# Patient Record
Sex: FEMALE | Race: WHITE | NOT HISPANIC OR LATINO | Employment: FULL TIME | URBAN - METROPOLITAN AREA
[De-identification: names, ages, dates, MRNs, and addresses within clinical notes are randomized per-mention and may not be internally consistent; named-entity substitution may affect disease eponyms.]

---

## 2021-03-31 DIAGNOSIS — Z23 ENCOUNTER FOR IMMUNIZATION: ICD-10-CM

## 2021-04-06 ENCOUNTER — TELEPHONE (OUTPATIENT)
Dept: ADMINISTRATIVE | Facility: OTHER | Age: 46
End: 2021-04-06

## 2021-04-06 NOTE — TELEPHONE ENCOUNTER
----- Message from Alanna Nelson sent at 4/5/2021  4:06 PM EDT -----  04/05/21 4:07 PM    Hello, our patient Ernestina Tucker has had mammogram completed/performed at the [address] [place] [address]   Please assist in updating the patient chart by   The date of service is 8/24/2020    Thank you,  APSTORA Nelson 63

## 2021-04-07 NOTE — TELEPHONE ENCOUNTER
Upon review of the In Basket request we were able to locate, review, and update the patient chart as requested for Mammogram     Any additional questions or concerns should be emailed to the Practice Liaisons via [email or web address] email, please do not reply via In Basket      Thank you  Leila Chavis MA

## 2021-04-08 ENCOUNTER — OFFICE VISIT (OUTPATIENT)
Dept: INTERNAL MEDICINE CLINIC | Facility: CLINIC | Age: 46
End: 2021-04-08
Payer: COMMERCIAL

## 2021-04-08 VITALS
TEMPERATURE: 97 F | BODY MASS INDEX: 25.1 KG/M2 | OXYGEN SATURATION: 98 % | SYSTOLIC BLOOD PRESSURE: 90 MMHG | HEART RATE: 80 BPM | DIASTOLIC BLOOD PRESSURE: 62 MMHG | HEIGHT: 64 IN | WEIGHT: 147 LBS

## 2021-04-08 DIAGNOSIS — R51.9 SINUS HEADACHE: ICD-10-CM

## 2021-04-08 DIAGNOSIS — R14.0 ABDOMINAL DISTENTION: ICD-10-CM

## 2021-04-08 DIAGNOSIS — M54.2 CHRONIC NECK PAIN: ICD-10-CM

## 2021-04-08 DIAGNOSIS — G89.29 CHRONIC BILATERAL LOW BACK PAIN, UNSPECIFIED WHETHER SCIATICA PRESENT: ICD-10-CM

## 2021-04-08 DIAGNOSIS — M54.50 CHRONIC BILATERAL LOW BACK PAIN, UNSPECIFIED WHETHER SCIATICA PRESENT: ICD-10-CM

## 2021-04-08 DIAGNOSIS — K21.9 GASTROESOPHAGEAL REFLUX DISEASE WITHOUT ESOPHAGITIS: ICD-10-CM

## 2021-04-08 DIAGNOSIS — G89.29 CHRONIC NECK PAIN: ICD-10-CM

## 2021-04-08 DIAGNOSIS — R10.10 PAIN OF UPPER ABDOMEN: ICD-10-CM

## 2021-04-08 DIAGNOSIS — Z11.4 SCREENING FOR HIV (HUMAN IMMUNODEFICIENCY VIRUS): ICD-10-CM

## 2021-04-08 DIAGNOSIS — Z00.00 ANNUAL PHYSICAL EXAM: Primary | ICD-10-CM

## 2021-04-08 PROCEDURE — 99204 OFFICE O/P NEW MOD 45 MIN: CPT | Performed by: FAMILY MEDICINE

## 2021-04-08 PROCEDURE — 3725F SCREEN DEPRESSION PERFORMED: CPT | Performed by: FAMILY MEDICINE

## 2021-04-08 RX ORDER — EPINEPHRINE 0.3 MG/.3ML
0.3 INJECTION SUBCUTANEOUS
COMMUNITY
Start: 2021-01-11 | End: 2022-05-12 | Stop reason: SDUPTHER

## 2021-04-08 RX ORDER — CETIRIZINE HYDROCHLORIDE 10 MG/1
10 TABLET ORAL DAILY
COMMUNITY
Start: 2021-01-11 | End: 2021-04-19 | Stop reason: SDUPTHER

## 2021-04-08 RX ORDER — EPINEPHRINE 0.3 MG/.3ML
INJECTION SUBCUTANEOUS
COMMUNITY
Start: 2021-01-14 | End: 2021-06-22 | Stop reason: ALTCHOICE

## 2021-04-08 RX ORDER — FLUOXETINE HYDROCHLORIDE 40 MG/1
CAPSULE ORAL
COMMUNITY
Start: 2020-11-23 | End: 2022-03-14 | Stop reason: SDUPTHER

## 2021-04-08 RX ORDER — HYDROXYZINE HYDROCHLORIDE 10 MG/1
TABLET, FILM COATED ORAL
COMMUNITY
Start: 2021-04-02 | End: 2021-04-09 | Stop reason: SDUPTHER

## 2021-04-08 RX ORDER — MECLIZINE HYDROCHLORIDE 25 MG/1
TABLET ORAL
COMMUNITY
Start: 2021-01-11 | End: 2022-03-14 | Stop reason: SDUPTHER

## 2021-04-08 RX ORDER — OMEPRAZOLE 40 MG/1
40 CAPSULE, DELAYED RELEASE ORAL DAILY
COMMUNITY
Start: 2021-03-23 | End: 2022-03-14 | Stop reason: SDUPTHER

## 2021-04-08 RX ORDER — ALBUTEROL SULFATE 90 UG/1
AEROSOL, METERED RESPIRATORY (INHALATION)
COMMUNITY
Start: 2021-03-26

## 2021-04-08 NOTE — PROGRESS NOTES
ADULT ANNUAL [address]     NAME: Tanya Draper  AGE: 54 y o  SEX: female  : 1975     DATE: 2021     Assessment and Plan:     Problem List Items Addressed This Visit     None      Visit Diagnoses     Annual physical exam    -  Primary    Relevant Orders    Vitamin B12    Vitamin D 25 hydroxy    Sinus headache        Relevant Orders    Ambulatory Referral to Otolaryngology    Screening for HIV (human immunodeficiency virus)        Gastroesophageal reflux disease without esophagitis        Relevant Medications    omeprazole (PriLOSEC) 40 MG capsule    Other Relevant Orders    Ambulatory referral to Gastroenterology    Pain of upper abdomen        Relevant Orders    Ambulatory referral to Gastroenterology    Abdominal distention        Relevant Orders    Ambulatory referral to Gastroenterology    Chronic bilateral low back pain, unspecified whether sciatica present        Chronic neck pain          Plan: referral placed  Pt to continue to follow with pain medicine concerning chronic pain  Fatigue persistent  prelmin labs in chart are relatively normal  Has elevated MCV without anemia and vit D has never been checked  Will add those studies  Immunizations and preventive care screenings were discussed with patient today  Appropriate education was printed on patient's after visit summary  Counseling:  · Exercise: the importance of regular exercise/physical activity was discussed  Recommend exercise 3-5 times per week for at least 30 minutes  BMI Counseling: Body mass index is 25 23 kg/m²  The BMI is above normal  Nutrition recommendations include encouraging healthy choices of fruits and vegetables, limiting drinks that contain sugar and reducing intake of cholesterol  Exercise recommendations include exercising 3-5 times per week             Return in about 1 year (around 2022) for Annual physical      Chief Complaint:     Chief Complaint   Patient presents with    [facility]      History of Present Illness:     Adult Annual Physical   Patient here for a comprehensive physical exam  The patient reports   Sinus migraines treid flonase, nasal spray, anithistamind but no improvement  Worse in the mornign  and abdominal pain and bloating  Worsening with over eatring  Deep knawing pain  Had an ultrasound done but it was unremarkable  Has gerd  On priolasc but doesn't help with discomfort and bloating  She already has an appointment with GI and ENT providers in the area  She needs referral for these providers  Has chronic back and neck pain  Follows with [facility] and pain management      Diet and Physical Activity  · Diet/Nutrition: consuming 3-5 servings of fruits/vegetables daily and drinks soda   · Exercise: walking  Depression Screening  PHQ-9 Depression Screening    PHQ-9:   Frequency of the following problems over the past two weeks:      Little interest or pleasure in doing things: 0 - not at all  Feeling down, depressed, or hopeless: 0 - not at all  PHQ-2 Score: 0       General Health  · Sleep: gets more than 8 hours of sleep on average  · Hearing: normal - bilateral   · Vision: goes for regular eye exams  · Dental: regular dental visits  /GYN Health  · Patient is: hystrectomy   · Last menstrual period: hysrectomy   · Last pap- gets it yearly  · Last mammo- august      Review of Systems:     Review of Systems   Constitutional: Negative for fever  HENT: Positive for sinus pressure  Respiratory: Negative for shortness of breath  Cardiovascular: Negative for leg swelling  Gastrointestinal: Positive for abdominal distention, abdominal pain and rectal pain (ent)  Negative for constipation, diarrhea and vomiting  Musculoskeletal: Positive for back pain and neck pain  Neurological: Positive for dizziness  Past Medical History:     History reviewed   No pertinent past medical history  Past Surgical History:     Past Surgical History:   Procedure Laterality Date    CYST REMOVAL  10/2020    lt breast     HYSTERECTOMY      HYSTERECTOMY  1998    LIPOSUCTION  09/2020      Social History:     E-Cigarette/Vaping    E-Cigarette Use Never User      E-Cigarette/Vaping Substances    Nicotine No     THC No     CBD No     Flavoring No     Other No     Unknown No      Social History     Socioeconomic History    Marital status: Unknown     Spouse name: None    Number of children: None    Years of education: None    Highest education level: None   Occupational History    None   Social Needs    Financial resource strain: None    Food insecurity     Worry: None     Inability: None    Transportation needs     Medical: None     Non-medical: None   Tobacco Use    Smoking status: Never Smoker    Smokeless tobacco: Never Used   Substance and Sexual Activity    Alcohol use: None    Drug use: None    Sexual activity: None   Lifestyle    Physical activity     Days per week: None     Minutes per session: None    Stress: None   Relationships    Social connections     Talks on phone: None     Gets together: None     Attends [employer] service: None     Active member of club or organization: None     Attends meetings of clubs or organizations: None     Relationship status: None    Intimate partner violence     Fear of current or ex partner: None     Emotionally abused: None     Physically abused: None     Forced sexual activity: None   Other Topics Concern    None   Social History Narrative    None      Family History:     History reviewed  No pertinent family history     Current Medications:     Current Outpatient Medications   Medication Sig Dispense Refill    albuterol (PROVENTIL HFA,VENTOLIN HFA) 90 mcg/act inhaler INHALE 2 PUFFS BY MOUTH AND INTO THE LUNGS 2 TIMES A DAY AS NEEDED FOR WHEEZING      BIOTIN EXTRA STRENGTH PO Take by mouth      cetirizine (ZyrTEC) 10 mg tablet Take 10 mg by mouth daily      Diclofenac Sodium (VOLTAREN) 1 % apply 1 APPLICATION topically four times a day      EPINEPHrine (EPIPEN) 0 3 mg/0 3 mL SOAJ inject 0 3 milliliters ( 0 3 milligrams ) intramuscularly in OUTE     (REFER TO PRESCRIPTION NOTES)   EPINEPHrine (EPIPEN) 0 3 mg/0 3 mL SOAJ Inject 0 3 mg into a muscle      FLUoxetine (PROzac) 40 MG capsule take 1 capsule by mouth daily      hydrOXYzine HCL (ATARAX) 10 mg tablet       meclizine (ANTIVERT) 25 mg tablet take 1 tablet by mouth twice a day if needed for dizziness      omeprazole (PriLOSEC) 40 MG capsule Take 40 mg by mouth daily       No current facility-administered medications for this visit  Allergies: Allergies   Allergen Reactions    Bee Venom Anaphylaxis    Iodides Other (See Comments)    [place] (Diagnostic) - Food Allergy Hives     Gi upset    [place] Oil - Food Allergy GI Intolerance    Aspirin Other (See Comments)     shaking  convulsions      Metronidazole GI Intolerance     Gi upset      Sulfamethoxazole-Trimethoprim GI Intolerance     Gi upst      Tape  [Medical Tape] Other (See Comments) and Hives     fever  Other reaction(s): Other  fever      Physical Exam:     BP 90/62 (BP Location: Left arm, Patient Position: Sitting) Comment: fsdf  Pulse 80   Temp (!) 97 °F (36 1 °C) (Tympanic) Comment: gdfgd  Ht 5' 4" (1 626 m)   Wt 66 7 kg (147 lb)   SpO2 98%   BMI 25 23 kg/m²     Physical Exam  HENT:      Head: Normocephalic and atraumatic  Right Ear: Tympanic membrane and external ear normal       Left Ear: Tympanic membrane and external ear normal       Nose: Congestion present  Eyes:      Extraocular Movements: Extraocular movements intact  Conjunctiva/sclera: Conjunctivae normal       Pupils: Pupils are equal, round, and reactive to light  Cardiovascular:      Rate and Rhythm: Normal rate and regular rhythm  Heart sounds: No murmur     Pulmonary:      Effort: Pulmonary effort is normal       Breath sounds: Normal breath sounds  Abdominal:      General: Bowel sounds are normal  There is distension (epigastric region)  Palpations: Abdomen is soft  Tenderness: There is no abdominal tenderness  There is no guarding or rebound  Musculoskeletal:      Right lower leg: No edema  Left lower leg: No edema  Skin:     General: Skin is warm  Neurological:      Mental Status: She is alert and oriented to person, place, and time        Gait: Gait normal       Deep Tendon Reflexes: Reflexes normal    Psychiatric:         Mood and Affect: Mood normal          Behavior: Behavior normal           Enma Timmons,   MEDICAL [id number]

## 2021-04-08 NOTE — PATIENT INSTRUCTIONS

## 2021-04-09 DIAGNOSIS — F41.9 ANXIETY: Primary | ICD-10-CM

## 2021-04-09 DIAGNOSIS — G47.00 INSOMNIA, UNSPECIFIED TYPE: ICD-10-CM

## 2021-04-09 RX ORDER — HYDROXYZINE HYDROCHLORIDE 10 MG/1
10 TABLET, FILM COATED ORAL
Qty: 30 TABLET | Refills: 0 | Status: SHIPPED | OUTPATIENT
Start: 2021-04-09 | End: 2021-05-25

## 2021-04-09 NOTE — TELEPHONE ENCOUNTER
----- Message from Gary Rangel sent at 4/8/2021  5:55 PM EDT -----  Regarding: Non-Urgent Medical Question  Contact: [phone]  I need my hydroxizene filled

## 2021-04-10 ENCOUNTER — APPOINTMENT (OUTPATIENT)
Dept: LAB | Facility: CLINIC | Age: 46
End: 2021-04-10
Payer: COMMERCIAL

## 2021-04-10 DIAGNOSIS — Z00.00 ANNUAL PHYSICAL EXAM: ICD-10-CM

## 2021-04-10 LAB
25(OH)D3 SERPL-MCNC: 25.4 NG/ML (ref 30–100)
VIT B12 SERPL-MCNC: 426 PG/ML (ref 100–900)

## 2021-04-10 PROCEDURE — 36415 COLL VENOUS BLD VENIPUNCTURE: CPT

## 2021-04-10 PROCEDURE — 82306 VITAMIN D 25 HYDROXY: CPT

## 2021-04-10 PROCEDURE — 82607 VITAMIN B-12: CPT

## 2021-04-12 ENCOUNTER — TELEPHONE (OUTPATIENT)
Dept: INTERNAL MEDICINE CLINIC | Facility: CLINIC | Age: 46
End: 2021-04-12

## 2021-04-12 NOTE — TELEPHONE ENCOUNTER
----- Message from Dominic Shine DO sent at 4/12/2021  9:25 AM EDT -----  Vit b12 normal  Vit d is low  Pt should start vit  D daily supplement

## 2021-04-15 ENCOUNTER — OFFICE VISIT (OUTPATIENT)
Dept: GASTROENTEROLOGY | Facility: CLINIC | Age: 46
End: 2021-04-15
Payer: COMMERCIAL

## 2021-04-15 VITALS
HEIGHT: 64 IN | SYSTOLIC BLOOD PRESSURE: 110 MMHG | HEART RATE: 78 BPM | BODY MASS INDEX: 25.61 KG/M2 | DIASTOLIC BLOOD PRESSURE: 80 MMHG | WEIGHT: 150 LBS | RESPIRATION RATE: 18 BRPM

## 2021-04-15 DIAGNOSIS — K21.9 GASTROESOPHAGEAL REFLUX DISEASE WITHOUT ESOPHAGITIS: ICD-10-CM

## 2021-04-15 DIAGNOSIS — R11.0 NAUSEA: ICD-10-CM

## 2021-04-15 DIAGNOSIS — R14.0 ABDOMINAL DISTENTION: ICD-10-CM

## 2021-04-15 DIAGNOSIS — R19.8 ALTERNATING CONSTIPATION AND DIARRHEA: Primary | ICD-10-CM

## 2021-04-15 DIAGNOSIS — R10.10 PAIN OF UPPER ABDOMEN: ICD-10-CM

## 2021-04-15 PROCEDURE — 99244 OFF/OP CNSLTJ NEW/EST MOD 40: CPT | Performed by: INTERNAL MEDICINE

## 2021-04-15 NOTE — LETTER
April 15, 2021     [facility] [address]    Patient: Gary Rangel   YOB: 1975   Date of Visit: 4/15/2021       Dear Dr Cesar Pruitt: Thank you for referring Gary Rangel to me for evaluation  Below are my notes for this consultation  If you have questions, please do not hesitate to call me  I look forward to following your patient along with you  Sincerely,        Aj Stokes MD        CC: No Recipients  Aj Stokes MD  4/15/2021  3:52 PM  Incomplete  [facility]    Dear   Dr Cesar Pruitt,     I had the pleasure of seeing your patient Gary Rangel in the office today and I thank you for this kind referral        Chief Complaint:  Abdominal lump and pain      HPI:  Gary Rangel is a 54 y o  female who presents with  Several GI issues  These appear to be associated with a lump that she feels in her mid abdomen  The patient had liposuction years ago  She has what she describes is a lump which comes and goes above the umbilicus  When it comes it is visible  It causes her pain most of the time, especially when she tries to sit up  When it is very bad she has significant GERD symptomatology  She has been taking Prilosec for years which usually works except during the episodes where she feels the lump  She has nausea but no vomiting  She has no dysphagia  She denies any weight loss or melena  No fever or chills  Her reflux symptomatology is otherwise not troubling unless the lump is present  She also notes constipation and diarrhea alternating  This is been going on for quite some time  She has a lot of low back issues  She does not take frequent nonsteroidals  She has no rectal bleeding melena or hematochezia  She has no change in the caliber of her stool  There is no family history of any GI disease  She had been gastroscoped many years ago  She has never been colonoscoped  She takes Prilosec on a daily basis 1/2 hour before her 1st meal of the day         Review of Systems:   Constitutional: No fever or chills, feels well, no tiredness, no recent weight gain or weight loss  HENT: No complaints of earache, no hearing loss, no nosebleeds, no nasal discharge, no sore throat, no hoarseness  Eyes: No complaints of eye pain, no red eyes, no discharge from eyes, no itchy eyes  Cardiovascular: No complaints of slow heart rate, no fast heart rate, no chest pain, no palpitations, no leg claudication, no lower extremity edema  Respiratory: No complaints of shortness of breath, no wheezing, no cough, no SOB on exertion, no orthopnea  Gastrointestinal: As noted in HPI  Genitourinary: No complaints of dysuria, no incontinence, no hesitancy, no nocturia  Musculoskeletal:   Chronic low back pain  Neurological: No complaints of headache, no confusion, no convulsions, no numbness or tingling, no dizziness or fainting, no limb weakness, no difficulty walking  Skin: No complaints of skin rash or skin lesions, no itching, no skin wound, no dry skin  Hematological/Lymphatic: No complaints of swollen glands, does not bleed easy  Allergic/Immunologic: No immunocompromised state  Endocrine:  No complaints of polyuria, no polydipsia  Psychiatric/Behavioral: is not suicidal, no sleep disturbances, no anxiety or depression, no change in personality, no emotional problems         Historical Information   Past Medical History:   Diagnosis Date    Degenerative joint disease     Depression     GERD (gastroesophageal reflux disease)     Hyperlipidemia     Lumbar radiculopathy     Thyroid nodule      Past Surgical History:   Procedure Laterality Date    CYST REMOVAL  10/2020    lt breast     HYSTERECTOMY      HYSTERECTOMY  1998    LIPOSUCTION  09/2020     Social History   Social History     Substance and Sexual Activity   Alcohol Use None     Social History     Substance and Sexual Activity   Drug Use Not on file     Social History     Tobacco Use   Smoking Status Never Smoker   Smokeless Tobacco Never Used     History reviewed  No pertinent family history  Current Medications: has a current medication list which includes the following prescription(s): albuterol, biotin, cetirizine, diclofenac sodium, epinephrine, fluoxetine, hydroxyzine hcl, meclizine, omeprazole, and epinephrine  Vital Signs: /80   Pulse 78   Resp 18   Ht 5' 4" (1 626 m)   Wt 68 kg (150 lb)   BMI 25 75 kg/m²     Physical Exam:   Constitutional  General Appearance: No acute distress, well appearing and well nourished  Head  Normocephalic  Eyes  Conjunctivae and lids: No swelling, erythema, or discharge  Pupils and irises: Equal, round and reactive to light  Ears, Nose, Mouth, and Throat  External inspection of ears and nose: Normal  Nasal mucosa, septum and turbinates: Normal without edema or erythema/   Oropharynx: Normal with no erythema, edema, exudate or lesions  Neck  Normal range of motion  Neck supple  Cardiovascular  Auscultation of the heart: Normal rate and rhythm, normal S1 and S2 without murmurs  Examination of the extremities for edema and/or varicosities: Normal  Pulmonary/Chest  Respiratory effort: No increased work of breathing or signs of respiratory distress  Auscultation of lungs: Clear to auscultation, equal breath sounds bilaterally, no wheezes, rales, no rhonchi  Abdomen  Abdomen:  Positive tender linear elevation above the umbilicus in the midline, positive Carnett sign  Liver and spleen: No hepatomegaly or splenomegaly  Musculoskeletal  Gait and station: normal   Digits and Nails: normal without clubbing or cyanosis  Inspection/palpation of joints, bones, and muscles: Normal  Neurological  No nystagmus or asterixis  Skin  Skin and subcutaneous tissue: Normal without rashes or lesions  Lymphatic  Palpation of the lymph nodes in neck: No lymphadenopathy  Psychiatric  Orientation to person, place and time: Normal   Mood and affect: Normal          Labs:   No results found for: ALT, AST, BUN, CALCIUM, CL, CHOL, CO2, CREATININE, GFRAA, GFRNONAA, HDL, HCT, HGB, HGBA1C, LDL, MG, PHOS, PLT, K, PSA, NA, TRIG, WBC      X-Rays & Procedures:   No orders to display         ______________________________________________________________________      Assessment & Plan:      Diagnoses and all orders for this visit:    Alternating constipation and diarrhea  -     Colonoscopy; Future    Gastroesophageal reflux disease without esophagitis  -     Ambulatory referral to Gastroenterology  -     EGD; Future    Pain of upper abdomen  -     Ambulatory referral to Gastroenterology  -     EGD; Future    Abdominal distention  -     Ambulatory referral to Gastroenterology  -     Colonoscopy; Future    Nausea  -     EGD; Future       the pain she is having in the area of her lump may indeed be a musculoskeletal  Issue or perhaps  An abdominal wall syndrome secondary to her Lipo suction, since she has a positive Carnett sign  I have advised that she see a surgeon and have given her a referral to Dr Kobe Arevalo  Concerning the chronic GERD with breakthrough as well as the nausea and alternating bowel habits I have taken liberty of scheduling her for both EGD and colonoscopy  I Will be happy to inform you of her results and further  Recommendations  I would like to thank you for allowing me to participate in her care              With warmest regards,    William Thompson MD, [facility]

## 2021-04-15 NOTE — PROGRESS NOTES
[facility]    Dear   Dr Arturo Pike,     I had the pleasure of seeing your patient Shay Domingo in the office today and I thank you for this kind referral        Chief Complaint:  Abdominal lump and pain      HPI:  Shay Domingo is a 54 y o  female who presents with  Several GI issues  These appear to be associated with a lump that she feels in her mid abdomen  The patient had liposuction years ago  She has what she describes is a lump which comes and goes above the umbilicus  When it comes it is visible  It causes her pain most of the time, especially when she tries to sit up  When it is very bad she has significant GERD symptomatology  She has been taking Prilosec for years which usually works except during the episodes where she feels the lump  She has nausea but no vomiting  She has no dysphagia  She denies any weight loss or melena  No fever or chills  Her reflux symptomatology is otherwise not troubling unless the lump is present  She also notes constipation and diarrhea alternating  This is been going on for quite some time  She has a lot of low back issues  She does not take frequent nonsteroidals  She has no rectal bleeding melena or hematochezia  She has no change in the caliber of her stool  There is no family history of any GI disease  She had been gastroscoped many years ago  She has never been colonoscoped  She takes Prilosec on a daily basis 1/2 hour before her 1st meal of the day         Review of Systems:   Constitutional: No fever or chills, feels well, no tiredness, no recent weight gain or weight loss  HENT: No complaints of earache, no hearing loss, no nosebleeds, no nasal discharge, no sore throat, no hoarseness  Eyes: No complaints of eye pain, no red eyes, no discharge from eyes, no itchy eyes    Cardiovascular: No complaints of slow heart rate, no fast heart rate, no chest pain, no palpitations, no leg claudication, no lower extremity edema  Respiratory: No complaints of shortness of breath, no wheezing, no cough, no SOB on exertion, no orthopnea  Gastrointestinal: As noted in HPI  Genitourinary: No complaints of dysuria, no incontinence, no hesitancy, no nocturia  Musculoskeletal:   Chronic low back pain  Neurological: No complaints of headache, no confusion, no convulsions, no numbness or tingling, no dizziness or fainting, no limb weakness, no difficulty walking  Skin: No complaints of skin rash or skin lesions, no itching, no skin wound, no dry skin  Hematological/Lymphatic: No complaints of swollen glands, does not bleed easy  Allergic/Immunologic: No immunocompromised state  Endocrine:  No complaints of polyuria, no polydipsia  Psychiatric/Behavioral: is not suicidal, no sleep disturbances, no anxiety or depression, no change in personality, no emotional problems  Historical Information   Past Medical History:   Diagnosis Date    Degenerative joint disease     Depression     GERD (gastroesophageal reflux disease)     Hyperlipidemia     Lumbar radiculopathy     Thyroid nodule      Past Surgical History:   Procedure Laterality Date    CYST REMOVAL  10/2020    lt breast     HYSTERECTOMY      HYSTERECTOMY  1998    LIPOSUCTION  09/2020     Social History   Social History     Substance and Sexual Activity   Alcohol Use None     Social History     Substance and Sexual Activity   Drug Use Not on file     Social History     Tobacco Use   Smoking Status Never Smoker   Smokeless Tobacco Never Used     History reviewed  No pertinent family history  Current Medications: has a current medication list which includes the following prescription(s): albuterol, biotin, cetirizine, diclofenac sodium, epinephrine, fluoxetine, hydroxyzine hcl, meclizine, omeprazole, and epinephrine         Vital Signs: /80   Pulse 78   Resp 18   Ht 5' 4" (1 626 m)   Wt 68 kg (150 lb)   BMI 25 75 kg/m²     Physical Exam: Constitutional  General Appearance: No acute distress, well appearing and well nourished  Head  Normocephalic  Eyes  Conjunctivae and lids: No swelling, erythema, or discharge  Pupils and irises: Equal, round and reactive to light  Ears, Nose, Mouth, and Throat  External inspection of ears and nose: Normal  Nasal mucosa, septum and turbinates: Normal without edema or erythema/   Oropharynx: Normal with no erythema, edema, exudate or lesions  Neck  Normal range of motion  Neck supple  Cardiovascular  Auscultation of the heart: Normal rate and rhythm, normal S1 and S2 without murmurs  Examination of the extremities for edema and/or varicosities: Normal  Pulmonary/Chest  Respiratory effort: No increased work of breathing or signs of respiratory distress  Auscultation of lungs: Clear to auscultation, equal breath sounds bilaterally, no wheezes, rales, no rhonchi  Abdomen  Abdomen:  Positive tender linear elevation above the umbilicus in the midline, positive Carnett sign  Liver and spleen: No hepatomegaly or splenomegaly  Musculoskeletal  Gait and station: normal   Digits and Nails: normal without clubbing or cyanosis  Inspection/palpation of joints, bones, and muscles: Normal  Neurological  No nystagmus or asterixis  Skin  Skin and subcutaneous tissue: Normal without rashes or lesions  Lymphatic  Palpation of the lymph nodes in neck: No lymphadenopathy  Psychiatric  Orientation to person, place and time: Normal   Mood and affect: Normal          Labs:   No results found for: ALT, AST, BUN, CALCIUM, CL, CHOL, CO2, CREATININE, GFRAA, GFRNONAA, HDL, HCT, HGB, HGBA1C, LDL, MG, PHOS, PLT, K, PSA, NA, TRIG, WBC      X-Rays & Procedures:   No orders to display         ______________________________________________________________________      Assessment & Plan:      Diagnoses and all orders for this visit:    Alternating constipation and diarrhea  -     Colonoscopy;  Future    Gastroesophageal reflux disease without esophagitis  -     Ambulatory referral to Gastroenterology  -     EGD; Future    Pain of upper abdomen  -     Ambulatory referral to Gastroenterology  -     EGD; Future    Abdominal distention  -     Ambulatory referral to Gastroenterology  -     Colonoscopy; Future    Nausea  -     EGD; Future       the pain she is having in the area of her lump may indeed be a musculoskeletal  Issue or perhaps  An abdominal wall syndrome secondary to her Lipo suction, since she has a positive Carnett sign  I have advised that she see a surgeon and have given her a referral to Dr Ronn Joshi  Concerning the chronic GERD with breakthrough as well as the nausea and alternating bowel habits I have taken liberty of scheduling her for both EGD and colonoscopy  I Will be happy to inform you of her results and further  Recommendations  I would like to thank you for allowing me to participate in her care              With warmest regards,    Kelly Barroso MD, [facility]

## 2021-04-16 ENCOUNTER — TELEPHONE (OUTPATIENT)
Dept: INTERNAL MEDICINE CLINIC | Facility: CLINIC | Age: 46
End: 2021-04-16

## 2021-04-16 DIAGNOSIS — R14.0 ABDOMINAL DISTENTION: ICD-10-CM

## 2021-04-16 DIAGNOSIS — R10.10 PAIN OF UPPER ABDOMEN: Primary | ICD-10-CM

## 2021-04-16 NOTE — TELEPHONE ENCOUNTER
Dr SIM \[facility]\"" referred pt to GI, who she saw yesterday for a lump in her stomach    he referred her to surgeon, Dr Sven Kessler Side she was then told that they can't accept the referral for surgery from GI, it has to come from her pcp  She is asking if Dr SIM \[facility]\"" would do the referral  She has an appt w/Dr Sven Mcdonnell for eval 4/23 @ 11:15    Pls advise pt of status of this request

## 2021-04-19 DIAGNOSIS — T78.40XD ALLERGY, SUBSEQUENT ENCOUNTER: Primary | ICD-10-CM

## 2021-04-19 RX ORDER — CETIRIZINE HYDROCHLORIDE 10 MG/1
10 TABLET ORAL DAILY
Qty: 90 TABLET | Refills: 1 | Status: SHIPPED | OUTPATIENT
Start: 2021-04-19 | End: 2021-10-23

## 2021-04-19 NOTE — TELEPHONE ENCOUNTER
----- Message from Nicole Patino sent at 4/19/2021  8:40 AM EDT -----  Regarding: Non-Urgent Medical Question  Contact: [phone]  I need a refill on my Zyrtec   Thanx No

## 2021-04-22 ENCOUNTER — TELEPHONE (OUTPATIENT)
Dept: INTERNAL MEDICINE CLINIC | Facility: CLINIC | Age: 46
End: 2021-04-22

## 2021-04-22 DIAGNOSIS — M54.50 CHRONIC BILATERAL LOW BACK PAIN, UNSPECIFIED WHETHER SCIATICA PRESENT: Primary | ICD-10-CM

## 2021-04-22 DIAGNOSIS — G89.29 CHRONIC NECK PAIN: ICD-10-CM

## 2021-04-22 DIAGNOSIS — M54.2 CHRONIC NECK PAIN: ICD-10-CM

## 2021-04-22 DIAGNOSIS — G89.29 CHRONIC BILATERAL LOW BACK PAIN, UNSPECIFIED WHETHER SCIATICA PRESENT: Primary | ICD-10-CM

## 2021-04-22 NOTE — TELEPHONE ENCOUNTER
Patient said she was referred to [facility]  who doesn't take her ins and she needs Dr Claude Torres to refer her to [address] [place] [address]'s spine & pain for her back & neck problem  Joshua Nuñez

## 2021-04-23 ENCOUNTER — TELEPHONE (OUTPATIENT)
Dept: PHYSICAL THERAPY | Facility: OTHER | Age: 46
End: 2021-04-23

## 2021-04-23 ENCOUNTER — CONSULT (OUTPATIENT)
Dept: SURGERY | Facility: CLINIC | Age: 46
End: 2021-04-23
Payer: COMMERCIAL

## 2021-04-23 VITALS
DIASTOLIC BLOOD PRESSURE: 68 MMHG | SYSTOLIC BLOOD PRESSURE: 118 MMHG | HEART RATE: 82 BPM | BODY MASS INDEX: 25.95 KG/M2 | HEIGHT: 64 IN | WEIGHT: 152 LBS | TEMPERATURE: 98.7 F

## 2021-04-23 DIAGNOSIS — R10.84 GENERALIZED ABDOMINAL PAIN: Primary | ICD-10-CM

## 2021-04-23 DIAGNOSIS — R19.06 EPIGASTRIC LUMP: Chronic | ICD-10-CM

## 2021-04-23 DIAGNOSIS — R10.10 PAIN OF UPPER ABDOMEN: ICD-10-CM

## 2021-04-23 PROCEDURE — 99244 OFF/OP CNSLTJ NEW/EST MOD 40: CPT | Performed by: SURGERY

## 2021-04-23 PROCEDURE — 1036F TOBACCO NON-USER: CPT | Performed by: SURGERY

## 2021-04-23 PROCEDURE — 3008F BODY MASS INDEX DOCD: CPT | Performed by: SURGERY

## 2021-04-23 NOTE — PROGRESS NOTES
Consult- General Surgery   Daniela Thomas 54 y o  female MRN: [id number]  Unit/Bed#:  Encounter: [id number]    Assessment/Plan     Assessment:   epigastric abdominal pain, painful lump   History of depression  GERD   Hyperlipidemia  Plan:  The patient is given a myriad of symptoms, nonspecific  I will get a CT scan of the abdomen and pelvis to investigate  The patient is allergic to IV contrast, she will get pre CT scan medication to avoid reaction  The patient will return to my office in a couple weeks for follow-up  History of Present Illness     HPI:  Daniela Thomas is a 54 y o  female who presents   To my office  For evaluation of epigastric lump  The patient noticed this lump September 2020, she also had liposuction September 2020  The lump has increased in size, causing discomfort and pain depending on the physical activity  Once the pain starts she started complaining of nausea without vomiting and radiated to the esophagus area  She is also complaining of loose bowel movement intermittent with constipation  She has been evaluated by GI and she is scheduled to have EGD and colonoscopy in the near future she never had imaging studies  Review of Systems   All other systems reviewed and are negative        Historical Information   Past Medical History:   Diagnosis Date    Degenerative joint disease     Depression     GERD (gastroesophageal reflux disease)     Hyperlipidemia     Lumbar radiculopathy     Thyroid nodule      Past Surgical History:   Procedure Laterality Date    CYST REMOVAL  10/2020    lt breast     HYSTERECTOMY      HYSTERECTOMY  1998    LIPOSUCTION  09/2020     Social History   Social History     Substance and Sexual Activity   Alcohol Use None     Social History     Substance and Sexual Activity   Drug Use Not on file     Social History     Tobacco Use   Smoking Status Never Smoker   Smokeless Tobacco Never Used     Family History: non-contributory    Meds/Allergies all medications and allergies reviewed     Current Outpatient Medications:     albuterol (PROVENTIL HFA,VENTOLIN HFA) 90 mcg/act inhaler, INHALE 2 PUFFS BY MOUTH AND INTO THE LUNGS 2 TIMES A DAY AS NEEDED FOR WHEEZING, Disp: , Rfl:     BIOTIN EXTRA STRENGTH PO, Take by mouth, Disp: , Rfl:     cetirizine (ZyrTEC) 10 mg tablet, Take 1 tablet (10 mg total) by mouth daily, Disp: 90 tablet, Rfl: 1    Diclofenac Sodium (VOLTAREN) 1 %, apply 1 APPLICATION topically four times a day, Disp: , Rfl:     EPINEPHrine (EPIPEN) 0 3 mg/0 3 mL SOAJ, Inject 0 3 mg into a muscle, Disp: , Rfl:     FLUoxetine (PROzac) 40 MG capsule, take 1 capsule by mouth daily, Disp: , Rfl:     hydrOXYzine HCL (ATARAX) 10 mg tablet, Take 1 tablet (10 mg total) by mouth daily at bedtime, Disp: 30 tablet, Rfl: 0    meclizine (ANTIVERT) 25 mg tablet, take 1 tablet by mouth twice a day if needed for dizziness, Disp: , Rfl:     omeprazole (PriLOSEC) 40 MG capsule, Take 40 mg by mouth daily, Disp: , Rfl:     EPINEPHrine (EPIPEN) 0 3 mg/0 3 mL SOAJ, inject 0 3 milliliters ( 0 3 milligrams ) intramuscularly in OUTE     (REFER TO PRESCRIPTION NOTES)  , Disp: , Rfl:   Allergies   Allergen Reactions    Bee Venom Anaphylaxis    Iodides Other (See Comments)    Layton (Diagnostic) - Food Allergy Hives     Gi upset    Layton Oil - Food Allergy GI Intolerance    Aspirin Other (See Comments)     shaking  convulsions      Metronidazole GI Intolerance     Gi upset      Sulfamethoxazole-Trimethoprim GI Intolerance     Gi upst      Tape  [Medical Tape] Other (See Comments) and Hives     fever  Other reaction(s):  Other  fever       Objective     Current Vitals:   Blood Pressure: 118/68 (04/23/21 1108)  Pulse: 82 (04/23/21 1108)  Temperature: 98 7 °F (37 1 °C) (04/23/21 1108)  Temp Source: Temporal (04/23/21 1108)  Height: 5' 4" (162 6 cm) (04/23/21 1108)  Weight - Scale: 68 9 kg (152 lb) (04/23/21 1108)      Physical Exam  Vitals signs and nursing note reviewed  Constitutional:       General: She is not in acute distress  Cardiovascular:      Rate and Rhythm: Normal rate and regular rhythm  Heart sounds: Normal heart sounds  No murmur  Pulmonary:      Effort: No respiratory distress  Breath sounds: Normal breath sounds  Abdominal:      Comments: Abdomen is soft, nondistended, mild to moderate tenderness in the supraumbilical region with fullness without obvious hernia noted  There is also generalized tenderness without guarding or rebound  There is no obvious mass palpable or visceromegaly noted  The patient has a Pfannenstiel surgical scar from hysterectomy  Skin:     General: Skin is warm  Coloration: Skin is not jaundiced  Findings: No erythema or rash  Neurological:      Mental Status: She is alert and oriented to person, place, and time  Cranial Nerves: No cranial nerve deficit     Psychiatric:         Mood and Affect: Mood normal          Behavior: Behavior normal

## 2021-04-27 ENCOUNTER — NURSE TRIAGE (OUTPATIENT)
Dept: PHYSICAL THERAPY | Facility: OTHER | Age: 46
End: 2021-04-27

## 2021-04-27 DIAGNOSIS — M54.41 CHRONIC BILATERAL LOW BACK PAIN WITH RIGHT-SIDED SCIATICA: Primary | ICD-10-CM

## 2021-04-27 DIAGNOSIS — G89.29 CHRONIC BILATERAL LOW BACK PAIN WITH RIGHT-SIDED SCIATICA: Primary | ICD-10-CM

## 2021-04-27 NOTE — TELEPHONE ENCOUNTER
Additional Information   Negative: Is this related to a work injury?  Negative: Is this related to an MVA?  Negative: Are you currently recieving homecare services?  Negative: Has the patient had unexplained weight loss?  Negative: Does the patient have a fever?  Negative: Is the patient experiencing blood in sputum?  Negative: Is the patient experiencing urine retention?  Negative: Is the patient experiencing acute drop foot or paralysis?  Negative: Has the patient experienced major trauma? (fall from height, high speed collision, direct blow to spine) and is also experiencing nausea, light-headedness, or loss of consciousness?  Affirmative: Is this a chronic condition? Background - Initial Assessment  Clinical complaint: bilateral lower back pain which radiates down the right leg to the foot at times  States she has a history of chronic back pain for over 20 years  States she has seen pain management for injections in the past  States she recently moved to this area and needs to establish with a new Pain Management group  Date of onset: 20 years  Frequency of pain: constant  Quality of pain: aching, sharp, shooting, stabbing and throbbing    Protocols used: SL [facility] COMPREHENSIVE SPINE PROGRAM PROTOCOL    This RN did review in detail the [facility] and what we can provide for their back pain  Patient is agreeable to being triaged by this RN and would like to have an evaluation with [address] [place] [address]  Patient is declining a PT eval at this time stating she has done PT in the past and was not helpful  Patient informed that I will be placing a referral to [facility] and that their office will be contacting her to discuss her appointment  No further questions and/or concerns were voiced by the patient at this time  Patient states understanding of the referral that was placed  Referral Closed

## 2021-04-28 ENCOUNTER — TELEPHONE (OUTPATIENT)
Dept: INTERNAL MEDICINE CLINIC | Facility: CLINIC | Age: 46
End: 2021-04-28

## 2021-05-07 ENCOUNTER — PATIENT MESSAGE (OUTPATIENT)
Dept: INTERNAL MEDICINE CLINIC | Facility: CLINIC | Age: 46
End: 2021-05-07

## 2021-05-12 ENCOUNTER — TELEPHONE (OUTPATIENT)
Dept: GASTROENTEROLOGY | Facility: CLINIC | Age: 46
End: 2021-05-12

## 2021-05-20 ENCOUNTER — TELEPHONE (OUTPATIENT)
Dept: INTERVENTIONAL RADIOLOGY/VASCULAR | Facility: HOSPITAL | Age: 46
End: 2021-05-20

## 2021-05-21 ENCOUNTER — TELEPHONE (OUTPATIENT)
Dept: INTERVENTIONAL RADIOLOGY/VASCULAR | Facility: HOSPITAL | Age: 46
End: 2021-05-21

## 2021-05-21 NOTE — NURSING NOTE
Pt was called to discuss prep medication instructions for upcoming CT abd/pelvis with PO and IV contrast and pt verbalized concerns regarding contrast stating "I can't take steroids  " pt describes "they make me sweat, a fast heart rate, I get anxious and hot" Pt also verbalized concern with drinking the po contrast stating "I don't think I'll be able to tolerate drinking the contrast"  Pt is unsure of what to do as she knows she needs the test to be done  I will reach out to the ordering provider for them to discuss other options with the patient   Pt verbalized no issues with MRI contrast

## 2021-05-21 NOTE — TELEPHONE ENCOUNTER
Nurse Malcom Fontana called to inform us of the message listed above  Please review and advise as to how to move forward with this pt   Thank you

## 2021-05-24 ENCOUNTER — TELEPHONE (OUTPATIENT)
Dept: INTERVENTIONAL RADIOLOGY/VASCULAR | Facility: HOSPITAL | Age: 46
End: 2021-05-24

## 2021-05-25 DIAGNOSIS — G47.00 INSOMNIA, UNSPECIFIED TYPE: ICD-10-CM

## 2021-05-25 RX ORDER — HYDROXYZINE HYDROCHLORIDE 10 MG/1
TABLET, FILM COATED ORAL
Qty: 30 TABLET | Refills: 0 | Status: SHIPPED | OUTPATIENT
Start: 2021-05-25 | End: 2021-06-28

## 2021-06-03 ENCOUNTER — HOSPITAL ENCOUNTER (OUTPATIENT)
Dept: CT IMAGING | Facility: HOSPITAL | Age: 46
Discharge: HOME | End: 2021-06-03
Attending: SURGERY
Payer: COMMERCIAL

## 2021-06-03 DIAGNOSIS — R10.84 GENERALIZED ABDOMINAL PAIN: ICD-10-CM

## 2021-06-03 DIAGNOSIS — R10.10 PAIN OF UPPER ABDOMEN: ICD-10-CM

## 2021-06-03 PROCEDURE — G1004 CDSM NDSC: HCPCS

## 2021-06-03 PROCEDURE — 74176 CT ABD & PELVIS W/O CONTRAST: CPT

## 2021-06-15 ENCOUNTER — HOSPITAL ENCOUNTER (EMERGENCY)
Facility: HOSPITAL | Age: 46
Discharge: HOME | End: 2021-06-15
Attending: EMERGENCY MEDICINE
Payer: COMMERCIAL

## 2021-06-15 VITALS
TEMPERATURE: 98.1 F | HEART RATE: 78 BPM | DIASTOLIC BLOOD PRESSURE: 70 MMHG | OXYGEN SATURATION: 100 % | BODY MASS INDEX: 26.07 KG/M2 | SYSTOLIC BLOOD PRESSURE: 146 MMHG | RESPIRATION RATE: 16 BRPM | WEIGHT: 151.9 LBS

## 2021-06-15 DIAGNOSIS — M54.31 SCIATICA, RIGHT SIDE: ICD-10-CM

## 2021-06-15 DIAGNOSIS — G89.29 ACUTE EXACERBATION OF CHRONIC LOW BACK PAIN: Primary | ICD-10-CM

## 2021-06-15 DIAGNOSIS — M54.50 ACUTE EXACERBATION OF CHRONIC LOW BACK PAIN: Primary | ICD-10-CM

## 2021-06-15 PROCEDURE — 99283 EMERGENCY DEPT VISIT LOW MDM: CPT

## 2021-06-15 PROCEDURE — 99285 EMERGENCY DEPT VISIT HI MDM: CPT | Performed by: EMERGENCY MEDICINE

## 2021-06-15 RX ORDER — PREDNISONE 10 MG/1
TABLET ORAL
Qty: 40 TABLET | Refills: 0 | Status: SHIPPED | OUTPATIENT
Start: 2021-06-15 | End: 2021-06-25

## 2021-06-15 RX ORDER — DIAZEPAM 5 MG/1
5 TABLET ORAL EVERY 8 HOURS PRN
Qty: 20 TABLET | Refills: 0 | Status: SHIPPED | OUTPATIENT
Start: 2021-06-15 | End: 2021-09-01

## 2021-06-15 RX ORDER — IBUPROFEN 600 MG/1
600 TABLET ORAL EVERY 6 HOURS PRN
Qty: 60 TABLET | Refills: 0 | Status: SHIPPED | OUTPATIENT
Start: 2021-06-15 | End: 2022-03-14

## 2021-06-15 RX ORDER — IBUPROFEN 600 MG/1
600 TABLET ORAL ONCE
Status: COMPLETED | OUTPATIENT
Start: 2021-06-15 | End: 2021-06-15

## 2021-06-15 RX ORDER — LIDOCAINE 50 MG/G
1 PATCH TOPICAL ONCE
Status: DISCONTINUED | OUTPATIENT
Start: 2021-06-15 | End: 2021-06-15 | Stop reason: HOSPADM

## 2021-06-15 RX ORDER — PREDNISONE 20 MG/1
60 TABLET ORAL ONCE
Status: DISCONTINUED | OUTPATIENT
Start: 2021-06-15 | End: 2021-06-15 | Stop reason: HOSPADM

## 2021-06-15 RX ADMIN — IBUPROFEN 600 MG: 600 TABLET ORAL at 10:00

## 2021-06-15 RX ADMIN — LIDOCAINE 5% 1 PATCH: 700 PATCH TOPICAL at 10:00

## 2021-06-15 NOTE — DISCHARGE INSTRUCTIONS
Do not drive while taking valium  The other medications you can drive on  Use Ibuprofen as prescribed WITH tylenol 650mg for further pain control       Call orthopedics and let them know you were in the ED because of pain, see if any cancelations have become available

## 2021-06-15 NOTE — ED PROVIDER NOTES
History  Chief Complaint   Patient presents with    Back Pain     pt with chronic back pain, has been having a flare up for a week      76-year-old female, chronic back pain, presents with acute exacerbation of her chronic back pain  She has been having exacerbation for approximately 1 week  She denies any movement or injury to her back that started the exacerbation  Patient is now complaining of right-sided sciatica as well  Patient denies numbness, tingling  She has no fevers or chills, no loss of control of bladder or bowel, no urinary symptoms  No saddle anesthesia  She states that it is very helpful for her to take ibuprofen 600 mg however she ran out of this medication  Back Pain  Associated symptoms: no abdominal pain, no dysuria, no fever, no numbness (No saddle anesthesia) and no weakness        Prior to Admission Medications   Prescriptions Last Dose Informant Patient Reported? Taking?    BIOTIN EXTRA STRENGTH PO  Self Yes No   Sig: Take by mouth   Diclofenac Sodium (VOLTAREN) 1 %  Self Yes No   Sig: apply 1 APPLICATION topically four times a day   EPINEPHrine (EPIPEN) 0 3 mg/0 3 mL SOAJ  Self Yes No   Sig: Inject 0 3 mg into a muscle   FLUoxetine (PROzac) 40 MG capsule  Self Yes No   Sig: take 1 capsule by mouth daily   albuterol (PROVENTIL HFA,VENTOLIN HFA) 90 mcg/act inhaler  Self Yes No   Sig: INHALE 2 PUFFS BY MOUTH AND INTO THE LUNGS 2 TIMES A DAY AS NEEDED FOR WHEEZING   cetirizine (ZyrTEC) 10 mg tablet  Self No No   Sig: Take 1 tablet (10 mg total) by mouth daily   meclizine (ANTIVERT) 25 mg tablet  Self Yes No   Sig: take 1 tablet by mouth twice a day if needed for dizziness   omeprazole (PriLOSEC) 40 MG capsule  Self Yes No   Sig: Take 40 mg by mouth daily      Facility-Administered Medications: None       Past Medical History:   Diagnosis Date    Degenerative joint disease     Depression     GERD (gastroesophageal reflux disease)     Hyperlipidemia     Lumbar radiculopathy  Thyroid nodule        Past Surgical History:   Procedure Laterality Date    CYST REMOVAL  10/2020    lt breast     HYSTERECTOMY      HYSTERECTOMY  1998    LIPOSUCTION  09/2020       History reviewed  No pertinent family history  I have reviewed and agree with the history as documented  E-Cigarette/Vaping    E-Cigarette Use Never User      E-Cigarette/Vaping Substances    Nicotine No     THC No     CBD No     Flavoring No     Other No     Unknown No      Social History     Tobacco Use    Smoking status: Never Smoker    Smokeless tobacco: Never Used   Vaping Use    Vaping Use: Never used   Substance Use Topics    Alcohol use: Not on file    Drug use: Not on file       Review of Systems   Constitutional: Negative for chills and fever  Gastrointestinal: Negative for abdominal pain, nausea and vomiting  Genitourinary: Negative for difficulty urinating, dysuria, flank pain and urgency  Musculoskeletal: Positive for back pain ( lumbar associated with right-sided sciatica)  Negative for arthralgias, gait problem and myalgias  Skin: Negative for rash and wound  Neurological: Negative for weakness and numbness (No saddle anesthesia)  All other systems reviewed and are negative  Physical Exam  Physical Exam  Vitals reviewed  Constitutional:       General: She is not in acute distress  Appearance: She is well-developed  She is not ill-appearing, toxic-appearing or diaphoretic  HENT:      Head: Normocephalic and atraumatic  Eyes:      Conjunctiva/sclera: Conjunctivae normal    Pulmonary:      Effort: Pulmonary effort is normal  No respiratory distress  Abdominal:      General: Abdomen is flat  There is no distension  Tenderness: There is no abdominal tenderness  There is no right CVA tenderness, left CVA tenderness or guarding  Musculoskeletal:         General: Tenderness (Lumbar, with right-sided sciatica  Positive straight leg raise at 10°  No numbness, no weakness  No focal neurologic deficit ) present  Cervical back: Normal range of motion  Skin:     General: Skin is warm and dry  Coloration: Skin is not pale  Findings: No bruising, erythema or rash  Neurological:      General: No focal deficit present  Mental Status: She is alert and oriented to person, place, and time  Cranial Nerves: No cranial nerve deficit  Sensory: No sensory deficit  Motor: No weakness  Psychiatric:         Behavior: Behavior normal          Vital Signs  ED Triage Vitals   Temperature Pulse Respirations Blood Pressure SpO2   06/15/21 0921 06/15/21 0921 06/15/21 0921 06/15/21 0921 06/15/21 0921   98 1 °F (36 7 °C) 78 16 146/70 100 %      Temp src Heart Rate Source Patient Position - Orthostatic VS BP Location FiO2 (%)   -- -- -- -- --             Pain Score       06/15/21 1000       Worst Possible Pain           Vitals:    06/15/21 0921   BP: 146/70   Pulse: 78         Visual Acuity      ED Medications  Medications   ibuprofen (MOTRIN) tablet 600 mg (600 mg Oral Given 6/15/21 1000)       Diagnostic Studies  Results Reviewed     None                 No orders to display              Procedures  Procedures         ED Course                             SBIRT 20yo+      Most Recent Value   SBIRT (24 yo +)   In order to provide better care to our patients, we are screening all of our patients for alcohol and drug use  Would it be okay to ask you these screening questions? No Filed at: 06/15/2021 0925                    MDM  Number of Diagnoses or Management Options  Acute exacerbation of chronic low back pain  Sciatica, right side  Diagnosis management comments: Acute exacerbation of chronic low back pain causing right-sided sciatica  No acute trauma that started the event, does not need imaging  No red flag symptoms, will treat symptomatically  Patient is given 600 mg ibuprofen as requested    Will discharge with anti-inflammatory medication, muscle relaxation, steroids  Advised follow-up with [facility]  Discharge in stable condition, advised return precaution in case of worsening pain or neuro deficit  feeling improved after ibuprofen given here  Disposition  Final diagnoses:   Acute exacerbation of chronic low back pain   Sciatica, right side     Time reflects when diagnosis was documented in both MDM as applicable and the Disposition within this note     Time User Action Codes Description Comment    6/15/2021  9:49 AM Antwan Pennington Add [M54 5,  G89 29] Acute exacerbation of chronic low back pain     6/15/2021  9:49 AM Antwan Pennington Add [M54 31] Sciatica, right side       ED Disposition     ED Disposition Condition Date/Time Comment    Discharge Stable Tue Troy 15, 2021  9:49 AM Domonique Ospina discharge to home/self care              Follow-up Information     Follow up With Specialties Details Why Contact Info Additional Information    [facility] Emergency Department Emergency Medicine  If symptoms worsen: loss of control of bladder/bowel, unbearable pain, numbness, weakness, etc [address] [facility] Emergency Department, [address] [place] [address] Comprehensive Spine Program Physical Therapy   [phone]          Discharge Medication List as of 6/15/2021  9:56 AM      START taking these medications    Details   diazepam (VALIUM) 5 mg tablet Take 1 tablet (5 mg total) by mouth every 8 (eight) hours as needed (Low back pain) for up to 10 days, Starting Tue 6/15/2021, Until Fri 6/25/2021 at 2359, Normal      ibuprofen (MOTRIN) 600 mg tablet Take 1 tablet (600 mg total) by mouth every 6 (six) hours as needed for moderate pain, Starting Tue 6/15/2021, Normal      predniSONE 10 mg tablet Multiple Dosages:Starting Tue 6/15/2021, Until Thu 6/17/2021 at 2359, THEN Starting Fri 6/18/2021, Until Sun 6/20/2021 at 2359, THEN Starting Mon 6/21/2021, Until Tue 6/22/2021 at 2359, THEN Starting Wed 6/23/2021, Until Thu 6/24/2021 at 2359Take 6  tablets (60 mg total) by mouth daily for 3 days, THEN 4 tablets (40 mg total) daily for 3 days, THEN 2 tablets (20 mg total) daily for 2 days, THEN 1 tablet (10 mg total) daily for 2 days  , Print         CONTINUE these medications which have NOT CHANGED    Details   albuterol (PROVENTIL HFA,VENTOLIN HFA) 90 mcg/act inhaler INHALE 2 PUFFS BY MOUTH AND INTO THE LUNGS 2 TIMES A DAY AS NEEDED FOR WHEEZING, Historical Med      BIOTIN EXTRA STRENGTH PO Take by mouth, Historical Med      cetirizine (ZyrTEC) 10 mg tablet Take 1 tablet (10 mg total) by mouth daily, Starting Mon 4/19/2021, Normal      Diclofenac Sodium (VOLTAREN) 1 % apply 1 APPLICATION topically four times a day, Historical Med      !! EPINEPHrine (EPIPEN) 0 3 mg/0 3 mL SOAJ Inject 0 3 mg into a muscle, Starting Mon 1/11/2021, Historical Med      FLUoxetine (PROzac) 40 MG capsule take 1 capsule by mouth daily, Historical Med      meclizine (ANTIVERT) 25 mg tablet take 1 tablet by mouth twice a day if needed for dizziness, Historical Med      omeprazole (PriLOSEC) 40 MG capsule Take 40 mg by mouth daily, Starting Tue 3/23/2021, Historical Med      !! EPINEPHrine (EPIPEN) 0 3 mg/0 3 mL SOAJ inject 0 3 milliliters ( 0 3 milligrams ) intramuscularly in OUTE     (REFER TO PRESCRIPTION NOTES)  , Historical Med      hydrOXYzine HCL (ATARAX) 10 mg tablet take 1 tablet by mouth at bedtime, Normal       !! - Potential duplicate medications found  Please discuss with provider              PDMP Review     None          ED Provider  Electronically Signed by           Kalani Kong DO  07/01/21 3173

## 2021-06-16 NOTE — TELEPHONE ENCOUNTER
Jazmyn Velásquez 5/7/2021 11:28 AM EDT      ----- Message -----  From: Ivette Juárez  Sent: 5/7/2021 11:03 AM EDT  To: [facility] Clinical  Subject: Non-Urgent Medical Question     I need [facility]eral to [facility] [place]  Had an eye dr appointment yesterday so they need it for insurance reasons  phone number [phone]   Thank libby

## 2021-06-22 ENCOUNTER — CONSULT (OUTPATIENT)
Dept: PAIN MEDICINE | Facility: CLINIC | Age: 46
End: 2021-06-22
Payer: COMMERCIAL

## 2021-06-22 ENCOUNTER — TELEPHONE (OUTPATIENT)
Dept: PHYSICAL THERAPY | Facility: OTHER | Age: 46
End: 2021-06-22

## 2021-06-22 ENCOUNTER — TELEPHONE (OUTPATIENT)
Dept: SURGERY | Facility: CLINIC | Age: 46
End: 2021-06-22

## 2021-06-22 VITALS
SYSTOLIC BLOOD PRESSURE: 118 MMHG | HEIGHT: 64 IN | BODY MASS INDEX: 25.95 KG/M2 | WEIGHT: 152 LBS | DIASTOLIC BLOOD PRESSURE: 76 MMHG | HEART RATE: 67 BPM

## 2021-06-22 DIAGNOSIS — G89.29 CHRONIC BILATERAL LOW BACK PAIN WITH RIGHT-SIDED SCIATICA: ICD-10-CM

## 2021-06-22 DIAGNOSIS — G89.4 CHRONIC PAIN SYNDROME: Primary | ICD-10-CM

## 2021-06-22 DIAGNOSIS — M51.36 LUMBAR DEGENERATIVE DISC DISEASE: ICD-10-CM

## 2021-06-22 DIAGNOSIS — M54.41 CHRONIC BILATERAL LOW BACK PAIN WITH RIGHT-SIDED SCIATICA: ICD-10-CM

## 2021-06-22 DIAGNOSIS — M54.16 LUMBAR RADICULOPATHY: ICD-10-CM

## 2021-06-22 PROBLEM — M51.369 LUMBAR DEGENERATIVE DISC DISEASE: Status: ACTIVE | Noted: 2021-06-22

## 2021-06-22 PROCEDURE — 99244 OFF/OP CNSLTJ NEW/EST MOD 40: CPT | Performed by: ANESTHESIOLOGY

## 2021-06-22 NOTE — TELEPHONE ENCOUNTER
Patient called to cancel and reschedule tomorrow's appointment due to work  She would like a call back to discuss the test results if possible  You can call anytime  [phone]  Patient did make an appointment for 7/14 but prefer a call sooner

## 2021-06-22 NOTE — PATIENT INSTRUCTIONS
Back Pain   WHAT YOU NEED TO KNOW:   What do I need to know about back pain? Back pain is common  You may feel sore or stiff on one or both sides of your back  The pain may spread to your buttocks or thighs  What causes or increases my risk for back pain? Conditions that affect the spine, joints, or muscles can cause back pain  These may include arthritis, spinal stenosis (narrowing of the spinal column), muscle tension, or breakdown of the spinal discs  The following increase your risk of back pain:  · Repeated bending, lifting, or twisting, or lifting heavy items    · Injury from a fall or accident    · Lack of regular physical activity     · Obesity or pregnancy     · Smoking    · Aging    · Driving, sitting, or standing for long periods    · Bad posture while sitting or standing    How is back pain diagnosed? Your healthcare provider will ask if you have any medical conditions  He or she may ask if you have a history of back pain and how it started  He or she may watch you stand and walk, and check your range of motion  Show him or her where you feel pain and what makes it better or worse  Describe the pain, how bad it is, and how long it lasts  Tell your provider if your pain worsens at night or when you lie on your back  How is back pain treated? · NSAIDs  help decrease swelling and pain  This medicine is available with or without a doctor's order  NSAIDs can cause stomach bleeding or kidney problems in certain people  If you take blood thinner medicine, always ask your healthcare provider if NSAIDs are safe for you  Always read the medicine label and follow directions  · Acetaminophen  decreases pain and fever  It is available without a doctor's order  Ask how much to take and how often to take it  Follow directions   Read the labels of all other medicines you are using to see if they also contain acetaminophen, or ask your doctor or pharmacist  Acetaminophen can cause liver damage if not taken correctly  Do not use more than 4 grams (4,000 milligrams) total of acetaminophen in one day  · Muscle relaxers  help decrease muscle spasms and back pain  · Prescription pain medicine  may be given  Ask your healthcare provider how to take this medicine safely  Some prescription pain medicines contain acetaminophen  Do not take other medicines that contain acetaminophen without talking to your healthcare provider  Too much acetaminophen may cause liver damage  Prescription pain medicine may cause constipation  Ask your healthcare provider how to prevent or treat constipation  How do I manage my back pain? · Apply ice  on your back for 15 to 20 minutes every hour or as directed  Use an ice pack, or put crushed ice in a plastic bag  Cover it with a towel before you apply it to your skin  Ice helps prevent tissue damage and decreases pain  · Apply heat  on your back for 20 to 30 minutes every 2 hours for as many days as directed  Heat helps decrease pain and muscle spasms  · Stay active  as much as you can without causing more pain  Bed rest could make your back pain worse  Avoid heavy lifting until your pain is gone  · Go to physical therapy as directed  A physical therapist can teach you exercises to help improve movement and strength, and to decrease pain  When should I seek immediate care? · You have pain, numbness, or weakness in one or both legs  · Your pain becomes so severe that you cannot walk  · You cannot control your urine or bowel movements  · You have severe back pain with chest pain  · You have severe back pain, nausea, and vomiting  · You have severe back pain that spreads to your side or genital area  When should I contact my healthcare provider? · You have back pain that does not get better with rest and pain medicine  · You have a fever      · You have pain that worsens when you are on your back or when you rest     · You have pain that worsens when you cough or sneeze  · You lose weight without trying  · You have questions or concerns about your condition or care  CARE AGREEMENT:   You have the right to help plan your care  Learn about your health condition and how it may be treated  Discuss treatment options with your healthcare providers to decide what care you want to receive  You always have the right to refuse treatment  The above information is an  only  It is not intended as medical advice for individual conditions or treatments  Talk to your doctor, nurse or pharmacist before following any medical regimen to see if it is safe and effective for you  © Copyright [address] [place] [address] Drive Information is for End User's use only and may not be sold, redistributed or otherwise used for commercial purposes   All illustrations and images included in CareNotes® are the copyrighted property of A D A M , Inc  or [address] [place] [address]

## 2021-06-22 NOTE — PROGRESS NOTES
Assessment:  1  Chronic pain syndrome    2  Chronic bilateral low back pain with right-sided sciatica    3  Lumbar radiculopathy    4  Lumbar degenerative disc disease        Plan:  Orders Placed This Encounter   Procedures    Ambulatory referral to Orthopedic Surgery     Standing Status:   Future     Standing Expiration Date:   6/22/2022     Referral Priority:   Routine     Referral Type:   Consult - AMB     Referral Reason:   Specialty Services Required     Referred to Provider:   Myah Dean MD     Requested Specialty:   Orthopedic Surgery     Number of Visits Requested:   1     Expiration Date:   6/22/2022     My impressions and treatment recommendations were discussed in detail with the patient, who verbalized understanding and had no further questions  The patient reports having L4-L5 lumbar epidural steroid injections in 2019 and 2020 when she was at [facility]  She reports that she is not interested in a further interventional pain procedures at this time  She would prefer to meet with a surgeon to have this fixed surgically  She states that the lumbar epidural steroid injections did not give her any relief, despite several office notes from the physicians at [facility] reporting that she had several weeks of relief  At this point, I did feel reasonable to have the patient see Dr Jose Juan Inman for an evaluation regarding surgical options  Follow-up is planned on an as-needed basis  Discharge instructions were provided  I personally saw and examined the patient and I agree with the above discussed plan of care  History of Present Illness:    Nj Wood is a 54 y o  female who presents to [facility] for initial evaluation of the above stated pain complaints  The patient has a past medical and chronic pain history as outlined in the assessment section  She was referred by Dr Charla Katz      The patient reports a 20 year history of low back pain   She states that her pain is moderate to severe and 10/10 on the verbal numerical pain rating scale  Her pain is nearly constant in nature without any typical pattern  She describes her pain as burning, cramping, shooting, numbness, sharp, pins and needles, throbbing, dull/aching  She reports weakness in her lower extremities  She does not ambulate with any assistive devices  Lying down and menstruation decreases pain  Standing, bending, sitting, walking, exercise, coughing, sneezing, and bowel movements increases pain  Nerve injections, home exercises, and heat/ice treatment provided no pain relief  The patient is a social alcohol drinker  Ibuprofen does give her some pain relief  She does reportedly have a history of undergoing L4-L5 lumbar epidural steroid injections from another pain physician, but reportedly does not wish to undergo any further injection therapy  She prefers to meet with a surgeon to discuss surgical options  Review of Systems:    Review of Systems   Constitutional: Negative for fever and unexpected weight change  HENT: Negative for trouble swallowing  Eyes: Negative for visual disturbance  Respiratory: Negative for shortness of breath and wheezing  Cardiovascular: Negative for chest pain and palpitations  Gastrointestinal: Negative for constipation, diarrhea, nausea and vomiting  Endocrine: Negative for cold intolerance, heat intolerance and polydipsia  Genitourinary: Negative for difficulty urinating and frequency  Musculoskeletal: Negative for arthralgias, gait problem, joint swelling and myalgias  Skin: Negative for rash  Neurological: Positive for dizziness, numbness and headaches  Negative for seizures, syncope and weakness  Hematological: Does not bruise/bleed easily  Psychiatric/Behavioral: Positive for dysphoric mood  All other systems reviewed and are negative          Patient Active Problem List   Diagnosis    Generalized abdominal pain  Epigastric lump    Chronic pain syndrome    Chronic bilateral low back pain with right-sided sciatica    Lumbar radiculopathy    Lumbar degenerative disc disease       Past Medical History:   Diagnosis Date    Degenerative joint disease     Depression     GERD (gastroesophageal reflux disease)     Hyperlipidemia     Lumbar radiculopathy     Thyroid nodule        Past Surgical History:   Procedure Laterality Date    CYST REMOVAL  10/2020    lt breast     HYSTERECTOMY      HYSTERECTOMY  1998    LIPOSUCTION  09/2020       History reviewed  No pertinent family history      Social History     Occupational History    Not on file   Tobacco Use    Smoking status: Never Smoker    Smokeless tobacco: Never Used   Vaping Use    Vaping Use: Never used   Substance and Sexual Activity    Alcohol use: Not on file    Drug use: Not on file    Sexual activity: Not on file         Current Outpatient Medications:     albuterol (PROVENTIL HFA,VENTOLIN HFA) 90 mcg/act inhaler, INHALE 2 PUFFS BY MOUTH AND INTO THE LUNGS 2 TIMES A DAY AS NEEDED FOR WHEEZING, Disp: , Rfl:     BIOTIN EXTRA STRENGTH PO, Take by mouth, Disp: , Rfl:     cetirizine (ZyrTEC) 10 mg tablet, Take 1 tablet (10 mg total) by mouth daily, Disp: 90 tablet, Rfl: 1    diazepam (VALIUM) 5 mg tablet, Take 1 tablet (5 mg total) by mouth every 8 (eight) hours as needed (Low back pain) for up to 10 days, Disp: 20 tablet, Rfl: 0    Diclofenac Sodium (VOLTAREN) 1 %, apply 1 APPLICATION topically four times a day, Disp: , Rfl:     EPINEPHrine (EPIPEN) 0 3 mg/0 3 mL SOAJ, Inject 0 3 mg into a muscle, Disp: , Rfl:     FLUoxetine (PROzac) 40 MG capsule, take 1 capsule by mouth daily, Disp: , Rfl:     hydrOXYzine HCL (ATARAX) 10 mg tablet, take 1 tablet by mouth at bedtime, Disp: 30 tablet, Rfl: 0    ibuprofen (MOTRIN) 600 mg tablet, Take 1 tablet (600 mg total) by mouth every 6 (six) hours as needed for moderate pain, Disp: 60 tablet, Rfl: 0   meclizine (ANTIVERT) 25 mg tablet, take 1 tablet by mouth twice a day if needed for dizziness, Disp: , Rfl:     omeprazole (PriLOSEC) 40 MG capsule, Take 40 mg by mouth daily, Disp: , Rfl:     predniSONE 10 mg tablet, Take 6 tablets (60 mg total) by mouth daily for 3 days, THEN 4 tablets (40 mg total) daily for 3 days, THEN 2 tablets (20 mg total) daily for 2 days, THEN 1 tablet (10 mg total) daily for 2 days  (Patient not taking: Reported on 6/22/2021), Disp: 40 tablet, Rfl: 0    Allergies   Allergen Reactions    Bee Venom Anaphylaxis    Iodides Other (See Comments)    [place] (Diagnostic) - Food Allergy Hives     Gi upset    [place] Oil - Food Allergy GI Intolerance    Aspirin Other (See Comments)     shaking  convulsions      Metronidazole GI Intolerance     Gi upset      Sulfamethoxazole-Trimethoprim GI Intolerance     Gi upst      Tape  [Medical Tape] Other (See Comments) and Hives     fever  Other reaction(s): Other  fever       Physical Exam:    /76   Pulse 67   Ht 5' 4" (1 626 m)   Wt 68 9 kg (152 lb)   BMI 26 09 kg/m²     Constitutional: normal, well developed, well nourished, alert, in no distress and non-toxic and no overt pain behavior  Eyes: anicteric  HEENT: grossly intact  Neck: supple, symmetric, trachea midline and no masses   Pulmonary:even and unlabored  Cardiovascular:No edema or pitting edema present  Skin:Normal without rashes or lesions and well hydrated  Psychiatric:Mood and affect appropriate  Neurologic:Cranial Nerves II-XII grossly intact  Musculoskeletal:normal     Lumbar Spine Exam    Appearance:  Normal lordosis  Palpation/Tenderness:  no tenderness or spasm  Sensory:  no sensory deficits noted  Range of Motion:  Flexion:   Moderately limited  without pain  Extension:  Moderately limited  with pain  Lateral Flexion - Left:  Moderately limited  with pain  Lateral Flexion - Right:  Moderately limited  with pain  Rotation - Left:  Moderately limited  with pain  Rotation - Right: Moderately limited  with pain  Motor Strength:  Left hip flexion:  5/5  Left hip extension:  5/5  Right hip flexion:  5/5  Right hip extension:  5/5  Left knee flexion:  5/5  Left knee extension:  5/5  Right knee flexion:  5/5  Right knee extension:  5/5  Left foot dorsiflexion:  5/5  Left foot plantar flexion:  5/5  Right foot dorsiflexion:  5/5  Right foot plantar flexion:  5/5  Reflexes:  Left Patellar:  2+   Right Patellar:  2+   Left Achilles:  2+   Right Achilles:  2+   Special Tests:  Left Straight Leg Test:  negative  Right Straight Leg Test:  negative  Left Santiago's Maneuver:  negative  Right Santiago's Maneuver:  negative    Imaging  MR LUMBAR SPINE WO CONTRAST    Impression    Progression of degenerative disc disease at L4-L5 and L5-S1, as   detailed above       Urgency: Routine  This is a routine medical imaging report  Recommendation: No specific imaging recommendation  Signed by Bret Sheth MD  on 3/17/2020 11:37 AM  Narrative    Procedure(s):MR LUMBAR SPINE WO CONTRAST   Date of service: 3/17/2020 7:06 AM     Provided clinical information: 38 years, Female, "Lumbar   radiculopathy, >6wks conservative tx, persistent-progressive sx,   surgical candidate"   Procedure and materials: Standard protocol  Contrast: None   Comparison studies: March 22, 2019   Observations:     Lumbar vertebral bodies demonstrate normal height, alignment and   signal intensity  Distal spinal cord unremarkable  T12-L1 disc through L3-L4 disc: Disc unremarkable  No central nor   foraminal stenosis and no compressive discopathy  No substantive facet   arthropathy  No interval change     L4-L5: The level of codominant finding  Disc bulge more than on the   prior examination extending into the right and left neuroforamina with   borderline bilateral foraminal stenosis which is new since the prior   study  Small posterior central annular tear less than on the prior   study       L5-S1: The level of codominant finding  Again seen is disc bulge with   interval development of posterior central disc protrusion 1 5 cm wide   at the base by 0 6 cm AP and 0 8 cm craniocaudal, more than on the   prior examination, strategically placed, abutting the right and left   S1 root descending in the canal  No foraminal stenosis  Other Result Text    This result has an attachment that is not available  Artie Schroeder MD - 03/17/2020   Formatting of this note might be different from the original    Procedure(s):MR [address] [place]LSEN   Date of service: 3/17/2020 7:06 AM     Provided clinical information: 38 years, Female, "Lumbar   radiculopathy, >6wks conservative tx, persistent-progressive sx,   surgical candidate"   Procedure and materials: Standard protocol  Contrast: None   Comparison studies: March 22, 2019   Observations:     Lumbar vertebral bodies demonstrate normal height, alignment and   signal intensity  Distal spinal cord unremarkable  T12-L1 disc through L3-L4 disc: Disc unremarkable  No central nor   foraminal stenosis and no compressive discopathy  No substantive facet   arthropathy  No interval change     L4-L5: The level of codominant finding  Disc bulge more than on the   prior examination extending into the right and left neuroforamina with   borderline bilateral foraminal stenosis which is new since the prior   study  Small posterior central annular tear less than on the prior   study  L5-S1: The level of codominant finding  Again seen is disc bulge with   interval development of posterior central disc protrusion 1 5 cm wide   at the base by 0 6 cm AP and 0 8 cm craniocaudal, more than on the   prior examination, strategically placed, abutting the right and left   S1 root descending in the canal  No foraminal stenosis       IMPRESSION   Progression of degenerative disc disease at L4-L5 and L5-S1, as   detailed above       Orders Placed This Encounter   Procedures    Ambulatory referral to Orthopedic Surgery

## 2021-06-22 NOTE — TELEPHONE ENCOUNTER
Call placed to the patient per [facility] referral     Voice message left for patient to call back  Phone number and hours of business provided  This is the 1st attempt to reach the patient  Will defer per protocol  Patient was triaged by me in 4/2021  Patient has seen Dr Janice Rowland and has a referral in for Dr Flor Castro with Orthopedic surgery

## 2021-06-28 ENCOUNTER — OFFICE VISIT (OUTPATIENT)
Dept: OBGYN CLINIC | Facility: CLINIC | Age: 46
End: 2021-06-28
Payer: COMMERCIAL

## 2021-06-28 VITALS
BODY MASS INDEX: 25.78 KG/M2 | DIASTOLIC BLOOD PRESSURE: 70 MMHG | HEART RATE: 81 BPM | SYSTOLIC BLOOD PRESSURE: 113 MMHG | HEIGHT: 64 IN | WEIGHT: 151 LBS

## 2021-06-28 DIAGNOSIS — M54.16 LUMBAR RADICULOPATHY: ICD-10-CM

## 2021-06-28 DIAGNOSIS — M51.36 LUMBAR DEGENERATIVE DISC DISEASE: ICD-10-CM

## 2021-06-28 DIAGNOSIS — G47.00 INSOMNIA, UNSPECIFIED TYPE: ICD-10-CM

## 2021-06-28 PROCEDURE — 99204 OFFICE O/P NEW MOD 45 MIN: CPT | Performed by: ORTHOPAEDIC SURGERY

## 2021-06-28 RX ORDER — HYDROXYZINE HYDROCHLORIDE 10 MG/1
TABLET, FILM COATED ORAL
Qty: 30 TABLET | Refills: 0 | Status: SHIPPED | OUTPATIENT
Start: 2021-06-28 | End: 2021-08-24
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
## 2021-07-06 ENCOUNTER — OFFICE VISIT (OUTPATIENT)
Dept: OBGYN CLINIC | Facility: CLINIC | Age: 46
End: 2021-07-06
Payer: COMMERCIAL

## 2021-07-06 VITALS
SYSTOLIC BLOOD PRESSURE: 99 MMHG | BODY MASS INDEX: 25.85 KG/M2 | DIASTOLIC BLOOD PRESSURE: 62 MMHG | WEIGHT: 151.4 LBS | HEIGHT: 64 IN | HEART RATE: 70 BPM

## 2021-07-06 DIAGNOSIS — G56.03 BILATERAL CARPAL TUNNEL SYNDROME: Primary | ICD-10-CM

## 2021-07-06 PROCEDURE — 99213 OFFICE O/P EST LOW 20 MIN: CPT | Performed by: ORTHOPAEDIC SURGERY

## 2021-07-06 RX ORDER — FLUOXETINE HYDROCHLORIDE 20 MG/1
CAPSULE ORAL
COMMUNITY
Start: 2021-07-01 | End: 2022-03-14

## 2021-07-06 NOTE — PROGRESS NOTES
CHIEF COMPLAINT:  Chief Complaint   Patient presents with    Left Hand - Pain, Numbness    Right Hand - Pain, Numbness       SUBJECTIVE:  Phillip Trinh is a 54y o  year old ***HD female who presents ***       PAST MEDICAL HISTORY:  Past Medical History:   Diagnosis Date    Degenerative joint disease     Depression     GERD (gastroesophageal reflux disease)     Hyperlipidemia     Lumbar radiculopathy     Thyroid nodule        PAST SURGICAL HISTORY:  Past Surgical History:   Procedure Laterality Date    CYST REMOVAL  10/2020    lt breast     HYSTERECTOMY      HYSTERECTOMY  1998    LIPOSUCTION  09/2020       FAMILY HISTORY:  History reviewed  No pertinent family history      SOCIAL HISTORY:  Social History     Tobacco Use    Smoking status: Never Smoker    Smokeless tobacco: Never Used   Vaping Use    Vaping Use: Never used   Substance Use Topics    Alcohol use: Not on file    Drug use: Not on file       MEDICATIONS:    Current Outpatient Medications:     albuterol (PROVENTIL HFA,VENTOLIN HFA) 90 mcg/act inhaler, INHALE 2 PUFFS BY MOUTH AND INTO THE LUNGS 2 TIMES A DAY AS NEEDED FOR WHEEZING, Disp: , Rfl:     BIOTIN EXTRA STRENGTH PO, Take by mouth, Disp: , Rfl:     cetirizine (ZyrTEC) 10 mg tablet, Take 1 tablet (10 mg total) by mouth daily, Disp: 90 tablet, Rfl: 1    Diclofenac Sodium (VOLTAREN) 1 %, apply 1 APPLICATION topically four times a day, Disp: , Rfl:     EPINEPHrine (EPIPEN) 0 3 mg/0 3 mL SOAJ, Inject 0 3 mg into a muscle, Disp: , Rfl:     FLUoxetine (PROzac) 20 mg capsule, , Disp: , Rfl:     FLUoxetine (PROzac) 40 MG capsule, take 1 capsule by mouth daily, Disp: , Rfl:     hydrOXYzine HCL (ATARAX) 10 mg tablet, take 1 tablet by mouth at bedtime, Disp: 30 tablet, Rfl: 0    ibuprofen (MOTRIN) 600 mg tablet, Take 1 tablet (600 mg total) by mouth every 6 (six) hours as needed for moderate pain, Disp: 60 tablet, Rfl: 0    meclizine (ANTIVERT) 25 mg tablet, take 1 tablet by mouth twice a day if needed for dizziness, Disp: , Rfl:     omeprazole (PriLOSEC) 40 MG capsule, Take 40 mg by mouth daily, Disp: , Rfl:     diazepam (VALIUM) 5 mg tablet, Take 1 tablet (5 mg total) by mouth every 8 (eight) hours as needed (Low back pain) for up to 10 days, Disp: 20 tablet, Rfl: 0    ALLERGIES:  Allergies   Allergen Reactions    Bee Venom Anaphylaxis    Iodides Other (See Comments)    [place] (Diagnostic) - Food Allergy Hives     Gi upset    [place] Oil - Food Allergy GI Intolerance    Aspirin Other (See Comments)     shaking  convulsions      Metronidazole GI Intolerance     Gi upset      Sulfamethoxazole-Trimethoprim GI Intolerance     Gi upst      Tape  [Medical Tape] Other (See Comments) and Hives     fever  Other reaction(s):  Other  fever       REVIEW OF SYSTEMS:  Review of Systems    VITALS:  Vitals:    07/06/21 0937   BP: 99/62   Pulse: 70       LABS:  HgA1c: No results found for: HGBA1C  BMP: No results found for: GLUCOSE, CALCIUM, NA, K, CO2, CL, BUN, CREATININE    _____________________________________________________  PHYSICAL EXAMINATION:  General: {1234:05189::"well developed and well nourished","alert","oriented times 3","appears comfortable"}  Psychiatric: {Psych:66516::"Normal"}  HEENT: Trachea Midline, No torticollis  Pulmonary: No audible wheezing or strider  Cardiovascular: No discernable arrhythmia   Skin: {Skin exam:55695}  Neurovascular: {Nerve Exam:18744::"Sensation Intact to the Median, Ulnar, Radial Nerve","Motor Intact to the Median, Ulnar, Radial Nerve","Pulses Intact"}    MUSCULOSKELETAL EXAMINATION:  ***    ___________________________________________________  STUDIES REVIEWED:  {[facility] Servicestudiesreviewed:54432::"Images obtained today of the *** *** views demonstrate ***"}      PROCEDURES PERFORMED:  Procedures  {Was Procdoc done:01370::"No Procedures performed today"}    _____________________________________________________  ASSESSMENT/PLAN:      There are no diagnoses linked to this encounter  Follow Up:  No follow-ups on file      Work/school status:  ***    To Do Next Visit:  {To do next visit:38400::"Re-evaluation of current issue"}    General Discussions:  {[facility] Non-Operative Discussions:72993}    Operative Discussions:  {[facility] Surgical Discussions:83804}    Scribe Attestation    I,:  Derik Howard am acting as a scribe while in the presence of the attending physician :       I,:  Latrell Stockton MD personally performed the services described in this documentation    as scribed in my presence :

## 2021-07-06 NOTE — PROGRESS NOTES
CHIEF COMPLAINT:  Chief Complaint   Patient presents with    Left Hand - Pain, Numbness    Right Hand - Pain, Numbness         SUBJECTIVE:  Rick Spencer is a 54y o  year old female who presents with numbness and tingling in the hand bilaterally  Patient states that this has been ongoing for a while  Patient states that they do wake her up at nighttime  She denies any falls, accidents, or injuries  She denies any previous treatment modalities  She denies any other acute complaints  PAST MEDICAL HISTORY:  Past Medical History:   Diagnosis Date    Degenerative joint disease     Depression     GERD (gastroesophageal reflux disease)     Hyperlipidemia     Lumbar radiculopathy     Thyroid nodule        PAST SURGICAL HISTORY:  Past Surgical History:   Procedure Laterality Date    CYST REMOVAL  10/2020    lt breast     HYSTERECTOMY      HYSTERECTOMY  1998    LIPOSUCTION  09/2020       FAMILY HISTORY:  History reviewed  No pertinent family history      SOCIAL HISTORY:  Social History     Tobacco Use    Smoking status: Never Smoker    Smokeless tobacco: Never Used   Vaping Use    Vaping Use: Never used   Substance Use Topics    Alcohol use: Not on file    Drug use: Not on file       MEDICATIONS:    Current Outpatient Medications:     albuterol (PROVENTIL HFA,VENTOLIN HFA) 90 mcg/act inhaler, INHALE 2 PUFFS BY MOUTH AND INTO THE LUNGS 2 TIMES A DAY AS NEEDED FOR WHEEZING, Disp: , Rfl:     BIOTIN EXTRA STRENGTH PO, Take by mouth, Disp: , Rfl:     cetirizine (ZyrTEC) 10 mg tablet, Take 1 tablet (10 mg total) by mouth daily, Disp: 90 tablet, Rfl: 1    Diclofenac Sodium (VOLTAREN) 1 %, apply 1 APPLICATION topically four times a day, Disp: , Rfl:     EPINEPHrine (EPIPEN) 0 3 mg/0 3 mL SOAJ, Inject 0 3 mg into a muscle, Disp: , Rfl:     FLUoxetine (PROzac) 20 mg capsule, , Disp: , Rfl:     FLUoxetine (PROzac) 40 MG capsule, take 1 capsule by mouth daily, Disp: , Rfl:     hydrOXYzine HCL (ATARAX) 10 mg tablet, take 1 tablet by mouth at bedtime, Disp: 30 tablet, Rfl: 0    ibuprofen (MOTRIN) 600 mg tablet, Take 1 tablet (600 mg total) by mouth every 6 (six) hours as needed for moderate pain, Disp: 60 tablet, Rfl: 0    meclizine (ANTIVERT) 25 mg tablet, take 1 tablet by mouth twice a day if needed for dizziness, Disp: , Rfl:     omeprazole (PriLOSEC) 40 MG capsule, Take 40 mg by mouth daily, Disp: , Rfl:     diazepam (VALIUM) 5 mg tablet, Take 1 tablet (5 mg total) by mouth every 8 (eight) hours as needed (Low back pain) for up to 10 days, Disp: 20 tablet, Rfl: 0    ALLERGIES:  Allergies   Allergen Reactions    Bee Venom Anaphylaxis    Iodides Other (See Comments)    [place] (Diagnostic) - Food Allergy Hives     Gi upset    [place] Oil - Food Allergy GI Intolerance    Aspirin Other (See Comments)     shaking  convulsions      Metronidazole GI Intolerance     Gi upset      Sulfamethoxazole-Trimethoprim GI Intolerance     Gi upst      Tape  [Medical Tape] Other (See Comments) and Hives     fever  Other reaction(s): Other  fever       REVIEW OF SYSTEMS:  Pertinent items are noted in HPI  A comprehensive review of systems was negative      LABS:  HgA1c: No results found for: HGBA1C  BMP: No results found for: GLUCOSE, CALCIUM, NA, K, CO2, CL, BUN, CREATININE      _____________________________________________________  PHYSICAL EXAMINATION:  General: well developed and well nourished, alert, oriented times 3 and appears comfortable  Psychiatric: Normal  HEENT: Trachea Midline, No torticollis  Cardiovascular: No discernable arrhythmia  Pulmonary: No wheezing or stridor  Skin: No masses, erythema, lacerations, fluctation, ulcerations  Neurovascular: Sensation Intact to the Median, Ulnar, Radial Nerve, Motor Intact to the Median, Ulnar, Radial Nerve and Pulses Intact    MUSCULOSKELETAL EXAMINATION:  LEFT SIDE:  Carpal tunnel:  No weakness APB, No atrophy thenar muscles, Negative tinel's test and Negative Derkin's Compression  RIGHT SIDE:  Carpal tunnel:  No weakness APB, No atrophy thenar muscles, Negative tinel's test and Negative Derkin's Compression     Tenderness over the right lateral epicondyle  Discomfort with resisted wrist extension      _____________________________________________________  STUDIES REVIEWED:  No Studies to review      PROCEDURES PERFORMED:  Procedures  No Procedures performed today     ASSESSMENT/PLAN:    Assessment:   Carpal Tunnel Syndrome  Bilateral  Lateral Epicondylitis - right     Plan:   Bracing  - night time splinting  EMG    Patient was offered an injection for lateral epicondylitis  Patient did not want to proceed with this is today  Follow Up: After Testing    To Do Next Visit:  Reassess current symptoms    General Discussions:  Carpal Tunnel Syndrome: The anatomy and physiology of carpal tunnel syndrome was discussed with the patient today  Increase pressure localized under the transverse carpal ligament can cause pain, numbness, tingling, or dysesthesias within the median nerve distribution as well as feelings of fatigue, clumsiness, or awkwardness  These symptoms typically occur at night and worse in the morning upon waking  Eventually, untreated carpal tunnel syndrome can result in weakness and permanent loss of muscle within the thenar compartment of the hand  Treatment options were discussed with the patient  Conservative treatment includes nocturnal resting splints to keep the nerve in a neutral position, ergonomic changes within the work or home environment, activity modification, and tendon gliding exercises  Steroid injections within the carpal canal can help a majority of patients, however this is often self-limited in a majority of patients  Surgical intervention to divide the transverse carpal ligament typically results in a long-lasting relief of the patient's complaints, with the recurrence rate of less than 1%     Lateral Epicondylitis: The anatomy and physiology of lateral epicondylitis was discussed with the patient today  Typically, a traumatic injury or repetitive use may cause a partial or complete tear of the extensor carpi radialis brevis muscle  This creates pain over the lateral epicondyle  This pain typically is made worse with palm down lifting activities as well as anything that involves strength and stability of the wrist   The pain may radiate from the wrist up to the elbow  At times, the shoulder may be weak as well which can predispose or cause continuation of the problem  Conservative treatment usually cures a majority of patients; however, this may take up to 6-9 months  Conservative treatment options typically include activity modification, therapy for strengthening of the shoulder and elbow, nocturnal wrist support splints, tennis elbow straps, and possible corticosteroid injections  Corticosteroid injections do not change the natural history of this process, may decrease the pain temporarily, and may increase the risk of recurrence  Surgery is required in fewer than 10% of patients

## 2021-07-07 ENCOUNTER — OFFICE VISIT (OUTPATIENT)
Dept: OBGYN CLINIC | Facility: CLINIC | Age: 46
End: 2021-07-07
Payer: COMMERCIAL

## 2021-07-07 VITALS
HEIGHT: 64 IN | WEIGHT: 151 LBS | DIASTOLIC BLOOD PRESSURE: 75 MMHG | SYSTOLIC BLOOD PRESSURE: 117 MMHG | BODY MASS INDEX: 25.78 KG/M2

## 2021-07-07 DIAGNOSIS — G89.4 CHRONIC PAIN SYNDROME: ICD-10-CM

## 2021-07-07 DIAGNOSIS — M54.41 CHRONIC BILATERAL LOW BACK PAIN WITH RIGHT-SIDED SCIATICA: ICD-10-CM

## 2021-07-07 DIAGNOSIS — M48.10 DISH (DIFFUSE IDIOPATHIC SKELETAL HYPEROSTOSIS): ICD-10-CM

## 2021-07-07 DIAGNOSIS — M54.16 LUMBAR RADICULOPATHY: Primary | ICD-10-CM

## 2021-07-07 DIAGNOSIS — G89.29 CHRONIC BILATERAL LOW BACK PAIN WITH RIGHT-SIDED SCIATICA: ICD-10-CM

## 2021-07-07 PROCEDURE — 1036F TOBACCO NON-USER: CPT | Performed by: ORTHOPAEDIC SURGERY

## 2021-07-07 PROCEDURE — 3008F BODY MASS INDEX DOCD: CPT | Performed by: ORTHOPAEDIC SURGERY

## 2021-07-07 PROCEDURE — 99214 OFFICE O/P EST MOD 30 MIN: CPT | Performed by: ORTHOPAEDIC SURGERY

## 2021-07-07 NOTE — PROGRESS NOTES
[facility]'S ORTHOPEDIC SPINE SURGERY  Yoel Tapia MD  [address] [address]  [phone]    HISTORY OF PRESENT ILLNESS:  13-year-old with chronic low back pain and right lower extremity symptoms evaluated on 6/28/2021  I did review the MRI report from 2020  Unfortunate films were not available  I also did not have access onto the list of injections  She reported 3 injections in 2021  I felt that the missing nformation was essential in order to fully evaluated the patient  I did give her an appointment for today  Function or symptoms were reported since last week  Films were brought in and were imported into our system  ALLERGIES:   Allergies   Allergen Reactions    Bee Venom Anaphylaxis    Iodides Other (See Comments)    [place] (Diagnostic) - Food Allergy Hives     Gi upset    [place] Oil - Food Allergy GI Intolerance    Aspirin Other (See Comments)     shaking  convulsions      Metronidazole GI Intolerance     Gi upset      Sulfamethoxazole-Trimethoprim GI Intolerance     Gi upst      Tape  [Medical Tape] Other (See Comments) and Hives     fever  Other reaction(s):  Other  fever       MEDICATIONS:    Current Outpatient Medications:     albuterol (PROVENTIL HFA,VENTOLIN HFA) 90 mcg/act inhaler, INHALE 2 PUFFS BY MOUTH AND INTO THE LUNGS 2 TIMES A DAY AS NEEDED FOR WHEEZING, Disp: , Rfl:     BIOTIN EXTRA STRENGTH PO, Take by mouth, Disp: , Rfl:     cetirizine (ZyrTEC) 10 mg tablet, Take 1 tablet (10 mg total) by mouth daily, Disp: 90 tablet, Rfl: 1    Diclofenac Sodium (VOLTAREN) 1 %, apply 1 APPLICATION topically four times a day, Disp: , Rfl:     EPINEPHrine (EPIPEN) 0 3 mg/0 3 mL SOAJ, Inject 0 3 mg into a muscle, Disp: , Rfl:     FLUoxetine (PROzac) 20 mg capsule, , Disp: , Rfl:     FLUoxetine (PROzac) 40 MG capsule, take 1 capsule by mouth daily, Disp: , Rfl:     hydrOXYzine HCL (ATARAX) 10 mg tablet, take 1 tablet by mouth at bedtime, Disp: 30 tablet, Rfl: 0    ibuprofen (MOTRIN) 600 mg tablet, Take 1 tablet (600 mg total) by mouth every 6 (six) hours as needed for moderate pain, Disp: 60 tablet, Rfl: 0    meclizine (ANTIVERT) 25 mg tablet, take 1 tablet by mouth twice a day if needed for dizziness, Disp: , Rfl:     omeprazole (PriLOSEC) 40 MG capsule, Take 40 mg by mouth daily, Disp: , Rfl:     diazepam (VALIUM) 5 mg tablet, Take 1 tablet (5 mg total) by mouth every 8 (eight) hours as needed (Low back pain) for up to 10 days, Disp: 20 tablet, Rfl: 0     PAST MEDICAL HISTORY:   Past Medical History:   Diagnosis Date    Degenerative joint disease     Depression     GERD (gastroesophageal reflux disease)     Hyperlipidemia     Lumbar radiculopathy     Thyroid nodule        PAST SURGICAL HISTORY:  Past Surgical History:   Procedure Laterality Date    CYST REMOVAL  10/2020    lt breast     HYSTERECTOMY      HYSTERECTOMY  1998    LIPOSUCTION  09/2020       SOCIAL HISTORY:  Social History     Tobacco Use   Smoking Status Never Smoker   Smokeless Tobacco Never Used        ROS:  Review of Systems   Musculoskeletal: Positive for back pain  Back greater than leg symptoms  Right leg predominantly  PHYSICAL EXAM:  Pleasant individual, no acute distress  Normal gait  No evidence of atrophy or deformity lower extremity  RADIOGRAPHIC STUDIES:  1  MRI, L-spine 3/17/2021, mild multilevel degenerative disc disease  Moderate degenerative changes L3-4  Moderate to severe degenerative changes at L5-S1  Posterior central disc protrusion with slight predominance on the left side with minimal lateral recess stenosis at L5-S1  Facet hypertrophy and mild lateral recess stenosis L4-5     2  CT, abdomen, 06/03/2021, multilevel thoracic and lumbar degenerative disc disease, diffuse idiopathic skeletal hyperostosis, moderate degenerative disc disease L3-4 L4-5 and L5-S1 with posterior osteophyte formation  No evidence of bony stenosis    No evidence of congenital stenosis  ASSESSMENT:  1  Lumbar radiculopathy    2  Chronic bilateral low back pain with right-sided sciatica    3  Chronic pain syndrome    4  DISH (diffuse idiopathic skeletal hyperostosis)      PLAN:  44-year-old with chronic back and right lower extremity symptoms  MRI study was reviewed CT scan was also reviewed  She does suffer from multilevel lumbar degenerative disc disease  There is also evidence of DISH  On MRI scan, there is a disc protrusion at L5-S1 without nerve root compression  There is evidence of mild lateral recess at L4-5 that can potentially be causing right lower extremity symptoms  I do not have access to the pain management record  If she has not had a L5 transforaminal injection, that should be attempted on the right side  If the injection helps then surgery may be considered  If the injection does not help, surgery is not reasonable  Review of the prior records will be helpful in this case    At this time I am not aware if she has had this injection this year or in the past      Scribe Attestation    I,:   am acting as a scribe while in the presence of the attending physician :       I,:   personally performed the services described in this documentation    as scribed in my presence :

## 2021-07-14 ENCOUNTER — TELEPHONE (OUTPATIENT)
Dept: PAIN MEDICINE | Facility: CLINIC | Age: 46
End: 2021-07-14

## 2021-07-14 NOTE — TELEPHONE ENCOUNTER
Patient wants a nurse to call her to find out the location of injection she had at [facility]   [address] in on 7/12     Please advise    Thank you     [phone]

## 2021-07-14 NOTE — TELEPHONE ENCOUNTER
S/W pt and advised that documentation from [facility] only included a procedure that was cancelled in Nov 2020, and that the other injections that she had in the past were not scanned into Epic at this time   Pt stated she did not want the same injection she had prior but she would discuss this with AS at next procedure

## 2021-07-15 ENCOUNTER — TELEPHONE (OUTPATIENT)
Dept: INTERNAL MEDICINE CLINIC | Facility: CLINIC | Age: 46
End: 2021-07-15

## 2021-07-15 DIAGNOSIS — Z11.1 SCREENING-PULMONARY TB: Primary | ICD-10-CM

## 2021-07-15 NOTE — TELEPHONE ENCOUNTER
Pt called back and is asking instead of the 2 step injection, can Dr Sherren Hymen order Quanatiferon Gold Plus blood test for the TB ???  Please call patient and let her know  Jameson Gallagher

## 2021-07-22 ENCOUNTER — TELEPHONE (OUTPATIENT)
Dept: NEUROLOGY | Facility: CLINIC | Age: 46
End: 2021-07-22

## 2021-08-02 ENCOUNTER — TELEPHONE (OUTPATIENT)
Dept: INTERNAL MEDICINE CLINIC | Facility: CLINIC | Age: 46
End: 2021-08-02

## 2021-08-02 DIAGNOSIS — Z12.31 BREAST CANCER SCREENING BY MAMMOGRAM: Primary | ICD-10-CM

## 2021-08-02 NOTE — TELEPHONE ENCOUNTER
PT has not been able to get here due to work schedule but will be in on Saturday to have the Quantiferon TB Gold Plus test drawn from the lab  Relayed to PT that order was still in      Also, Pt would like a referral for a Mammo so she can schedule one this August     Arnie 30:

## 2021-08-24 DIAGNOSIS — G47.00 INSOMNIA, UNSPECIFIED TYPE: ICD-10-CM

## 2021-08-24 RX ORDER — HYDROXYZINE HYDROCHLORIDE 10 MG/1
TABLET, FILM COATED ORAL
Qty: 30 TABLET | Refills: 0 | Status: SHIPPED | OUTPATIENT
Start: 2021-08-24 | End: 2021-09-22

## 2021-08-31 ENCOUNTER — OFFICE VISIT (OUTPATIENT)
Dept: INTERNAL MEDICINE CLINIC | Facility: CLINIC | Age: 46
End: 2021-08-31
Payer: COMMERCIAL

## 2021-08-31 VITALS
TEMPERATURE: 98.9 F | HEART RATE: 88 BPM | OXYGEN SATURATION: 98 % | WEIGHT: 154 LBS | DIASTOLIC BLOOD PRESSURE: 66 MMHG | SYSTOLIC BLOOD PRESSURE: 98 MMHG | HEIGHT: 64 IN | BODY MASS INDEX: 26.29 KG/M2

## 2021-08-31 DIAGNOSIS — G44.321 INTRACTABLE CHRONIC POST-TRAUMATIC HEADACHE: ICD-10-CM

## 2021-08-31 DIAGNOSIS — N64.4 BREAST PAIN, RIGHT: Primary | ICD-10-CM

## 2021-08-31 DIAGNOSIS — L90.5 SCAR TISSUE: ICD-10-CM

## 2021-08-31 PROCEDURE — 1036F TOBACCO NON-USER: CPT | Performed by: FAMILY MEDICINE

## 2021-08-31 PROCEDURE — 99214 OFFICE O/P EST MOD 30 MIN: CPT | Performed by: FAMILY MEDICINE

## 2021-08-31 RX ORDER — AZITHROMYCIN 250 MG/1
TABLET, FILM COATED ORAL
COMMUNITY
Start: 2021-08-30 | End: 2022-04-14 | Stop reason: ALTCHOICE

## 2021-08-31 RX ORDER — FLUCONAZOLE 150 MG/1
150 TABLET ORAL DAILY
COMMUNITY
Start: 2021-08-26 | End: 2022-04-14 | Stop reason: ALTCHOICE

## 2021-08-31 NOTE — PROGRESS NOTES
FOLLOW-UP OFFICE VISIT  [facility]    NAME: Peter Morelos  AGE: 54 y o  SEX: female  : 1975     DATE: 2021     Assessment and Plan:     Problem List Items Addressed This Visit     None      Visit Diagnoses     Breast pain, right    -  Primary    Relevant Orders    Mammo diagnostic bilateral w cad    Intractable chronic post-traumatic headache        Relevant Orders    Ambulatory referral to Neurology    MRI brain wo contrast    Scar tissue        Relevant Orders    Ambulatory referral to Dermatology      Plan; diagnostic mammo ordered  Will start work up for HA with mri of brain  Pt to f/u with neurology  Derm referral for possible intralesional injections  No follow-ups on file  Chief Complaint:     Chief Complaint   Patient presents with   Christina had issues with lt breast     cyst on lt breast - had it for a while     headaches     wants referral to neurologist , dropped hammer on head back in 2020        History of Present Illness:    patient complains chronic recurrent headaches consistent right breast   History of cyst removal breast on the left side  Says she started to notice some on the right side as well  Occasionally bothersome  Currently has a screening mammogram ordered and was told she needs a diagnostic mammo   recurrent headaches have been going since   Occurs in this daily  Onset is usually when she wakes up  Hx of trauma- hammer fell on her head a year ago  No post consussion work up  location is frontal    tried ibuprophen, alevie, advil , antihistamine, tylenol  with no improvement  asscoiated with dizziness and nasuea  No aura  No parasthesia  Review of Systems:     Review of Systems   Constitutional: Negative for fever  Respiratory: Negative for shortness of breath  Cardiovascular: Negative for chest pain  Gastrointestinal: Positive for nausea and vomiting     Genitourinary: Breast pain   Skin: Positive for wound  Neurological: Positive for headaches  Problem List:     Patient Active Problem List   Diagnosis    Generalized abdominal pain    Epigastric lump    Chronic pain syndrome    Chronic bilateral low back pain with right-sided sciatica    Lumbar radiculopathy    Lumbar degenerative disc disease    DISH (diffuse idiopathic skeletal hyperostosis)        Objective:     BP 98/66 (BP Location: Left arm, Patient Position: Sitting)   Pulse 88   Temp 98 9 °F (37 2 °C) (Tympanic)   Ht 5' 4" (1 626 m)   Wt 69 9 kg (154 lb)   SpO2 98%   BMI 26 43 kg/m²     Physical Exam  HENT:      Head: Normocephalic and atraumatic  Right Ear: External ear normal       Left Ear: External ear normal    Eyes:      Conjunctiva/sclera: Conjunctivae normal    Cardiovascular:      Rate and Rhythm: Normal rate  Heart sounds: No murmur heard  Pulmonary:      Breath sounds: Normal breath sounds  Musculoskeletal:      Right lower leg: No edema  Left lower leg: No edema  Skin:     Comments: Linear surgical scar, thick, on left breast     Neurological:      Mental Status: She is alert and oriented to person, place, and time  Gait: Gait normal       Deep Tendon Reflexes: Reflexes normal    Psychiatric:         Mood and Affect: Mood normal          Behavior: Behavior normal          Pertinent Laboratory/Diagnostic Studies:    Laboratory Results: I have personally reviewed the pertinent laboratory results/reports     Radiology/Other Diagnostic Testing Results: I have personally reviewed pertinent reports          Jimmy LÓPEZ HSPTLCasper Phlegm [facility]  9/1/2021 12:53 PM

## 2021-09-01 ENCOUNTER — TELEPHONE (OUTPATIENT)
Dept: NEUROLOGY | Facility: CLINIC | Age: 46
End: 2021-09-01

## 2021-09-01 ENCOUNTER — TELEPHONE (OUTPATIENT)
Dept: INTERNAL MEDICINE CLINIC | Facility: CLINIC | Age: 46
End: 2021-09-01

## 2021-09-01 NOTE — TELEPHONE ENCOUNTER
SAW  RAZIA  YESTERDAY   SHE  MADE  AN  APPT  WITH  DR Kashif Plata  BUT  NOT  TILL   12/20  FOR  HEADACHES    REALLY  DOESN'T   WANT  TO  WAIT  THAT  LONG   WANTS  TO  KNOW  IF  RAZIA  WOULD  ORDER  HER   AN  MRI  OF  HER  HEAD  WITH  DYE  BECAUSE  SHE  HAS  BEEN  HAVING  HEADACHES ISSUES  SINCE  SEPT 2020   Krista  PT   [phone]

## 2021-09-01 NOTE — TELEPHONE ENCOUNTER
Best contact number for patient:  [phone]  Emergency Contact name and number:    Referring provider and telephone number:  SHYAM SOLIS Provider Name and if affiliated with [facility]:     Reason for Appointment/Dx:migraines, dizziness, vertigo    Have you seen and followed up with a pediatric Neurologist for this disease in the past?      No (If yes ok to schedule with Dr Kimberlyn Steward)    Neurology Location patient would like to be seen:    Order received? Yes                                                 Records Received? Yes    Have you ever seen another Neurologist?       No    Insurance Information    Insurance Name:    ID/Policy #:    Secondary Insurance:    ID/Policy#: Workman's Comp/ Accident/ School  Information      Workman's Comp/Accident/School related? No    If yes name of Insurance company:    Claim #:    Date of Injury:    Type of Injury:     Name and Telephone Number:    Notes:                   Appointment date: 12/20/21 @ 2:30pm, w/Dr Apple Miles in [facility]  Verified address/phone-/insurance-all current  Sent appt details/directions

## 2021-09-01 NOTE — TELEPHONE ENCOUNTER
Called pt   And was notified and given number to call to schedule also needs you to sign correct mammo order as you put in for right breast and it is the left and tech corrected and sent it to you to sign

## 2021-09-02 ENCOUNTER — OFFICE VISIT (OUTPATIENT)
Dept: PAIN MEDICINE | Facility: CLINIC | Age: 46
End: 2021-09-02
Payer: COMMERCIAL

## 2021-09-02 VITALS
HEIGHT: 64 IN | HEART RATE: 69 BPM | WEIGHT: 156 LBS | DIASTOLIC BLOOD PRESSURE: 80 MMHG | BODY MASS INDEX: 26.63 KG/M2 | SYSTOLIC BLOOD PRESSURE: 118 MMHG

## 2021-09-02 DIAGNOSIS — G89.4 CHRONIC PAIN SYNDROME: Primary | ICD-10-CM

## 2021-09-02 DIAGNOSIS — M51.36 LUMBAR DEGENERATIVE DISC DISEASE: ICD-10-CM

## 2021-09-02 DIAGNOSIS — M54.16 LUMBAR RADICULOPATHY: ICD-10-CM

## 2021-09-02 PROCEDURE — 3008F BODY MASS INDEX DOCD: CPT | Performed by: FAMILY MEDICINE

## 2021-09-02 PROCEDURE — 1036F TOBACCO NON-USER: CPT | Performed by: NURSE PRACTITIONER

## 2021-09-02 PROCEDURE — 99214 OFFICE O/P EST MOD 30 MIN: CPT | Performed by: NURSE PRACTITIONER

## 2021-09-02 NOTE — PATIENT INSTRUCTIONS
Epidural Steroid Injection   AMBULATORY CARE:   What you need to know about an epidural steroid injection (JAZMINE):  An JAZMINE is a procedure to inject steroid medicine into the epidural space  The epidural space is between your spinal cord and vertebrae  Steroids reduce inflammation and fluid buildup in your spine that may be causing pain  You may be given pain medicine along with the steroids  How to prepare for an JAZMINE:  Your healthcare provider will talk to you about how to prepare for your procedure  He or she will tell you what medicines to take or not take on the day of your procedure  You may need to stop taking blood thinners or other medicines several days before your procedure  You may need to adjust any diabetes medicine you take on the day of your procedure  Steroid medicine can increase your blood sugar level  Arrange for someone to drive you home when you are discharged  What will happen during an JAZMINE:   · You will be given medicine to numb the procedure area  You will be awake for the procedure, but you will not feel pain  You may also be given medicine to help you relax  Contrast liquid will be used to help your healthcare provider see the area better  Tell the healthcare provider if you have ever had an allergic reaction to contrast liquid  · Your healthcare provider may place the needle into your neck area, middle of your back, or tailbone area  He may inject the medicine next to the nerves that are causing your pain  He may instead inject the medicine into a larger area of the epidural space  This helps the medicine spread to more nerves  Your healthcare provider will use a fluoroscope to help guide the needle to the right place  A fluoroscope is a type of x-ray  After the procedure, a bandage will be placed over the injection site to prevent infection  What will happen after an JAZMINE:  You will have a bandage over the injection site to prevent infection   Your healthcare provider will tell you when you can bathe and any activity guidelines  You will be able to go home  Risks of an JAZMINE:  You may have temporary or permanent nerve damage or paralysis  You may have bleeding or develop a serious infection, such as meningitis (swelling of the brain coverings)  An abscess may also develop  An abscess is a pus-filled area under the skin  You may need surgery to fix the abscess  You may have a seizure, anxiety, or trouble sleeping  If you are a man, you may have temporary erectile dysfunction (not able to have an erection)  Call your local emergency number (911 in the [address] [place] [address],[address]) if:   · You have a seizure  · You have trouble moving your legs  Seek care immediately if:   · Blood soaks through your bandage  · You have a fever or chills, severe back pain, and the procedure area is sensitive to the touch  · You cannot control when you urinate or have a bowel movement  Call your doctor if:   · You have weakness or numbness in your legs  · Your wound is red, swollen, or draining pus  · You have nausea or are vomiting  · Your face or neck is red and you feel warm  · You have more pain than you had before the procedure  · You have swelling in your hands or feet  · You have questions or concerns about your condition or care  Care for your wound as directed: You may remove the bandage before you go to bed the day of your procedure  You may take a shower, but do not take a bath for at least 24 hours  Self-care:   · Do not drive,  use machines, or do strenuous activity for 24 hours after your procedure or as directed  · Continue other treatments  as directed  Steroid injections alone will not control your pain  The injections are meant to be used with other treatments, such as physical therapy  Follow up with your doctor as directed:  Write down your questions so you remember to ask them during your visits     © Copyright [employer] 2021 Information is for End User's use only and may not be sold, redistributed or otherwise used for commercial purposes  All illustrations and images included in CareNotes® are the copyrighted property of A D A M , Inc  or [employer]  The above information is an  only  It is not intended as medical advice for individual conditions or treatments  Talk to your doctor, nurse or pharmacist before following any medical regimen to see if it is safe and effective for you

## 2021-09-09 ENCOUNTER — HOSPITAL ENCOUNTER (OUTPATIENT)
Dept: ULTRASOUND IMAGING | Facility: CLINIC | Age: 46
Discharge: HOME | End: 2021-09-09
Payer: COMMERCIAL

## 2021-09-09 ENCOUNTER — HOSPITAL ENCOUNTER (OUTPATIENT)
Dept: MAMMOGRAPHY | Facility: CLINIC | Age: 46
Discharge: HOME | End: 2021-09-09
Payer: COMMERCIAL

## 2021-09-09 VITALS — BODY MASS INDEX: 26.63 KG/M2 | WEIGHT: 156 LBS | HEIGHT: 64 IN

## 2021-09-09 DIAGNOSIS — N64.4 BREAST PAIN, LEFT: ICD-10-CM

## 2021-09-09 PROCEDURE — 76642 ULTRASOUND BREAST LIMITED: CPT

## 2021-09-09 PROCEDURE — G0279 TOMOSYNTHESIS, MAMMO: HCPCS

## 2021-09-09 PROCEDURE — 77066 DX MAMMO INCL CAD BI: CPT

## 2021-09-11 ENCOUNTER — TELEPHONE (OUTPATIENT)
Dept: INTERNAL MEDICINE CLINIC | Facility: CLINIC | Age: 46
End: 2021-09-11

## 2021-09-11 NOTE — TELEPHONE ENCOUNTER
----- Message from Kathleen Sanders DO sent at 9/10/2021  9:09 AM EDT -----  Please notify pt of negative study

## 2021-09-11 NOTE — TELEPHONE ENCOUNTER
Patient has been left a VM about her study and our number to call back if she has any questions or concerns

## 2021-09-17 ENCOUNTER — HOSPITAL ENCOUNTER (OUTPATIENT)
Dept: MRI IMAGING | Facility: HOSPITAL | Age: 46
Discharge: HOME | End: 2021-09-17
Payer: COMMERCIAL

## 2021-09-17 DIAGNOSIS — G44.321 INTRACTABLE CHRONIC POST-TRAUMATIC HEADACHE: ICD-10-CM

## 2021-09-17 PROCEDURE — G1004 CDSM NDSC: HCPCS

## 2021-09-17 PROCEDURE — 70551 MRI BRAIN STEM W/O DYE: CPT

## 2021-09-21 ENCOUNTER — TELEPHONE (OUTPATIENT)
Dept: INTERNAL MEDICINE CLINIC | Facility: CLINIC | Age: 46
End: 2021-09-21

## 2021-09-21 NOTE — TELEPHONE ENCOUNTER
----- Message from Kathleen Sanders DO sent at 9/21/2021  9:49 AM EDT -----  Please notify pt that her MRI of the brain was normal

## 2021-09-22 DIAGNOSIS — G47.00 INSOMNIA, UNSPECIFIED TYPE: ICD-10-CM

## 2021-09-22 RX ORDER — HYDROXYZINE HYDROCHLORIDE 10 MG/1
TABLET, FILM COATED ORAL
Qty: 30 TABLET | Refills: 0 | Status: SHIPPED | OUTPATIENT
Start: 2021-09-22 | End: 2021-11-18

## 2021-09-27 ENCOUNTER — TELEPHONE (OUTPATIENT)
Dept: RADIOLOGY | Facility: CLINIC | Age: 46
End: 2021-09-27

## 2021-10-23 DIAGNOSIS — T78.40XD ALLERGY, SUBSEQUENT ENCOUNTER: ICD-10-CM

## 2021-10-23 RX ORDER — CETIRIZINE HYDROCHLORIDE 10 MG/1
TABLET ORAL
Qty: 90 TABLET | Refills: 1 | Status: SHIPPED | OUTPATIENT
Start: 2021-10-23 | End: 2022-03-14 | Stop reason: SDUPTHER

## 2021-11-18 DIAGNOSIS — G47.00 INSOMNIA, UNSPECIFIED TYPE: ICD-10-CM

## 2021-11-18 RX ORDER — HYDROXYZINE HYDROCHLORIDE 10 MG/1
TABLET, FILM COATED ORAL
Qty: 30 TABLET | Refills: 0 | Status: SHIPPED | OUTPATIENT
Start: 2021-11-18 | End: 2021-12-16

## 2021-11-30 ENCOUNTER — TELEPHONE (OUTPATIENT)
Dept: GASTROENTEROLOGY | Facility: HOSPITAL | Age: 46
End: 2021-11-30

## 2021-12-01 ENCOUNTER — ANESTHESIA (OUTPATIENT)
Dept: GASTROENTEROLOGY | Facility: HOSPITAL | Age: 46
End: 2021-12-01

## 2021-12-01 ENCOUNTER — HOSPITAL ENCOUNTER (OUTPATIENT)
Dept: GASTROENTEROLOGY | Facility: HOSPITAL | Age: 46
Setting detail: OUTPATIENT SURGERY
Discharge: HOME | End: 2021-12-01
Attending: INTERNAL MEDICINE
Payer: COMMERCIAL

## 2021-12-01 ENCOUNTER — ANESTHESIA EVENT (OUTPATIENT)
Dept: GASTROENTEROLOGY | Facility: HOSPITAL | Age: 46
End: 2021-12-01

## 2021-12-01 VITALS
RESPIRATION RATE: 16 BRPM | WEIGHT: 152.56 LBS | SYSTOLIC BLOOD PRESSURE: 115 MMHG | HEART RATE: 66 BPM | TEMPERATURE: 98 F | BODY MASS INDEX: 26.05 KG/M2 | HEIGHT: 64 IN | OXYGEN SATURATION: 100 % | DIASTOLIC BLOOD PRESSURE: 57 MMHG

## 2021-12-01 DIAGNOSIS — R10.10 PAIN OF UPPER ABDOMEN: ICD-10-CM

## 2021-12-01 DIAGNOSIS — K21.9 GASTROESOPHAGEAL REFLUX DISEASE WITHOUT ESOPHAGITIS: ICD-10-CM

## 2021-12-01 DIAGNOSIS — R14.0 ABDOMINAL DISTENTION: ICD-10-CM

## 2021-12-01 DIAGNOSIS — R19.8 ALTERNATING CONSTIPATION AND DIARRHEA: ICD-10-CM

## 2021-12-01 DIAGNOSIS — R11.0 NAUSEA: ICD-10-CM

## 2021-12-01 PROCEDURE — 88342 IMHCHEM/IMCYTCHM 1ST ANTB: CPT | Performed by: PATHOLOGY

## 2021-12-01 PROCEDURE — 88313 SPECIAL STAINS GROUP 2: CPT | Performed by: PATHOLOGY

## 2021-12-01 PROCEDURE — 88305 TISSUE EXAM BY PATHOLOGIST: CPT | Performed by: PATHOLOGY

## 2021-12-01 PROCEDURE — 43239 EGD BIOPSY SINGLE/MULTIPLE: CPT | Performed by: INTERNAL MEDICINE

## 2021-12-01 PROCEDURE — 45380 COLONOSCOPY AND BIOPSY: CPT | Performed by: INTERNAL MEDICINE

## 2021-12-01 RX ORDER — SODIUM CHLORIDE, SODIUM LACTATE, POTASSIUM CHLORIDE, CALCIUM CHLORIDE 600; 310; 30; 20 MG/100ML; MG/100ML; MG/100ML; MG/100ML
125 INJECTION, SOLUTION INTRAVENOUS CONTINUOUS
Status: DISCONTINUED | OUTPATIENT
Start: 2021-12-01 | End: 2021-12-05 | Stop reason: HOSPADM

## 2021-12-01 RX ORDER — SODIUM CHLORIDE, SODIUM LACTATE, POTASSIUM CHLORIDE, CALCIUM CHLORIDE 600; 310; 30; 20 MG/100ML; MG/100ML; MG/100ML; MG/100ML
INJECTION, SOLUTION INTRAVENOUS CONTINUOUS PRN
Status: DISCONTINUED | OUTPATIENT
Start: 2021-12-01 | End: 2021-12-01

## 2021-12-01 RX ORDER — PROPOFOL 10 MG/ML
INJECTION, EMULSION INTRAVENOUS AS NEEDED
Status: DISCONTINUED | OUTPATIENT
Start: 2021-12-01 | End: 2021-12-01

## 2021-12-01 RX ADMIN — PROPOFOL 50 MG: 10 INJECTION, EMULSION INTRAVENOUS at 10:36

## 2021-12-01 RX ADMIN — PROPOFOL 50 MG: 10 INJECTION, EMULSION INTRAVENOUS at 10:34

## 2021-12-01 RX ADMIN — PROPOFOL 50 MG: 10 INJECTION, EMULSION INTRAVENOUS at 10:39

## 2021-12-01 RX ADMIN — PROPOFOL 100 MG: 10 INJECTION, EMULSION INTRAVENOUS at 10:33

## 2021-12-01 RX ADMIN — PROPOFOL 50 MG: 10 INJECTION, EMULSION INTRAVENOUS at 10:42

## 2021-12-01 RX ADMIN — SODIUM CHLORIDE, SODIUM LACTATE, POTASSIUM CHLORIDE, AND CALCIUM CHLORIDE 125 ML/HR: .6; .31; .03; .02 INJECTION, SOLUTION INTRAVENOUS at 09:41

## 2021-12-02 ENCOUNTER — TELEPHONE (OUTPATIENT)
Dept: GASTROENTEROLOGY | Facility: CLINIC | Age: 46
End: 2021-12-02

## 2021-12-03 ENCOUNTER — TELEPHONE (OUTPATIENT)
Dept: GASTROENTEROLOGY | Facility: CLINIC | Age: 46
End: 2021-12-03

## 2021-12-08 ENCOUNTER — TELEPHONE (OUTPATIENT)
Dept: GASTROENTEROLOGY | Facility: CLINIC | Age: 46
End: 2021-12-08

## 2021-12-08 DIAGNOSIS — R10.10 PAIN OF UPPER ABDOMEN: Primary | ICD-10-CM

## 2021-12-08 RX ORDER — DICYCLOMINE HCL 20 MG
20 TABLET ORAL 4 TIMES DAILY PRN
Qty: 30 TABLET | Refills: 3 | Status: SHIPPED | OUTPATIENT
Start: 2021-12-08 | End: 2022-04-14 | Stop reason: ALTCHOICE

## 2021-12-16 DIAGNOSIS — G47.00 INSOMNIA, UNSPECIFIED TYPE: ICD-10-CM

## 2021-12-16 RX ORDER — HYDROXYZINE HYDROCHLORIDE 10 MG/1
TABLET, FILM COATED ORAL
Qty: 30 TABLET | Refills: 0 | Status: SHIPPED | OUTPATIENT
Start: 2021-12-16 | End: 2022-03-14 | Stop reason: SDUPTHER

## 2022-01-03 ENCOUNTER — TELEPHONE (OUTPATIENT)
Dept: PAIN MEDICINE | Facility: CLINIC | Age: 47
End: 2022-01-03

## 2022-01-03 NOTE — TELEPHONE ENCOUNTER
Pt called in to reschedule her procedure  Please be advised thank you    Pt can be reached @ [phone]

## 2022-01-12 ENCOUNTER — TELEPHONE (OUTPATIENT)
Dept: INTERNAL MEDICINE CLINIC | Facility: CLINIC | Age: 47
End: 2022-01-12

## 2022-01-12 DIAGNOSIS — R10.11 RUQ PAIN: Primary | ICD-10-CM

## 2022-01-12 NOTE — TELEPHONE ENCOUNTER
Pt's gall bladders has to come out  She's in a lot of pain  The nitro test has to be done before they'll remove it   Can you order a Nitro test for her gall bladder?     Let her know; if one can be ordered

## 2022-01-12 NOTE — TELEPHONE ENCOUNTER
I need more detail  Who is requiring a "nitro test" and what exactly is that?  Why is her GI doc not ordering a  "nitro test"

## 2022-01-13 NOTE — TELEPHONE ENCOUNTER
Pt called back stated carmine here told her nothing was wrong with her then she moved from [place] to [facility] pa saw carmine gallardo  At [facility] and they ordered this test and before she could have it done she moved back to [place] and is asking to have it done this is for her gall bladder

## 2022-01-19 NOTE — TELEPHONE ENCOUNTER
Pt is ck'ing to see if the order for the Reyna Cancel has been received ???  It is a Hida Scan not a [employer]    she is really suffering needs to get this done ASAP    [employer] said they faxed it here    please call [facility] ph / 6  to see what can be done about getting this faxed        [phone] ()

## 2022-01-19 NOTE — TELEPHONE ENCOUNTER
Please notify pt that the hida has been ordered on the behalf of her GI provider  Please notify pt once again I will not review this study with her  She will have to follow up with her GI provider for the results once completed

## 2022-01-28 ENCOUNTER — TELEPHONE (OUTPATIENT)
Dept: INTERNAL MEDICINE CLINIC | Facility: CLINIC | Age: 47
End: 2022-01-28

## 2022-01-28 DIAGNOSIS — L90.5 SCAR TISSUE: Primary | ICD-10-CM

## 2022-02-08 ENCOUNTER — OFFICE VISIT (OUTPATIENT)
Dept: DERMATOLOGY | Facility: CLINIC | Age: 47
End: 2022-02-08
Payer: COMMERCIAL

## 2022-02-08 VITALS — WEIGHT: 150.8 LBS | TEMPERATURE: 98.2 F | BODY MASS INDEX: 25.88 KG/M2

## 2022-02-08 DIAGNOSIS — Z13.89 SCREENING FOR SKIN CONDITION: ICD-10-CM

## 2022-02-08 DIAGNOSIS — L91.0 HYPERTROPHIC SCAR: Primary | ICD-10-CM

## 2022-02-08 DIAGNOSIS — D22.9 NEVUS: ICD-10-CM

## 2022-02-08 PROCEDURE — 1036F TOBACCO NON-USER: CPT | Performed by: DERMATOLOGY

## 2022-02-08 PROCEDURE — 11900 INJECT SKIN LESIONS </W 7: CPT | Performed by: DERMATOLOGY

## 2022-02-08 PROCEDURE — 99203 OFFICE O/P NEW LOW 30 MIN: CPT | Performed by: DERMATOLOGY

## 2022-02-08 RX ORDER — TRIAMCINOLONE ACETONIDE 40 MG/ML
10 INJECTION, SUSPENSION INTRA-ARTICULAR; INTRAMUSCULAR ONCE
Status: COMPLETED | OUTPATIENT
Start: 2022-02-08 | End: 2022-02-08

## 2022-02-08 RX ADMIN — TRIAMCINOLONE ACETONIDE 10 MG: 40 INJECTION, SUSPENSION INTRA-ARTICULAR; INTRAMUSCULAR at 15:03

## 2022-02-08 NOTE — PROGRESS NOTES
[address]  [address] [facility] [address]  [phone]  [phone]     MRN: [id number] : 1975  Encounter: [id number]  Patient Information: Vivien Mcpherson  Chief complaint:  Scar left breast    History of present illness:  65-year-old female who tans presents for concerns regarding a scar on her left breast after an excision was performed for  A cyst about a year ago no other concerns noted  Past Medical History:   Diagnosis Date    Degenerative joint disease     Depression     GERD (gastroesophageal reflux disease)     Hyperlipidemia     Lumbar radiculopathy     Thyroid nodule      Past Surgical History:   Procedure Laterality Date    BREAST BIOPSY Left     neg    CYST REMOVAL  10/2020    lt breast     EGD      HYSTERECTOMY      HYSTERECTOMY  1998    LIPOSUCTION  2020     Social History   Social History     Substance and Sexual Activity   Alcohol Use Never     Social History     Substance and Sexual Activity   Drug Use Never     Social History     Tobacco Use   Smoking Status Never Smoker   Smokeless Tobacco Never Used     Family History   Problem Relation Age of Onset    No Known Problems Mother     No Known Problems Sister     No Known Problems Daughter     No Known Problems Maternal Grandmother     No Known Problems Paternal Grandmother     No Known Problems Maternal Aunt     No Known Problems Maternal Aunt     No Known Problems Maternal Aunt     No Known Problems Maternal Aunt     No Known Problems Maternal Aunt     No Known Problems Maternal Aunt     Breast cancer Neg Hx      Meds/Allergies   Allergies   Allergen Reactions    Bee Venom Anaphylaxis    Iodides Other (See Comments)    Caribou (Diagnostic) - Food Allergy Hives     Gi upset    [place] Oil - Food Allergy GI Intolerance    Aspirin Other (See Comments)     shaking  convulsions      Metronidazole GI Intolerance     Gi upset      Sulfamethoxazole-Trimethoprim GI Intolerance     Gi upst      Tape  [Medical Tape] Other (See Comments) and Hives     fever  Other reaction(s): Other  fever       Meds:  Prior to Admission medications    Medication Sig Start Date End Date Taking?  Authorizing Provider   albuterol (PROVENTIL HFA,VENTOLIN HFA) 90 mcg/act inhaler INHALE 2 PUFFS BY MOUTH AND INTO THE LUNGS 2 TIMES A DAY AS NEEDED FOR WHEEZING 3/26/21  Yes Historical Provider, MD   BIOTIN EXTRA STRENGTH PO Take by mouth   Yes Historical Provider, MD   cetirizine (ZyrTEC) 10 mg tablet take 1 tablet by mouth once daily 10/23/21  Yes Shelby Crum DO   Diclofenac Sodium (VOLTAREN) 1 % apply 1 APPLICATION topically four times a day 1/11/21  Yes Historical Provider, MD   fluconazole (DIFLUCAN) 150 mg tablet Take 150 mg by mouth daily 8/26/21  Yes Historical Provider, MD   FLUoxetine (PROzac) 40 MG capsule take 1 capsule by mouth daily 11/23/20  Yes Historical Provider, MD   ibuprofen (MOTRIN) 600 mg tablet Take 1 tablet (600 mg total) by mouth every 6 (six) hours as needed for moderate pain 6/15/21  Yes Dory Pennington,    meclizine (ANTIVERT) 25 mg tablet take 1 tablet by mouth twice a day if needed for dizziness 1/11/21  Yes Historical Provider, MD   omeprazole (PriLOSEC) 40 MG capsule Take 40 mg by mouth daily 3/23/21  Yes Historical Provider, MD   azithromycin (ZITHROMAX) 250 mg tablet take 2 tablets by mouth TODAY then take 1 tablet DAILY FOR 4 DAYS  Patient not taking: Reported on 2/8/2022 8/30/21   Historical Provider, MD   dicyclomine (BENTYL) 20 mg tablet Take 1 tablet (20 mg total) by mouth 4 (four) times a day as needed (abd pain)  Patient not taking: Reported on 2/8/2022 12/8/21   Michael Eaton MD   EPINEPHrine (EPIPEN) 0 3 mg/0 3 mL SOAJ Inject 0 3 mg into a muscle  Patient not taking: Reported on 8/31/2021 1/11/21   Historical Provider, MD   FLUoxetine (PROzac) 20 mg capsule  7/1/21   Historical Provider, MD   hydrOXYzine HCL (ATARAX) 10 mg tablet take 1 tablet by mouth at bedtime  Patient not taking: Reported on 2/8/2022 12/16/21   Katherin Salvage, DO       Subjective:     Review of Systems:    General: negative for - chills, fatigue, fever,  weight gain or weight loss  Psychological: negative for - anxiety, behavioral disorder, concentration difficulties, decreased libido, depression, irritability, memory difficulties, mood swings, sleep disturbances or suicidal ideation  ENT: negative for - hearing difficulties , nasal congestion, nasal discharge, oral lesions, sinus pain, sneezing, sore throat  Allergy and Immunology: negative for - hives, insect bite sensitivity,  Hematological and Lymphatic: negative for - bleeding problems, blood clots,bruising, swollen lymph nodes  Endocrine: negative for - hair pattern changes, hot flashes, malaise/lethargy, mood swings, palpitations, polydipsia/polyuria, skin changes, temperature intolerance or unexpected weight change  Respiratory: negative for - cough, hemoptysis, orthopnea, shortness of breath, or wheezing  Cardiovascular: negative for - chest pain, dyspnea on exertion, edema,  Gastrointestinal: negative for - abdominal pain, nausea/vomiting  Genito-Urinary: negative for - dysuria, incontinence, irregular/heavy menses or urinary frequency/urgency  Musculoskeletal: negative for - gait disturbance, joint pain, joint stiffness, joint swelling, muscle pain, muscular weakness  Dermatological:  As in HPI  Neurological: negative for confusion, dizziness, headaches, impaired coordination/balance, memory loss, numbness/tingling, seizures, speech problems, tremors or weakness       Objective:   Temp 98 2 °F (36 8 °C) (Temporal)   Wt 68 4 kg (150 lb 12 8 oz)   BMI 25 88 kg/m²     Physical Exam:    General Appearance:    Alert, cooperative, no distress   Head:    Normocephalic, without obvious abnormality, atraumatic           Skin:   A full skin exam was performed including scalp, head scalp, eyes, ears, nose, lips, neck, chest, axilla, abdomen, back, buttocks, bilateral upper extremities, bilateral lower extremities, hands, feet, fingers, toes, fingernails, and toenails patient well tanned hand normal pigmented lesions with regular shape and color hypertrophic scar noted on the left breast  Intralesional Injection Procedure Note  Diagnosis:  Hypertrophic scar Location:  Left breast informed consent:  Discussed risks (infection, pain, bleeding, bruising, thinning of the skin, loss of skin pigment, lack of resolution, and recurrence of lesion) and benefits of the procedure, as well as the alternatives   Informed consent was obtained  Preparation: The area was prepared a standard fashion  Anesthesia: not required  Procedure Details: An intralesional injection was performed with                             0 25 cc of Kenalog 40 in total were injected  Total number of lesions injected:  3       Assessment:     1  Hypertrophic scar     2  Nevus     3  Screening for skin condition           Plan:   Due to patient's concern the hypertrophic scar was treated with Kenalog 40 patient tolerated procedure well will recheck in 1 month  Nevi reviewed the concept of ABCDE and ugly duckling nothing markedly atypical patient reassured  Screening for Dermatologic Disorders: Nothing else of concern noted on complete exam follow up in 1 year       Jeni Vasques MD  2/8/2022,2:55 PM    Portions of the record may have been created with voice recognition software   Occasional wrong word or "sound a like" substitutions may have occurred due to the inherent limitations of voice recognition software   Read the chart carefully and recognize, using context, where substitutions have occurred

## 2022-02-08 NOTE — PATIENT INSTRUCTIONS
Due to patient's concern the hypertrophic scar was treated with Kenalog 40 patient tolerated procedure well will recheck in 1 month  Nevi reviewed the concept of ABCDE and ugly duckling nothing markedly atypical patient reassured  Screening for Dermatologic Disorders: Nothing else of concern noted on complete exam follow up in 1 year

## 2022-02-21 ENCOUNTER — TELEPHONE (OUTPATIENT)
Dept: PAIN MEDICINE | Facility: CLINIC | Age: 47
End: 2022-02-21

## 2022-02-21 NOTE — TELEPHONE ENCOUNTER
S/w pt and rescheduled [facility] for 4/27/22 230 pm  Advised her auth expires 3/9/22, pt unable to come in prior to that due to work schedule and other dr appts  Will try to obtain new auth for 4/27/22 but pt aware may need new office visit

## 2022-03-07 ENCOUNTER — PATIENT MESSAGE (OUTPATIENT)
Dept: GASTROENTEROLOGY | Facility: CLINIC | Age: 47
End: 2022-03-07

## 2022-03-07 ENCOUNTER — TELEPHONE (OUTPATIENT)
Dept: GASTROENTEROLOGY | Facility: CLINIC | Age: 47
End: 2022-03-07

## 2022-03-07 NOTE — TELEPHONE ENCOUNTER
Please tell her we do indeed check for gastric cancer and there is absolutely no evidence of that whatsoever, including on biopsies  Please tell her again that because of the positive findings on her HIDA scan I recommend a surgical evaluation for biliary dyskinesia

## 2022-03-07 NOTE — TELEPHONE ENCOUNTER
----- Message from Chen Bookbinder sent at 3/7/2022  8:56 AM EST -----  Regarding: My hida scan  Im still having severe pain in my upper abdomen  Had a hida scan on Friday was wondering what the next step was  I found out stomach cancer runs in my family  When u did those biopsys did u check for that and could it be possible to have that     Thanx

## 2022-03-08 ENCOUNTER — OFFICE VISIT (OUTPATIENT)
Dept: DERMATOLOGY | Facility: CLINIC | Age: 47
End: 2022-03-08
Payer: COMMERCIAL

## 2022-03-08 VITALS — HEIGHT: 64 IN | BODY MASS INDEX: 25.61 KG/M2 | WEIGHT: 150 LBS

## 2022-03-08 DIAGNOSIS — L98.9 UNKNOWN SKIN LESION: Primary | ICD-10-CM

## 2022-03-08 DIAGNOSIS — L91.0 HYPERTROPHIC SCAR: ICD-10-CM

## 2022-03-08 PROCEDURE — 88342 IMHCHEM/IMCYTCHM 1ST ANTB: CPT | Performed by: STUDENT IN AN ORGANIZED HEALTH CARE EDUCATION/TRAINING PROGRAM

## 2022-03-08 PROCEDURE — 88305 TISSUE EXAM BY PATHOLOGIST: CPT | Performed by: STUDENT IN AN ORGANIZED HEALTH CARE EDUCATION/TRAINING PROGRAM

## 2022-03-08 PROCEDURE — 88341 IMHCHEM/IMCYTCHM EA ADD ANTB: CPT | Performed by: STUDENT IN AN ORGANIZED HEALTH CARE EDUCATION/TRAINING PROGRAM

## 2022-03-08 PROCEDURE — 11102 TANGNTL BX SKIN SINGLE LES: CPT | Performed by: DERMATOLOGY

## 2022-03-08 PROCEDURE — 99214 OFFICE O/P EST MOD 30 MIN: CPT | Performed by: DERMATOLOGY

## 2022-03-08 PROCEDURE — 1036F TOBACCO NON-USER: CPT | Performed by: DERMATOLOGY

## 2022-03-08 PROCEDURE — 3008F BODY MASS INDEX DOCD: CPT | Performed by: DERMATOLOGY

## 2022-03-08 NOTE — PROGRESS NOTES
[address]  [address] [facility] [address]  [phone]  [phone]     MRN: [id number] : 1975  Encounter: [id number]  Patient Information: Rachel Davis  Chief complaint:  Recheck of keloid and skin lesion on chest    History of present illness:  69-year-old female we had treated for a hypertrophic scar/keloid left breast with intralesional steroid injection who comes in for recheck patient notes marked improved also concerned regarding lesion her mid chest that has been present for awhile  Past Medical History:   Diagnosis Date    Degenerative joint disease     Depression     GERD (gastroesophageal reflux disease)     Hyperlipidemia     Lumbar radiculopathy     Thyroid nodule      Past Surgical History:   Procedure Laterality Date    BREAST BIOPSY Left     neg    CYST REMOVAL  10/2020    lt breast     EGD      HYSTERECTOMY      HYSTERECTOMY  1998    LIPOSUCTION  2020     Social History   Social History     Substance and Sexual Activity   Alcohol Use Never     Social History     Substance and Sexual Activity   Drug Use Never     Social History     Tobacco Use   Smoking Status Never Smoker   Smokeless Tobacco Never Used     Family History   Problem Relation Age of Onset    No Known Problems Mother     No Known Problems Sister     No Known Problems Daughter     No Known Problems Maternal Grandmother     No Known Problems Paternal Grandmother     No Known Problems Maternal Aunt     No Known Problems Maternal Aunt     No Known Problems Maternal Aunt     No Known Problems Maternal Aunt     No Known Problems Maternal Aunt     No Known Problems Maternal Aunt     Breast cancer Neg Hx      Meds/Allergies   Allergies   Allergen Reactions    Bee Venom Anaphylaxis    Iodides Other (See Comments)    Browning (Diagnostic) - Food Allergy Hives     Gi upset    [place] Oil - Food Allergy GI Intolerance    Aspirin Other (See Comments)     shaking  convulsions      Metronidazole GI Intolerance     Gi upset      Sulfamethoxazole-Trimethoprim GI Intolerance     Gi upst      Tape  [Medical Tape] Other (See Comments) and Hives     fever  Other reaction(s): Other  fever       Meds:  Prior to Admission medications    Medication Sig Start Date End Date Taking?  Authorizing Provider   albuterol (PROVENTIL HFA,VENTOLIN HFA) 90 mcg/act inhaler INHALE 2 PUFFS BY MOUTH AND INTO THE LUNGS 2 TIMES A DAY AS NEEDED FOR WHEEZING 3/26/21  Yes Historical Provider, MD   BIOTIN EXTRA STRENGTH PO Take by mouth   Yes Historical Provider, MD   cetirizine (ZyrTEC) 10 mg tablet take 1 tablet by mouth once daily 10/23/21  Yes Shelby Crum DO   Diclofenac Sodium (VOLTAREN) 1 % apply 1 APPLICATION topically four times a day 1/11/21  Yes Historical Provider, MD   fluconazole (DIFLUCAN) 150 mg tablet Take 150 mg by mouth daily 8/26/21  Yes Historical Provider, MD   FLUoxetine (PROzac) 40 MG capsule take 1 capsule by mouth daily 11/23/20  Yes Historical Provider, MD   ibuprofen (MOTRIN) 600 mg tablet Take 1 tablet (600 mg total) by mouth every 6 (six) hours as needed for moderate pain 6/15/21  Yes Dory Pennington DO   meclizine (ANTIVERT) 25 mg tablet take 1 tablet by mouth twice a day if needed for dizziness 1/11/21  Yes Historical Provider, MD   omeprazole (PriLOSEC) 40 MG capsule Take 40 mg by mouth daily 3/23/21  Yes Historical Provider, MD   azithromycin (ZITHROMAX) 250 mg tablet take 2 tablets by mouth TODAY then take 1 tablet DAILY FOR 4 DAYS  Patient not taking: Reported on 2/8/2022 8/30/21   Historical Provider, MD   dicyclomine (BENTYL) 20 mg tablet Take 1 tablet (20 mg total) by mouth 4 (four) times a day as needed (abd pain)  Patient not taking: Reported on 2/8/2022 12/8/21   Vivian Christianson MD   EPINEPHrine (EPIPEN) 0 3 mg/0 3 mL SOAJ Inject 0 3 mg into a muscle  Patient not taking: Reported on 8/31/2021 1/11/21   Historical Provider, MD   FLUoxetine (PROzac) 20 mg capsule  7/1/21   Historical Provider, MD   hydrOXYzine HCL (ATARAX) 10 mg tablet take 1 tablet by mouth at bedtime  Patient not taking: Reported on 2/8/2022 12/16/21   Ericka Horvath DO       Subjective:     Review of Systems:    General: negative for - chills, fatigue, fever,  weight gain or weight loss  Psychological: negative for - anxiety, behavioral disorder, concentration difficulties, decreased libido, depression, irritability, memory difficulties, mood swings, sleep disturbances or suicidal ideation  ENT: negative for - hearing difficulties , nasal congestion, nasal discharge, oral lesions, sinus pain, sneezing, sore throat  Allergy and Immunology: negative for - hives, insect bite sensitivity,  Hematological and Lymphatic: negative for - bleeding problems, blood clots,bruising, swollen lymph nodes  Endocrine: negative for - hair pattern changes, hot flashes, malaise/lethargy, mood swings, palpitations, polydipsia/polyuria, skin changes, temperature intolerance or unexpected weight change  Respiratory: negative for - cough, hemoptysis, orthopnea, shortness of breath, or wheezing  Cardiovascular: negative for - chest pain, dyspnea on exertion, edema,  Gastrointestinal: negative for - abdominal pain, nausea/vomiting  Genito-Urinary: negative for - dysuria, incontinence, irregular/heavy menses or urinary frequency/urgency  Musculoskeletal: negative for - gait disturbance, joint pain, joint stiffness, joint swelling, muscle pain, muscular weakness  Dermatological:  As in HPI  Neurological: negative for confusion, dizziness, headaches, impaired coordination/balance, memory loss, numbness/tingling, seizures, speech problems, tremors or weakness       Objective:   Ht 5' 4" (1 626 m)   Wt 68 kg (150 lb)   BMI 25 75 kg/m²     Physical Exam:    General Appearance:    Alert, cooperative, no distress   Head:    Normocephalic, without obvious abnormality, atraumatic Skin:   A full skin exam was performed including scalp, head scalp, eyes, ears, nose, lips, neck, chest, axilla, abdomen, back, buttocks, bilateral upper extremities, bilateral lower extremities, hands, feet, fingers, toes, fingernails, and toenails previous noted keloid appear to be flat no elevation slightly indurated but minimally present 2 mm keratotic papule noted on the mid chest    Shave Biopsy Procedure Note    Pre-operative Diagnosis:  Rule out squamous cell carcinoma    Plan:  1  Instructed to keep the wound dry and covered for 24 and clean thereafter  2  Warning signs of infection were reviewed  3  Recommended that the patient use OTC acetaminophen as needed for pain  4  Return  Pending results of biopsy(ies)    Locations:  Mid chest    Indications:  Suspicious lesion    Anesthesia: Lidocaine 1% with epinephrine without added sodium bicarbonate    Procedure Details     Patient informed of the risks (including bleeding and infection) and benefits of the   procedure and Verbal informed consent obtained  The lesion and surrounding area were given a sterile prep using alcohol and draped in the usual sterile fashion  A Blue blade razor was used to obtain a specimen  Hemostasis achieved with aluminum chloride  Petrolatum and a sterile dressing applied  The specimen was sent for pathologic examination  The patient tolerated the procedure(s) well  Complications:  none  Assessment:     1  Unknown skin lesion     2   Hypertrophic scar           Plan:   Skin lesion question squamous cell carcinoma may require small excision  Hypertrophic scar and do not feel that further treatment at this time would be helpful may actually cause atrophy would hold off at this point    Tamie Romero MD  3/8/2022,3:30 PM    Portions of the record may have been created with voice recognition software   Occasional wrong word or "sound a like" substitutions may have occurred due to the inherent limitations of voice recognition software   Read the chart carefully and recognize, using context, where substitutions have occurred

## 2022-03-08 NOTE — PATIENT INSTRUCTIONS
Skin lesion question squamous cell carcinoma may require small excision  Hypertrophic scar and do not feel that further treatment at this time would be helpful may actually cause atrophy would hold off at this point  Wound care instructions given to patient

## 2022-03-14 ENCOUNTER — PATIENT MESSAGE (OUTPATIENT)
Dept: INTERNAL MEDICINE CLINIC | Facility: CLINIC | Age: 47
End: 2022-03-14

## 2022-03-14 DIAGNOSIS — K21.9 GASTROESOPHAGEAL REFLUX DISEASE WITHOUT ESOPHAGITIS: ICD-10-CM

## 2022-03-14 DIAGNOSIS — F32.9 REACTIVE DEPRESSION: Primary | ICD-10-CM

## 2022-03-14 DIAGNOSIS — T78.40XD ALLERGY, SUBSEQUENT ENCOUNTER: ICD-10-CM

## 2022-03-14 DIAGNOSIS — G47.00 INSOMNIA, UNSPECIFIED TYPE: ICD-10-CM

## 2022-03-14 DIAGNOSIS — R42 VERTIGO: ICD-10-CM

## 2022-03-14 DIAGNOSIS — F41.9 ANXIETY: ICD-10-CM

## 2022-03-14 RX ORDER — MECLIZINE HYDROCHLORIDE 25 MG/1
25 TABLET ORAL EVERY 8 HOURS PRN
Qty: 120 TABLET | Refills: 0 | Status: SHIPPED | OUTPATIENT
Start: 2022-03-14 | End: 2022-05-02

## 2022-03-14 RX ORDER — OMEPRAZOLE 40 MG/1
40 CAPSULE, DELAYED RELEASE ORAL DAILY
Qty: 30 CAPSULE | Refills: 2 | Status: SHIPPED | OUTPATIENT
Start: 2022-03-14 | End: 2022-06-02 | Stop reason: SDUPTHER

## 2022-03-14 RX ORDER — FLUOXETINE HYDROCHLORIDE 40 MG/1
40 CAPSULE ORAL DAILY
Qty: 30 CAPSULE | Refills: 2 | Status: SHIPPED | OUTPATIENT
Start: 2022-03-14 | End: 2022-06-02 | Stop reason: SDUPTHER

## 2022-03-14 RX ORDER — HYDROXYZINE HYDROCHLORIDE 10 MG/1
10 TABLET, FILM COATED ORAL
Qty: 30 TABLET | Refills: 0 | Status: SHIPPED | OUTPATIENT
Start: 2022-03-14 | End: 2022-06-02 | Stop reason: SDUPTHER

## 2022-03-14 RX ORDER — CETIRIZINE HYDROCHLORIDE 10 MG/1
10 TABLET ORAL DAILY
Qty: 90 TABLET | Refills: 0 | Status: SHIPPED | OUTPATIENT
Start: 2022-03-14 | End: 2022-04-25 | Stop reason: SDUPTHER

## 2022-03-16 ENCOUNTER — PATIENT MESSAGE (OUTPATIENT)
Dept: DERMATOLOGY | Facility: CLINIC | Age: 47
End: 2022-03-16

## 2022-03-16 NOTE — TELEPHONE ENCOUNTER
Patient called into the answering service after missing a call from the office  Patient was calling for her results  Patient informed her pathology was normal per Dr Singh Few note  Patient would still like to speak with the office  Patient was transferred to the office staff

## 2022-04-14 ENCOUNTER — OFFICE VISIT (OUTPATIENT)
Dept: INTERNAL MEDICINE CLINIC | Facility: CLINIC | Age: 47
End: 2022-04-14
Payer: COMMERCIAL

## 2022-04-14 VITALS
TEMPERATURE: 99.7 F | HEART RATE: 80 BPM | RESPIRATION RATE: 18 BRPM | SYSTOLIC BLOOD PRESSURE: 112 MMHG | DIASTOLIC BLOOD PRESSURE: 70 MMHG | BODY MASS INDEX: 25.1 KG/M2 | WEIGHT: 147 LBS | OXYGEN SATURATION: 99 % | HEIGHT: 64 IN

## 2022-04-14 DIAGNOSIS — Z11.59 NEED FOR HEPATITIS C SCREENING TEST: Primary | ICD-10-CM

## 2022-04-14 DIAGNOSIS — Z13.29 SCREENING FOR THYROID DISORDER: ICD-10-CM

## 2022-04-14 DIAGNOSIS — Z13.1 SCREENING FOR DIABETES MELLITUS: ICD-10-CM

## 2022-04-14 DIAGNOSIS — E55.9 VITAMIN D DEFICIENCY: ICD-10-CM

## 2022-04-14 DIAGNOSIS — Z13.6 ENCOUNTER FOR LIPID SCREENING FOR CARDIOVASCULAR DISEASE: ICD-10-CM

## 2022-04-14 DIAGNOSIS — B00.1 COLD SORE: ICD-10-CM

## 2022-04-14 DIAGNOSIS — Z13.220 ENCOUNTER FOR LIPID SCREENING FOR CARDIOVASCULAR DISEASE: ICD-10-CM

## 2022-04-14 DIAGNOSIS — R10.84 GENERALIZED ABDOMINAL PAIN: ICD-10-CM

## 2022-04-14 DIAGNOSIS — R10.9 RIGHT SIDED ABDOMINAL PAIN: ICD-10-CM

## 2022-04-14 PROCEDURE — 3725F SCREEN DEPRESSION PERFORMED: CPT

## 2022-04-14 PROCEDURE — 99396 PREV VISIT EST AGE 40-64: CPT

## 2022-04-14 RX ORDER — FLUOXETINE HYDROCHLORIDE 20 MG/1
20 CAPSULE ORAL DAILY
COMMUNITY
Start: 2022-04-03 | End: 2022-04-14 | Stop reason: SDUPTHER

## 2022-04-14 RX ORDER — CEPHALEXIN 500 MG/1
500 CAPSULE ORAL 3 TIMES DAILY
COMMUNITY
Start: 2022-04-04 | End: 2022-04-14 | Stop reason: ALTCHOICE

## 2022-04-14 RX ORDER — LORATADINE 10 MG/1
10 TABLET ORAL DAILY
COMMUNITY
Start: 2022-04-04 | End: 2022-04-14 | Stop reason: ALTCHOICE

## 2022-04-14 RX ORDER — FLUTICASONE PROPIONATE 50 MCG
1 SPRAY, SUSPENSION (ML) NASAL 2 TIMES DAILY
COMMUNITY
Start: 2022-04-04

## 2022-04-14 RX ORDER — ACYCLOVIR 50 MG/G
OINTMENT TOPICAL 4 TIMES DAILY
Qty: 15 G | Refills: 0 | Status: SHIPPED | OUTPATIENT
Start: 2022-04-14

## 2022-04-14 NOTE — PATIENT INSTRUCTIONS
Follow up with surgeon  Get labs completed    Allergic Rhinitis   AMBULATORY CARE:   Allergic rhinitis , or hay fever, is swelling of the inside of your nose  The swelling is a reaction to allergens in the air  An allergen can be anything that causes an allergic reaction  Allergies to weeds, grass, trees, or mold often cause seasonal allergic rhinitis  Indoor dust mites, cockroaches, pet dander, or mold can also cause allergic rhinitis  Common signs and symptoms include the following:   · Sneezing    · Nasal congestion    · Runny nose    · Itchy nose, eyes, or mouth    · Red, watery eyes    · Postnasal drip (nasal drainage down the back of your throat)    · Cough or frequent throat clearing    · Feeling tired or lethargic    · Dark circles under your eyes    Call 911 for the following:   · You have chest pain or shortness of breath  Seek care immediately if:   · You have severe pain  · You cough up blood  Contact your healthcare provider if:   · You have a fever  · You have ear or sinus pain, or a headache  · Your symptoms get worse, even after treatment  · You have yellow, green, brown, or bloody mucus coming from your nose  · Your nose is bleeding or you have pain inside your nose  · You have trouble sleeping because of your symptoms  · You have questions or concerns about your condition or care  Treatment:   · Antihistamines  help reduce itching, sneezing, and a runny nose  Some antihistamines can make you sleepy  · Nasal steroids  help decrease inflammation in your nose  · Decongestants  help clear your stuffy nose  · Immunotherapy  may be needed if your symptoms are severe or other treatments do not work  Immunotherapy is used to inject an allergen into your skin  At first, the therapy contains tiny amounts of the allergen  Your healthcare provider will slowly increase the amount of allergen   This may help your body be less sensitive to the allergen and stop reacting to it  You may need immunotherapy for weeks or longer  Manage allergic rhinitis:  The best way to manage allergic rhinitis is to avoid allergens that can trigger your symptoms  Any of the following may help decrease your symptoms:  · Rinse your nose and sinuses  with a salt water solution or use a salt water nasal spray  This will help thin the mucus in your nose and rinse away pollen and dirt  It will also help reduce swelling so you can breathe normally  Ask your healthcare provider how often to rinse your nose  · Reduce exposure to dust mites  Wash sheets and towels in hot water every week  Cover your pillows and mattresses with allergen-free covers  Limit the number of stuffed animals and soft toys your child has  Wash your child's toys in hot water regularly  Vacuum weekly and use a vacuum  with an air filter  If possible, get rid of carpets and curtains  These collect dust and dust mites  · Reduce exposure to pollen  Keep windows and doors closed in your house and car  Stay inside when air pollution or the pollen count is high  Run your air conditioner on recycle, and change air filters often  Shower and wash your hair before bed every night to rinse away pollen  · Reduce exposure to pet dander  If possible, do not keep cats, dogs, birds, or other pets  If you do keep pets in your home, keep them out of bedrooms and carpeted rooms  Bathe them often  · Reduce exposure to mold  Do not spend time in basements  Choose artificial plants instead of live plants  Keep your home's humidity at less than 45%  Do not have ponds or standing water in your home or yard  · Do not smoke  Avoid others who smoke  Ask your healthcare provider for information if you currently smoke and need help to quit  Follow up with your doctor as directed:  Write down your questions so you remember to ask them during your visits     © Copyright [employer] 2022 Information is for End User's use only and may not be sold, redistributed or otherwise used for commercial purposes  All illustrations and images included in CareNotes® are the copyrighted property of [employer]  or [employer]  The above information is an  only  It is not intended as medical advice for individual conditions or treatments  Talk to your doctor, nurse or pharmacist before following any medical regimen to see if it is safe and effective for you

## 2022-04-14 NOTE — PROGRESS NOTES
ADULT ANNUAL [address]     NAME: Marty Mcmillan  AGE: 51 y o  SEX: female  : 1975     DATE: 4/15/2022     Assessment and Plan:     Problem List Items Addressed This Visit        Other    Generalized abdominal pain      Other Visit Diagnoses     Need for hepatitis C screening test    -  Primary    Relevant Orders    Hepatitis C Antibody ([facility])    Right sided abdominal pain        Relevant Orders    CBC and differential    Comprehensive metabolic panel    UA w Reflex to Microscopic w Reflex to Culture -Lab Collect    Screening for diabetes mellitus        Relevant Orders    Hemoglobin A1C    Vitamin D deficiency        Relevant Orders    Vitamin D 25 hydroxy    Screening for thyroid disorder        Relevant Orders    TSH, 3rd generation with Free T4 reflex    Encounter for lipid screening for cardiovascular disease        Relevant Orders    Lipid Panel with Direct LDL reflex    Cold sore        Relevant Medications    acyclovir (ZOVIRAX) 5 % ointment          Immunizations and preventive care screenings were discussed with patient today  Appropriate education was printed on patient's after visit summary  Counseling:  Alcohol/drug use: discussed moderation in alcohol intake, the recommendations for healthy alcohol use, and avoidance of illicit drug use  Dental Health: discussed importance of regular tooth brushing, flossing, and dental visits  Injury prevention: discussed safety/seat belts, safety helmets, smoke detectors, carbon dioxide detectors, and smoking near bedding or upholstery  Sexual health: discussed sexually transmitted diseases, partner selection, use of condoms, avoidance of unintended pregnancy, and contraceptive alternatives  · Exercise: the importance of regular exercise/physical activity was discussed  Recommend exercise 3-5 times per week for at least 30 minutes            Return in about 3 months (around 7/14/2022) for Next scheduled follow up, Recheck  Chief Complaint:     Chief Complaint   Patient presents with    Mouth Lesions     cold sore popped up the last few days the OTC stuff isnt working      History of Present Illness:     Adult Annual Physical   Patient here for a comprehensive physical exam  The patient reports no problems  Diet and Physical Activity  · Diet/Nutrition: well balanced diet  · Exercise: no formal exercise  Depression Screening  PHQ-2/9 Depression Screening    Little interest or pleasure in doing things: 0 - not at all  Feeling down, depressed, or hopeless: 0 - not at all  PHQ-2 Score: 0  PHQ-2 Interpretation: Negative depression screen       General Health  · Sleep: gets more than 8 hours of sleep on average  · Hearing: normal - bilateral   · Vision: no vision problems  · Dental: regular dental visits and brushes teeth once daily  /GYN Health  · Patient is: postmenopausal  · Last menstrual period: n/a  · Contraceptive method: hysterectomy  Review of Systems:     Review of Systems   Constitutional: Negative  HENT: Negative  Nose burns   Eyes: Negative  Respiratory: Negative  Cardiovascular: Negative  Gastrointestinal: Negative  Endocrine: Negative  Genitourinary: Negative  Musculoskeletal: Positive for back pain  Skin: Negative  Neurological: Positive for headaches  Psychiatric/Behavioral: Negative  Negative for sleep disturbance and suicidal ideas        Past Medical History:     Past Medical History:   Diagnosis Date    Degenerative joint disease     Depression     GERD (gastroesophageal reflux disease)     Hyperlipidemia     Lumbar radiculopathy     Thyroid nodule       Past Surgical History:     Past Surgical History:   Procedure Laterality Date    BREAST BIOPSY Left 2020    neg    CYST REMOVAL  10/2020    lt breast     EGD      HYSTERECTOMY      HYSTERECTOMY  1998    LIPOSUCTION  09/2020      Social History:     Social History     Socioeconomic History    Marital status:      Spouse name: None    Number of children: None    Years of education: None    Highest education level: None   Occupational History    None   Tobacco Use    Smoking status: Never Smoker    Smokeless tobacco: Never Used   Vaping Use    Vaping Use: Never used   Substance and Sexual Activity    Alcohol use: Never    Drug use: Never    Sexual activity: Yes     Partners: Male   Other Topics Concern    None   Social History Narrative    None     Social Determinants of Health     Financial Resource Strain: Not on file   Food Insecurity: Not on file   Transportation Needs: Not on file   Physical Activity: Not on file   Stress: Not on file   Social Connections: Not on file   Intimate Partner Violence: Not on file   Housing Stability: Not on file      Family History:     Family History   Problem Relation Age of Onset    No Known Problems Mother     No Known Problems Sister     No Known Problems Daughter     No Known Problems Maternal Grandmother     No Known Problems Paternal Grandmother     No Known Problems Maternal Aunt     No Known Problems Maternal Aunt     No Known Problems Maternal Aunt     No Known Problems Maternal Aunt     No Known Problems Maternal Aunt     No Known Problems Maternal Aunt     Breast cancer Neg Hx       Current Medications:     Current Outpatient Medications   Medication Sig Dispense Refill    albuterol (PROVENTIL HFA,VENTOLIN HFA) 90 mcg/act inhaler INHALE 2 PUFFS BY MOUTH AND INTO THE LUNGS 2 TIMES A DAY AS NEEDED FOR WHEEZING      BIOTIN EXTRA STRENGTH PO Take by mouth      cetirizine (ZyrTEC) 10 mg tablet Take 1 tablet (10 mg total) by mouth daily 90 tablet 0    Diclofenac Sodium (VOLTAREN) 1 % apply 1 APPLICATION topically four times a day      FLUoxetine (PROzac) 40 MG capsule Take 1 capsule (40 mg total) by mouth daily 30 capsule 2    fluticasone (FLONASE) 50 mcg/act nasal spray 1 spray 2 (two) times a day      hydrOXYzine HCL (ATARAX) 10 mg tablet Take 1 tablet (10 mg total) by mouth daily at bedtime 30 tablet 0    meclizine (ANTIVERT) 25 mg tablet Take 1 tablet (25 mg total) by mouth every 8 (eight) hours as needed for dizziness 120 tablet 0    omeprazole (PriLOSEC) 40 MG capsule Take 1 capsule (40 mg total) by mouth daily 30 capsule 2    Probiotic Product (Misc Intestinal Lacy Regulat) CAPS Take 1 capsule by mouth daily      acyclovir (ZOVIRAX) 5 % ointment Apply topically 4 (four) times a day 15 g 0    EPINEPHrine (EPIPEN) 0 3 mg/0 3 mL SOAJ Inject 0 3 mg into a muscle (Patient not taking: Reported on 8/31/2021)       No current facility-administered medications for this visit  Allergies: Allergies   Allergen Reactions    Bee Venom Anaphylaxis    Iodides Other (See Comments)    [place] (Diagnostic) - Food Allergy Hives     Gi upset    [place] Oil - Food Allergy GI Intolerance     Other reaction(s): GI Intolerance, GI Reaction    Aspirin Other (See Comments)     shaking  convulsions    Other reaction(s): Other (See Comments)  shaking  convulsions  shaking  convulsions    Metronidazole GI Intolerance     Gi upset      Sulfamethoxazole-Trimethoprim GI Intolerance     Gi upst    Other reaction(s): GI Intolerance, GI Reaction  Gi upst  Gi upst    Tape  [Medical Tape] Other (See Comments) and Hives     fever  Other reaction(s): Other  fever      Physical Exam:     /70 (BP Location: Left arm, Patient Position: Sitting, Cuff Size: Standard)   Pulse 80   Temp 99 7 °F (37 6 °C) (Tympanic)   Resp 18   Ht 5' 4" (1 626 m)   Wt 66 7 kg (147 lb)   SpO2 99%   BMI 25 23 kg/m²     Physical Exam  Vitals and nursing note reviewed  Constitutional:       General: She is not in acute distress  Appearance: She is well-developed  HENT:      Head: Normocephalic and atraumatic        Right Ear: Tympanic membrane normal       Left Ear: Tympanic membrane normal       Nose: Nose normal       Mouth/Throat:      Mouth: Mucous membranes are moist       Pharynx: Oropharynx is clear  Eyes:      Conjunctiva/sclera: Conjunctivae normal       Pupils: Pupils are equal, round, and reactive to light  Cardiovascular:      Rate and Rhythm: Normal rate and regular rhythm  Pulses: Normal pulses  Heart sounds: Normal heart sounds  No murmur heard  Pulmonary:      Effort: Pulmonary effort is normal  No respiratory distress  Breath sounds: Normal breath sounds  Abdominal:      General: Bowel sounds are normal  There is no distension  Palpations: Abdomen is soft  Tenderness: There is no abdominal tenderness  Musculoskeletal:         General: Normal range of motion  Cervical back: Neck supple  Skin:     General: Skin is warm and dry  Capillary Refill: Capillary refill takes less than 2 seconds  Neurological:      Mental Status: She is alert and oriented to person, place, and time  Psychiatric:         Mood and Affect: Mood normal          Behavior: Behavior normal          Thought Content:  Thought content normal          Judgment: Judgment normal           Laurel Berry, [address] [facility]

## 2022-04-16 ENCOUNTER — APPOINTMENT (OUTPATIENT)
Dept: LAB | Facility: CLINIC | Age: 47
End: 2022-04-16
Payer: COMMERCIAL

## 2022-04-16 DIAGNOSIS — Z13.220 ENCOUNTER FOR LIPID SCREENING FOR CARDIOVASCULAR DISEASE: ICD-10-CM

## 2022-04-16 DIAGNOSIS — Z11.59 NEED FOR HEPATITIS C SCREENING TEST: ICD-10-CM

## 2022-04-16 DIAGNOSIS — Z13.6 ENCOUNTER FOR LIPID SCREENING FOR CARDIOVASCULAR DISEASE: ICD-10-CM

## 2022-04-16 DIAGNOSIS — Z13.29 SCREENING FOR THYROID DISORDER: ICD-10-CM

## 2022-04-16 DIAGNOSIS — R10.9 RIGHT SIDED ABDOMINAL PAIN: ICD-10-CM

## 2022-04-16 DIAGNOSIS — E55.9 VITAMIN D DEFICIENCY: ICD-10-CM

## 2022-04-16 DIAGNOSIS — Z13.1 SCREENING FOR DIABETES MELLITUS: ICD-10-CM

## 2022-04-16 LAB
25(OH)D3 SERPL-MCNC: 42.6 NG/ML (ref 30–100)
ALBUMIN SERPL BCP-MCNC: 3.6 G/DL (ref 3.5–5)
ALP SERPL-CCNC: 98 U/L (ref 46–116)
ALT SERPL W P-5'-P-CCNC: 24 U/L (ref 12–78)
ANION GAP SERPL CALCULATED.3IONS-SCNC: 4 MMOL/L (ref 4–13)
AST SERPL W P-5'-P-CCNC: 21 U/L (ref 5–45)
BACTERIA UR QL AUTO: ABNORMAL /HPF
BASOPHILS # BLD AUTO: 0.05 THOUSANDS/ΜL (ref 0–0.1)
BASOPHILS NFR BLD AUTO: 1 % (ref 0–1)
BILIRUB SERPL-MCNC: 0.51 MG/DL (ref 0.2–1)
BILIRUB UR QL STRIP: NEGATIVE
BUN SERPL-MCNC: 12 MG/DL (ref 5–25)
CALCIUM SERPL-MCNC: 8.9 MG/DL (ref 8.3–10.1)
CHLORIDE SERPL-SCNC: 107 MMOL/L (ref 100–108)
CHOLEST SERPL-MCNC: 173 MG/DL
CLARITY UR: CLEAR
CO2 SERPL-SCNC: 27 MMOL/L (ref 21–32)
COLOR UR: ABNORMAL
CREAT SERPL-MCNC: 0.69 MG/DL (ref 0.6–1.3)
EOSINOPHIL # BLD AUTO: 0.2 THOUSAND/ΜL (ref 0–0.61)
EOSINOPHIL NFR BLD AUTO: 3 % (ref 0–6)
ERYTHROCYTE [DISTWIDTH] IN BLOOD BY AUTOMATED COUNT: 12 % (ref 11.6–15.1)
EST. AVERAGE GLUCOSE BLD GHB EST-MCNC: 111 MG/DL
GFR SERPL CREATININE-BSD FRML MDRD: 103 ML/MIN/1.73SQ M
GLUCOSE P FAST SERPL-MCNC: 95 MG/DL (ref 65–99)
GLUCOSE UR STRIP-MCNC: NEGATIVE MG/DL
HBA1C MFR BLD: 5.5 %
HCT VFR BLD AUTO: 40.6 % (ref 34.8–46.1)
HCV AB SER QL: NORMAL
HDLC SERPL-MCNC: 49 MG/DL
HGB BLD-MCNC: 13.2 G/DL (ref 11.5–15.4)
HGB UR QL STRIP.AUTO: NEGATIVE
IMM GRANULOCYTES # BLD AUTO: 0.02 THOUSAND/UL (ref 0–0.2)
IMM GRANULOCYTES NFR BLD AUTO: 0 % (ref 0–2)
KETONES UR STRIP-MCNC: NEGATIVE MG/DL
LDLC SERPL CALC-MCNC: 115 MG/DL (ref 0–100)
LEUKOCYTE ESTERASE UR QL STRIP: NEGATIVE
LYMPHOCYTES # BLD AUTO: 2.15 THOUSANDS/ΜL (ref 0.6–4.47)
LYMPHOCYTES NFR BLD AUTO: 28 % (ref 14–44)
MCH RBC QN AUTO: 30.1 PG (ref 26.8–34.3)
MCHC RBC AUTO-ENTMCNC: 32.5 G/DL (ref 31.4–37.4)
MCV RBC AUTO: 93 FL (ref 82–98)
MONOCYTES # BLD AUTO: 0.67 THOUSAND/ΜL (ref 0.17–1.22)
MONOCYTES NFR BLD AUTO: 9 % (ref 4–12)
MUCOUS THREADS UR QL AUTO: ABNORMAL
NEUTROPHILS # BLD AUTO: 4.59 THOUSANDS/ΜL (ref 1.85–7.62)
NEUTS SEG NFR BLD AUTO: 59 % (ref 43–75)
NITRITE UR QL STRIP: NEGATIVE
NON-SQ EPI CELLS URNS QL MICRO: ABNORMAL /HPF
NRBC BLD AUTO-RTO: 0 /100 WBCS
PH UR STRIP.AUTO: 6.5 [PH]
PLATELET # BLD AUTO: 354 THOUSANDS/UL (ref 149–390)
PMV BLD AUTO: 10 FL (ref 8.9–12.7)
POTASSIUM SERPL-SCNC: 4.5 MMOL/L (ref 3.5–5.3)
PROT SERPL-MCNC: 7.2 G/DL (ref 6.4–8.2)
PROT UR STRIP-MCNC: ABNORMAL MG/DL
RBC # BLD AUTO: 4.38 MILLION/UL (ref 3.81–5.12)
RBC #/AREA URNS AUTO: ABNORMAL /HPF
SODIUM SERPL-SCNC: 138 MMOL/L (ref 136–145)
SP GR UR STRIP.AUTO: 1.02 (ref 1–1.03)
TRIGL SERPL-MCNC: 47 MG/DL
TSH SERPL DL<=0.05 MIU/L-ACNC: 1.63 UIU/ML (ref 0.45–4.5)
UROBILINOGEN UR STRIP-ACNC: <2 MG/DL
WBC # BLD AUTO: 7.68 THOUSAND/UL (ref 4.31–10.16)
WBC #/AREA URNS AUTO: ABNORMAL /HPF

## 2022-04-16 PROCEDURE — 83036 HEMOGLOBIN GLYCOSYLATED A1C: CPT

## 2022-04-16 PROCEDURE — 86803 HEPATITIS C AB TEST: CPT

## 2022-04-16 PROCEDURE — 36415 COLL VENOUS BLD VENIPUNCTURE: CPT

## 2022-04-16 PROCEDURE — 84443 ASSAY THYROID STIM HORMONE: CPT

## 2022-04-16 PROCEDURE — 82306 VITAMIN D 25 HYDROXY: CPT

## 2022-04-16 PROCEDURE — 85025 COMPLETE CBC W/AUTO DIFF WBC: CPT

## 2022-04-16 PROCEDURE — 81001 URINALYSIS AUTO W/SCOPE: CPT

## 2022-04-16 PROCEDURE — 80061 LIPID PANEL: CPT

## 2022-04-16 PROCEDURE — 80053 COMPREHEN METABOLIC PANEL: CPT

## 2022-04-25 DIAGNOSIS — T78.40XD ALLERGY, SUBSEQUENT ENCOUNTER: ICD-10-CM

## 2022-04-25 RX ORDER — CETIRIZINE HYDROCHLORIDE 10 MG/1
10 TABLET ORAL DAILY
Qty: 90 TABLET | Refills: 0 | Status: SHIPPED | OUTPATIENT
Start: 2022-04-25 | End: 2022-07-26

## 2022-04-26 ENCOUNTER — OFFICE VISIT (OUTPATIENT)
Dept: SURGERY | Facility: CLINIC | Age: 47
End: 2022-04-26
Payer: COMMERCIAL

## 2022-04-26 VITALS
WEIGHT: 150 LBS | TEMPERATURE: 98 F | BODY MASS INDEX: 25.61 KG/M2 | DIASTOLIC BLOOD PRESSURE: 68 MMHG | HEART RATE: 72 BPM | RESPIRATION RATE: 16 BRPM | SYSTOLIC BLOOD PRESSURE: 116 MMHG | HEIGHT: 64 IN | OXYGEN SATURATION: 98 %

## 2022-04-26 DIAGNOSIS — K82.8 DYSKINESIA OF GALLBLADDER: Primary | ICD-10-CM

## 2022-04-26 PROCEDURE — 99214 OFFICE O/P EST MOD 30 MIN: CPT | Performed by: SURGERY

## 2022-04-26 PROCEDURE — 3008F BODY MASS INDEX DOCD: CPT | Performed by: SURGERY

## 2022-04-26 RX ORDER — HEPARIN SODIUM 5000 [USP'U]/ML
5000 INJECTION, SOLUTION INTRAVENOUS; SUBCUTANEOUS
Status: CANCELLED | OUTPATIENT
Start: 2022-04-26 | End: 2022-04-27

## 2022-04-26 RX ORDER — SODIUM CHLORIDE, SODIUM LACTATE, POTASSIUM CHLORIDE, CALCIUM CHLORIDE 600; 310; 30; 20 MG/100ML; MG/100ML; MG/100ML; MG/100ML
125 INJECTION, SOLUTION INTRAVENOUS
Status: CANCELLED | OUTPATIENT
Start: 2022-04-26 | End: 2022-04-27

## 2022-04-26 NOTE — PROGRESS NOTES
Follow Up - General Surgery   Ryder Christianson 51 y o  female MRN: [id number]  Unit/Bed#:  Encounter: [id number]    Assessment/Plan     Assessment:  Gallbladder dyskinesia  History of GERD  History of hyperlipidemia  History of degenerative joint disease  History of fatty liver  Plan:  In light of the symptomatology and findings on HIDA scan, the patient became symptomatic during detection of CCK, I advised the patient to undergo laparoscopic cholecystectomy  I discussed the operative procedure, risks, benefits, alternatives and possible complications, she understood and agreed to proceed  I also advised the patient that not all her symptoms will go away after surgery  History of Present Illness     HPI:  Ryder Christianson is a 51 y o  female who presents to my office for follow-up  The patient continue having symptoms of abdominal pain in the epigastric area, feeling full after eating, nauseous all the time, no diarrhea, no constipation, no change in the color urine or stool  Patient had HIDA scan ordered by her PCP revealing ejection fraction of 42%, patient became symptomatic with injection of CCK complaining of nausea, abdominal pain not feeling well overall  The patient was referred to us for further evaluation  Patient has had extensive workup in the past with EGD, colonoscopy, CT scan of the abdomen and pelvis and ultrasound  Review of Systems: The rest of the review of system total of 10 were negative except for the HPI      Historical Information   Past Medical History:   Diagnosis Date    Degenerative joint disease     Depression     GERD (gastroesophageal reflux disease)     Hyperlipidemia     Lumbar radiculopathy     Thyroid nodule      Past Surgical History:   Procedure Laterality Date    BREAST BIOPSY Left 2020    neg    CYST REMOVAL  10/2020    lt breast     EGD      HYSTERECTOMY      HYSTERECTOMY  1998    LIPOSUCTION  09/2020     Social History   Social History     Substance and Sexual Activity   Alcohol Use Never     Social History     Substance and Sexual Activity   Drug Use Never     Social History     Tobacco Use   Smoking Status Never Smoker   Smokeless Tobacco Never Used     Family History: non-contributory    Meds/Allergies   all medications and allergies reviewed     Current Outpatient Medications:     acyclovir (ZOVIRAX) 5 % ointment, Apply topically 4 (four) times a day, Disp: 15 g, Rfl: 0    albuterol (PROVENTIL HFA,VENTOLIN HFA) 90 mcg/act inhaler, INHALE 2 PUFFS BY MOUTH AND INTO THE LUNGS 2 TIMES A DAY AS NEEDED FOR WHEEZING, Disp: , Rfl:     BIOTIN EXTRA STRENGTH PO, Take by mouth, Disp: , Rfl:     cetirizine (ZyrTEC) 10 mg tablet, Take 1 tablet (10 mg total) by mouth daily, Disp: 90 tablet, Rfl: 0    Diclofenac Sodium (VOLTAREN) 1 %, apply 1 APPLICATION topically four times a day, Disp: , Rfl:     EPINEPHrine (EPIPEN) 0 3 mg/0 3 mL SOAJ, Inject 0 3 mg into a muscle  , Disp: , Rfl:     FLUoxetine (PROzac) 40 MG capsule, Take 1 capsule (40 mg total) by mouth daily, Disp: 30 capsule, Rfl: 2    fluticasone (FLONASE) 50 mcg/act nasal spray, 1 spray 2 (two) times a day, Disp: , Rfl:     hydrOXYzine HCL (ATARAX) 10 mg tablet, Take 1 tablet (10 mg total) by mouth daily at bedtime, Disp: 30 tablet, Rfl: 0    meclizine (ANTIVERT) 25 mg tablet, Take 1 tablet (25 mg total) by mouth every 8 (eight) hours as needed for dizziness, Disp: 120 tablet, Rfl: 0    omeprazole (PriLOSEC) 40 MG capsule, Take 1 capsule (40 mg total) by mouth daily, Disp: 30 capsule, Rfl: 2    Probiotic Product (Misc Intestinal Lacy Regulat) CAPS, Take 1 capsule by mouth daily, Disp: , Rfl:   Allergies   Allergen Reactions    Bee Venom Anaphylaxis    Iodides Other (See Comments)    [place] (Diagnostic) - Food Allergy Hives     Gi upset    [place] Oil - Food Allergy GI Intolerance     Other reaction(s): GI Intolerance, GI Reaction    Aspirin Other (See Comments)     shaking  convulsions    Other reaction(s): Other (See Comments)  shaking  convulsions  shaking  convulsions    Metronidazole GI Intolerance     Gi upset      Sulfamethoxazole-Trimethoprim GI Intolerance     Gi upst    Other reaction(s): GI Intolerance, GI Reaction  Gi upst  Gi upst    Tape  [Medical Tape] Other (See Comments) and Hives     fever  Other reaction(s): Other  fever       Objective     Current Vitals:   Blood Pressure: 116/68 (04/26/22 0937)  Pulse: 72 (04/26/22 0937)  Temperature: 98 °F (36 7 °C) (04/26/22 0937)  Temp Source: Temporal (04/26/22 0937)  Respirations: 16 (04/26/22 0937)  Height: 5' 4" (162 6 cm) (04/26/22 0153)  Weight - Scale: 68 kg (150 lb) (04/26/22 0937)  SpO2: 98 % (04/26/22 1892)    Physical Exam  Vitals and nursing note reviewed  Constitutional:       General: She is not in acute distress  Cardiovascular:      Rate and Rhythm: Normal rate and regular rhythm  Heart sounds: No murmur heard  Pulmonary:      Effort: No respiratory distress  Breath sounds: Normal breath sounds  Abdominal:      Palpations: Abdomen is soft  There is no mass  Tenderness: There is no abdominal tenderness  Skin:     General: Skin is warm  Coloration: Skin is not jaundiced  Findings: No erythema or rash  Neurological:      Mental Status: She is alert and oriented to person, place, and time  Cranial Nerves: No cranial nerve deficit     Psychiatric:         Mood and Affect: Mood normal          Behavior: Behavior normal

## 2022-04-26 NOTE — H&P (VIEW-ONLY)
Follow Up - General Surgery   Eugenie Krishnan 51 y o  female MRN: [id number]  Unit/Bed#:  Encounter: [id number]    Assessment/Plan     Assessment:  Gallbladder dyskinesia  History of GERD  History of hyperlipidemia  History of degenerative joint disease  History of fatty liver  Plan:  In light of the symptomatology and findings on HIDA scan, the patient became symptomatic during detection of CCK, I advised the patient to undergo laparoscopic cholecystectomy  I discussed the operative procedure, risks, benefits, alternatives and possible complications, she understood and agreed to proceed  I also advised the patient that not all her symptoms will go away after surgery  History of Present Illness     HPI:  Eugenie Krishnan is a 51 y o  female who presents to my office for follow-up  The patient continue having symptoms of abdominal pain in the epigastric area, feeling full after eating, nauseous all the time, no diarrhea, no constipation, no change in the color urine or stool  Patient had HIDA scan ordered by her PCP revealing ejection fraction of 42%, patient became symptomatic with injection of CCK complaining of nausea, abdominal pain not feeling well overall  The patient was referred to us for further evaluation  Patient has had extensive workup in the past with EGD, colonoscopy, CT scan of the abdomen and pelvis and ultrasound  Review of Systems: The rest of the review of system total of 10 were negative except for the HPI      Historical Information   Past Medical History:   Diagnosis Date    Degenerative joint disease     Depression     GERD (gastroesophageal reflux disease)     Hyperlipidemia     Lumbar radiculopathy     Thyroid nodule      Past Surgical History:   Procedure Laterality Date    BREAST BIOPSY Left 2020    neg    CYST REMOVAL  10/2020    lt breast     EGD      HYSTERECTOMY      HYSTERECTOMY  1998    LIPOSUCTION  09/2020     Social History   Social History     Substance and Sexual Activity   Alcohol Use Never     Social History     Substance and Sexual Activity   Drug Use Never     Social History     Tobacco Use   Smoking Status Never Smoker   Smokeless Tobacco Never Used     Family History: non-contributory    Meds/Allergies   all medications and allergies reviewed     Current Outpatient Medications:     acyclovir (ZOVIRAX) 5 % ointment, Apply topically 4 (four) times a day, Disp: 15 g, Rfl: 0    albuterol (PROVENTIL HFA,VENTOLIN HFA) 90 mcg/act inhaler, INHALE 2 PUFFS BY MOUTH AND INTO THE LUNGS 2 TIMES A DAY AS NEEDED FOR WHEEZING, Disp: , Rfl:     BIOTIN EXTRA STRENGTH PO, Take by mouth, Disp: , Rfl:     cetirizine (ZyrTEC) 10 mg tablet, Take 1 tablet (10 mg total) by mouth daily, Disp: 90 tablet, Rfl: 0    Diclofenac Sodium (VOLTAREN) 1 %, apply 1 APPLICATION topically four times a day, Disp: , Rfl:     EPINEPHrine (EPIPEN) 0 3 mg/0 3 mL SOAJ, Inject 0 3 mg into a muscle  , Disp: , Rfl:     FLUoxetine (PROzac) 40 MG capsule, Take 1 capsule (40 mg total) by mouth daily, Disp: 30 capsule, Rfl: 2    fluticasone (FLONASE) 50 mcg/act nasal spray, 1 spray 2 (two) times a day, Disp: , Rfl:     hydrOXYzine HCL (ATARAX) 10 mg tablet, Take 1 tablet (10 mg total) by mouth daily at bedtime, Disp: 30 tablet, Rfl: 0    meclizine (ANTIVERT) 25 mg tablet, Take 1 tablet (25 mg total) by mouth every 8 (eight) hours as needed for dizziness, Disp: 120 tablet, Rfl: 0    omeprazole (PriLOSEC) 40 MG capsule, Take 1 capsule (40 mg total) by mouth daily, Disp: 30 capsule, Rfl: 2    Probiotic Product (Misc Intestinal Lacy Regulat) CAPS, Take 1 capsule by mouth daily, Disp: , Rfl:   Allergies   Allergen Reactions    Bee Venom Anaphylaxis    Iodides Other (See Comments)    [place] (Diagnostic) - Food Allergy Hives     Gi upset    [place] Oil - Food Allergy GI Intolerance     Other reaction(s): GI Intolerance, GI Reaction    Aspirin Other (See Comments)     shaking  convulsions    Other reaction(s): Other (See Comments)  shaking  convulsions  shaking  convulsions    Metronidazole GI Intolerance     Gi upset      Sulfamethoxazole-Trimethoprim GI Intolerance     Gi upst    Other reaction(s): GI Intolerance, GI Reaction  Gi upst  Gi upst    Tape  [Medical Tape] Other (See Comments) and Hives     fever  Other reaction(s): Other  fever       Objective     Current Vitals:   Blood Pressure: 116/68 (04/26/22 0937)  Pulse: 72 (04/26/22 0937)  Temperature: 98 °F (36 7 °C) (04/26/22 0937)  Temp Source: Temporal (04/26/22 0937)  Respirations: 16 (04/26/22 0937)  Height: 5' 4" (162 6 cm) (04/26/22 2607)  Weight - Scale: 68 kg (150 lb) (04/26/22 0937)  SpO2: 98 % (04/26/22 9594)    Physical Exam  Vitals and nursing note reviewed  Constitutional:       General: She is not in acute distress  Cardiovascular:      Rate and Rhythm: Normal rate and regular rhythm  Heart sounds: No murmur heard  Pulmonary:      Effort: No respiratory distress  Breath sounds: Normal breath sounds  Abdominal:      Palpations: Abdomen is soft  There is no mass  Tenderness: There is no abdominal tenderness  Skin:     General: Skin is warm  Coloration: Skin is not jaundiced  Findings: No erythema or rash  Neurological:      Mental Status: She is alert and oriented to person, place, and time  Cranial Nerves: No cranial nerve deficit     Psychiatric:         Mood and Affect: Mood normal          Behavior: Behavior normal

## 2022-05-02 DIAGNOSIS — R42 VERTIGO: ICD-10-CM

## 2022-05-02 RX ORDER — MECLIZINE HYDROCHLORIDE 25 MG/1
TABLET ORAL
Qty: 120 TABLET | Refills: 0 | Status: SHIPPED | OUTPATIENT
Start: 2022-05-02 | End: 2022-06-11

## 2022-05-02 NOTE — PRE-PROCEDURE INSTRUCTIONS
Pre-Surgery Instructions:   Medication Instructions    albuterol (PROVENTIL HFA,VENTOLIN HFA) 90 mcg/act inhaler Uses PRN- OK to take day of surgery    BIOTIN EXTRA STRENGTH PO Stop taking prior to surgery    cetirizine (ZyrTEC) 10 mg tablet Take day of surgery   Diclofenac Sodium (VOLTAREN) 1 % Stop taking 1 day prior to surgery   FLUoxetine (PROzac) 40 MG capsule Take night before surgery    fluticasone (FLONASE) 50 mcg/act nasal spray Uses PRN- OK to take day of surgery    hydrOXYzine HCL (ATARAX) 10 mg tablet Take night before surgery    meclizine (ANTIVERT) 25 mg tablet Uses PRN- OK to take day of surgery    omeprazole (PriLOSEC) 40 MG capsule Take day of surgery   Probiotic Product (Misc Intestinal Lacy Regulat) CAPS Stop taking prior to surgery  Covid screening negative as per patient  Reviewed with patient via phone all medication instructions  Advised not to take any NSAID's, Vitamins or Herbal products prior to the DOS  Acetaminophen products are ok to take  Reviewed showering instructions as given by surgical office  Instructed to call office with any questions or concerns  Instructed about NPO after midnight the night before DOS, except sips of water with allowed medications in AM on DOS  Informed about call from Michael Reed with the time to arrive for the scheduled surgery  Patient verbalized understanding

## 2022-05-03 ENCOUNTER — TELEPHONE (OUTPATIENT)
Dept: PAIN MEDICINE | Facility: CLINIC | Age: 47
End: 2022-05-03

## 2022-05-03 NOTE — TELEPHONE ENCOUNTER
Patient called to cnl procedure on 5/11- pls call to reschedule for a later date -pt is having surgery on 5/6 - thank you     [phone]

## 2022-05-03 NOTE — TELEPHONE ENCOUNTER
S/w pt and cxl 5/11/22 [facility]  Advised ins Teresa Valladares is still pending, will call back to reschedule once Teresa Valladares is received

## 2022-05-06 ENCOUNTER — HOSPITAL ENCOUNTER (OUTPATIENT)
Facility: HOSPITAL | Age: 47
Setting detail: OUTPATIENT SURGERY
Discharge: HOME | End: 2022-05-06
Attending: SURGERY | Admitting: SURGERY
Payer: COMMERCIAL

## 2022-05-06 ENCOUNTER — ANESTHESIA (OUTPATIENT)
Dept: PERIOP | Facility: HOSPITAL | Age: 47
End: 2022-05-06
Payer: COMMERCIAL

## 2022-05-06 ENCOUNTER — ANESTHESIA EVENT (OUTPATIENT)
Dept: PERIOP | Facility: HOSPITAL | Age: 47
End: 2022-05-06
Payer: COMMERCIAL

## 2022-05-06 VITALS
HEART RATE: 85 BPM | RESPIRATION RATE: 11 BRPM | TEMPERATURE: 98.4 F | HEIGHT: 64 IN | DIASTOLIC BLOOD PRESSURE: 55 MMHG | BODY MASS INDEX: 25.56 KG/M2 | OXYGEN SATURATION: 99 % | WEIGHT: 149.69 LBS | SYSTOLIC BLOOD PRESSURE: 119 MMHG

## 2022-05-06 DIAGNOSIS — K82.8 DYSKINESIA OF GALLBLADDER: ICD-10-CM

## 2022-05-06 PROBLEM — R11.2 PONV (POSTOPERATIVE NAUSEA AND VOMITING): Status: ACTIVE | Noted: 2022-05-06

## 2022-05-06 PROBLEM — Z98.890 PONV (POSTOPERATIVE NAUSEA AND VOMITING): Status: ACTIVE | Noted: 2022-05-06

## 2022-05-06 PROCEDURE — 47562 LAPAROSCOPIC CHOLECYSTECTOMY: CPT | Performed by: CLINICAL NURSE SPECIALIST

## 2022-05-06 PROCEDURE — C1729 CATH, DRAINAGE: HCPCS | Performed by: SURGERY

## 2022-05-06 PROCEDURE — 47562 LAPAROSCOPIC CHOLECYSTECTOMY: CPT | Performed by: SURGERY

## 2022-05-06 PROCEDURE — 88304 TISSUE EXAM BY PATHOLOGIST: CPT | Performed by: PATHOLOGY

## 2022-05-06 RX ORDER — SODIUM CHLORIDE, SODIUM LACTATE, POTASSIUM CHLORIDE, CALCIUM CHLORIDE 600; 310; 30; 20 MG/100ML; MG/100ML; MG/100ML; MG/100ML
125 INJECTION, SOLUTION INTRAVENOUS
Status: COMPLETED | OUTPATIENT
Start: 2022-05-06 | End: 2022-05-06

## 2022-05-06 RX ORDER — FENTANYL CITRATE/PF 50 MCG/ML
50 SYRINGE (ML) INJECTION
Status: DISCONTINUED | OUTPATIENT
Start: 2022-05-06 | End: 2022-05-06 | Stop reason: HOSPADM

## 2022-05-06 RX ORDER — PROPOFOL 10 MG/ML
INJECTION, EMULSION INTRAVENOUS CONTINUOUS PRN
Status: DISCONTINUED | OUTPATIENT
Start: 2022-05-06 | End: 2022-05-06

## 2022-05-06 RX ORDER — ROCURONIUM BROMIDE 10 MG/ML
INJECTION, SOLUTION INTRAVENOUS AS NEEDED
Status: DISCONTINUED | OUTPATIENT
Start: 2022-05-06 | End: 2022-05-06

## 2022-05-06 RX ORDER — KETAMINE HCL IN NACL, ISO-OSM 100MG/10ML
SYRINGE (ML) INJECTION AS NEEDED
Status: DISCONTINUED | OUTPATIENT
Start: 2022-05-06 | End: 2022-05-06

## 2022-05-06 RX ORDER — PROPOFOL 10 MG/ML
INJECTION, EMULSION INTRAVENOUS AS NEEDED
Status: DISCONTINUED | OUTPATIENT
Start: 2022-05-06 | End: 2022-05-06

## 2022-05-06 RX ORDER — TRAMADOL HYDROCHLORIDE 50 MG/1
50 TABLET ORAL EVERY 6 HOURS PRN
Status: DISCONTINUED | OUTPATIENT
Start: 2022-05-06 | End: 2022-05-06 | Stop reason: HOSPADM

## 2022-05-06 RX ORDER — ONDANSETRON 2 MG/ML
4 INJECTION INTRAMUSCULAR; INTRAVENOUS EVERY 8 HOURS PRN
Status: DISCONTINUED | OUTPATIENT
Start: 2022-05-06 | End: 2022-05-06 | Stop reason: HOSPADM

## 2022-05-06 RX ORDER — PROMETHAZINE HYDROCHLORIDE 25 MG/ML
6.25 INJECTION, SOLUTION INTRAMUSCULAR; INTRAVENOUS ONCE AS NEEDED
Status: COMPLETED | OUTPATIENT
Start: 2022-05-06 | End: 2022-05-06

## 2022-05-06 RX ORDER — HEPARIN SODIUM 5000 [USP'U]/ML
5000 INJECTION, SOLUTION INTRAVENOUS; SUBCUTANEOUS
Status: COMPLETED | OUTPATIENT
Start: 2022-05-06 | End: 2022-05-06

## 2022-05-06 RX ORDER — BUPIVACAINE HYDROCHLORIDE 2.5 MG/ML
INJECTION, SOLUTION EPIDURAL; INFILTRATION; INTRACAUDAL AS NEEDED
Status: DISCONTINUED | OUTPATIENT
Start: 2022-05-06 | End: 2022-05-06 | Stop reason: HOSPADM

## 2022-05-06 RX ORDER — ACETAMINOPHEN 325 MG/1
TABLET ORAL
Status: DISCONTINUED
Start: 2022-05-06 | End: 2022-05-06 | Stop reason: HOSPADM

## 2022-05-06 RX ORDER — CEFAZOLIN SODIUM 1 G/50ML
1000 SOLUTION INTRAVENOUS ONCE
Status: COMPLETED | OUTPATIENT
Start: 2022-05-06 | End: 2022-05-06

## 2022-05-06 RX ORDER — DEXAMETHASONE SODIUM PHOSPHATE 10 MG/ML
INJECTION, SOLUTION INTRAMUSCULAR; INTRAVENOUS AS NEEDED
Status: DISCONTINUED | OUTPATIENT
Start: 2022-05-06 | End: 2022-05-06

## 2022-05-06 RX ORDER — CEFAZOLIN SODIUM 1 G/3ML
INJECTION, POWDER, FOR SOLUTION INTRAMUSCULAR; INTRAVENOUS AS NEEDED
Status: DISCONTINUED | OUTPATIENT
Start: 2022-05-06 | End: 2022-05-06

## 2022-05-06 RX ORDER — ONDANSETRON 2 MG/ML
INJECTION INTRAMUSCULAR; INTRAVENOUS AS NEEDED
Status: DISCONTINUED | OUTPATIENT
Start: 2022-05-06 | End: 2022-05-06

## 2022-05-06 RX ORDER — ACETAMINOPHEN AND CODEINE PHOSPHATE 300; 30 MG/1; MG/1
1 TABLET ORAL EVERY 6 HOURS PRN
Qty: 20 TABLET | Refills: 0 | Status: SHIPPED | OUTPATIENT
Start: 2022-05-06 | End: 2022-05-16

## 2022-05-06 RX ORDER — SCOLOPAMINE TRANSDERMAL SYSTEM 1 MG/1
1 PATCH, EXTENDED RELEASE TRANSDERMAL
Status: DISCONTINUED | OUTPATIENT
Start: 2022-05-06 | End: 2022-05-06 | Stop reason: HOSPADM

## 2022-05-06 RX ORDER — METOCLOPRAMIDE HYDROCHLORIDE 5 MG/ML
10 INJECTION INTRAMUSCULAR; INTRAVENOUS ONCE AS NEEDED
Status: COMPLETED | OUTPATIENT
Start: 2022-05-06 | End: 2022-05-06

## 2022-05-06 RX ORDER — FENTANYL CITRATE 50 UG/ML
INJECTION, SOLUTION INTRAMUSCULAR; INTRAVENOUS AS NEEDED
Status: DISCONTINUED | OUTPATIENT
Start: 2022-05-06 | End: 2022-05-06

## 2022-05-06 RX ORDER — MAGNESIUM HYDROXIDE 1200 MG/15ML
LIQUID ORAL AS NEEDED
Status: DISCONTINUED | OUTPATIENT
Start: 2022-05-06 | End: 2022-05-06 | Stop reason: HOSPADM

## 2022-05-06 RX ORDER — LIDOCAINE HYDROCHLORIDE 10 MG/ML
INJECTION, SOLUTION EPIDURAL; INFILTRATION; INTRACAUDAL; PERINEURAL AS NEEDED
Status: DISCONTINUED | OUTPATIENT
Start: 2022-05-06 | End: 2022-05-06

## 2022-05-06 RX ORDER — ONDANSETRON 2 MG/ML
4 INJECTION INTRAMUSCULAR; INTRAVENOUS ONCE AS NEEDED
Status: DISCONTINUED | OUTPATIENT
Start: 2022-05-06 | End: 2022-05-06 | Stop reason: HOSPADM

## 2022-05-06 RX ORDER — ACETAMINOPHEN 325 MG/1
975 TABLET ORAL EVERY 8 HOURS PRN
Status: DISCONTINUED | OUTPATIENT
Start: 2022-05-06 | End: 2022-05-06 | Stop reason: HOSPADM

## 2022-05-06 RX ORDER — HYDROMORPHONE HCL/PF 1 MG/ML
0.5 SYRINGE (ML) INJECTION
Status: DISCONTINUED | OUTPATIENT
Start: 2022-05-06 | End: 2022-05-06 | Stop reason: HOSPADM

## 2022-05-06 RX ORDER — MIDAZOLAM HYDROCHLORIDE 2 MG/2ML
INJECTION, SOLUTION INTRAMUSCULAR; INTRAVENOUS AS NEEDED
Status: DISCONTINUED | OUTPATIENT
Start: 2022-05-06 | End: 2022-05-06

## 2022-05-06 RX ADMIN — PROPOFOL 50 MG: 10 INJECTION, EMULSION INTRAVENOUS at 13:05

## 2022-05-06 RX ADMIN — SCOPALAMINE 1 PATCH: 1 PATCH, EXTENDED RELEASE TRANSDERMAL at 11:16

## 2022-05-06 RX ADMIN — ONDANSETRON 4 MG: 2 INJECTION INTRAMUSCULAR; INTRAVENOUS at 12:29

## 2022-05-06 RX ADMIN — ACETAMINOPHEN 975 MG: 325 TABLET ORAL at 14:12

## 2022-05-06 RX ADMIN — DEXAMETHASONE SODIUM PHOSPHATE 10 MG: 10 INJECTION, SOLUTION INTRAMUSCULAR; INTRAVENOUS at 12:34

## 2022-05-06 RX ADMIN — Medication 20 MG: at 12:44

## 2022-05-06 RX ADMIN — SUGAMMADEX 200 MG: 100 INJECTION, SOLUTION INTRAVENOUS at 13:16

## 2022-05-06 RX ADMIN — SODIUM CHLORIDE, SODIUM LACTATE, POTASSIUM CHLORIDE, AND CALCIUM CHLORIDE: .6; .31; .03; .02 INJECTION, SOLUTION INTRAVENOUS at 13:11

## 2022-05-06 RX ADMIN — PROPOFOL 30 MG: 10 INJECTION, EMULSION INTRAVENOUS at 12:44

## 2022-05-06 RX ADMIN — METOCLOPRAMIDE HYDROCHLORIDE 10 MG: 5 INJECTION INTRAMUSCULAR; INTRAVENOUS at 13:32

## 2022-05-06 RX ADMIN — CEFAZOLIN SODIUM 1000 MG: 1 INJECTION, POWDER, FOR SOLUTION INTRAMUSCULAR; INTRAVENOUS at 12:44

## 2022-05-06 RX ADMIN — SODIUM CHLORIDE, SODIUM LACTATE, POTASSIUM CHLORIDE, AND CALCIUM CHLORIDE 125 ML/HR: .6; .31; .03; .02 INJECTION, SOLUTION INTRAVENOUS at 11:09

## 2022-05-06 RX ADMIN — PROPOFOL 120 MCG/KG/MIN: 10 INJECTION, EMULSION INTRAVENOUS at 12:35

## 2022-05-06 RX ADMIN — HEPARIN SODIUM 5000 UNITS: 5000 INJECTION INTRAVENOUS; SUBCUTANEOUS at 11:09

## 2022-05-06 RX ADMIN — PROMETHAZINE HYDROCHLORIDE 6.25 MG: 25 INJECTION INTRAMUSCULAR; INTRAVENOUS at 13:40

## 2022-05-06 RX ADMIN — SUGAMMADEX 200 MG: 100 INJECTION, SOLUTION INTRAVENOUS at 12:59

## 2022-05-06 RX ADMIN — PROPOFOL 170 MG: 10 INJECTION, EMULSION INTRAVENOUS at 12:33

## 2022-05-06 RX ADMIN — MIDAZOLAM HYDROCHLORIDE 2 MG: 1 INJECTION, SOLUTION INTRAMUSCULAR; INTRAVENOUS at 12:29

## 2022-05-06 RX ADMIN — ROCURONIUM BROMIDE 30 MG: 10 INJECTION, SOLUTION INTRAVENOUS at 12:33

## 2022-05-06 RX ADMIN — FENTANYL CITRATE 100 MCG: 50 INJECTION, SOLUTION INTRAMUSCULAR; INTRAVENOUS at 12:33

## 2022-05-06 RX ADMIN — PHENYLEPHRINE HYDROCHLORIDE 20 MCG/MIN: 10 INJECTION INTRAVENOUS at 12:35

## 2022-05-06 RX ADMIN — LIDOCAINE HYDROCHLORIDE 50 MG: 10 INJECTION, SOLUTION EPIDURAL; INFILTRATION; INTRACAUDAL; PERINEURAL at 12:33

## 2022-05-06 RX ADMIN — ONDANSETRON 4 MG: 2 INJECTION INTRAMUSCULAR; INTRAVENOUS at 13:15

## 2022-05-06 RX ADMIN — CEFAZOLIN SODIUM 1000 MG: 1 SOLUTION INTRAVENOUS at 11:34

## 2022-05-06 NOTE — ANESTHESIA POSTPROCEDURE EVALUATION
Post-Op Assessment Note    CV Status:  Stable  Pain Score: 0    Pain management: adequate     Mental Status:  Awake and sleepy   Hydration Status:  Euvolemic   PONV Controlled:  Controlled   Airway Patency:  Patent and adequate      Post Op Vitals Reviewed: Yes      Staff: CRNA         No complications documented      BP   117/64   Temp   97 9   Pulse  83   Resp   20   SpO2   100

## 2022-05-06 NOTE — OP NOTE
OPERATIVE REPORT  PATIENT NAME: Brett Powell    :  1975  MRN: [id number]  Pt Location: [facility]OM 02    SURGERY DATE: 2022    Surgeon(s) and Role:     * Reyna Puentes MD - Primary     * Sherie Miller PA-C - Assisting    Preop Diagnosis:  Dyskinesia of gallbladder [K82 8]    Post-Op Diagnosis Codes: * Dyskinesia of gallbladder [K82 8]    Procedure(s) (LRB):  CHOLECYSTECTOMY LAPAROSCOPIC (N/A)    Specimen(s):  ID Type Source Tests Collected by Time Destination   1 : Gallbladder and contents  Tissue Gallbladder TISSUE EXAM Reyna Puentes MD 2022 1250        Estimated Blood Loss:   2 mL    Drains:  None    Anesthesia Type:   General    Operative Indications:  Dyskinesia of gallbladder [K82 8]    Operative Findings:  Gallbladder was markedly distended with some adhesions to the surrounding fatty tissue  The rest of the abdominal cavity showed no evidence of inflammatory or neoplastic process  Liver surface appeared normal     Complications:   None    Procedure and Technique:    The patient was identified and the patient was placed in the operating table in a supine position  After adequate anesthesia induction and satisfactory endotracheal intubation the abdomen was prepped and draped under sterile usual fashion with ChloraPrep  Time-out was called the patient was identified as well as the surgical site  The abdominal wall was elevated with towel clips, an incision was made through the umbilicus and 5 mm trocar was introduced  After verifying the position and the abdomen was insufflated with CO2  After obtaining adequate pneumoperitoneum the scope was advanced and exploration was performed with above findings  11 mm trocar was placed in the epigastric area and 5 mm trocar in the midclavicular and anterior axillary line under direct vision  The fundus of the gallbladder was grasped and pulled towards the right shoulder       All the adhesions were carefully taken down between the surrounding fatty tissue and gallbladder using the dissector and cautery  Anthony's pouch was grasped and pulled towards the right side  The cystic duct was identified, dissected and stapled proximally distally x2 and then divided with scissors  The cystic artery was also identified, dissected, stapled proximally and distally and then divided with scissors  The gallbladder was removed from the gallbladder fossa using electrocautery and the hook  The gallbladder was retrieved through the epigastric port site with the help of the Endo Catch  The abdominal cavity was copiously irrigated with saline solution  The gallbladder fossa was dry  The cystic duct and cystic artery were inspected with no evidence of bile leak or bleeding respectively  The ports were removed under direct vision without evidence of bleeding from the abdominal wall  The subcutaneous tissue was infiltrated with 0 25% Marcaine and the skin was closed with a 4-0 Vicryl in an interrupted subcuticular fashion  Sterile dressings were applied  At the end of the case instrument, needles, sponges counts were correct  The patient tolerated the procedure well and then the patient was transferred to recovery room in a stable conditions       I was present for the entire procedure, A qualified resident physician was not available and A physician assistant was required during the procedure for retraction tissue handling,dissection and suturing    Patient Disposition:  PACU , hemodynamically stable and extubated and stable      SIGNATURE: Ag House MD  DATE: May 6, 2022  TIME: 1:05 PM

## 2022-05-06 NOTE — ANESTHESIA PREPROCEDURE EVALUATION
Procedure:  CHOLECYSTECTOMY LAPAROSCOPIC (N/A Abdomen)    Relevant Problems   ANESTHESIA   (+) PONV (postoperative nausea and vomiting)      MUSCULOSKELETAL   (+) Chronic bilateral low back pain with right-sided sciatica   (+) Lumbar degenerative disc disease      NEURO/PSYCH   (+) Chronic bilateral low back pain with right-sided sciatica   (+) Chronic pain syndrome      Other   (+) DISH (diffuse idiopathic skeletal hyperostosis)   (+) Generalized abdominal pain   (+) Lumbar radiculopathy        Physical Exam    Airway    Mallampati score: I  TM Distance: >3 FB  Neck ROM: full     Dental       Cardiovascular  Cardiovascular exam normal    Pulmonary  Pulmonary exam normal     Other Findings        Anesthesia Plan  ASA Score- 2     Anesthesia Type- general with ASA Monitors  Additional Monitors:   Airway Plan: ETT  Plan Factors-Exercise tolerance (METS): >4 METS  Chart reviewed  EKG reviewed  Existing labs reviewed  Patient summary reviewed  Induction- intravenous  Postoperative Plan- Plan for postoperative opioid use  Planned trial extubation    Informed Consent- Anesthetic plan and risks discussed with patient  I personally reviewed this patient with the CRNA  Discussed and agreed on the Anesthesia Plan with the CRNA  Lizzie Montalvo

## 2022-05-06 NOTE — DISCHARGE INSTRUCTIONS
SURGERY INSTRUCTIONS    No diet restriction for this surgery  May shower every day starting tomorrow  Remove plastic dressings in 3 days  Remove strips in 7 days  Nothing should be covering incisions 7 days after surgery  Call office to make an appointment in 2 weeks [phone]  Call office with any issues regarding the surgery  No driving, heavy lifting or strenuous exercise for one week  Resume home medications starting today  Resume blood thinners starting tomorrow  (Aspirin, Coumadin, Eliquis, Xarelto)  Apply ice to the incisions  10 minutes every hour for 2 days  May take Tylenol 3, regular Tylenol or ibuprofen for pain  Alternating narcotics with ibuprofen or Advil leave will have better pain control    May take Colace for constipation  If you experience urinary retention, please contact our office to be treated  After LAPAROSCOPIC surgery you may experience shoulder pain that usually resolves in 2-3 days or taking plain Tylenol  Please let us know how we did, fill out survey that you may receive via email or regular mail, thank you!!

## 2022-05-12 ENCOUNTER — OFFICE VISIT (OUTPATIENT)
Dept: INTERNAL MEDICINE CLINIC | Facility: CLINIC | Age: 47
End: 2022-05-12
Payer: COMMERCIAL

## 2022-05-12 VITALS
SYSTOLIC BLOOD PRESSURE: 110 MMHG | WEIGHT: 147.6 LBS | BODY MASS INDEX: 25.2 KG/M2 | HEART RATE: 80 BPM | OXYGEN SATURATION: 97 % | DIASTOLIC BLOOD PRESSURE: 70 MMHG | HEIGHT: 64 IN | TEMPERATURE: 98.5 F

## 2022-05-12 DIAGNOSIS — Z90.49 S/P LAPAROSCOPIC CHOLECYSTECTOMY: ICD-10-CM

## 2022-05-12 DIAGNOSIS — Z91.030 H/O BEE STING ALLERGY: Primary | ICD-10-CM

## 2022-05-12 PROCEDURE — 1036F TOBACCO NON-USER: CPT

## 2022-05-12 PROCEDURE — 99213 OFFICE O/P EST LOW 20 MIN: CPT

## 2022-05-12 RX ORDER — EPINEPHRINE 0.3 MG/.3ML
0.3 INJECTION SUBCUTANEOUS ONCE
Qty: 1 EACH | Refills: 1 | Status: SHIPPED | OUTPATIENT
Start: 2022-05-12 | End: 2022-05-23

## 2022-05-12 NOTE — PROGRESS NOTES
[facility] [address] [place] [address]    NAME: Jody Schaumann  AGE: 51 y o  SEX: female  : 1975     DATE: 2022     Assessment and Plan:     1  H/O bee sting allergy    - EPINEPHrine (EPIPEN) 0 3 mg/0 3 mL SOAJ; Inject 0 3 mL (0 3 mg total) into a muscle once for 1 dose  Dispense: 1 each; Refill: 1    2  S/P laparoscopic cholecystectomy  Patient has surgery on May 6  She is home and doing well  She is tolerating diet  Incisional wounds are healing well without signs of infection  She offers no complaints  She has a follow-up with her surgeon next week  No follow-ups on file  Chief Complaint:     Chief Complaint   Patient presents with    Hair/Scalp Problem     Falling out    Skin Problem     itchy        History of Present Illness:     Bee presents for follow-up evaluation  She had a cholecystectomy on May 6  She reports no problems or concerns since the surgery  She denies fever or chills, chest pain or shortness of breath, nausea, vomiting, or diarrhea  Her primary concern today is loss of hair  She states she is seeing some hair loss when showering  There is no large patches of hair loss on her scalp  There is no rash  She states she had had some itchiness on her scalp however since her surgery that has resolved  She thinks the hair loss may be related to the hair coloring she has use  Review of Systems:     Review of Systems   Constitutional: Negative  HENT: Negative  Respiratory: Negative  Cardiovascular: Negative  Gastrointestinal: Negative  Skin:        Increased hair loss noticed   Neurological: Negative  Psychiatric/Behavioral: Negative           Problem List:     Patient Active Problem List   Diagnosis    Generalized abdominal pain    Epigastric lump    Chronic pain syndrome    Chronic bilateral low back pain with right-sided sciatica    Lumbar radiculopathy    Lumbar degenerative disc disease    DISH (diffuse idiopathic skeletal hyperostosis)    PONV (postoperative nausea and vomiting)    Dyskinesia of gallbladder        Objective:     /70   Pulse 80   Temp 98 5 °F (36 9 °C)   Ht 5' 4" (1 626 m)   Wt 67 kg (147 lb 9 6 oz)   SpO2 97%   BMI 25 34 kg/m²     Physical Exam  Constitutional:       General: She is not in acute distress  Appearance: She is normal weight  HENT:      Head: Normocephalic and atraumatic  Right Ear: External ear normal       Left Ear: External ear normal       Nose: Nose normal       Mouth/Throat:      Mouth: Mucous membranes are moist       Pharynx: Oropharynx is clear  Eyes:      Conjunctiva/sclera: Conjunctivae normal    Cardiovascular:      Rate and Rhythm: Normal rate and regular rhythm  Pulses: Normal pulses  Heart sounds: Normal heart sounds  No murmur heard  Pulmonary:      Effort: Pulmonary effort is normal       Breath sounds: Normal breath sounds  No wheezing  Abdominal:      General: Bowel sounds are normal  There is no distension  Palpations: Abdomen is soft  Tenderness: There is no abdominal tenderness  Comments: Laparoscopic incisional wounds from cholecystectomy are healing well  Incisions are without redness, drainage, or swelling  Musculoskeletal:         General: Normal range of motion  Cervical back: Neck supple  Skin:     General: Skin is warm and dry  Capillary Refill: Capillary refill takes less than 2 seconds  Neurological:      Mental Status: She is alert and oriented to person, place, and time  Psychiatric:         Mood and Affect: Mood normal          Behavior: Behavior normal          Thought Content: Thought content normal          Judgment: Judgment normal          I spent 10 minutes with this patient      [address] [place] [address]  [facility]

## 2022-05-13 ENCOUNTER — TELEPHONE (OUTPATIENT)
Dept: INTERNAL MEDICINE CLINIC | Facility: CLINIC | Age: 47
End: 2022-05-13

## 2022-05-13 NOTE — TELEPHONE ENCOUNTER
PA for the patients EPINEPHrine 0 3MG/0 3ML auto-injectors has been started, patients (Key: [id number]) - [id number]   Prescription has been discontinued by physician, so Pa was deleted

## 2022-05-13 NOTE — TELEPHONE ENCOUNTER
TFESI was denied  Pt's pain needs to be 6 or greater  Last time I s/w pt her pain was only 4 and pt has not been seen in the office since 9/2/21  S/w pt and advised of denial  Told pt when pain increases to schedule office follow up and can resubmit request again

## 2022-05-23 ENCOUNTER — OFFICE VISIT (OUTPATIENT)
Dept: SURGERY | Facility: CLINIC | Age: 47
End: 2022-05-23

## 2022-05-23 VITALS
SYSTOLIC BLOOD PRESSURE: 112 MMHG | HEART RATE: 95 BPM | RESPIRATION RATE: 16 BRPM | DIASTOLIC BLOOD PRESSURE: 70 MMHG | BODY MASS INDEX: 24.52 KG/M2 | OXYGEN SATURATION: 98 % | HEIGHT: 64 IN | WEIGHT: 143.6 LBS | TEMPERATURE: 98.6 F

## 2022-05-23 DIAGNOSIS — K82.8 DYSKINESIA OF GALLBLADDER: Primary | ICD-10-CM

## 2022-05-23 DIAGNOSIS — Z48.89 POSTOPERATIVE VISIT: ICD-10-CM

## 2022-05-23 PROCEDURE — 99024 POSTOP FOLLOW-UP VISIT: CPT | Performed by: SURGERY

## 2022-05-23 RX ORDER — VITAMIN E 268 MG
400 CAPSULE ORAL DAILY
COMMUNITY

## 2022-05-23 NOTE — PROGRESS NOTES
Post-Op Follow Up- General Surgery   Elizabeth Cooper 51 y o  female MRN: [id number]  Unit/Bed#:  Encounter: [id number]    Assessment/Plan     Assessment:  Status post laparoscopic cholecystectomy, improved  Plan:  Patient is doing fantastic from the surgical standpoint  She is discharged from my care and I will be glad to see her if any problem arises in the future  History of Present Illness     HPI:  Elizabeth Cooper is a 51 y o  female who presents to my office for 1st postop follow-up after laparoscopic cholecystectomy from May 6 for gallbladder dyskinesia  The patient stated doing well, tolerating diet and having regular bowel movement  The patient denies having any fever, chills, nausea, vomiting, diarrhea, constipation or abdominal pain  Pathology discussed with patient  Patient stated the symptoms prior to surgery are completely resolved        Historical Information   Past Medical History:   Diagnosis Date    Asthma     Degenerative joint disease     Depression     GERD (gastroesophageal reflux disease)     Lumbar radiculopathy     PONV (postoperative nausea and vomiting)     Thyroid nodule      Past Surgical History:   Procedure Laterality Date    BREAST BIOPSY Left 2020    neg    CHOLECYSTECTOMY LAPAROSCOPIC N/A 5/6/2022    Procedure: CHOLECYSTECTOMY LAPAROSCOPIC;  Surgeon: Scott Arango MD;  [facility];  Service: General    CYST REMOVAL  10/2020    lt breast     EGD      HYSTERECTOMY      HYSTERECTOMY  1998    LIPOSUCTION  09/2020    TUBAL LIGATION       Social History   Social History     Substance and Sexual Activity   Alcohol Use Not Currently     Social History     Substance and Sexual Activity   Drug Use Never     Social History     Tobacco Use   Smoking Status Never Smoker   Smokeless Tobacco Never Used     Family History: non-contributory    Meds/Allergies   all medications and allergies reviewed     Current Outpatient Medications:     albuterol (PROVENTIL HFA,VENTOLIN HFA) 90 mcg/act inhaler, INHALE 2 PUFFS BY MOUTH AND INTO THE LUNGS 2 TIMES A DAY AS NEEDED FOR WHEEZING, Disp: , Rfl:     BIOTIN EXTRA STRENGTH PO, Take by mouth, Disp: , Rfl:     cetirizine (ZyrTEC) 10 mg tablet, Take 1 tablet (10 mg total) by mouth daily, Disp: 90 tablet, Rfl: 0    Diclofenac Sodium (VOLTAREN) 1 %, apply 1 APPLICATION topically four times a day, Disp: , Rfl:     EPINEPHrine (EPIPEN) 0 3 mg/0 3 mL SOAJ, Inject 0 3 mL (0 3 mg total) into a muscle once for 1 dose, Disp: 1 each, Rfl: 1    FLUoxetine (PROzac) 40 MG capsule, Take 1 capsule (40 mg total) by mouth daily, Disp: 30 capsule, Rfl: 2    fluticasone (FLONASE) 50 mcg/act nasal spray, 1 spray 2 (two) times a day, Disp: , Rfl:     hydrOXYzine HCL (ATARAX) 10 mg tablet, Take 1 tablet (10 mg total) by mouth daily at bedtime, Disp: 30 tablet, Rfl: 0    meclizine (ANTIVERT) 25 mg tablet, take 1 tablet by mouth every 8 hours if needed for dizziness, Disp: 120 tablet, Rfl: 0    milk thistle 175 MG tablet, Take 175 mg by mouth in the morning and 175 mg in the evening and 175 mg before bedtime  , Disp: , Rfl:     omeprazole (PriLOSEC) 40 MG capsule, Take 1 capsule (40 mg total) by mouth daily, Disp: 30 capsule, Rfl: 2    Probiotic Product (Misc Intestinal Lacy Regulat) CAPS, Take 1 capsule by mouth daily, Disp: , Rfl:     vitamin E, tocopherol, 400 units capsule, Take 400 Units by mouth in the morning , Disp: , Rfl:     acyclovir (ZOVIRAX) 5 % ointment, Apply topically 4 (four) times a day (Patient not taking: No sig reported), Disp: 15 g, Rfl: 0  Allergies   Allergen Reactions    Bee Venom Anaphylaxis    Iodides Other (See Comments)    [place] (Diagnostic) - Food Allergy Hives     Gi upset    [place] Oil - Food Allergy GI Intolerance     Other reaction(s): GI Intolerance, GI Reaction    Aspirin Other (See Comments)     shaking  convulsions    Other reaction(s):  Other (See Comments)  shaking  convulsions  shaking  convulsions  Metronidazole GI Intolerance     Gi upset      Sulfamethoxazole-Trimethoprim GI Intolerance     Gi upst    Other reaction(s): GI Intolerance, GI Reaction  Gi upst  Gi upst    Tape  [Medical Tape] Other (See Comments) and Hives     fever  Other reaction(s): Other  fever       Objective     Current Vitals:   Blood Pressure: 112/70 (05/23/22 0847)  Pulse: 95 (05/23/22 0847)  Temperature: 98 6 °F (37 °C) (05/23/22 0847)  Temp Source: Temporal (05/23/22 0847)  Respirations: 16 (05/23/22 0847)  Height: 5' 4" (162 6 cm) (05/23/22 0847)  Weight - Scale: 65 1 kg (143 lb 9 6 oz) (05/23/22 0847)  SpO2: 98 % (05/23/22 0847)    Physical Exam  Vitals and nursing note reviewed  Abdominal:      Comments: Abdomen is soft, nondistended and nontender  Incisions are well-healed without evidence of infection or incisional hernia

## 2022-05-24 ENCOUNTER — OFFICE VISIT (OUTPATIENT)
Dept: DERMATOLOGY | Facility: CLINIC | Age: 47
End: 2022-05-24
Payer: COMMERCIAL

## 2022-05-24 VITALS — HEIGHT: 64 IN | WEIGHT: 143 LBS | BODY MASS INDEX: 24.41 KG/M2

## 2022-05-24 DIAGNOSIS — R21 RASH: Primary | ICD-10-CM

## 2022-05-24 PROCEDURE — 88313 SPECIAL STAINS GROUP 2: CPT | Performed by: PATHOLOGY

## 2022-05-24 PROCEDURE — 88305 TISSUE EXAM BY PATHOLOGIST: CPT | Performed by: PATHOLOGY

## 2022-05-24 PROCEDURE — 3008F BODY MASS INDEX DOCD: CPT

## 2022-05-24 PROCEDURE — 11102 TANGNTL BX SKIN SINGLE LES: CPT | Performed by: DERMATOLOGY

## 2022-05-24 PROCEDURE — 99214 OFFICE O/P EST MOD 30 MIN: CPT | Performed by: DERMATOLOGY

## 2022-05-24 NOTE — PROGRESS NOTES
[facility]  [address] [place] [id number]  [phone]  [phone]     MRN: [id number] : 1975  Encounter: [id number]  Patient Information: Michael Beatty  Chief complaint:  Itching elbows    History of present illness:  59-year-old female presents for concerns regarding irritation on elbows that has been intermittent over last couple months also complains of some spots on the scalp no other areas of involvement noted    Past Medical History:   Diagnosis Date    Asthma     Degenerative joint disease     Depression     GERD (gastroesophageal reflux disease)     Lumbar radiculopathy     PONV (postoperative nausea and vomiting)     Thyroid nodule      Past Surgical History:   Procedure Laterality Date    BREAST BIOPSY Left     neg    CHOLECYSTECTOMY LAPAROSCOPIC N/A 2022    Procedure: CHOLECYSTECTOMY LAPAROSCOPIC;  Surgeon: Damien Hamlin MD;  Location: [facility];  Service: General    CYST REMOVAL  10/2020    lt breast     EGD      HYSTERECTOMY      HYSTERECTOMY  1998    LIPOSUCTION  2020    TUBAL LIGATION       Social History   Social History     Substance and Sexual Activity   Alcohol Use Not Currently     Social History     Substance and Sexual Activity   Drug Use Never     Social History     Tobacco Use   Smoking Status Never Smoker   Smokeless Tobacco Never Used     Family History   Problem Relation Age of Onset    No Known Problems Mother     No Known Problems Sister     No Known Problems Daughter     No Known Problems Maternal Grandmother     No Known Problems Paternal Grandmother     No Known Problems Maternal Aunt     No Known Problems Maternal Aunt     No Known Problems Maternal Aunt     No Known Problems Maternal Aunt     No Known Problems Maternal Aunt     No Known Problems Maternal Aunt     Breast cancer Neg Hx      Meds/Allergies   Allergies   Allergen Reactions    Bee Venom Anaphylaxis    Iodides Other (See Comments)    [place] (Diagnostic) - Food Allergy Hives     Gi upset    [place] Oil - Food Allergy GI Intolerance     Other reaction(s): GI Intolerance, GI Reaction    Aspirin Other (See Comments)     shaking  convulsions    Other reaction(s): Other (See Comments)  shaking  convulsions  shaking  convulsions    Metronidazole GI Intolerance     Gi upset      Sulfamethoxazole-Trimethoprim GI Intolerance     Gi upst    Other reaction(s): GI Intolerance, GI Reaction  Gi upst  Gi upst    Tape  [Medical Tape] Other (See Comments) and Hives     fever  Other reaction(s): Other  fever       Meds:  Prior to Admission medications    Medication Sig Start Date End Date Taking?  Authorizing Provider   acyclovir (ZOVIRAX) 5 % ointment Apply topically 4 (four) times a day  Patient not taking: No sig reported 4/14/22   KIRSTIN Medina   albuterol (PROVENTIL HFA,VENTOLIN HFA) 90 mcg/act inhaler INHALE 2 PUFFS BY MOUTH AND INTO THE LUNGS 2 TIMES A DAY AS NEEDED FOR WHEEZING 3/26/21   Historical Provider, MD   BIOTIN EXTRA STRENGTH PO Take by mouth    Historical Provider, MD   cetirizine (ZyrTEC) 10 mg tablet Take 1 tablet (10 mg total) by mouth daily 4/25/22   Donelda Arm, DO   Diclofenac Sodium (VOLTAREN) 1 % apply 1 APPLICATION topically four times a day 1/11/21   Historical Provider, MD   EPINEPHrine (EPIPEN) 0 3 mg/0 3 mL SOAJ Inject 0 3 mL (0 3 mg total) into a muscle once for 1 dose 5/12/22 5/23/22  KIRSTIN Medina   FLUoxetine (PROzac) 40 MG capsule Take 1 capsule (40 mg total) by mouth daily 3/14/22 6/12/22  Donelda Arm, DO   fluticasone (FLONASE) 50 mcg/act nasal spray 1 spray 2 (two) times a day 4/4/22   Historical Provider, MD   hydrOXYzine HCL (ATARAX) 10 mg tablet Take 1 tablet (10 mg total) by mouth daily at bedtime 3/14/22   Donelda Arm, DO   meclizine (ANTIVERT) 25 mg tablet take 1 tablet by mouth every 8 hours if needed for dizziness 5/2/22   Donelda Arm, DO   milk thistle 175 MG tablet Take 175 mg by mouth in the morning and 175 mg in the evening and 175 mg before bedtime  Historical Provider, MD   omeprazole (PriLOSEC) 40 MG capsule Take 1 capsule (40 mg total) by mouth daily 3/14/22 6/12/22  Osman Gladis, DO   Probiotic Product (Misc Intestinal Lacy Regulat) CAPS Take 1 capsule by mouth daily    Historical Provider, MD   vitamin E, tocopherol, 400 units capsule Take 400 Units by mouth in the morning      Historical Provider, MD       Subjective:     Review of Systems:    General: negative for - chills, fatigue, fever,  weight gain or weight loss  Psychological: negative for - anxiety, behavioral disorder, concentration difficulties, decreased libido, depression, irritability, memory difficulties, mood swings, sleep disturbances or suicidal ideation  ENT: negative for - hearing difficulties , nasal congestion, nasal discharge, oral lesions, sinus pain, sneezing, sore throat  Allergy and Immunology: negative for - hives, insect bite sensitivity,  Hematological and Lymphatic: negative for - bleeding problems, blood clots,bruising, swollen lymph nodes  Endocrine: negative for - hair pattern changes, hot flashes, malaise/lethargy, mood swings, palpitations, polydipsia/polyuria, skin changes, temperature intolerance or unexpected weight change  Respiratory: negative for - cough, hemoptysis, orthopnea, shortness of breath, or wheezing  Cardiovascular: negative for - chest pain, dyspnea on exertion, edema,  Gastrointestinal: negative for - abdominal pain, nausea/vomiting  Genito-Urinary: negative for - dysuria, incontinence, irregular/heavy menses or urinary frequency/urgency  Musculoskeletal: negative for - gait disturbance, joint pain, joint stiffness, joint swelling, muscle pain, muscular weakness  Dermatological:  As in HPI  Neurological: negative for confusion, dizziness, headaches, impaired coordination/balance, memory loss, numbness/tingling, seizures, speech problems, tremors or weakness       Objective:   Ht 5' 4" (1 626 m)   Wt 64 9 kg (143 lb)   BMI 24 55 kg/m²     Physical Exam:    General Appearance:    Alert, cooperative, no distress   Head:    Normocephalic, without obvious abnormality, atraumatic           Skin:   A full skin exam was performed including scalp, head scalp, eyes, ears, nose, lips, neck, chest, axilla, abdomen, back, buttocks, bilateral upper extremities, bilateral lower extremities, hands, feet, fingers, toes, fingernails, and toenails papules noted on the elbow also some on scalp no definitive vesicles noted      Shave Biopsy Procedure Note    Pre-operative Diagnosis:  Question dermatitis herpetiformis    Plan:  1  Instructed to keep the wound dry and covered for 24 and clean thereafter  2  Warning signs of infection were reviewed  3  Recommended that the patient use OTC acetaminophen as needed for pain  4  Return  Pending results of biopsy(ies)    Locations:  Left elbow    Indications:  Pruritic rash    Anesthesia: Lidocaine 1% with epinephrine without added sodium bicarbonate    Procedure Details     Patient informed of the risks (including bleeding and infection) and benefits of the   procedure and Verbal informed consent obtained  The lesion and surrounding area were given a sterile prep using alcohol and draped in the usual sterile fashion  A Blue blade razor was used to obtain a specimen  Hemostasis achieved with aluminum chloride  Petrolatum and a sterile dressing applied  The specimen was sent for pathologic examination  The patient tolerated the procedure(s) well  Complications:  none  Assessment:     1  Rash           Plan:   Rash in the elbow area with itching reminiscent of dermatitis herpetiformis  biopsy performed at present await for the results of this and if suggestive will go ahead and obtain IgA tissue methyltransferase      Alvina Mirza MD  3/74/4726,4:79 PM    Portions of the record may have been created with voice recognition software   Occasional wrong word or "sound a like" substitutions may have occurred due to the inherent limitations of voice recognition software   Read the chart carefully and recognize, using context, where substitutions have occurred

## 2022-05-24 NOTE — PATIENT INSTRUCTIONS
Rash in the elbow area with itching reminiscent of dermatitis herpetiformis  biopsy performed at present await for the results of this and if suggestive will go ahead and obtain IgA tissue methyltransferase

## 2022-06-01 ENCOUNTER — TELEPHONE (OUTPATIENT)
Dept: DERMATOLOGY | Facility: CLINIC | Age: 47
End: 2022-06-01

## 2022-06-01 DIAGNOSIS — L30.9 ECZEMA, UNSPECIFIED TYPE: Primary | ICD-10-CM

## 2022-06-01 RX ORDER — FLUOCINONIDE TOPICAL SOLUTION USP, 0.05% 0.5 MG/ML
SOLUTION TOPICAL 2 TIMES DAILY
Qty: 60 ML | Refills: 1 | Status: SHIPPED | OUTPATIENT
Start: 2022-06-01

## 2022-06-01 NOTE — RESULT ENCOUNTER NOTE
Discussed with patient  Biopsy showed nonspecific changes more consistent with a eczematous type process at present will go ahead and treat the scalp with fluocinonide solution and the elbows with fluocinonide cream if no improvement noted will pursue the diagnosis of dermatitis herpetiformis further

## 2022-06-01 NOTE — PROGRESS NOTES
Biopsy showed nonspecific changes more consistent with a eczematous type process at present will go ahead and treat the scalp with fluocinonide solution and the elbows with fluocinonide cream if no improvement noted will pursue the diagnosis of dermatitis herpetiformis further

## 2022-06-02 ENCOUNTER — TELEPHONE (OUTPATIENT)
Dept: INTERNAL MEDICINE CLINIC | Facility: CLINIC | Age: 47
End: 2022-06-02

## 2022-06-02 DIAGNOSIS — G47.00 INSOMNIA, UNSPECIFIED TYPE: ICD-10-CM

## 2022-06-02 DIAGNOSIS — K21.9 GASTROESOPHAGEAL REFLUX DISEASE WITHOUT ESOPHAGITIS: ICD-10-CM

## 2022-06-02 DIAGNOSIS — F32.9 REACTIVE DEPRESSION: ICD-10-CM

## 2022-06-02 DIAGNOSIS — F41.9 ANXIETY: ICD-10-CM

## 2022-06-02 RX ORDER — HYDROXYZINE HYDROCHLORIDE 10 MG/1
10 TABLET, FILM COATED ORAL
Qty: 30 TABLET | Refills: 0 | Status: CANCELLED | OUTPATIENT
Start: 2022-06-02

## 2022-06-02 RX ORDER — FLUOXETINE HYDROCHLORIDE 40 MG/1
40 CAPSULE ORAL DAILY
Qty: 30 CAPSULE | Refills: 2 | Status: SHIPPED | OUTPATIENT
Start: 2022-06-02 | End: 2022-08-31

## 2022-06-02 RX ORDER — HYDROXYZINE HYDROCHLORIDE 10 MG/1
10 TABLET, FILM COATED ORAL
Qty: 30 TABLET | Refills: 0 | Status: SHIPPED | OUTPATIENT
Start: 2022-06-02 | End: 2022-07-26 | Stop reason: SDUPTHER

## 2022-06-02 RX ORDER — OMEPRAZOLE 40 MG/1
40 CAPSULE, DELAYED RELEASE ORAL DAILY
Qty: 30 CAPSULE | Refills: 2 | Status: SHIPPED | OUTPATIENT
Start: 2022-06-02 | End: 2022-08-31

## 2022-06-02 NOTE — TELEPHONE ENCOUNTER
----- Message from Rachael Nelson sent at 6/2/2022  2:55 PM EDT -----  Regarding: Refill   I need a refill on my medication called hydroxizine and I tried to refill it and its locked in my medicine all of them r locked except for one  Can u unlock them?

## 2022-06-11 DIAGNOSIS — R42 VERTIGO: ICD-10-CM

## 2022-06-11 RX ORDER — MECLIZINE HYDROCHLORIDE 25 MG/1
TABLET ORAL
Qty: 120 TABLET | Refills: 0 | Status: SHIPPED | OUTPATIENT
Start: 2022-06-11

## 2022-07-26 DIAGNOSIS — G47.00 INSOMNIA, UNSPECIFIED TYPE: ICD-10-CM

## 2022-07-26 DIAGNOSIS — T78.40XD ALLERGY, SUBSEQUENT ENCOUNTER: ICD-10-CM

## 2022-07-26 RX ORDER — CETIRIZINE HYDROCHLORIDE 10 MG/1
10 TABLET ORAL DAILY
Qty: 90 TABLET | Refills: 0 | Status: CANCELLED | OUTPATIENT
Start: 2022-07-26

## 2022-07-26 RX ORDER — CETIRIZINE HYDROCHLORIDE 10 MG/1
TABLET ORAL
Qty: 90 TABLET | Refills: 0 | Status: SHIPPED | OUTPATIENT
Start: 2022-07-26 | End: 2022-10-27

## 2022-07-26 RX ORDER — HYDROXYZINE HYDROCHLORIDE 10 MG/1
10 TABLET, FILM COATED ORAL
Qty: 30 TABLET | Refills: 0 | Status: SHIPPED | OUTPATIENT
Start: 2022-07-26 | End: 2022-10-12

## 2022-08-11 RX ORDER — FLUOXETINE HYDROCHLORIDE 20 MG/1
20 CAPSULE ORAL DAILY
COMMUNITY
Start: 2022-06-26 | End: 2022-10-28

## 2022-08-12 ENCOUNTER — OFFICE VISIT (OUTPATIENT)
Dept: INTERNAL MEDICINE CLINIC | Facility: CLINIC | Age: 47
End: 2022-08-12
Payer: COMMERCIAL

## 2022-08-12 VITALS
HEIGHT: 64 IN | TEMPERATURE: 98.8 F | RESPIRATION RATE: 16 BRPM | HEART RATE: 70 BPM | WEIGHT: 154.2 LBS | BODY MASS INDEX: 26.32 KG/M2 | SYSTOLIC BLOOD PRESSURE: 110 MMHG | OXYGEN SATURATION: 98 % | DIASTOLIC BLOOD PRESSURE: 60 MMHG

## 2022-08-12 DIAGNOSIS — Z13.1 SCREENING FOR DIABETES MELLITUS: ICD-10-CM

## 2022-08-12 DIAGNOSIS — Z11.4 SCREENING FOR HIV (HUMAN IMMUNODEFICIENCY VIRUS): ICD-10-CM

## 2022-08-12 DIAGNOSIS — Z23 ENCOUNTER FOR IMMUNIZATION: ICD-10-CM

## 2022-08-12 DIAGNOSIS — Z12.4 SCREENING FOR CERVICAL CANCER: ICD-10-CM

## 2022-08-12 DIAGNOSIS — Z12.31 ENCOUNTER FOR SCREENING MAMMOGRAM FOR BREAST CANCER: ICD-10-CM

## 2022-08-12 DIAGNOSIS — R11.0 NAUSEA: ICD-10-CM

## 2022-08-12 DIAGNOSIS — Z13.220 ENCOUNTER FOR LIPID SCREENING FOR CARDIOVASCULAR DISEASE: ICD-10-CM

## 2022-08-12 DIAGNOSIS — Z13.6 ENCOUNTER FOR LIPID SCREENING FOR CARDIOVASCULAR DISEASE: ICD-10-CM

## 2022-08-12 DIAGNOSIS — R10.13 EPIGASTRIC PAIN: Primary | ICD-10-CM

## 2022-08-12 DIAGNOSIS — Z13.29 SCREENING FOR THYROID DISORDER: ICD-10-CM

## 2022-08-12 PROCEDURE — 99214 OFFICE O/P EST MOD 30 MIN: CPT

## 2022-08-12 RX ORDER — ONDANSETRON 8 MG/1
8 TABLET, ORALLY DISINTEGRATING ORAL EVERY 8 HOURS PRN
Qty: 20 TABLET | Refills: 0 | Status: SHIPPED | OUTPATIENT
Start: 2022-08-12 | End: 2022-10-27

## 2022-08-12 NOTE — PROGRESS NOTES
INTERNAL MEDICINE FOLLOW-UP VISIT  [facility] - [facility] [address] [place] [address]    NAME: Amarjit Garcia  AGE: 51 y o  SEX: female  : 1975     DATE: 2022     Assessment and Plan:     Epigastric pain/nausea  Recently had cholecystectomy in May  Continues with nausea, epigastric pain as well as "yellow" stool  ? residual stone or stricture in duct    - Hepatic function panel; Future  - UA w Reflex to Microscopic w Reflex to Culture -Lab Collect; Future  - CT abdomen pelvis wo contrast; Future  - ondansetron (ZOFRAN-ODT) 8 mg disintegrating tablet; Take 1 tablet (8 mg total) by mouth every 8 (eight) hours as needed for nausea  Dispense: 20 tablet; Refill: 0  - CBC and differential; Future  - Basic metabolic panel; Future     Chief Complaint:     Chief Complaint   Patient presents with    Follow-up    Nausea      History of Present Illness:     Patient presents today in the office with nausea and epigastric pain, and "yellow" colored stool  She has been experiencing this for 6-8 months  She eventually had her gallbladder out in may however still continues with these symptoms  She had an EGD and colonoscopy as well  S    The following portions of the patient's history were reviewed and updated as appropriate: allergies, current medications, past family history, past medical history, past social history, past surgical history and problem list      Review of Systems:     Review of Systems   Constitutional: Negative for appetite change, chills, diaphoresis, fatigue, fever and unexpected weight change  HENT: Negative for postnasal drip and sneezing  Eyes: Negative for visual disturbance  Respiratory: Negative for chest tightness and shortness of breath  Cardiovascular: Negative for chest pain, palpitations and leg swelling  Gastrointestinal: Positive for abdominal pain and nausea  Negative for blood in stool     Endocrine: Negative for cold intolerance, heat intolerance, polydipsia, polyphagia and polyuria  Genitourinary: Negative for difficulty urinating, dysuria, frequency and urgency  Musculoskeletal: Negative for arthralgias and myalgias  Skin: Negative for rash and wound  Neurological: Negative for dizziness, weakness, light-headedness and headaches  Hematological: Negative for adenopathy  Psychiatric/Behavioral: Negative for confusion, dysphoric mood and sleep disturbance  The patient is not nervous/anxious           Past Medical History:     Past Medical History:   Diagnosis Date    Asthma     Degenerative joint disease     Depression     GERD (gastroesophageal reflux disease)     Lumbar radiculopathy     PONV (postoperative nausea and vomiting)     Thyroid nodule         Current Medications:     Current Outpatient Medications:     albuterol (PROVENTIL HFA,VENTOLIN HFA) 90 mcg/act inhaler, INHALE 2 PUFFS BY MOUTH AND INTO THE LUNGS 2 TIMES A DAY AS NEEDED FOR WHEEZING, Disp: , Rfl:     BIOTIN EXTRA STRENGTH PO, Take by mouth, Disp: , Rfl:     cetirizine (ZyrTEC) 10 mg tablet, take 1 tablet by mouth once daily, Disp: 90 tablet, Rfl: 0    Diclofenac Sodium (VOLTAREN) 1 %, apply 1 APPLICATION topically four times a day, Disp: , Rfl:     fluocinonide (LIDEX) 0 05 % cream, Apply topically 2 (two) times a day To rash on elbows to resolved (Patient not taking: Reported on 8/12/2022), Disp: 30 g, Rfl: 1    fluocinonide (LIDEX) 0 05 % external solution, Apply topically 2 (two) times a day To scalp until resolved (Patient not taking: Reported on 8/12/2022), Disp: 60 mL, Rfl: 1    FLUoxetine (PROzac) 40 MG capsule, Take 1 capsule (40 mg total) by mouth daily, Disp: 30 capsule, Rfl: 2    fluticasone (FLONASE) 50 mcg/act nasal spray, 1 spray 2 (two) times a day, Disp: , Rfl:     hydrOXYzine HCL (ATARAX) 10 mg tablet, Take 1 tablet (10 mg total) by mouth daily at bedtime, Disp: 30 tablet, Rfl: 0    meclizine (ANTIVERT) 25 mg tablet, take 1 tablet by mouth every 8 hours if needed for dizziness, Disp: 120 tablet, Rfl: 0    milk thistle 175 MG tablet, Take 175 mg by mouth in the morning and 175 mg in the evening and 175 mg before bedtime  , Disp: , Rfl:     omeprazole (PriLOSEC) 40 MG capsule, Take 1 capsule (40 mg total) by mouth daily, Disp: 30 capsule, Rfl: 2    ondansetron (ZOFRAN-ODT) 8 mg disintegrating tablet, Take 1 tablet (8 mg total) by mouth every 8 (eight) hours as needed for nausea, Disp: 20 tablet, Rfl: 0    Probiotic Product (Misc Intestinal Lacy Regulat) CAPS, Take 1 capsule by mouth daily, Disp: , Rfl:     acyclovir (ZOVIRAX) 5 % ointment, Apply topically 4 (four) times a day (Patient not taking: No sig reported), Disp: 15 g, Rfl: 0    EPINEPHrine (EPIPEN) 0 3 mg/0 3 mL SOAJ, Inject 0 3 mL (0 3 mg total) into a muscle once for 1 dose, Disp: 1 each, Rfl: 1    FLUoxetine (PROzac) 20 mg capsule, 20 mg daily, Disp: , Rfl:     vitamin E, tocopherol, 400 units capsule, Take 400 Units by mouth in the morning  (Patient not taking: Reported on 8/12/2022), Disp: , Rfl:      Allergies: Allergies   Allergen Reactions    Bee Venom Anaphylaxis    Iodides Other (See Comments)    [place] (Diagnostic) - Food Allergy Hives     Gi upset    [place] Oil - Food Allergy GI Intolerance     Other reaction(s): GI Intolerance, GI Reaction    Aspirin Other (See Comments)     shaking  convulsions    Other reaction(s): Other (See Comments)  shaking  convulsions  shaking  convulsions    Metronidazole GI Intolerance     Gi upset      Sulfamethoxazole-Trimethoprim GI Intolerance     Gi upst    Other reaction(s): GI Intolerance, GI Reaction  Gi upst  Gi upst    Tape  [Medical Tape] Other (See Comments) and Hives     fever  Other reaction(s):  Other  fever        Physical Exam:     /60 (BP Location: Left arm, Patient Position: Sitting, Cuff Size: Standard)   Pulse 70   Temp 98 8 °F (37 1 °C) (Tympanic)   Resp 16   Ht 5' 4" (1 626 m)   Wt 69 9 kg (154 lb 3 2 oz) SpO2 98%   BMI 26 47 kg/m²     Physical Exam  Constitutional:       Appearance: She is well-developed  HENT:      Head: Normocephalic and atraumatic  Eyes:      Pupils: Pupils are equal, round, and reactive to light  Neck:      Thyroid: No thyromegaly  Cardiovascular:      Rate and Rhythm: Normal rate and regular rhythm  Heart sounds: No murmur heard  Pulmonary:      Effort: Pulmonary effort is normal       Breath sounds: Normal breath sounds  Abdominal:      General: Bowel sounds are normal       Palpations: Abdomen is soft  Musculoskeletal:         General: Normal range of motion  Cervical back: Normal range of motion and neck supple  Lymphadenopathy:      Cervical: No cervical adenopathy  Skin:     General: Skin is warm and dry  Neurological:      Mental Status: She is alert and oriented to person, place, and time  Data:     Laboratory Results: I have personally reviewed the pertinent laboratory results/reports   Radiology/Other Diagnostic Testing Results: I have personally reviewed pertinent reports        KIRSTIN Sebastian  [facility] OF [address] [place] [address]

## 2022-08-13 ENCOUNTER — APPOINTMENT (OUTPATIENT)
Dept: LAB | Facility: CLINIC | Age: 47
End: 2022-08-13
Payer: COMMERCIAL

## 2022-08-13 DIAGNOSIS — R10.13 EPIGASTRIC PAIN: ICD-10-CM

## 2022-08-13 DIAGNOSIS — Z11.4 SCREENING FOR HIV (HUMAN IMMUNODEFICIENCY VIRUS): ICD-10-CM

## 2022-08-13 DIAGNOSIS — Z13.1 SCREENING FOR DIABETES MELLITUS: ICD-10-CM

## 2022-08-13 DIAGNOSIS — Z13.6 ENCOUNTER FOR LIPID SCREENING FOR CARDIOVASCULAR DISEASE: ICD-10-CM

## 2022-08-13 DIAGNOSIS — R11.0 NAUSEA: ICD-10-CM

## 2022-08-13 DIAGNOSIS — Z13.220 ENCOUNTER FOR LIPID SCREENING FOR CARDIOVASCULAR DISEASE: ICD-10-CM

## 2022-08-13 DIAGNOSIS — Z13.29 SCREENING FOR THYROID DISORDER: ICD-10-CM

## 2022-08-13 LAB
ALBUMIN SERPL BCP-MCNC: 3.4 G/DL (ref 3.5–5)
ALP SERPL-CCNC: 109 U/L (ref 46–116)
ALT SERPL W P-5'-P-CCNC: 26 U/L (ref 12–78)
ANION GAP SERPL CALCULATED.3IONS-SCNC: 2 MMOL/L (ref 4–13)
AST SERPL W P-5'-P-CCNC: 22 U/L (ref 5–45)
BASOPHILS # BLD AUTO: 0.08 THOUSANDS/ΜL (ref 0–0.1)
BASOPHILS NFR BLD AUTO: 1 % (ref 0–1)
BILIRUB DIRECT SERPL-MCNC: 0.11 MG/DL (ref 0–0.2)
BILIRUB SERPL-MCNC: 0.47 MG/DL (ref 0.2–1)
BILIRUB UR QL STRIP: NEGATIVE
BUN SERPL-MCNC: 8 MG/DL (ref 5–25)
CALCIUM SERPL-MCNC: 8.5 MG/DL (ref 8.3–10.1)
CHLORIDE SERPL-SCNC: 109 MMOL/L (ref 96–108)
CHOLEST SERPL-MCNC: 174 MG/DL
CLARITY UR: CLEAR
CO2 SERPL-SCNC: 27 MMOL/L (ref 21–32)
COLOR UR: NORMAL
CREAT SERPL-MCNC: 0.69 MG/DL (ref 0.6–1.3)
EOSINOPHIL # BLD AUTO: 0.34 THOUSAND/ΜL (ref 0–0.61)
EOSINOPHIL NFR BLD AUTO: 4 % (ref 0–6)
ERYTHROCYTE [DISTWIDTH] IN BLOOD BY AUTOMATED COUNT: 11.9 % (ref 11.6–15.1)
EST. AVERAGE GLUCOSE BLD GHB EST-MCNC: 111 MG/DL
GFR SERPL CREATININE-BSD FRML MDRD: 103 ML/MIN/1.73SQ M
GLUCOSE P FAST SERPL-MCNC: 92 MG/DL (ref 65–99)
GLUCOSE UR STRIP-MCNC: NEGATIVE MG/DL
HBA1C MFR BLD: 5.5 %
HCT VFR BLD AUTO: 40.4 % (ref 34.8–46.1)
HDLC SERPL-MCNC: 48 MG/DL
HGB BLD-MCNC: 13.1 G/DL (ref 11.5–15.4)
HGB UR QL STRIP.AUTO: NEGATIVE
IMM GRANULOCYTES # BLD AUTO: 0.02 THOUSAND/UL (ref 0–0.2)
IMM GRANULOCYTES NFR BLD AUTO: 0 % (ref 0–2)
KETONES UR STRIP-MCNC: NEGATIVE MG/DL
LDLC SERPL CALC-MCNC: 114 MG/DL (ref 0–100)
LEUKOCYTE ESTERASE UR QL STRIP: NEGATIVE
LYMPHOCYTES # BLD AUTO: 2.08 THOUSANDS/ΜL (ref 0.6–4.47)
LYMPHOCYTES NFR BLD AUTO: 26 % (ref 14–44)
MCH RBC QN AUTO: 30 PG (ref 26.8–34.3)
MCHC RBC AUTO-ENTMCNC: 32.4 G/DL (ref 31.4–37.4)
MCV RBC AUTO: 93 FL (ref 82–98)
MONOCYTES # BLD AUTO: 0.63 THOUSAND/ΜL (ref 0.17–1.22)
MONOCYTES NFR BLD AUTO: 8 % (ref 4–12)
NEUTROPHILS # BLD AUTO: 4.8 THOUSANDS/ΜL (ref 1.85–7.62)
NEUTS SEG NFR BLD AUTO: 61 % (ref 43–75)
NITRITE UR QL STRIP: NEGATIVE
NRBC BLD AUTO-RTO: 0 /100 WBCS
PH UR STRIP.AUTO: 7 [PH]
PLATELET # BLD AUTO: 349 THOUSANDS/UL (ref 149–390)
PMV BLD AUTO: 9.6 FL (ref 8.9–12.7)
POTASSIUM SERPL-SCNC: 4.4 MMOL/L (ref 3.5–5.3)
PROT SERPL-MCNC: 7.3 G/DL (ref 6.4–8.4)
PROT UR STRIP-MCNC: NEGATIVE MG/DL
RBC # BLD AUTO: 4.36 MILLION/UL (ref 3.81–5.12)
SODIUM SERPL-SCNC: 138 MMOL/L (ref 135–147)
SP GR UR STRIP.AUTO: 1.02 (ref 1–1.03)
TRIGL SERPL-MCNC: 60 MG/DL
TSH SERPL DL<=0.05 MIU/L-ACNC: 2.44 UIU/ML (ref 0.45–4.5)
UROBILINOGEN UR STRIP-ACNC: <2 MG/DL
WBC # BLD AUTO: 7.95 THOUSAND/UL (ref 4.31–10.16)

## 2022-08-13 PROCEDURE — 84443 ASSAY THYROID STIM HORMONE: CPT

## 2022-08-13 PROCEDURE — 80076 HEPATIC FUNCTION PANEL: CPT

## 2022-08-13 PROCEDURE — 83036 HEMOGLOBIN GLYCOSYLATED A1C: CPT

## 2022-08-13 PROCEDURE — 36415 COLL VENOUS BLD VENIPUNCTURE: CPT

## 2022-08-13 PROCEDURE — 80048 BASIC METABOLIC PNL TOTAL CA: CPT

## 2022-08-13 PROCEDURE — 87389 HIV-1 AG W/HIV-1&-2 AB AG IA: CPT

## 2022-08-13 PROCEDURE — 85025 COMPLETE CBC W/AUTO DIFF WBC: CPT

## 2022-08-13 PROCEDURE — 81003 URINALYSIS AUTO W/O SCOPE: CPT

## 2022-08-13 PROCEDURE — 80061 LIPID PANEL: CPT

## 2022-08-14 LAB — HIV 1+2 AB+HIV1 P24 AG SERPL QL IA: NORMAL

## 2022-08-15 ENCOUNTER — TELEPHONE (OUTPATIENT)
Dept: INTERNAL MEDICINE CLINIC | Facility: CLINIC | Age: 47
End: 2022-08-15

## 2022-08-15 NOTE — TELEPHONE ENCOUNTER
----- Message from [id number] sent at 8/15/2022  8:35 AM EDT -----  Bad cholesterol is mildly elevated   Monitor carbs and sweets

## 2022-09-13 ENCOUNTER — HOSPITAL ENCOUNTER (OUTPATIENT)
Dept: MAMMOGRAPHY | Facility: CLINIC | Age: 47
Discharge: HOME | End: 2022-09-13
Payer: COMMERCIAL

## 2022-09-13 VITALS — BODY MASS INDEX: 25.61 KG/M2 | WEIGHT: 150 LBS | HEIGHT: 64 IN

## 2022-09-13 DIAGNOSIS — Z12.31 ENCOUNTER FOR SCREENING MAMMOGRAM FOR BREAST CANCER: ICD-10-CM

## 2022-09-13 PROCEDURE — 77063 BREAST TOMOSYNTHESIS BI: CPT

## 2022-09-13 PROCEDURE — 77067 SCR MAMMO BI INCL CAD: CPT

## 2022-09-14 ENCOUNTER — TELEPHONE (OUTPATIENT)
Dept: INTERNAL MEDICINE CLINIC | Facility: CLINIC | Age: 47
End: 2022-09-14

## 2022-09-14 NOTE — TELEPHONE ENCOUNTER
She wants you to know that she had her tubes tied   And when they did liposuction the broke the tube     is this causing her all her pain

## 2022-09-14 NOTE — TELEPHONE ENCOUNTER
Patient saw  Olga Damon on 8/12 due to Abdominal pain she ordered a CT of the Abdominal patient would like Olga Damon to call her at [phone] asap she has some questions for her

## 2022-09-14 NOTE — TELEPHONE ENCOUNTER
The CT scan was never done   I cant say for if this is causing pain she would need to follow up with gynecology

## 2022-09-17 ENCOUNTER — HOSPITAL ENCOUNTER (OUTPATIENT)
Dept: CT IMAGING | Facility: HOSPITAL | Age: 47
Discharge: HOME | End: 2022-09-17
Payer: COMMERCIAL

## 2022-09-17 DIAGNOSIS — R10.13 EPIGASTRIC PAIN: ICD-10-CM

## 2022-09-17 PROCEDURE — 74176 CT ABD & PELVIS W/O CONTRAST: CPT

## 2022-09-17 PROCEDURE — G1004 CDSM NDSC: HCPCS

## 2022-09-21 ENCOUNTER — TELEPHONE (OUTPATIENT)
Dept: INTERNAL MEDICINE CLINIC | Facility: CLINIC | Age: 47
End: 2022-09-21

## 2022-09-21 NOTE — TELEPHONE ENCOUNTER
Patient read the results where it states about colitis, she is asking if its possible she has an infection in her intestines?

## 2022-09-21 NOTE — TELEPHONE ENCOUNTER
----- Message from Preethi Yo, [address] sent at 9/21/2022 10:07 AM EDT -----  No specific issues noted on CT scan   I agree with Laurel's last assessment to follow up with the gastroenterology doctor for further direction

## 2022-10-12 DIAGNOSIS — G47.00 INSOMNIA, UNSPECIFIED TYPE: ICD-10-CM

## 2022-10-12 RX ORDER — HYDROXYZINE HYDROCHLORIDE 10 MG/1
TABLET, FILM COATED ORAL
Qty: 30 TABLET | Refills: 0 | Status: SHIPPED | OUTPATIENT
Start: 2022-10-12

## 2022-10-27 ENCOUNTER — TELEPHONE (OUTPATIENT)
Dept: INTERNAL MEDICINE CLINIC | Facility: CLINIC | Age: 47
End: 2022-10-27

## 2022-10-27 RX ORDER — DOXYCYCLINE HYCLATE 100 MG
100 TABLET ORAL 2 TIMES DAILY
COMMUNITY
Start: 2022-10-25 | End: 2022-11-01

## 2022-10-27 NOTE — TELEPHONE ENCOUNTER
----- Message from Romero Lott sent at 10/27/2022  2:41 PM EDT -----  Regarding: Change pharmacies   Can u please change my pharmacy from [facility] to [facility] in [address] [place] [address],[address]

## 2022-10-28 ENCOUNTER — OFFICE VISIT (OUTPATIENT)
Dept: INTERNAL MEDICINE CLINIC | Facility: CLINIC | Age: 47
End: 2022-10-28

## 2022-10-28 VITALS
WEIGHT: 148.5 LBS | SYSTOLIC BLOOD PRESSURE: 118 MMHG | BODY MASS INDEX: 25.35 KG/M2 | HEIGHT: 64 IN | HEART RATE: 72 BPM | DIASTOLIC BLOOD PRESSURE: 62 MMHG | TEMPERATURE: 97.6 F | OXYGEN SATURATION: 100 % | RESPIRATION RATE: 98 BRPM

## 2022-10-28 DIAGNOSIS — Z12.4 SCREENING FOR CERVICAL CANCER: ICD-10-CM

## 2022-10-28 DIAGNOSIS — Z23 ENCOUNTER FOR IMMUNIZATION: ICD-10-CM

## 2022-10-28 DIAGNOSIS — J01.10 ACUTE NON-RECURRENT FRONTAL SINUSITIS: Primary | ICD-10-CM

## 2022-10-28 RX ORDER — FLUTICASONE PROPIONATE 50 MCG
1 SPRAY, SUSPENSION (ML) NASAL 2 TIMES DAILY
Qty: 11.1 ML | Refills: 2 | Status: SHIPPED | OUTPATIENT
Start: 2022-10-28

## 2022-10-28 NOTE — PROGRESS NOTES
INTERNAL MEDICINE FOLLOW-UP VISIT  [facility] - [facility] OF [address] [place] [address]    NAME: Sarah Rodriguez  AGE: 49 y o  SEX: female  : 1975     DATE: 10/28/2022     Assessment and Plan:   1  Acute non-recurrent frontal sinusitis  Patient was seen in urgent care on 10/25 and started on doxycycline still continues with nasal congestion  Complete antibiotics until finished  May use Sudafed as a decongestant  - fluticasone (FLONASE) 50 mcg/act nasal spray; 1 spray into each nostril 2 (two) times a day  Dispense: 11 1 mL; Refill: 2  Stay hydrated as water will thin out secretions        No follow-ups on file  Chief Complaint:     Chief Complaint   Patient presents with   • Sinusitis     Was in urgent care and was provided meds but still feels unwell       History of Present Illness:     Patient is here today for a follow-up  She was seen in urgent care on  for congestion and sore throat and was started on doxycycline  She comes in today with continued congestion and headaches  The following portions of the patient's history were reviewed and updated as appropriate: allergies, current medications, past family history, past medical history, past social history, past surgical history and problem list      Review of Systems:     Review of Systems   Constitutional: Negative for appetite change, chills, diaphoresis, fatigue, fever and unexpected weight change  HENT: Positive for congestion  Negative for postnasal drip and sneezing  Eyes: Negative for visual disturbance  Respiratory: Negative for chest tightness and shortness of breath  Cardiovascular: Negative for chest pain, palpitations and leg swelling  Gastrointestinal: Negative for abdominal pain and blood in stool  Endocrine: Negative for cold intolerance, heat intolerance, polydipsia, polyphagia and polyuria  Genitourinary: Negative for difficulty urinating, dysuria, frequency and urgency  Musculoskeletal: Negative for arthralgias and myalgias  Skin: Negative for rash and wound  Neurological: Positive for headaches  Negative for dizziness, weakness and light-headedness  Hematological: Negative for adenopathy  Psychiatric/Behavioral: Negative for confusion, dysphoric mood and sleep disturbance  The patient is not nervous/anxious           Past Medical History:     Past Medical History:   Diagnosis Date   • Asthma    • Degenerative joint disease    • Depression    • GERD (gastroesophageal reflux disease)    • Lumbar radiculopathy    • PONV (postoperative nausea and vomiting)    • Thyroid nodule         Current Medications:     Current Outpatient Medications:   •  albuterol (PROVENTIL HFA,VENTOLIN HFA) 90 mcg/act inhaler, INHALE 2 PUFFS BY MOUTH AND INTO THE LUNGS 2 TIMES A DAY AS NEEDED FOR WHEEZING, Disp: , Rfl:   •  Diclofenac Sodium (VOLTAREN) 1 %, apply 1 APPLICATION topically four times a day, Disp: , Rfl:   •  doxycycline hyclate (VIBRA-TABS) 100 mg tablet, Take 100 mg by mouth 2 (two) times a day, Disp: , Rfl:   •  fluocinonide (LIDEX) 0 05 % cream, Apply topically 2 (two) times a day To rash on elbows to resolved, Disp: 30 g, Rfl: 1  •  fluocinonide (LIDEX) 0 05 % external solution, Apply topically 2 (two) times a day To scalp until resolved, Disp: 60 mL, Rfl: 1  •  fluticasone (FLONASE) 50 mcg/act nasal spray, 1 spray into each nostril 2 (two) times a day, Disp: 11 1 mL, Rfl: 2  •  hydrOXYzine HCL (ATARAX) 10 mg tablet, take 1 tablet by mouth at bedtime, Disp: 30 tablet, Rfl: 0  •  meclizine (ANTIVERT) 25 mg tablet, take 1 tablet by mouth every 8 hours if needed for dizziness, Disp: 120 tablet, Rfl: 0  •  EPINEPHrine (EPIPEN) 0 3 mg/0 3 mL SOAJ, Inject 0 3 mL (0 3 mg total) into a muscle once for 1 dose, Disp: 1 each, Rfl: 1  •  FLUoxetine (PROzac) 40 MG capsule, Take 1 capsule (40 mg total) by mouth daily, Disp: 30 capsule, Rfl: 2  •  omeprazole (PriLOSEC) 40 MG capsule, Take 1 capsule (40 mg total) by mouth daily, Disp: 30 capsule, Rfl: 2     Allergies: Allergies   Allergen Reactions   • Bee Venom Anaphylaxis   • Iodides Other (See Comments)   • [place] (Diagnostic) - Food Allergy Hives     Gi upset   • [place] Oil - Food Allergy GI Intolerance     Other reaction(s): GI Intolerance, GI Reaction   • Aspirin Other (See Comments)     shaking  convulsions    Other reaction(s): Other (See Comments)  shaking  convulsions  shaking  convulsions   • Metronidazole GI Intolerance     Gi upset     • Sulfamethoxazole-Trimethoprim GI Intolerance     Gi upst    Other reaction(s): GI Intolerance, GI Reaction  Gi upst  Gi upst   • Tape  [Medical Tape] Other (See Comments) and Hives     fever  Other reaction(s): Other  fever        Physical Exam:     /62 (BP Location: Left arm, Patient Position: Sitting, Cuff Size: Standard) Comment: bp  Pulse 72   Temp 97 6 °F (36 4 °C) (Temporal) Comment: no  Resp (!) 98   Ht 5' 4" (1 626 m)   Wt 67 4 kg (148 lb 8 oz)   SpO2 100%   BMI 25 49 kg/m²     Physical Exam  Constitutional:       Appearance: She is well-developed  HENT:      Head: Normocephalic and atraumatic  Eyes:      Pupils: Pupils are equal, round, and reactive to light  Neck:      Thyroid: No thyromegaly  Cardiovascular:      Rate and Rhythm: Normal rate and regular rhythm  Heart sounds: No murmur heard  Pulmonary:      Effort: Pulmonary effort is normal       Breath sounds: Normal breath sounds  Abdominal:      General: Bowel sounds are normal       Palpations: Abdomen is soft  Musculoskeletal:         General: Normal range of motion  Cervical back: Normal range of motion and neck supple  Lymphadenopathy:      Cervical: No cervical adenopathy  Skin:     General: Skin is warm and dry  Neurological:      Mental Status: She is alert and oriented to person, place, and time          [facility] [address] [place] [address]

## 2022-10-30 DIAGNOSIS — F32.9 REACTIVE DEPRESSION: ICD-10-CM

## 2022-10-30 DIAGNOSIS — F41.9 ANXIETY: ICD-10-CM

## 2022-10-30 DIAGNOSIS — Z91.030 H/O BEE STING ALLERGY: ICD-10-CM

## 2022-10-30 DIAGNOSIS — R42 VERTIGO: ICD-10-CM

## 2022-10-31 RX ORDER — FLUOXETINE HYDROCHLORIDE 40 MG/1
40 CAPSULE ORAL DAILY
Qty: 90 CAPSULE | Refills: 1 | Status: SHIPPED | OUTPATIENT
Start: 2022-10-31 | End: 2023-04-29

## 2022-10-31 RX ORDER — MECLIZINE HYDROCHLORIDE 25 MG/1
25 TABLET ORAL EVERY 8 HOURS PRN
Qty: 120 TABLET | Refills: 1 | Status: SHIPPED | OUTPATIENT
Start: 2022-10-31

## 2022-10-31 RX ORDER — EPINEPHRINE 0.3 MG/.3ML
0.3 INJECTION SUBCUTANEOUS ONCE
Qty: 1 EACH | Refills: 0 | Status: SHIPPED | OUTPATIENT
Start: 2022-10-31 | End: 2022-11-03 | Stop reason: SDUPTHER

## 2022-11-02 ENCOUNTER — TELEPHONE (OUTPATIENT)
Dept: INTERNAL MEDICINE CLINIC | Facility: CLINIC | Age: 47
End: 2022-11-02

## 2022-11-02 NOTE — TELEPHONE ENCOUNTER
PA has been initiated for the patients EPINEPHrine 0 3MG/0 3ML auto-injectors, patients key is: [id number]  Paperwork has been printed and put in  Physician office to be signed and then faxed

## 2022-11-02 NOTE — TELEPHONE ENCOUNTER
[facility] rec prior auth for Epipen  They do not need a prior auth for the preferred brand Mylan  Please redo rx for the Mylan brand and resend to pharmacy

## 2022-11-03 ENCOUNTER — TELEPHONE (OUTPATIENT)
Dept: INTERNAL MEDICINE CLINIC | Facility: CLINIC | Age: 47
End: 2022-11-03

## 2022-11-03 DIAGNOSIS — Z91.030 H/O BEE STING ALLERGY: Primary | ICD-10-CM

## 2022-11-03 RX ORDER — EPINEPHRINE 0.3 MG/.3ML
0.3 INJECTION SUBCUTANEOUS ONCE
Qty: 1 EACH | Refills: 0 | Status: SHIPPED | OUTPATIENT
Start: 2022-11-03 | End: 2022-11-03

## 2022-11-03 RX ORDER — CEFDINIR 300 MG/1
CAPSULE ORAL
COMMUNITY
Start: 2022-10-30 | End: 2022-11-28

## 2022-11-03 RX ORDER — EPINEPHRINE 0.3 MG/.3ML
0.3 INJECTION SUBCUTANEOUS ONCE
Status: CANCELLED | OUTPATIENT
Start: 2022-11-03 | End: 2022-11-03

## 2022-11-08 ENCOUNTER — VBI (OUTPATIENT)
Dept: ADMINISTRATIVE | Facility: OTHER | Age: 47
End: 2022-11-08

## 2022-11-25 ENCOUNTER — TELEPHONE (OUTPATIENT)
Dept: INTERNAL MEDICINE CLINIC | Facility: CLINIC | Age: 47
End: 2022-11-25

## 2022-11-25 ENCOUNTER — APPOINTMENT (OUTPATIENT)
Dept: LAB | Facility: CLINIC | Age: 47
End: 2022-11-25

## 2022-11-25 ENCOUNTER — OFFICE VISIT (OUTPATIENT)
Dept: INTERNAL MEDICINE CLINIC | Facility: CLINIC | Age: 47
End: 2022-11-25

## 2022-11-25 VITALS
WEIGHT: 148 LBS | RESPIRATION RATE: 20 BRPM | OXYGEN SATURATION: 98 % | DIASTOLIC BLOOD PRESSURE: 68 MMHG | TEMPERATURE: 98.7 F | SYSTOLIC BLOOD PRESSURE: 114 MMHG | HEART RATE: 78 BPM | HEIGHT: 64 IN | BODY MASS INDEX: 25.27 KG/M2

## 2022-11-25 DIAGNOSIS — Z91.030 H/O BEE STING ALLERGY: ICD-10-CM

## 2022-11-25 DIAGNOSIS — R21 RASH AND NONSPECIFIC SKIN ERUPTION: ICD-10-CM

## 2022-11-25 DIAGNOSIS — R10.13 EPIGASTRIC PAIN: ICD-10-CM

## 2022-11-25 DIAGNOSIS — R11.0 NAUSEA: ICD-10-CM

## 2022-11-25 DIAGNOSIS — R10.13 EPIGASTRIC PAIN: Primary | ICD-10-CM

## 2022-11-25 LAB
ALBUMIN SERPL BCP-MCNC: 3.4 G/DL (ref 3.5–5)
ALP SERPL-CCNC: 100 U/L (ref 46–116)
ALT SERPL W P-5'-P-CCNC: 30 U/L (ref 12–78)
ANION GAP SERPL CALCULATED.3IONS-SCNC: 5 MMOL/L (ref 4–13)
AST SERPL W P-5'-P-CCNC: 22 U/L (ref 5–45)
BASOPHILS # BLD AUTO: 0.07 THOUSANDS/ÂΜL (ref 0–0.1)
BASOPHILS NFR BLD AUTO: 1 % (ref 0–1)
BILIRUB SERPL-MCNC: 0.16 MG/DL (ref 0.2–1)
BUN SERPL-MCNC: 13 MG/DL (ref 5–25)
CALCIUM ALBUM COR SERPL-MCNC: 9.4 MG/DL (ref 8.3–10.1)
CALCIUM SERPL-MCNC: 8.9 MG/DL (ref 8.3–10.1)
CHLORIDE SERPL-SCNC: 109 MMOL/L (ref 96–108)
CO2 SERPL-SCNC: 27 MMOL/L (ref 21–32)
CREAT SERPL-MCNC: 0.65 MG/DL (ref 0.6–1.3)
EOSINOPHIL # BLD AUTO: 0.41 THOUSAND/ÂΜL (ref 0–0.61)
EOSINOPHIL NFR BLD AUTO: 4 % (ref 0–6)
ERYTHROCYTE [DISTWIDTH] IN BLOOD BY AUTOMATED COUNT: 12.4 % (ref 11.6–15.1)
GFR SERPL CREATININE-BSD FRML MDRD: 105 ML/MIN/1.73SQ M
GLUCOSE P FAST SERPL-MCNC: 94 MG/DL (ref 65–99)
HCT VFR BLD AUTO: 39.8 % (ref 34.8–46.1)
HGB BLD-MCNC: 12.9 G/DL (ref 11.5–15.4)
IMM GRANULOCYTES # BLD AUTO: 0.03 THOUSAND/UL (ref 0–0.2)
IMM GRANULOCYTES NFR BLD AUTO: 0 % (ref 0–2)
LIPASE SERPL-CCNC: 183 U/L (ref 73–393)
LYMPHOCYTES # BLD AUTO: 3.43 THOUSANDS/ÂΜL (ref 0.6–4.47)
LYMPHOCYTES NFR BLD AUTO: 32 % (ref 14–44)
MCH RBC QN AUTO: 30.2 PG (ref 26.8–34.3)
MCHC RBC AUTO-ENTMCNC: 32.4 G/DL (ref 31.4–37.4)
MCV RBC AUTO: 93 FL (ref 82–98)
MONOCYTES # BLD AUTO: 0.71 THOUSAND/ÂΜL (ref 0.17–1.22)
MONOCYTES NFR BLD AUTO: 7 % (ref 4–12)
NEUTROPHILS # BLD AUTO: 6.01 THOUSANDS/ÂΜL (ref 1.85–7.62)
NEUTS SEG NFR BLD AUTO: 56 % (ref 43–75)
NRBC BLD AUTO-RTO: 0 /100 WBCS
PLATELET # BLD AUTO: 356 THOUSANDS/UL (ref 149–390)
PMV BLD AUTO: 9.7 FL (ref 8.9–12.7)
POTASSIUM SERPL-SCNC: 4.3 MMOL/L (ref 3.5–5.3)
PROT SERPL-MCNC: 7.6 G/DL (ref 6.4–8.4)
RBC # BLD AUTO: 4.27 MILLION/UL (ref 3.81–5.12)
SODIUM SERPL-SCNC: 141 MMOL/L (ref 135–147)
WBC # BLD AUTO: 10.66 THOUSAND/UL (ref 4.31–10.16)

## 2022-11-25 RX ORDER — MOMETASONE FUROATE 1 MG/ML
SOLUTION TOPICAL DAILY
Qty: 60 ML | Refills: 0 | Status: SHIPPED | OUTPATIENT
Start: 2022-11-25

## 2022-11-25 RX ORDER — FAMOTIDINE 20 MG/1
20 TABLET, FILM COATED ORAL 2 TIMES DAILY
Qty: 60 TABLET | Refills: 3 | Status: SHIPPED | OUTPATIENT
Start: 2022-11-25

## 2022-11-25 RX ORDER — EPINEPHRINE 0.3 MG/.3ML
0.3 INJECTION SUBCUTANEOUS ONCE
Qty: 0.3 ML | Refills: 3 | Status: SHIPPED | OUTPATIENT
Start: 2022-11-25 | End: 2022-11-25

## 2022-11-25 NOTE — TELEPHONE ENCOUNTER
Rogers [employer] left message regarding  epinephrine auto injectors  Prior authorization is not needed   If you have any questions, our number is [phone]

## 2022-11-25 NOTE — PATIENT INSTRUCTIONS
Tea tree oil - scalp treatment        GERD (Gastroesophageal Reflux Disease)   AMBULATORY CARE:   Gastroesophageal reflux disease (GERD)  is reflux that happens more than 2 times a week for a few weeks  Reflux means acid and food in your stomach back up into your esophagus  GERD can cause other health problems over time if it is not treated  Common causes of GERD:  GERD often happens because the lower muscle (sphincter) of the esophagus does not close properly  The sphincter normally opens to let food into the stomach  It then closes to keep food and stomach acid in the stomach  If the sphincter does not close properly, stomach acid and food back up (reflux) into the esophagus  The following may increase your risk for GERD:  Certain foods such as spicy foods, chocolate, foods that contain caffeine, peppermint, and fried foods    Hiatal hernia    Certain medicines such as calcium channel blockers (used to treat high blood pressure), allergy medicines, sedatives, or antidepressants    Pregnancy, obesity, or scleroderma    Lying down after a meal    Drinking alcohol or smoking cigarettes    Signs and symptoms:   Heartburn (burning pain in your chest)    Pain after meals that spreads to your neck, jaw, or shoulder    Pain that gets better when you change positions    Bitter or acid taste in your mouth    A dry cough    Trouble swallowing or pain with swallowing    Hoarseness or a sore throat    Burping or hiccups    Feeling full soon after you start eating    Call your local emergency number (911 in the [address] [place] [address],[address]) if:   You have severe chest pain and sudden trouble breathing  Seek care immediately if:   You have trouble breathing after you vomit  You have trouble swallowing, or pain with swallowing  Your bowel movements are black, bloody, or tarry-looking  Your vomit looks like coffee grounds or has blood in it  Call your doctor or gastroenterologist if:   You feel full and cannot burp or vomit      You vomit large amounts, or you vomit often  You are losing weight without trying  Your symptoms get worse or do not improve with treatment  You have questions or concerns about your condition or care  Treatment for GERD:   Medicines  are used to decrease stomach acid  Medicine may also be used to help your lower esophageal sphincter and stomach contract (tighten) more  Surgery  is done to wrap the upper part of the stomach around the esophageal sphincter  This will strengthen the sphincter and prevent reflux  Manage GERD:       Do not have foods or drinks that may increase heartburn  These include chocolate, peppermint, fried or fatty foods, drinks that contain caffeine, or carbonated drinks (soda)  Other foods include spicy foods, onions, tomatoes, and tomato-based foods  Do not have foods or drinks that can irritate your esophagus, such as citrus fruits, juices, and alcohol  Do not eat large meals  When you eat a lot of food at one time, your stomach needs more acid to digest it  Eat 6 small meals each day instead of 3 large meals, and eat slowly  Do not eat meals 2 to 3 hours before bedtime  Elevate the head of your bed  Place 6-inch blocks under the head of your bed frame  You may also use more than one pillow under your head and shoulders while you sleep  Maintain a healthy weight  If you are overweight, weight loss may help relieve symptoms of GERD  Do not smoke  Smoking weakens the lower esophageal sphincter and increases the risk of GERD  Ask your healthcare provider for information if you currently smoke and need help to quit  E-cigarettes or smokeless tobacco still contain nicotine  Talk to your healthcare provider before you use these products  Do not put pressure on your abdomen  Pressure pushes acid up into your esophagus  Do not wear clothing that is tight around your waist  Do not bend over  Bend at the knees if you need to pick something up      Follow up with your doctor or gastroenterologist as directed:  Write down your questions so you remember to ask them during your visits  © Copyright [employer] 2022 Information is for End User's use only and may not be sold, redistributed or otherwise used for commercial purposes  All illustrations and images included in CareNotes® are the copyrighted property of A [employer]  or [employer]   The above information is an  only  It is not intended as medical advice for individual conditions or treatments  Talk to your doctor, nurse or pharmacist before following any medical regimen to see if it is safe and effective for you

## 2022-11-25 NOTE — PROGRESS NOTES
[facility] - [facility] OF [address] [place] [address]    NAME: Ryder Christianson  AGE: 49 y o  SEX: female  : 1975     DATE: 2022     Assessment and Plan:     Problem List Items Addressed This Visit        Other    Epigastric pain - Primary     Patient reports ongoing epigastric pain with GERD symptoms  Positive for nausea and early satiety  CT of abdomen and pelvis in September without any remarkable findings  No alcohol or THC use  Reports her father passed away from stomach cancer recently  For epigastric tenderness  Gastric emptying study ordered as patient reports early satiety  Will also get ultrasound of abdomen  Check lipase  Patient currently takes Prilosec 40 mg daily  Will add Pepcid 20 mg b i d   If symptoms continue will refer to Gastroenterology  Relevant Medications    famotidine (PEPCID) 20 mg tablet    Other Relevant Orders    NM gastric emptying    US abdomen complete    Comprehensive metabolic panel (Completed)    CBC and differential (Completed)    Lipase (Completed)    Celiac Disease Antibody Profile   Other Visit Diagnoses     Rash and nonspecific skin eruption        Relevant Medications    mometasone (ELOCON) 0 1 % lotion    Nausea        Relevant Orders    US abdomen complete    Comprehensive metabolic panel (Completed)    CBC and differential (Completed)    Lipase (Completed)    Celiac Disease Antibody Profile              No follow-ups on file  Chief Complaint:     Chief Complaint   Patient presents with   • Heartburn     Having GI issues   • itchy scalp     Currently at a 5 on pain scale and worst has been a 10        History of Present Illness:     Bela presents to the office for evaluation of epigastric pain and reflux  Symptoms are chronic  A CT scan of the abdomen pelvis was completed in September which showed no significant findings    She had EGD and colonoscopy in the last year both which were normal   She reports that her father recently passed from stomach cancer which is creating some anxiety for her  She reports early satiety and reflux symptoms  Denies fever chills, nausea, vomiting, diarrhea or constipation  Review of Systems:     Review of Systems   HENT: Negative  Respiratory: Negative  Cardiovascular: Negative  Gastrointestinal: Positive for abdominal pain (epigastric )  Early satiety   Genitourinary: Negative  Musculoskeletal: Negative  Skin: Positive for rash (base of scalp)  Neurological: Negative  Psychiatric/Behavioral: Negative  Problem List:     Patient Active Problem List   Diagnosis   • Generalized abdominal pain   • Epigastric lump   • Chronic pain syndrome   • Chronic bilateral low back pain with right-sided sciatica   • Lumbar radiculopathy   • Lumbar degenerative disc disease   • DISH (diffuse idiopathic skeletal hyperostosis)   • PONV (postoperative nausea and vomiting)   • Dyskinesia of gallbladder   • Postoperative visit   • Epigastric pain        Objective:     /68 (BP Location: Left arm, Patient Position: Sitting, Cuff Size: Large)   Pulse 78   Temp 98 7 °F (37 1 °C) (Tympanic)   Resp 20   Ht 5' 4" (1 626 m)   Wt 67 1 kg (148 lb)   SpO2 98%   BMI 25 40 kg/m²     Physical Exam  Constitutional:       General: She is not in acute distress  Appearance: Normal appearance  She is normal weight  HENT:      Head: Normocephalic and atraumatic  Right Ear: External ear normal       Left Ear: External ear normal       Nose: Nose normal       Mouth/Throat:      Mouth: Mucous membranes are moist       Pharynx: Oropharynx is clear  Eyes:      Conjunctiva/sclera: Conjunctivae normal    Cardiovascular:      Rate and Rhythm: Normal rate and regular rhythm  Pulses: Normal pulses  Heart sounds: Normal heart sounds  No murmur heard  Pulmonary:      Effort: Pulmonary effort is normal       Breath sounds: Normal breath sounds  No wheezing     Abdominal: General: Bowel sounds are normal  There is no distension  Palpations: Abdomen is soft  Tenderness: There is generalized abdominal tenderness  Musculoskeletal:         General: Normal range of motion  Cervical back: Normal range of motion and neck supple  Lymphadenopathy:      Cervical: No cervical adenopathy  Skin:     General: Skin is warm and dry  Capillary Refill: Capillary refill takes less than 2 seconds  Findings: Rash (Three reddened macular lesions on scalp  No crusting or drainage ) present  Neurological:      Mental Status: She is alert and oriented to person, place, and time  Psychiatric:         Mood and Affect: Mood normal          Behavior: Behavior normal          Thought Content: Thought content normal          Judgment: Judgment normal          I spent 20 minutes with this patient      [address] [place] [address]  [facility]

## 2022-11-28 LAB
ENDOMYSIUM IGA SER QL: NEGATIVE
GLIADIN PEPTIDE IGA SER-ACNC: 5 UNITS (ref 0–19)
GLIADIN PEPTIDE IGG SER-ACNC: 1 UNITS (ref 0–19)
IGA SERPL-MCNC: 417 MG/DL (ref 87–352)
TTG IGA SER-ACNC: <2 U/ML (ref 0–3)
TTG IGG SER-ACNC: <2 U/ML (ref 0–5)

## 2022-11-28 NOTE — ASSESSMENT & PLAN NOTE
Patient reports ongoing epigastric pain with GERD symptoms  Positive for nausea and early satiety  CT of abdomen and pelvis in September without any remarkable findings  No alcohol or THC use  Reports her father passed away from stomach cancer recently  For epigastric tenderness  Gastric emptying study ordered as patient reports early satiety  Will also get ultrasound of abdomen  Check lipase  Patient currently takes Prilosec 40 mg daily  Will add Pepcid 20 mg b i d   If symptoms continue will refer to Gastroenterology

## 2022-11-29 ENCOUNTER — TELEPHONE (OUTPATIENT)
Dept: INTERNAL MEDICINE CLINIC | Facility: CLINIC | Age: 47
End: 2022-11-29

## 2022-11-29 DIAGNOSIS — R10.13 EPIGASTRIC PAIN: Primary | ICD-10-CM

## 2022-11-29 NOTE — TELEPHONE ENCOUNTER
I spoke with Bela regarding her test results  She has her ultrasound and gastric emptying study scheduled  She also has a an appointment with Gastroenterology in December which she had scheduled  Have reached out to Gastroenterology regarding her recent testing her upcoming appointment  I will follow-up with her if they have any further recommendations prior to her appointment    Also will follow-up with her with test results of her upcoming test

## 2022-11-30 ENCOUNTER — APPOINTMENT (OUTPATIENT)
Dept: LAB | Facility: CLINIC | Age: 47
End: 2022-11-30

## 2022-12-06 ENCOUNTER — HOSPITAL ENCOUNTER (OUTPATIENT)
Dept: NUCLEAR MEDICINE | Facility: HOSPITAL | Age: 47
Discharge: HOME | End: 2022-12-06

## 2022-12-06 DIAGNOSIS — R10.13 EPIGASTRIC PAIN: ICD-10-CM

## 2022-12-10 ENCOUNTER — HOSPITAL ENCOUNTER (OUTPATIENT)
Dept: ULTRASOUND IMAGING | Facility: HOSPITAL | Age: 47
Discharge: HOME | End: 2022-12-10

## 2022-12-10 DIAGNOSIS — R11.0 NAUSEA: ICD-10-CM

## 2022-12-10 DIAGNOSIS — R10.13 EPIGASTRIC PAIN: ICD-10-CM

## 2022-12-12 DIAGNOSIS — L30.9 ECZEMA, UNSPECIFIED TYPE: Primary | ICD-10-CM

## 2022-12-12 DIAGNOSIS — R21 RASH AND NONSPECIFIC SKIN ERUPTION: ICD-10-CM

## 2022-12-13 RX ORDER — MOMETASONE FUROATE 1 MG/ML
SOLUTION TOPICAL
Qty: 60 ML | Refills: 0 | Status: SHIPPED | OUTPATIENT
Start: 2022-12-13

## 2022-12-13 RX ORDER — FLUOCINONIDE TOPICAL SOLUTION USP, 0.05% 0.5 MG/ML
SOLUTION TOPICAL
Qty: 60 ML | Refills: 1 | Status: SHIPPED | OUTPATIENT
Start: 2022-12-13

## 2022-12-14 ENCOUNTER — TELEPHONE (OUTPATIENT)
Dept: GASTROENTEROLOGY | Facility: CLINIC | Age: 47
End: 2022-12-14

## 2022-12-14 NOTE — TELEPHONE ENCOUNTER
Patients GI provider:  Sushil Parker    Number to return call: [phone]    Reason for call: Pt called to r/s apt for Friday due to weather  She r/s her apt for 1/19  She is requesting a call with results in the mean time      Scheduled procedure/appointment date if applicable: apt 0/00

## 2022-12-16 ENCOUNTER — TELEMEDICINE (OUTPATIENT)
Dept: INTERNAL MEDICINE CLINIC | Facility: CLINIC | Age: 47
End: 2022-12-16

## 2022-12-16 DIAGNOSIS — K31.84 GASTROPARESIS: Primary | ICD-10-CM

## 2022-12-16 NOTE — PATIENT INSTRUCTIONS
Gastroparesis Diet Guidelines  [facility]     Gastroparesis   AMBULATORY CARE:   Gastroparesis  is a condition that causes food to move more slowly than normal from the stomach to the intestines  Gastroparesis is not caused by blockage  Often, the cause may not be known  It may be caused by damage to a nerve that controls muscles used to move food to your small intestines  Common signs and symptoms include:   Nausea and vomiting    Feeling full sooner than normal or after eating less than usual    Abdominal bloating and pain    Weight loss or poor nutrition     Frequent changes in blood sugar    You or someone else should call 911 if:   Your heart is beating faster and you are breathing faster than usual     You cannot be woken up  Seek care immediately if:   You are confused or have trouble thinking clearly  You are dizzy or very drowsy  Contact your healthcare provider if:   You are urinating less than usual     Your symptoms return or become worse  The color of your urine is dark yellow  You have questions or concerns about your condition or care  Treatment for gastroparesis  may include medicine to control your nausea and vomiting or to help food move through your stomach  You may need nutritional support  Gastric electrical stimulation (GES) may be done when symptoms cannot be controlled by other treatments  Surgery may be needed to place a feeding tube into your small intestines if other treatments do not work  Manage gastroparesis:  Your healthcare provider may suggest any of the following:  Change your eating habits  Take small bites of food to make it easier for your body to digest  You may need to eat several small meals low in fiber and fat throughout the day  Ask your dietitian for help with planning your meals  Do not eat raw fruits, vegetables, or whole grains  These can cause you to have undigested food in your stomach   The undigested food can form a blockage that can become life-threatening  Drink liquids as directed  Liquids will prevent dehydration caused by vomiting  Slowly drink small amounts of liquids at a time  Ask your healthcare provider how much liquid to drink each day, and which liquids are best for you  You may also need to drink an oral rehydration solution (ORS)  An ORS has the right amounts of sugar, salt, and minerals in water to replace body fluids  Do not lie down for 2 hours after your meals  Walking and sitting after meals help with digestion  Control your blood sugar levels if you have diabetes  High blood sugar levels may make your symptoms worse  Ask your healthcare provider how to control your blood sugar levels  Follow up with your healthcare provider as directed: You may need tests to check if treatment is working  You may need to see a dietitian for help with a nutrition plan  Write down your questions so you remember to ask them during your visits  © Copyright [employer] 2022 Information is for End User's use only and may not be sold, redistributed or otherwise used for commercial purposes  All illustrations and images included in CareNotes® are the copyrighted property of A D A M , Inc  or [facility] Adali Jarquin   The above information is an  only  It is not intended as medical advice for individual conditions or treatments  Talk to your doctor, nurse or pharmacist before following any medical regimen to see if it is safe and effective for you

## 2022-12-16 NOTE — PROGRESS NOTES
Virtual Regular Visit    Verification of patient location:    Patient is located in the following state in which I hold an active license [place]      Assessment/Plan:    Problem List Items Addressed This Visit        Digestive    Gastroparesis - Primary     Gastric emptying study completed shows severe delay in gastric emptying  We will send information regarding gastroparesis diet  Patient instructed to follow-up with gastroenterology  Reason for visit is No chief complaint on file  Encounter provider [address] [place], [place] [address]K[place]IN    Provider located at [address] [place] [address]      Recent Visits  No visits were found meeting these conditions  Showing recent visits within past 7 days and meeting all other requirements  Today's Visits  Date Type Provider Dept   12/16/22 Telemedicine Laurel Veloz, [address] [facility] today's visits and meeting all other requirements  Future Appointments  No visits were found meeting these conditions  Showing future appointments within next 150 days and meeting all other requirements       The patient was identified by name and date of birth  Darnell Padilla was informed that this is a telemedicine visit and that the visit is being conducted through the [employer]  She agrees to proceed     My office door was closed  No one else was in the room  She acknowledged consent and understanding of privacy and security of the video platform  The patient has agreed to participate and understands they can discontinue the visit at any time  Patient is aware this is a billable service  Subjective  Darnell Padilla is a 49 y o  female to review recent radiology results   Bee presents via virtual visit to discuss results of her recent abdominal ultrasound and gastric emptying study    She has been having ongoing issues related to abdominal discomfort and bloating, epigastric burning and abnormal color stools  Status postcholecystectomy earlier this year  She states that her symptoms really started after she had liposuction surgery a couple years ago  Gastric emptying study showed severe delays in gastric emptying  Ultrasound showed mild hepatic steatosis  CT scan completed in September was unremarkable  Patient was supposed to see gastroenterology today however had to cancel secondary to weather  She is rescheduled to see them in January  Patient was reassured regarding her ultrasound  Counseled on avoiding excessive alcohol, hepatotoxic medications, and eating carbohydrates in moderation with exercise  I will send her information regarding diet for gastroparesis         Past Medical History:   Diagnosis Date   • Asthma    • Degenerative joint disease    • Depression    • GERD (gastroesophageal reflux disease)    • Lumbar radiculopathy    • PONV (postoperative nausea and vomiting)    • Thyroid nodule        Past Surgical History:   Procedure Laterality Date   • BREAST BIOPSY Left 2020    neg   • CHOLECYSTECTOMY LAPAROSCOPIC N/A 5/6/2022    Procedure: CHOLECYSTECTOMY LAPAROSCOPIC;  Surgeon: Juan Manuel Brown MD;  Location: [facility] OR;  Service: General   • CYST REMOVAL  10/2020    lt breast    • EGD     • HYSTERECTOMY     • HYSTERECTOMY  1998   • LIPOSUCTION  09/2020   • TUBAL LIGATION         Current Outpatient Medications   Medication Sig Dispense Refill   • albuterol (PROVENTIL HFA,VENTOLIN HFA) 90 mcg/act inhaler INHALE 2 PUFFS BY MOUTH AND INTO THE LUNGS 2 TIMES A DAY AS NEEDED FOR WHEEZING (Patient not taking: Reported on 11/25/2022)     • Diclofenac Sodium (VOLTAREN) 1 % As needed     • EPINEPHrine (EPIPEN) 0 3 mg/0 3 mL SOAJ Inject 0 3 mL (0 3 mg total) into a muscle once for 1 dose As needed 0 3 mL 3   • famotidine (PEPCID) 20 mg tablet Take 1 tablet (20 mg total) by mouth 2 (two) times a day 60 tablet 3   • fluocinonide (LIDEX) 0 05 % external solution APPLY TWICE A DAY TO SCALP UNTIL RESOLVED 60 mL 1   • FLUoxetine (PROzac) 40 MG capsule Take 1 capsule (40 mg total) by mouth daily 90 capsule 1   • fluticasone (FLONASE) 50 mcg/act nasal spray 1 spray into each nostril 2 (two) times a day 11 1 mL 2   • hydrOXYzine HCL (ATARAX) 10 mg tablet take 1 tablet by mouth at bedtime 30 tablet 0   • meclizine (ANTIVERT) 25 mg tablet Take 1 tablet (25 mg total) by mouth every 8 (eight) hours as needed for dizziness 120 tablet 1   • mometasone (ELOCON) 0 1 % lotion APPLY TO AFFECTED AREA TOPICALLY EVERY DAY 60 mL 0   • omeprazole (PriLOSEC) 40 MG capsule Take 1 capsule (40 mg total) by mouth daily 30 capsule 2     No current facility-administered medications for this visit  Allergies   Allergen Reactions   • Bee Venom Anaphylaxis   • Iodides Other (See Comments)   • [place] Oil - Food Allergy GI Intolerance     Other reaction(s): GI Intolerance, GI Reaction   • Aspirin Other (See Comments)     shaking  convulsions    Other reaction(s): Other (See Comments)  shaking  convulsions  shaking  convulsions   • Metronidazole GI Intolerance     Gi upset     • Sulfamethoxazole-Trimethoprim GI Intolerance     Gi upst    Other reaction(s): GI Intolerance, GI Reaction  Gi upst  Gi upst   • Tape  [Medical Tape] Other (See Comments) and Hives     fever  Other reaction(s): Other  fever       Review of Systems   Constitutional: Negative  Gastrointestinal: Positive for abdominal distention and abdominal pain  Yellow colored stools       Video Exam    There were no vitals filed for this visit  Physical Exam  Constitutional:       General: She is not in acute distress  HENT:      Head: Normocephalic and atraumatic  Right Ear: External ear normal       Left Ear: External ear normal       Nose: No congestion  Mouth/Throat:      Pharynx: Oropharynx is clear     Eyes:      Conjunctiva/sclera: Conjunctivae normal    Pulmonary:      Effort: Pulmonary effort is normal  No respiratory distress  Skin:     General: Skin is dry  Neurological:      General: No focal deficit present  Mental Status: She is alert and oriented to person, place, and time  Psychiatric:         Mood and Affect: Mood normal          Behavior: Behavior normal          Thought Content:  Thought content normal          Judgment: Judgment normal           I spent 15 minutes directly with the patient during this visit

## 2022-12-16 NOTE — ASSESSMENT & PLAN NOTE
Gastric emptying study completed shows severe delay in gastric emptying  We will send information regarding gastroparesis diet  Patient instructed to follow-up with gastroenterology

## 2022-12-25 ENCOUNTER — PATIENT MESSAGE (OUTPATIENT)
Dept: INTERNAL MEDICINE CLINIC | Facility: CLINIC | Age: 47
End: 2022-12-25

## 2022-12-25 DIAGNOSIS — G89.4 CHRONIC PAIN SYNDROME: Primary | ICD-10-CM

## 2022-12-25 DIAGNOSIS — Z91.030 H/O BEE STING ALLERGY: ICD-10-CM

## 2022-12-25 DIAGNOSIS — R06.02 SHORTNESS OF BREATH: ICD-10-CM

## 2022-12-27 RX ORDER — EPINEPHRINE 0.3 MG/.3ML
0.3 INJECTION SUBCUTANEOUS ONCE
Qty: 0.3 ML | Refills: 0 | Status: SHIPPED | OUTPATIENT
Start: 2022-12-27 | End: 2022-12-27

## 2022-12-27 RX ORDER — ALBUTEROL SULFATE 90 UG/1
1-2 AEROSOL, METERED RESPIRATORY (INHALATION) EVERY 6 HOURS PRN
Qty: 18 G | Refills: 3 | Status: SHIPPED | OUTPATIENT
Start: 2022-12-27

## 2023-01-09 ENCOUNTER — OFFICE VISIT (OUTPATIENT)
Dept: INTERNAL MEDICINE CLINIC | Facility: CLINIC | Age: 48
End: 2023-01-09

## 2023-01-09 ENCOUNTER — TELEPHONE (OUTPATIENT)
Dept: GASTROENTEROLOGY | Facility: CLINIC | Age: 48
End: 2023-01-09

## 2023-01-09 ENCOUNTER — PATIENT MESSAGE (OUTPATIENT)
Dept: GASTROENTEROLOGY | Facility: CLINIC | Age: 48
End: 2023-01-09

## 2023-01-09 VITALS
TEMPERATURE: 98.7 F | DIASTOLIC BLOOD PRESSURE: 76 MMHG | OXYGEN SATURATION: 98 % | SYSTOLIC BLOOD PRESSURE: 112 MMHG | HEIGHT: 64 IN | BODY MASS INDEX: 24.96 KG/M2 | WEIGHT: 146.2 LBS | HEART RATE: 82 BPM

## 2023-01-09 DIAGNOSIS — R10.84 GENERALIZED ABDOMINAL PAIN: Primary | ICD-10-CM

## 2023-01-09 DIAGNOSIS — K58.9 IRRITABLE BOWEL SYNDROME, UNSPECIFIED TYPE: Primary | ICD-10-CM

## 2023-01-09 RX ORDER — DICYCLOMINE HCL 20 MG
20 TABLET ORAL 4 TIMES DAILY PRN
Qty: 30 TABLET | Refills: 0 | Status: SHIPPED | OUTPATIENT
Start: 2023-01-09

## 2023-01-09 NOTE — PATIENT INSTRUCTIONS
Irritable Bowel Syndrome   AMBULATORY CARE:   Irritable bowel syndrome (IBS)  is a condition that prevents food from moving through your intestines normally  The food may move through too slowly or too quickly  This causes abdominal pain, bloating, increased gas, constipation, or diarrhea  Common symptoms include the following:   Abdominal pain that disappears after you have a bowel movement    Abdominal cramps that are worse after you eat    Gas    Bloated abdomen    Diarrhea, constipation, or both     Feeling like you need to have a bowel movement after you just had one    Mucus in your bowel movement    Feeling that you have not completely emptied your bowels after a bowel movement    Seek care immediately if:   You have severe abdominal pain  Your bowel movements are dark or have blood in them  Call your doctor if:   You have a fever  You have pain in your rectum  You are losing weight without trying  Your abdominal pain does not go away, even after treatment  You have questions or concerns about your condition or care  Medicines:   Medicines  help you have a bowel movement, soften your bowel movement, or treat diarrhea  You may also need medicine to treat abnormal growth of bacteria or to relax your muscles  Ask you healthcare provider about a taking a daily probiotic  Take your medicine as directed  Contact your healthcare provider if you think your medicine is not helping or if you have side effects  Tell him of her if you are allergic to any medicine  Keep a list of the medicines, vitamins, and herbs you take  Include the amounts, and when and why you take them  Bring the list or the pill bottles to follow-up visits  Carry your medicine list with you in case of an emergency  Manage your symptoms:   Eat a variety of healthy foods  Healthy foods include fruits, vegetables, whole-grain breads, low-fat dairy products, beans, lean meats, and fish   You may need to avoid certain foods to decrease your symptoms  Drink liquids as directed  Ask how much liquid to drink each day and which liquids are best for you  For most people, good liquids to drink are water, juice, and milk  Exercise regularly  Ask about the best exercise plan for you  Exercise can decrease your blood pressure and improve your health  Keep a record  for 3 weeks  Include everything you eat and drink and your symptoms  Bring this record with you to your follow-up visits  Follow up with your doctor as directed:  Write down your questions so you remember to ask them during your visits  © Copyright [employer] 2022 Information is for End User's use only and may not be sold, redistributed or otherwise used for commercial purposes  All illustrations and images included in CareNotes® are the copyrighted property of [employer]  or [facility] Adali Jarquin   The above information is an  only  It is not intended as medical advice for individual conditions or treatments  Talk to your doctor, nurse or pharmacist before following any medical regimen to see if it is safe and effective for you

## 2023-01-09 NOTE — PROGRESS NOTES
[facility] - [facility] OF [address] [place] [address]    NAME: Monster Troy  AGE: 49 y o  SEX: female  : 1975     DATE: 2023     Assessment and Plan:     1  Generalized abdominal pain  Abdominal cramping and loose stools x2 days  Reports she is having sensation that she needs to have a bowel movement  Denies fever or chills, nausea or vomiting  Denies watery or bloody stools  Abdomen is soft nondistended, bowel sounds normoactive  Mildly diffusely tender  Gastroenterology sent in a prescription for dicyclomine  Patient was counseled to start medication as prescribed encouraged hydration and a brat diet patient has follow-up with gastroenterology in 10 days  Counseled that if she develops fever or chills, nausea or vomiting increasing pain or any other concerns to return for further evaluation          No follow-ups on file  Chief Complaint:     Chief Complaint   Patient presents with   • Abdominal Pain     Stomach cramping and loose stools for the last 2 days         History of Present Illness:     Bee presents for evaluation of stomach cramping and loose stools x2 days  Denies fever or chills  Denies nausea vomiting, diarrhea or bloody stools  States she feels like she has to have a bowel movement frequently  She reached out to gastroenterology through messaging they sent her in a prescription for dicyclomine  Review of Systems:     Review of Systems   Constitutional: Negative  Negative for chills and fever  Respiratory: Negative  Cardiovascular: Negative  Gastrointestinal: Positive for abdominal pain (cramping) and diarrhea ("loose stools")  Negative for abdominal distention, blood in stool, nausea and vomiting  Genitourinary: Negative  Musculoskeletal: Negative  Neurological: Negative  Psychiatric/Behavioral: The patient is nervous/anxious           Problem List:     Patient Active Problem List   Diagnosis   • Generalized abdominal pain   • Epigastric lump   • Chronic pain syndrome   • Chronic bilateral low back pain with right-sided sciatica   • Lumbar radiculopathy   • Lumbar degenerative disc disease   • DISH (diffuse idiopathic skeletal hyperostosis)   • PONV (postoperative nausea and vomiting)   • Dyskinesia of gallbladder   • Postoperative visit   • Epigastric pain   • Gastroparesis        Objective:     /76 (BP Location: Left arm, Patient Position: Sitting, Cuff Size: Standard)   Pulse 82   Temp 98 7 °F (37 1 °C) (Temporal)   Ht 5' 4" (1 626 m)   Wt 66 3 kg (146 lb 3 2 oz)   SpO2 98%   BMI 25 10 kg/m²     Physical Exam  Vitals and nursing note reviewed  Constitutional:       General: She is not in acute distress  Appearance: She is normal weight  HENT:      Head: Normocephalic and atraumatic  Right Ear: External ear normal       Left Ear: External ear normal       Nose: Nose normal       Mouth/Throat:      Mouth: Mucous membranes are moist       Pharynx: Oropharynx is clear  Eyes:      Conjunctiva/sclera: Conjunctivae normal    Cardiovascular:      Rate and Rhythm: Normal rate and regular rhythm  Pulses: Normal pulses  Heart sounds: Normal heart sounds  No murmur heard  Pulmonary:      Effort: Pulmonary effort is normal       Breath sounds: Normal breath sounds  No wheezing  Abdominal:      General: Bowel sounds are normal       Palpations: Abdomen is soft  Tenderness: There is generalized abdominal tenderness  Musculoskeletal:         General: Normal range of motion  Cervical back: Neck supple  Skin:     General: Skin is warm and dry  Capillary Refill: Capillary refill takes less than 2 seconds  Neurological:      Mental Status: She is alert and oriented to person, place, and time  Psychiatric:         Mood and Affect: Mood normal          Behavior: Behavior normal          Thought Content:  Thought content normal          Judgment: Judgment normal          I spent 12 minutes with this patient      [facility]  [facility]

## 2023-01-09 NOTE — TELEPHONE ENCOUNTER
Pt has an upcoming appointment w/ you on the 19th  She is experiencing trouble w/ her IBS and wanted to know if she could be prescribed medication, she was previously taking dicyclomine do you want her to take the same medication   She was unsure of the rx name

## 2023-01-19 ENCOUNTER — OFFICE VISIT (OUTPATIENT)
Dept: GASTROENTEROLOGY | Facility: CLINIC | Age: 48
End: 2023-01-19

## 2023-01-19 VITALS
RESPIRATION RATE: 16 BRPM | HEART RATE: 87 BPM | HEIGHT: 64 IN | OXYGEN SATURATION: 98 % | WEIGHT: 141 LBS | BODY MASS INDEX: 24.07 KG/M2 | SYSTOLIC BLOOD PRESSURE: 110 MMHG | DIASTOLIC BLOOD PRESSURE: 72 MMHG

## 2023-01-19 DIAGNOSIS — R14.0 ABDOMINAL DISTENTION: Primary | ICD-10-CM

## 2023-01-19 DIAGNOSIS — R19.7 DIARRHEA, UNSPECIFIED TYPE: ICD-10-CM

## 2023-01-19 DIAGNOSIS — K31.84 GASTROPARESIS: ICD-10-CM

## 2023-01-19 DIAGNOSIS — R11.2 NAUSEA AND VOMITING, UNSPECIFIED VOMITING TYPE: ICD-10-CM

## 2023-01-19 NOTE — PROGRESS NOTES
[facility] - Outpatient Follow-up Note  Sangeetha Harding 49 y o  female MRN: [id number]  Encounter: [id number]          ASSESSMENT AND PLAN:      1  Gastroparesis  2  Nausea and vomiting  3  Diarrhea  4  Abdominal pain    Patient reports a long history of recurrent nausea, vomiting, and abdominal pain  She reports symptoms began after her abdominal liposuction surgery 4 years ago  She also has a long history of intermittent bouts of diarrhea and cramping x years  EGD 12/1/21 was normal and biopsies were negative for h pylori  Colonoscopy with visualization of the terminal ileum 12/1/21 was normal and biopsies were negative for microscopic colitis  US 12/10/22 s/p mild hepatic steatosis and s/p cholecystectomy  Celiac serology was negative  Gastric emptying study 12/6/22 showed a severely reduced rate of gastric emptying  We reviewed the diagnosis of gastroparesis  Small, frequent low fat meals recommended  We discussed a Reglan trial (and reviewed risk of tardive dyskinesia)  She would like to think about this possibility and will call the office for a script if needed  She is on a PPI  Will check a MR enterography to evaluate for SB crohn's disease and exclude obstruction given her significant gastroparesis (she is allergic to iodine/CT contrast)  Will check a 24 hr urine 5-HIAA  Will check a fecal calprotectin and fecal pancreatic elastase  Will check 3 O&P stool tests  Follow up after above testing  If MR enterography and stool testing negative, would consider a Xifaxan 550mg po TID x 14 days course for her diarrhea   ______________________________________________________________________    SUBJECTIVE:  Patient is a pleasant 49year old female who presents to the office for follow up of her GI symptoms  Patient reports a long history of recurrent nausea, vomiting, and abdominal pain   She reports symptoms began after her abdominal liposuction surgery 4 years ago   She also has a long history of intermittent bout of diarrhea and cramping x years  No blood in the stool  EGD 12/1/21 was normal and biopsies were negative for h pylori  Colonoscopy with visualization of the terminal ileum 12/1/21 was normal and biopsies were negative for microscopic colitis  US 12/10/22 s/p mild hepatic steatosis and s/p cholecystectomy  Celiac serology was negative  Gastric emptying study 12/6/22 showed a severely reduced rate of gastric emptying  REVIEW OF SYSTEMS IS OTHERWISE NEGATIVE        Historical Information   Past Medical History:   Diagnosis Date   • Asthma    • Degenerative joint disease    • Depression    • GERD (gastroesophageal reflux disease)    • Lumbar radiculopathy    • PONV (postoperative nausea and vomiting)    • Thyroid nodule      Past Surgical History:   Procedure Laterality Date   • BREAST BIOPSY Left 2020    neg   • CHOLECYSTECTOMY LAPAROSCOPIC N/A 5/6/2022    Procedure: CHOLECYSTECTOMY LAPAROSCOPIC;  Surgeon: Li Colorado MD;  Location: [facility];  Service: General   • CYST REMOVAL  10/2020    lt breast    • EGD     • HYSTERECTOMY     • HYSTERECTOMY  1998   • LIPOSUCTION  09/2020   • TUBAL LIGATION       Social History   Social History     Substance and Sexual Activity   Alcohol Use Not Currently     Social History     Substance and Sexual Activity   Drug Use Never     Social History     Tobacco Use   Smoking Status Never   Smokeless Tobacco Never     Family History   Problem Relation Age of Onset   • No Known Problems Mother    • Cancer Father    • No Known Problems Sister    • No Known Problems Daughter    • No Known Problems Maternal Grandmother    • No Known Problems Paternal Grandmother    • No Known Problems Maternal Aunt    • No Known Problems Maternal Aunt    • No Known Problems Maternal Aunt    • No Known Problems Maternal Aunt    • No Known Problems Maternal Aunt    • No Known Problems Maternal Aunt    • Breast cancer Neg Hx Meds/Allergies       Current Outpatient Medications:   •  albuterol (PROVENTIL HFA,VENTOLIN HFA) 90 mcg/act inhaler  •  Diclofenac Sodium (VOLTAREN) 1 %  •  dicyclomine (BENTYL) 20 mg tablet  •  EPINEPHrine (EPIPEN) 0 3 mg/0 3 mL SOAJ  •  famotidine (PEPCID) 20 mg tablet  •  fluocinonide (LIDEX) 0 05 % external solution  •  FLUoxetine (PROzac) 40 MG capsule  •  fluticasone (FLONASE) 50 mcg/act nasal spray  •  hydrOXYzine HCL (ATARAX) 10 mg tablet  •  meclizine (ANTIVERT) 25 mg tablet  •  mometasone (ELOCON) 0 1 % lotion  •  omeprazole (PriLOSEC) 40 MG capsule    Allergies   Allergen Reactions   • Bee Venom Anaphylaxis   • Iodides Other (See Comments)   • [place] Oil - Food Allergy GI Intolerance     Other reaction(s): GI Intolerance, GI Reaction   • Aspirin Other (See Comments)     shaking  convulsions    Other reaction(s): Other (See Comments)  shaking  convulsions  shaking  convulsions   • Metronidazole GI Intolerance     Gi upset     • Sulfamethoxazole-Trimethoprim GI Intolerance     Gi upst    Other reaction(s): GI Intolerance, GI Reaction  Gi upst  Gi upst   • Tape  [Medical Tape] Other (See Comments) and Hives     fever  Other reaction(s): Other  fever           Objective     Blood pressure 110/72, pulse 87, resp  rate 16, height 5' 4" (1 626 m), weight 64 kg (141 lb), SpO2 98 %, not currently breastfeeding  Body mass index is 24 2 kg/m²  PHYSICAL EXAM:      General Appearance:   Alert, cooperative, no distress   HEENT:   Normocephalic, atraumatic, anicteric    Neck:  Supple, symmetrical, trachea midline   Lungs:   Clear to auscultation bilaterally; no rales, rhonchi or wheezing; respirations unlabored    Heart[de-identified]   Regular rate and rhythm; no murmur, rub, or gallop     Abdomen:   Soft, non-tender, non-distended; normal bowel sounds; no masses, no organomegaly    Genitalia:   Deferred    Rectal:   Deferred    Extremities:  No cyanosis, clubbing or edema    Pulses:  2+ and symmetric    Skin:  No jaundice, rashes, or lesions    Lymph nodes:  No palpable cervical lymphadenopathy        Lab Results:   No visits with results within 1 Day(s) from this visit  Latest known visit with results is:   Appointment on 11/29/2022   Component Date Value   • H pylori Ag, Stl 11/30/2022 Negative          Radiology Results:   No results found

## 2023-01-20 ENCOUNTER — TELEPHONE (OUTPATIENT)
Dept: OTHER | Facility: OTHER | Age: 48
End: 2023-01-20

## 2023-01-20 ENCOUNTER — PATIENT MESSAGE (OUTPATIENT)
Dept: GASTROENTEROLOGY | Facility: CLINIC | Age: 48
End: 2023-01-20

## 2023-01-20 ENCOUNTER — TELEPHONE (OUTPATIENT)
Dept: GASTROENTEROLOGY | Facility: CLINIC | Age: 48
End: 2023-01-20

## 2023-01-20 NOTE — TELEPHONE ENCOUNTER
Please provide her for the number to the lab as they would be able to best answer questions regarding collection for those tests

## 2023-01-20 NOTE — TELEPHONE ENCOUNTER
Patient called today regarding she has Questions for the 24 Hour Urine test and the Stool Sample Test  Please call patient back

## 2023-01-20 NOTE — TELEPHONE ENCOUNTER
----- Message from Adin Mauricio sent at 1/20/2023  6:42 AM EST -----  Regarding: Mri test  Contact: [phone]  Can I have medicine threw an iv in my arm instead of drinking it? There’s no way I’m gonna be able to stomach that drink  This is for the mri for my intestines   Thank u

## 2023-01-30 ENCOUNTER — APPOINTMENT (OUTPATIENT)
Dept: LAB | Facility: CLINIC | Age: 48
End: 2023-01-30

## 2023-01-30 DIAGNOSIS — R14.0 ABDOMINAL DISTENTION: ICD-10-CM

## 2023-01-31 PROBLEM — N60.02 BENIGN CYST OF LEFT BREAST IN FEMALE: Status: ACTIVE | Noted: 2021-01-11

## 2023-01-31 PROBLEM — E04.1 THYROID NODULE: Status: ACTIVE | Noted: 2023-01-31

## 2023-01-31 PROBLEM — F32.5 MAJOR DEPRESSIVE DISORDER WITH SINGLE EPISODE, IN FULL REMISSION (HCC): Status: ACTIVE | Noted: 2021-01-14

## 2023-01-31 PROBLEM — E88.1 LIPODYSTROPHY: Status: ACTIVE | Noted: 2020-08-31

## 2023-01-31 PROBLEM — K21.9 GASTROESOPHAGEAL REFLUX DISEASE WITHOUT ESOPHAGITIS: Status: ACTIVE | Noted: 2021-01-14

## 2023-02-01 ENCOUNTER — OFFICE VISIT (OUTPATIENT)
Dept: OBGYN CLINIC | Age: 48
End: 2023-02-01

## 2023-02-01 VITALS
DIASTOLIC BLOOD PRESSURE: 70 MMHG | WEIGHT: 145 LBS | SYSTOLIC BLOOD PRESSURE: 114 MMHG | BODY MASS INDEX: 25.69 KG/M2 | HEIGHT: 63 IN

## 2023-02-01 DIAGNOSIS — Z12.31 ENCOUNTER FOR SCREENING MAMMOGRAM FOR MALIGNANT NEOPLASM OF BREAST: ICD-10-CM

## 2023-02-01 DIAGNOSIS — N60.01 BREAST CYST, RIGHT: ICD-10-CM

## 2023-02-01 DIAGNOSIS — Z01.419 ENCOUNTER FOR ANNUAL ROUTINE GYNECOLOGICAL EXAMINATION: Primary | ICD-10-CM

## 2023-02-01 NOTE — PATIENT INSTRUCTIONS
Kegel Exercises for Women   AMBULATORY CARE:   Kegel exercises  help strengthen your pelvic muscles  Pelvic muscles hold your pelvic organs, such as your bladder and uterus, in place  Kegel exercises help prevent or control problems with urine incontinence (leakage)  Incontinence may be caused by pregnancy, childbirth, or menopause  Contact your healthcare provider if:   You cannot feel your muscles tighten or relax  You continue to leak urine  You have questions or concerns about your condition or care  Use the correct muscles:  Pelvic muscles are the muscles you use to control urine flow  To target these muscles, stop and start the flow of urine several times  This will help you become familiar with how it feels to tighten and relax these muscles  How to do Kegel exercises:   Empty your bladder  You may lie down, stand up, or sit down to do these exercises  When you first try to do these exercises, it may be easier if you lie down  Tighten or squeeze your pelvic muscles slowly  It may feel like you are trying to hold back urine or gas  Hold this position for 3 seconds  Relax for 3 seconds  Repeat this cycle 10 times  Do 10 sets of Kegel exercises, at least 3 times a day  Do not hold your breath when you do Kegel exercises  Keep your stomach, back, and leg muscles relaxed  As your muscles get stronger, you will be able to hold the squeeze longer  Your healthcare provider may ask that you increase your pelvic muscle squeeze to 10 seconds  After you squeeze for 10 seconds, relax for 10 seconds  What else you should know:   Once you know how to do Kegel exercises, use different positions  You can do these exercises while you lie on the floor, sit at your desk or watch TV, and while you stand  You may notice improved bladder control within about 6 weeks  Tighten your pelvic muscles before you sneeze, cough, or lift to prevent urine leakage      Follow up with your doctor as directed: Write down your questions so you remember to ask them during your visits  © Copyright [employer] 2022 Information is for End User's use only and may not be sold, redistributed or otherwise used for commercial purposes  All illustrations and images included in CareNotes® are the copyrighted property of A D A M , Inc  or [employer]  The above information is an  only  It is not intended as medical advice for individual conditions or treatments  Talk to your doctor, nurse or pharmacist before following any medical regimen to see if it is safe and effective for you  Breast Self Exam for Women   AMBULATORY CARE:   A breast self-exam (BSE)  is a way to check your breasts for lumps and other changes  Regular BSEs can help you know how your breasts normally look and feel  Most breast lumps or changes are not cancer, but you should always have them checked by a healthcare provider  Why you should do a BSE:  Breast cancer is the most common type of cancer in women  Even if you have mammograms, you may still want to do a BSE regularly  If you know how your breasts normally feel and look, it may help you know when to contact your healthcare provider  Mammograms can miss some cancers  You may find a lump during a BSE that did not show up on a mammogram   When you should do a BSE:  If you have periods, you may want to do your BSE 1 week after your period ends  This is the time when your breasts may be the least swollen, lumpy, or tender  You can do regular BSEs even if you are breastfeeding or have breast implants  Call your doctor if:   You find any lumps or changes in your breasts  You have breast pain or fluid coming from your nipples  You have questions or concerns about your condition or care  How to do a BSE:       Look at your breasts in a mirror  Look at the size and shape of each breast and nipple  Check for swelling, lumps, dimpling, scaly skin, or other skin changes   Look for nipple changes, such as a nipple that is painful or beginning to pull inward  Gently squeeze both nipples and check to see if fluid (that is not breast milk) comes out of them  If you find any of these or other breast changes, contact your healthcare provider  Check your breasts while you sit or  the following 3 positions:    [place] your arms down at your sides  Raise your hands and join them behind your head  Put firm pressure with your hands on your hips  Bend slightly forward while you look at your breasts in the mirror  Lie down and feel your breasts  When you lie down, your breast tissue spreads out evenly over your chest  This makes it easier for you to feel for lumps and anything that may not be normal for your breasts  Do a BSE on one breast at a time  Place a small pillow or towel under your left shoulder  Put your left arm behind your head  Use the 3 middle fingers of your right hand  Use your fingertip pads, on the top of your fingers  Your fingertip pad is the most sensitive part of your finger  Use small circles to feel your breast tissue  Use your fingertip pads to make dime-sized, overlapping circles on your breast and armpits  Use light, medium, and firm pressure  First, press lightly  Second, press with medium pressure to feel a little deeper into the breast  Last, use firm pressure to feel deep within your breast     Examine your entire breast area  Examine the breast area from above the breast to below the breast where you feel only ribs  Make small circles with your fingertips, starting in the middle of your armpit  Make circles going up and down the breast area  Continue toward your breast and all the way across it  Examine the area from your armpit all the way over to the middle of your chest (breastbone)  Stop at the middle of your chest     Move the pillow or towel to your right shoulder, and put your right arm behind your head    Use the 3 fingertip pads of your left hand, and repeat the above steps to do a BSE on your right breast     What else you can do to check for breast problems or cancer:  Talk to your healthcare provider about mammograms  A mammogram is an x-ray of your breasts to screen for breast cancer or other problems  Your provider can tell you the benefits and risks of mammograms  The first mammogram is usually at age 39 or 48  Your provider may recommend you start at 36 or younger if your risk for breast cancer is high  Mammograms usually continue every 1 to 2 years until age 76  Follow up with your doctor as directed:  Write down your questions so you remember to ask them during your visits  © [employer] 2022 Information is for End User's use only and may not be sold, redistributed or otherwise used for commercial purposes  All illustrations and images included in CareNotes® are the copyrighted property of A D A M , Inc  or [employer]   The above information is an  only  It is not intended as medical advice for individual conditions or treatments  Talk to your doctor, nurse or pharmacist before following any medical regimen to see if it is safe and effective for you  Mammogram   AMBULATORY CARE:   What you need to know about a mammogram:  A mammogram is an x-ray of your breasts to screen for breast cancer  Your healthcare provider will talk with you about the benefits and risks of mammograms  Together you will decide when you will get your first mammogram  This is usually at age 39 or 48  Your provider may recommend you start at 36 or younger if your risk for breast cancer is high  Mammograms usually continue every 1 to 2 years until age 76  How to prepare for a mammogram:   Do not use deodorant, powder, lotion, or perfume  These products may cause particles to appear on your mammogram     Wear a 2-piece outfit  If your breasts are tender before your monthly period, do not have a mammogram during this time  Schedule your mammogram for 1 week after your period ends  If you are breastfeeding, express as much milk as possible before the mammogram     Bring a list of the dates and places of your past mammograms and other breast tests or treatments  What will happen during a mammogram:  A top view and a side view x-ray are usually done for each breast  Tell healthcare providers if you have breast implants or breast problems before you have your mammogram  You may need extra x-rays of each breast   You will be given a hospital gown  Take off your clothes from the waist up  Wear the hospital gown so that it opens in the front  You will sit or stand next to a small x-ray machine  The healthcare provider will help you place one of your breasts on the x-ray plate  Your arm and breast will be moved until your breast is in the correct position  Your breast will be gently pressed between 2 plates for a few seconds while the x-ray is taken  This may be uncomfortable  You will be asked to hold your breath while the x-ray is taken  Another x-ray will be taken of the same breast after the position of the x-ray machine has been changed  Your other breast will be x-rayed the same way  What will happen after your mammogram:  Your breasts may feel tender for a short time after the mammogram  You may go back to your regular activities  Ask your healthcare provider when you should receive the results of your mammogram   Risks of a mammogram:  You will be exposed to a small amount of radiation  Some breast cancers may not show up on mammograms  Call your doctor if:   You do not receive your results when expected  You have questions or concerns about the mammogram     Follow up with your doctor as directed:  Write down your questions so you remember to ask them during your visits    © Copyright [employer] 2022 Information is for End User's use only and may not be sold, redistributed or otherwise used for commercial purposes  All illustrations and images included in CareNotes® are the copyrighted property of A [employer] M , Inc  or [employer]  The above information is an  only  It is not intended as medical advice for individual conditions or treatments  Talk to your doctor, nurse or pharmacist before following any medical regimen to see if it is safe and effective for you

## 2023-02-01 NOTE — PROGRESS NOTES
Here today for her NP annual exam  Current complaints none to offer     [id number]  Menarche 12  Hysterectomy 1998  Last PAP  Unsure thinks 1998   Last Mammo 9/13/2022 BiR-1 TC-3 8%L  Last Colonoscopy 11/15/21

## 2023-02-01 NOTE — PROGRESS NOTES
Tamia Cain  1975    Assessment/Plan: Yearly exam     Perineal hygiene reviewed  Kegel exercises recommended  SBE, daily exercise and healthy diet with adequate calcium and vitamin D encouraged  Weight bearing exercises a minimum of 150 minutes/week advised  Yearly mammograms advised  Advised to call with any issues, all concerns & questions addressed  See provided information in your after visit summary     Viet Boucher was seen today for gynecologic exam     Diagnoses and all orders for this visit:    Encounter for annual routine gynecological examination    Encounter for screening mammogram for malignant neoplasm of breast  -     Mammo screening bilateral w 3d & cad; Future    F/U annually or Biannual if Medicare     Results will be released to [employer], if abnormal will call or message to review and discuss treatment plan  Health Maintenance:    Last Mammogram: 09/13/2022  Results were: Negative  Next Mammogram: 09/13/2023    Last Colonoscopy: 12/01/2021      Subjective    CC: Yearly Exam     Tamia Cain is a 49 y o  postmenopausal female here for annual exam  No concerns today  S/p total hysterectomy 20 years ago for benign disease  Unsure if she has ovaries  [id number]  Sexual activity: She is sexually active without pain, bleeding or dryness  STD testing:  She does not want STD testing today  Non- smoker, occasional drinker  Exercise- not currently     She denies breast concerns, vaginal issues, pelvic pain, dyspareunia, urinary symptoms, symptoms of pelvic organ prolapse, urinary, or fecal incontinence today  Reports occasional urinary urgency and leakage with coughing or sneezing       Family hx of breast cancer: No  Family hx of ovarian cancer: No  Family hx of colon cancer: Yes      Past Medical History:   Diagnosis Date   • Asthma    • Degenerative joint disease    • Depression    • GERD (gastroesophageal reflux disease)    • Lumbar radiculopathy    • PONV (postoperative nausea and vomiting)    • Thyroid nodule      Past Surgical History:   Procedure Laterality Date   • BREAST BIOPSY Left 2020    neg   • CHOLECYSTECTOMY LAPAROSCOPIC N/A 5/6/2022    Procedure: CHOLECYSTECTOMY LAPAROSCOPIC;  Surgeon: Ameya Hawkins MD;  Location: [facility];  Service: General   • CYST REMOVAL  10/2020    lt breast    • EGD     • HYSTERECTOMY     • HYSTERECTOMY  1998   • LIPOSUCTION  09/2020   • TUBAL LIGATION        Family History   Problem Relation Age of Onset   • Alzheimer's disease Mother    • Cancer Father         stomach/GI   • No Known Problems Sister    • No Known Problems Daughter    • No Known Problems Maternal Grandmother    • No Known Problems Paternal Grandmother    • Colon cancer Maternal Aunt    • Cancer Maternal Aunt         stomach   • No Known Problems Maternal Aunt    • No Known Problems Maternal Aunt    • No Known Problems Maternal Aunt    • No Known Problems Maternal Aunt    • No Known Problems Maternal Aunt    • Colon cancer Maternal Uncle    • Breast cancer Neg Hx      Social History     Tobacco Use   • Smoking status: Never   • Smokeless tobacco: Never   Vaping Use   • Vaping Use: Never used   Substance Use Topics   • Alcohol use: Not Currently     Comment: ocaasional   • Drug use: Never       Current Outpatient Medications:   •  Diclofenac Sodium (VOLTAREN) 1 %, Apply 2 g topically 4 (four) times a day, Disp: 100 g, Rfl: 3  •  famotidine (PEPCID) 20 mg tablet, Take 1 tablet (20 mg total) by mouth 2 (two) times a day, Disp: 60 tablet, Rfl: 3  •  fluocinonide (LIDEX) 0 05 % external solution, APPLY TWICE A DAY TO SCALP UNTIL RESOLVED, Disp: 60 mL, Rfl: 1  •  FLUoxetine (PROzac) 40 MG capsule, Take 1 capsule (40 mg total) by mouth daily, Disp: 90 capsule, Rfl: 1  •  fluticasone (FLONASE) 50 mcg/act nasal spray, 1 spray into each nostril 2 (two) times a day, Disp: 11 1 mL, Rfl: 2  •  hydrOXYzine HCL (ATARAX) 10 mg tablet, take 1 tablet by mouth at bedtime, Disp: 30 tablet, Rfl: 0  •  meclizine (ANTIVERT) 25 mg tablet, Take 1 tablet (25 mg total) by mouth every 8 (eight) hours as needed for dizziness, Disp: 120 tablet, Rfl: 1  •  mometasone (ELOCON) 0 1 % lotion, APPLY TO AFFECTED AREA TOPICALLY EVERY DAY, Disp: 60 mL, Rfl: 0  •  omeprazole (PriLOSEC) 40 MG capsule, Take 1 capsule (40 mg total) by mouth daily, Disp: 30 capsule, Rfl: 2  •  albuterol (PROVENTIL HFA,VENTOLIN HFA) 90 mcg/act inhaler, Inhale 1-2 puffs every 6 (six) hours as needed for wheezing (Patient not taking: Reported on 2/1/2023), Disp: 18 g, Rfl: 3  •  dicyclomine (BENTYL) 20 mg tablet, Take 1 tablet (20 mg total) by mouth 4 (four) times a day as needed (IBS) (Patient not taking: Reported on 2/1/2023), Disp: 30 tablet, Rfl: 0  •  EPINEPHrine (EPIPEN) 0 3 mg/0 3 mL SOAJ, Inject 0 3 mL (0 3 mg total) into a muscle once for 1 dose As needed, Disp: 0 3 mL, Rfl: 0  Patient Active Problem List    Diagnosis Date Noted   • Thyroid nodule 01/31/2023   • Gastroparesis 12/16/2022   • Epigastric pain 08/12/2022   • Postoperative visit 05/23/2022   • PONV (postoperative nausea and vomiting) 05/06/2022   • Dyskinesia of gallbladder 05/06/2022   • DISH (diffuse idiopathic skeletal hyperostosis) 07/07/2021   • Chronic pain syndrome 06/22/2021   • Chronic bilateral low back pain with right-sided sciatica 06/22/2021   • Lumbar radiculopathy 06/22/2021   • Lumbar degenerative disc disease 06/22/2021   • Generalized abdominal pain 04/23/2021   • Epigastric lump 04/23/2021   • Major depressive disorder with single episode, in full remission ([facility] 75 ) 01/14/2021   • Gastroesophageal reflux disease without esophagitis 01/14/2021   • Benign cyst of left breast in female 01/11/2021   • 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       Allergies   Allergen Reactions   • Bee Venom Anaphylaxis   • Iodides Other (See Comments)   • Barnhart Oil - Food Allergy GI Intolerance     Other reaction(s): GI Intolerance, GI Reaction   • Aspirin Other (See Comments) shaking  convulsions    Other reaction(s): Other (See Comments)  shaking  convulsions  shaking  convulsions   • Metronidazole GI Intolerance     Gi upset     • Sulfamethoxazole-Trimethoprim GI Intolerance     Gi upst    Other reaction(s): GI Intolerance, GI Reaction  Gi upst  Gi upst   • Tape  [Medical Tape] Other (See Comments) and Hives     fever  Other reaction(s): Other  fever       OB History    Para Term  AB Living   3 3 3 0 0 3   SAB IAB Ectopic Multiple Live Births   0 0 0 0 3      # Outcome Date GA Lbr Jayme/2nd Weight Sex Delivery Anes PTL Lv   3 Term 56    F Vag-Spont   FRANSICO   2 Term 17    M Vag-Spont   FRANSICO   1 Term 11    M Vag-Spont   FRANSICO       Vitals:    23 1552   BP: 114/70   Weight: 65 8 kg (145 lb)   Height: 5' 3" (1 6 m)     Body mass index is 25 69 kg/m²  Review of Systems   Constitutional: Negative for chills, fatigue, fever and unexpected weight change  Eyes: Negative for visual disturbance  Respiratory: Negative for shortness of breath  Cardiovascular: Negative for chest pain  Gastrointestinal: Negative for abdominal pain, constipation, diarrhea, nausea and vomiting  Endocrine: Negative  Genitourinary: Negative for difficulty urinating, dyspareunia, dysuria, frequency, genital sores, hematuria, pelvic pain, urgency, vaginal bleeding, vaginal discharge and vaginal pain  Musculoskeletal: Negative for back pain and myalgias  Skin: Negative for pallor and rash  Neurological: Negative for dizziness, light-headedness and headaches  Hematological: Negative for adenopathy  Psychiatric/Behavioral: Negative for dysphoric mood  All other systems reviewed and are negative  Physical Exam  Constitutional:       General: She is not in acute distress  Appearance: Normal appearance  She is not ill-appearing  Genitourinary:      Bladder and urethral meatus normal       No lesions in the vagina        Right Labia: No rash, tenderness, lesions, skin changes or Bartholin's cyst      Left Labia: No tenderness, lesions, skin changes, Bartholin's cyst or rash  No inguinal adenopathy present in the right or left side  Vaginal cuff intact  No vaginal discharge, erythema, tenderness, bleeding, ulceration or cuff induration  No vaginal prolapse present  No vaginal atrophy present  Right Adnexa: not palpable  Left Adnexa: not palpable  Cervix is absent  Uterus is absent  No urethral prolapse, tenderness or discharge present  Bladder urgency on palpation not present  Pelvic exam was performed with patient in the lithotomy position  Breasts:     Right: No swelling, inverted nipple, mass, nipple discharge, skin change or tenderness  Left: No swelling, inverted nipple, mass, nipple discharge, skin change or tenderness  HENT:      Head: Normocephalic and atraumatic  Eyes:      Conjunctiva/sclera: Conjunctivae normal    Pulmonary:      Effort: Pulmonary effort is normal    Abdominal:      General: There is no distension  Palpations: Abdomen is soft  Tenderness: There is no abdominal tenderness  Musculoskeletal:         General: Normal range of motion  Cervical back: Neck supple  Lymphadenopathy:      Upper Body:      Right upper body: No supraclavicular or axillary adenopathy  Left upper body: No supraclavicular or axillary adenopathy  Lower Body: No right inguinal adenopathy  No left inguinal adenopathy  Neurological:      Mental Status: She is alert and oriented to person, place, and time  Skin:     General: Skin is warm and dry  Psychiatric:         Mood and Affect: Mood normal          Behavior: Behavior normal    Vitals and nursing note reviewed

## 2023-02-02 ENCOUNTER — LAB (OUTPATIENT)
Dept: LAB | Facility: CLINIC | Age: 48
End: 2023-02-02

## 2023-02-02 DIAGNOSIS — R19.7 DIARRHEA, UNSPECIFIED TYPE: ICD-10-CM

## 2023-02-04 LAB — CALPROTECTIN STL-MCNT: 71 UG/G (ref 0–120)

## 2023-02-07 LAB
5OH-INDOLEACETATE 24H UR-MCNC: 1.7 MG/L
5OH-INDOLEACETATE 24H UR-MRATE: 3.4 MG/24 HR (ref 0–14.9)
ELASTASE PANC STL-MCNT: >500 UG ELAST./G

## 2023-02-09 ENCOUNTER — HOSPITAL ENCOUNTER (OUTPATIENT)
Dept: MRI IMAGING | Facility: HOSPITAL | Age: 48
Discharge: HOME | End: 2023-02-09

## 2023-02-09 DIAGNOSIS — R19.7 DIARRHEA, UNSPECIFIED TYPE: ICD-10-CM

## 2023-02-09 DIAGNOSIS — R14.0 ABDOMINAL DISTENTION: ICD-10-CM

## 2023-02-09 RX ADMIN — GADOBUTROL 6 ML: 604.72 INJECTION INTRAVENOUS at 08:21

## 2023-02-20 DIAGNOSIS — K58.0 IRRITABLE BOWEL SYNDROME WITH DIARRHEA: Primary | ICD-10-CM

## 2023-02-23 DIAGNOSIS — R21 RASH AND NONSPECIFIC SKIN ERUPTION: ICD-10-CM

## 2023-02-23 RX ORDER — MOMETASONE FUROATE 1 MG/ML
SOLUTION TOPICAL
Qty: 60 ML | Refills: 3 | Status: SHIPPED | OUTPATIENT
Start: 2023-02-23

## 2023-03-03 ENCOUNTER — TELEPHONE (OUTPATIENT)
Dept: GASTROENTEROLOGY | Facility: CLINIC | Age: 48
End: 2023-03-03

## 2023-03-03 ENCOUNTER — PATIENT MESSAGE (OUTPATIENT)
Dept: GASTROENTEROLOGY | Facility: CLINIC | Age: 48
End: 2023-03-03

## 2023-03-03 NOTE — TELEPHONE ENCOUNTER
----- Message from Otis Ricardo sent at 3/3/2023 12:16 PM EST -----  Regarding: Medication   Contact: [phone]  Hey I’m ready to try that medication for my gastropreses that we talked about at my last appointment  That medication xifaxan seems to be working yah   Thanx

## 2023-03-04 ENCOUNTER — APPOINTMENT (OUTPATIENT)
Dept: LAB | Facility: CLINIC | Age: 48
End: 2023-03-04

## 2023-03-04 DIAGNOSIS — E78.5 BORDERLINE HYPERLIPIDEMIA: ICD-10-CM

## 2023-03-04 LAB
ANION GAP SERPL CALCULATED.3IONS-SCNC: 3 MMOL/L (ref 4–13)
BUN SERPL-MCNC: 10 MG/DL (ref 5–25)
CALCIUM SERPL-MCNC: 9.2 MG/DL (ref 8.3–10.1)
CHLORIDE SERPL-SCNC: 109 MMOL/L (ref 96–108)
CHOLEST SERPL-MCNC: 175 MG/DL
CO2 SERPL-SCNC: 26 MMOL/L (ref 21–32)
CREAT SERPL-MCNC: 0.61 MG/DL (ref 0.6–1.3)
ERYTHROCYTE [DISTWIDTH] IN BLOOD BY AUTOMATED COUNT: 12 % (ref 11.6–15.1)
GFR SERPL CREATININE-BSD FRML MDRD: 107 ML/MIN/1.73SQ M
GLUCOSE P FAST SERPL-MCNC: 93 MG/DL (ref 65–99)
HCT VFR BLD AUTO: 38.9 % (ref 34.8–46.1)
HDLC SERPL-MCNC: 54 MG/DL
HGB BLD-MCNC: 13 G/DL (ref 11.5–15.4)
LDLC SERPL CALC-MCNC: 110 MG/DL (ref 0–100)
MCH RBC QN AUTO: 31 PG (ref 26.8–34.3)
MCHC RBC AUTO-ENTMCNC: 33.4 G/DL (ref 31.4–37.4)
MCV RBC AUTO: 93 FL (ref 82–98)
PLATELET # BLD AUTO: 305 THOUSANDS/UL (ref 149–390)
PMV BLD AUTO: 10 FL (ref 8.9–12.7)
POTASSIUM SERPL-SCNC: 4 MMOL/L (ref 3.5–5.3)
RBC # BLD AUTO: 4.2 MILLION/UL (ref 3.81–5.12)
SODIUM SERPL-SCNC: 138 MMOL/L (ref 135–147)
TRIGL SERPL-MCNC: 53 MG/DL
WBC # BLD AUTO: 8.67 THOUSAND/UL (ref 4.31–10.16)

## 2023-03-09 DIAGNOSIS — G47.00 INSOMNIA, UNSPECIFIED TYPE: ICD-10-CM

## 2023-03-09 RX ORDER — HYDROXYZINE HYDROCHLORIDE 10 MG/1
10 TABLET, FILM COATED ORAL
Qty: 30 TABLET | Refills: 0 | Status: SHIPPED | OUTPATIENT
Start: 2023-03-09

## 2023-03-11 ENCOUNTER — APPOINTMENT (OUTPATIENT)
Dept: LAB | Facility: CLINIC | Age: 48
End: 2023-03-11

## 2023-03-11 DIAGNOSIS — R10.13 EPIGASTRIC PAIN: ICD-10-CM

## 2023-03-11 DIAGNOSIS — E78.5 BORDERLINE HYPERLIPIDEMIA: ICD-10-CM

## 2023-03-11 LAB
ALBUMIN SERPL BCP-MCNC: 3.6 G/DL (ref 3.5–5)
ALP SERPL-CCNC: 107 U/L (ref 46–116)
ALT SERPL W P-5'-P-CCNC: 39 U/L (ref 12–78)
AST SERPL W P-5'-P-CCNC: 27 U/L (ref 5–45)
BILIRUB DIRECT SERPL-MCNC: 0.11 MG/DL (ref 0–0.2)
BILIRUB SERPL-MCNC: 0.49 MG/DL (ref 0.2–1)
PROT SERPL-MCNC: 7.3 G/DL (ref 6.4–8.4)

## 2023-03-11 RX ORDER — FAMOTIDINE 20 MG/1
TABLET, FILM COATED ORAL
Qty: 60 TABLET | Refills: 3 | Status: SHIPPED | OUTPATIENT
Start: 2023-03-11

## 2023-03-28 DIAGNOSIS — R06.02 SHORTNESS OF BREATH: ICD-10-CM

## 2023-03-28 RX ORDER — ALBUTEROL SULFATE 90 UG/1
AEROSOL, METERED RESPIRATORY (INHALATION)
Qty: 18 G | Refills: 3 | Status: SHIPPED | OUTPATIENT
Start: 2023-03-28

## 2023-04-30 DIAGNOSIS — F41.9 ANXIETY: ICD-10-CM

## 2023-04-30 DIAGNOSIS — K31.84 GASTROPARESIS: ICD-10-CM

## 2023-04-30 DIAGNOSIS — F32.9 REACTIVE DEPRESSION: ICD-10-CM

## 2023-04-30 RX ORDER — METOCLOPRAMIDE 5 MG/1
TABLET ORAL
Qty: 90 TABLET | Refills: 1 | Status: SHIPPED | OUTPATIENT
Start: 2023-04-30

## 2023-04-30 RX ORDER — FLUOXETINE HYDROCHLORIDE 40 MG/1
40 CAPSULE ORAL DAILY
Qty: 90 CAPSULE | Refills: 1 | Status: SHIPPED | OUTPATIENT
Start: 2023-04-30 | End: 2023-10-27

## 2023-05-16 ENCOUNTER — TELEPHONE (OUTPATIENT)
Dept: ADMINISTRATIVE | Facility: OTHER | Age: 48
End: 2023-05-16

## 2023-05-16 ENCOUNTER — OFFICE VISIT (OUTPATIENT)
Age: 48
End: 2023-05-16

## 2023-05-16 VITALS
TEMPERATURE: 99.7 F | DIASTOLIC BLOOD PRESSURE: 76 MMHG | WEIGHT: 144.2 LBS | BODY MASS INDEX: 25.55 KG/M2 | SYSTOLIC BLOOD PRESSURE: 122 MMHG | HEART RATE: 96 BPM | OXYGEN SATURATION: 98 % | HEIGHT: 63 IN | RESPIRATION RATE: 16 BRPM

## 2023-05-16 DIAGNOSIS — Z91.030 H/O BEE STING ALLERGY: ICD-10-CM

## 2023-05-16 DIAGNOSIS — K52.9 GASTROENTERITIS: Primary | ICD-10-CM

## 2023-05-16 RX ORDER — ONDANSETRON 4 MG/1
4 TABLET, ORALLY DISINTEGRATING ORAL EVERY 6 HOURS PRN
Qty: 20 TABLET | Refills: 0 | Status: SHIPPED | OUTPATIENT
Start: 2023-05-16

## 2023-05-16 RX ORDER — EPINEPHRINE 0.3 MG/.3ML
0.3 INJECTION SUBCUTANEOUS ONCE
Qty: 0.3 ML | Refills: 0 | Status: SHIPPED | OUTPATIENT
Start: 2023-05-16 | End: 2023-05-16

## 2023-05-16 NOTE — TELEPHONE ENCOUNTER
----- Message from Jess Guerra LPN sent at 7/99/4700 11:33 AM EDT -----  Regarding: pap  05/16/23 11:33 AM    Hello, our patient oLrena Luevano has had pap smear completed/performed   Please assist in updating the patient chart by [facility] The date of service is 02/2023    Thank you,  PRATEEK Blackwood U  8

## 2023-05-16 NOTE — LETTER
May 16, 2023     Patient: Edwin Cadena  YOB: 1975  Date of Visit: 5/16/2023      To Whom it May Concern:    Edwin Cadena is under my professional care  Carmen Cortes was seen in my office on 5/16/2023  Carmen Cortes may return to work on 05/18/23  If you have any questions or concerns, please don't hesitate to call           Sincerely,          KIRSTIN Albrecht        CC: No Recipients

## 2023-05-16 NOTE — PROGRESS NOTES
"[facility] - [facility] [place]    NAME: Kate White  AGE: 49 y o  SEX: female  : 1975     DATE: 2023     Assessment and Plan:     1  Gastroenteritis  Patient reports nausea, vomiting, diarrhea with suspected fever and chills since   Reports that she has been having multiple liquid stools throughout the day and night  States that today she was able to tolerate water  Skin is warm, dry and normal color  Cap refill less than 2 seconds  Mucous membranes are pink and moist   Patient has mild diffuse tenderness on abdominal exam   Counseled on starting Zofran and then trying to take liquids  Counseled on a bland diet and to advance diet as tolerated  Tylenol for fever  If symptoms worsen such as increased pain continued vomiting despite medication or bloody stools to be evaluated emergency department  If symptoms persist into Thursday she was instructed to follow-up in office   - ondansetron (ZOFRAN-ODT) 4 mg disintegrating tablet; Take 1 tablet (4 mg total) by mouth every 6 (six) hours as needed for nausea  Dispense: 20 tablet; Refill: 0    2  H/O bee sting allergy  Requesting refill  - EPINEPHrine (EPIPEN) 0 3 mg/0 3 mL SOAJ; Inject 0 3 mL (0 3 mg total) into a muscle once for 1 dose As needed  Dispense: 0 3 mL; Refill: 0        Return for Next scheduled follow up  Chief Complaint:     Chief Complaint   Patient presents with   • Nausea   • Vomiting     Onset         History of Present Illness:     Bee presents to the office today for evaluation of nausea, vomiting and diarrhea since   States she is \"unable to keep anything down\" however states that this morning she has been able to tolerate water  Patient believes she has had a fever she has felt chilled  Denies chest pain or shortness of breath  Denies blood in stool  Review of Systems:     Review of Systems   Constitutional: Positive for chills, fatigue and fever     HENT: " "Negative  Respiratory: Negative  Cardiovascular: Negative  Gastrointestinal: Positive for abdominal pain, diarrhea, nausea and vomiting  Genitourinary: Negative  Musculoskeletal: Negative  Skin: Negative  Neurological: Negative  Problem List:     Patient Active Problem List   Diagnosis   • Generalized abdominal pain   • Epigastric lump   • Chronic pain syndrome   • Chronic bilateral low back pain with right-sided sciatica   • Lumbar radiculopathy   • Lumbar degenerative disc disease   • DISH (diffuse idiopathic skeletal hyperostosis)   • PONV (postoperative nausea and vomiting)   • Dyskinesia of gallbladder   • Postoperative visit   • Epigastric pain   • Gastroparesis   • Benign cyst of left breast in female   • Thyroid nodule   • Major depressive disorder with single episode, in full remission (HCC)   • Lipodystrophy   • Gastroesophageal reflux disease without esophagitis        Objective:     /76 (BP Location: Right arm, Patient Position: Sitting, Cuff Size: Standard)   Pulse 96   Temp 99 7 °F (37 6 °C) (Tympanic)   Resp 16   Ht 5' 3\" (1 6 m)   Wt 65 4 kg (144 lb 3 2 oz)   SpO2 98%   BMI 25 54 kg/m²     Physical Exam  Vitals and nursing note reviewed  Constitutional:       General: She is not in acute distress  Appearance: She is normal weight  HENT:      Head: Normocephalic and atraumatic  Right Ear: External ear normal       Left Ear: External ear normal       Nose: Nose normal       Mouth/Throat:      Lips: Pink  Mouth: Mucous membranes are moist       Pharynx: Oropharynx is clear  Eyes:      Conjunctiva/sclera: Conjunctivae normal    Cardiovascular:      Rate and Rhythm: Normal rate and regular rhythm  Pulses: Normal pulses  Heart sounds: Normal heart sounds  No murmur heard  Pulmonary:      Effort: Pulmonary effort is normal       Breath sounds: Normal breath sounds  No wheezing     Abdominal:      General: Bowel sounds are normal  There " is no distension  Palpations: Abdomen is soft  Tenderness: There is abdominal tenderness (mild, diffuse)  There is no guarding  Musculoskeletal:         General: Normal range of motion  Cervical back: Neck supple  Skin:     General: Skin is warm and dry  Capillary Refill: Capillary refill takes less than 2 seconds  Neurological:      Mental Status: She is alert and oriented to person, place, and time  Psychiatric:         Mood and Affect: Mood normal          Behavior: Behavior normal          Thought Content: Thought content normal          Judgment: Judgment normal          I spent 15 minutes with this patient      [address] [place] [address], [address] [place], [place] [address] PRIMARY CARE [place]

## 2023-05-23 ENCOUNTER — TELEPHONE (OUTPATIENT)
Age: 48
End: 2023-05-23

## 2023-05-23 NOTE — TELEPHONE ENCOUNTER
[facility] called epi pain approved but pharmacy has to order it will be in tomorrow

## 2023-05-25 NOTE — TELEPHONE ENCOUNTER
Upon review of the In Basket request we no pap done per feb 1 2023 patient had previous hysterectomy and both cervix and uterus are absent  update HM to not a candidate  Any additional questions or concerns should be emailed to the Practice Liaisons via the appropriate education email address, please do not reply via In Basket      Thank you  Chalo Tam MA

## 2023-05-31 NOTE — TELEPHONE ENCOUNTER
Derm referral in [facility]  Referral #[id number] [FreeTextEntry1] : \par warm soaks\par mupirocin\par cx sent\par call/rto prn

## 2023-06-02 ENCOUNTER — APPOINTMENT (OUTPATIENT)
Age: 48
End: 2023-06-02
Payer: COMMERCIAL

## 2023-06-02 ENCOUNTER — OFFICE VISIT (OUTPATIENT)
Age: 48
End: 2023-06-02
Payer: COMMERCIAL

## 2023-06-02 VITALS
DIASTOLIC BLOOD PRESSURE: 74 MMHG | RESPIRATION RATE: 18 BRPM | OXYGEN SATURATION: 97 % | WEIGHT: 144 LBS | TEMPERATURE: 97.8 F | HEIGHT: 63 IN | SYSTOLIC BLOOD PRESSURE: 116 MMHG | HEART RATE: 88 BPM | BODY MASS INDEX: 25.52 KG/M2

## 2023-06-02 DIAGNOSIS — R00.2 PALPITATIONS: ICD-10-CM

## 2023-06-02 DIAGNOSIS — F41.9 ANXIETY: ICD-10-CM

## 2023-06-02 DIAGNOSIS — R00.2 PALPITATIONS: Primary | ICD-10-CM

## 2023-06-02 DIAGNOSIS — E04.1 THYROID NODULE: ICD-10-CM

## 2023-06-02 DIAGNOSIS — Z13.29 SCREENING FOR THYROID DISORDER: ICD-10-CM

## 2023-06-02 PROCEDURE — 84443 ASSAY THYROID STIM HORMONE: CPT

## 2023-06-02 PROCEDURE — 86376 MICROSOMAL ANTIBODY EACH: CPT

## 2023-06-02 PROCEDURE — 36415 COLL VENOUS BLD VENIPUNCTURE: CPT

## 2023-06-02 PROCEDURE — 84439 ASSAY OF FREE THYROXINE: CPT

## 2023-06-02 PROCEDURE — 99213 OFFICE O/P EST LOW 20 MIN: CPT

## 2023-06-02 PROCEDURE — 84481 FREE ASSAY (FT-3): CPT

## 2023-06-02 NOTE — PATIENT INSTRUCTIONS
[employer] [facility]    Heart Palpitations   AMBULATORY CARE:   Heart palpitations  are feelings that your heart races, jumps, throbs, or flutters  You may feel extra beats, no beats for a short time, or skipped beats  You may have these feelings in your chest, throat, or neck  They may happen when you are sitting, standing, or lying  Heart palpitations may be frightening, but are usually not caused by a serious problem  Call 911 or have someone else call for any of the following: You have any of the following signs of a heart attack:      Squeezing, pressure, or pain in your chest    You may  also have any of the following:     Discomfort or pain in your back, neck, jaw, stomach, or arm    Shortness of breath    Nausea or vomiting    Lightheadedness or a sudden cold sweat    You have any of the following signs of a stroke:      Numbness or drooping on one side of your face     Weakness in an arm or leg    Confusion or difficulty speaking    Dizziness, a severe headache, or vision loss    You faint or lose consciousness  Seek care immediately if:   Your palpitations happen more often or last longer than usual      You have palpitations and shortness of breath, nausea, sweating, or dizziness  Contact your healthcare provider if:   You have questions or concerns about your condition or care  Follow up with your healthcare provider as directed: You may need to follow up with a cardiologist  Artdarinel Morrow may need tests to check for heart problems that cause palpitations  Write down your questions so you remember to ask them during your visits  Treatment for heart palpitations  is usually not needed  Your healthcare provider may stop or change your medicines if they are causing your palpitations  Conditions that cause palpitations, such as an abnormal heartbeat, will be treated  Keep a record:  Write down when your palpitations start and stop, what you were doing when they started, and your symptoms   Keep track of what you ate or drank within a few hours of your palpitations  Include anything that seemed to help your symptoms, such as lying down or holding your breath  This record will help you and your healthcare provider learn what triggers your palpitations  Bring this record with you to your follow up visits  Help prevent heart palpitations:   Manage stress and anxiety  Find ways to relax such as listening to music, meditating, or doing yoga  Exercise can also help decrease stress and anxiety  Talk to someone you trust about your stress or anxiety  You can also talk to a therapist      Get plenty of sleep every night  Ask your healthcare provider how much sleep you need each night  Do not drink caffeine or alcohol  Caffeine and alcohol can make your palpitations worse  Caffeine is found in soda, coffee, tea, chocolate, and drinks that increase your energy  Do not smoke  Nicotine and other chemicals in cigarettes and cigars may damage your heart and blood vessels  Ask your healthcare provider for information if you currently smoke and need help to quit  E-cigarettes or smokeless tobacco still contain nicotine  Talk to your healthcare provider before you use these products  Do not use illegal drugs  Talk to your healthcare provider if you use illegal drugs and want help to quit  © Copyright [employer] 2022 Information is for End User's use only and may not be sold, redistributed or otherwise used for commercial purposes  The above information is an  only  It is not intended as medical advice for individual conditions or treatments  Talk to your doctor, nurse or pharmacist before following any medical regimen to see if it is safe and effective for you  Relaxation and Meditation   WHAT YOU NEED TO KNOW:   Relaxation and meditation can help decrease pain, stress, and anxiety  Relaxation and meditation also help regulate your breathing and decrease your blood pressure and heartbeat  DISCHARGE INSTRUCTIONS:   Types of relaxation:   Slow, deep breathing  can help relax your body and mind  Deep breathing can be done at any time  Progressive muscle relaxation  decreases tense muscles  With this therapy, you relax certain muscle groups until your entire body is relaxed  Start at one end of your body and move to the other end, such as from your feet to your head  Autogenic training, or self-control relaxation , helps increase the blood flow to your limbs and helps you sleep  With this therapy, you try to replace painful or uncomfortable thoughts and feelings with pleasant ones  Guided imagery  uses your imagination to help you feel peaceful and calm  You try to see, hear, smell, and taste things that you picture in your mind  For example, you might imagine lying on a beach, feeling the sand, and hearing the waves  Distraction  uses activities you enjoy to help you take your mind off of pain, stress, or anxiety  Distraction may include, listening to music, painting, reading a book, or exercise  Types of meditation:  Meditation is a mind exercise that helps to relax your body and free your mind of worry  You sit quietly in a comfortable position, close your eyes, and relax your muscles  Your thoughts are relaxed while your body stays alert  Meditation can be done alone or with other people  Mantra meditation  is when you think or speak a certain word or phrase over and over  The word or phrase often has a smooth sound  The mantra is used as a way to help you focus  The sound is believed to make vibrations that have different effects on people  Mindfulness meditation  is when you focus on what is happening in your life at that point in time  You become aware of your thoughts and feelings in the present without making any judgment  You learn not to worry about your past and future      © Copyright [employer] 2022 Information is for End User's use only and may not be sold, redistributed or otherwise used for commercial purposes  The above information is an  only  It is not intended as medical advice for individual conditions or treatments  Talk to your doctor, nurse or pharmacist before following any medical regimen to see if it is safe and effective for you

## 2023-06-03 LAB
T3FREE SERPL-MCNC: 3.2 PG/ML (ref 2.5–3.9)
T4 FREE SERPL-MCNC: 0.96 NG/DL (ref 0.61–1.12)
TSH SERPL DL<=0.05 MIU/L-ACNC: 2.77 UIU/ML (ref 0.45–4.5)

## 2023-06-04 LAB — THYROPEROXIDASE AB SERPL-ACNC: <9 IU/ML (ref 0–34)

## 2023-06-05 NOTE — PROGRESS NOTES
[facility] - [facility] PRIMARY CARE [place]    NAME: Sarika Check  AGE: 49 y o  SEX: female  : 1975     DATE: 2023     Assessment and Plan:     1  Palpitations  Reports increasing episodes of  feeling her heart is racing with associated dizziness and sweatiness  Patient has history of depression with anxiety  States that there is no new stressors that she is aware of  Patient does have anxiety related to medical concerns and has been noted to ruminate on test results  Patient has been reassured  As she reports increased frequency we will do a Holter monitor test   Also check TSH    - AMB extended holter monitor; Future  - TSH, 3rd generation with Free T4 reflex; Future  - T3, free; Future  - T4, free; Future  - Anti-microsomal antibody; Future    2  Anxiety  Patient is on Prozac 40 mg daily  Does have anxiety related to medical problems  Patient tends to obsess on test results  And ruminating on concerns related to what tests mean or worsening of her mild hepatosteatosis  Patient reports concerns are secondary to her father's passing from stomach cancer  We will check TSH she has a history of a thyroid nodule  Patient may benefit from behavioral health therapy for chronic stress management   - TSH, 3rd generation with Free T4 reflex; Future  - T3, free; Future  - T4, free; Future  - Anti-microsomal antibody; Future    3  Screening for thyroid disorder    - TSH, 3rd generation with Free T4 reflex; Future  - T3, free; Future  - T4, free; Future  - Anti-microsomal antibody; Future    4  Thyroid nodule    - TSH, 3rd generation with Free T4 reflex; Future  - T3, free; Future  - T4, free; Future  - Anti-microsomal antibody; Future        No follow-ups on file       Chief Complaint:     Chief Complaint   Patient presents with   • Follow-up     Dizzy, nausea, shivers, sweats, itchiness on scalp and elbow        History of Present Illness:     Bee presents to the office today for evaluation of multiple symptoms  Many of these have been chronic in nature  States she is concerned because they are increasing in frequency and she is recently had what she believes could possibly be panic attacks  Patient has had ongoing rash on her scalp and elbows she has seen dermatology  Has an upcoming appointment with dermatology in July  Recommend that she discuss the rash with dermatologist, as he has been treating her for it  Patient also has ongoing GI issues related to nausea and abdominal pain  Has a history of gastroparesis  I have done extensive lab work related to the GI complaints that she has also had multiple imaging studies  Has an appointment with gastroenterology next week  Patient is also reporting palpitations feeling like her heart is racing  States she will get dizzy and sweaty with these episodes  Review of Systems:     Review of Systems   Constitutional: Negative  HENT: Negative  Respiratory: Negative  Cardiovascular: Positive for palpitations  Gastrointestinal: Positive for abdominal pain and nausea  Genitourinary: Negative  Skin: Positive for rash  Neurological: Positive for dizziness (With palpitations)  Psychiatric/Behavioral: The patient is nervous/anxious           Problem List:     Patient Active Problem List   Diagnosis   • Generalized abdominal pain   • Epigastric lump   • Chronic pain syndrome   • Chronic bilateral low back pain with right-sided sciatica   • Lumbar radiculopathy   • Lumbar degenerative disc disease   • DISH (diffuse idiopathic skeletal hyperostosis)   • PONV (postoperative nausea and vomiting)   • Dyskinesia of gallbladder   • Postoperative visit   • Epigastric pain   • Gastroparesis   • Benign cyst of left breast in female   • Thyroid nodule   • Major depressive disorder with single episode, in full remission ([facility] [address] )   • Lipodystrophy   • Gastroesophageal reflux disease without esophagitis        Objective:     /74 (BP "Location: Right arm, Patient Position: Sitting, Cuff Size: Standard)   Pulse 88   Temp 97 8 °F (36 6 °C) (Temporal)   Resp 18   Ht 5' 3\" (1 6 m)   Wt 65 3 kg (144 lb)   SpO2 97%   BMI 25 51 kg/m²     Physical Exam  Vitals and nursing note reviewed  Constitutional:       General: She is not in acute distress  Appearance: Normal appearance  She is normal weight  She is not ill-appearing  HENT:      Head: Normocephalic and atraumatic  Nose: Nose normal       Mouth/Throat:      Mouth: Mucous membranes are moist       Pharynx: Oropharynx is clear  Eyes:      Conjunctiva/sclera: Conjunctivae normal       Pupils: Pupils are equal, round, and reactive to light  Cardiovascular:      Rate and Rhythm: Normal rate and regular rhythm  Pulses: Normal pulses  Heart sounds: Normal heart sounds  No murmur heard  Pulmonary:      Effort: Pulmonary effort is normal       Breath sounds: Normal breath sounds  No wheezing  Abdominal:      General: Bowel sounds are normal       Palpations: Abdomen is soft  Musculoskeletal:         General: Normal range of motion  Cervical back: Normal range of motion and neck supple  Lymphadenopathy:      Cervical: No cervical adenopathy  Skin:     General: Skin is warm and dry  Capillary Refill: Capillary refill takes less than 2 seconds  Neurological:      General: No focal deficit present  Mental Status: She is alert and oriented to person, place, and time  Psychiatric:         Attention and Perception: Attention normal          Mood and Affect: Mood normal          Behavior: Behavior normal          Thought Content: Thought content normal          Judgment: Judgment normal          I spent 20 minutes with this patient      KIRSTIN Topete  [facility] PRIMARY CARE [place]  "

## 2023-06-08 ENCOUNTER — OFFICE VISIT (OUTPATIENT)
Dept: GASTROENTEROLOGY | Facility: CLINIC | Age: 48
End: 2023-06-08
Payer: COMMERCIAL

## 2023-06-08 VITALS
SYSTOLIC BLOOD PRESSURE: 108 MMHG | BODY MASS INDEX: 25.83 KG/M2 | DIASTOLIC BLOOD PRESSURE: 68 MMHG | HEIGHT: 63 IN | HEART RATE: 77 BPM | OXYGEN SATURATION: 98 % | WEIGHT: 145.8 LBS

## 2023-06-08 DIAGNOSIS — K58.0 IRRITABLE BOWEL SYNDROME WITH DIARRHEA: ICD-10-CM

## 2023-06-08 DIAGNOSIS — K31.84 GASTROPARESIS: Primary | ICD-10-CM

## 2023-06-08 PROCEDURE — 99214 OFFICE O/P EST MOD 30 MIN: CPT | Performed by: PHYSICIAN ASSISTANT

## 2023-06-08 NOTE — PROGRESS NOTES
[facility] - Outpatient Follow-up Note  Antony Avery 49 y o  female MRN: [id number]  Encounter: [id number]          ASSESSMENT AND PLAN:      1  Gastroparesis  2  Irritable bowel syndrome with diarrhea    Patient presents for follow up: she has gastroparesis and IBS-D  Her gastroparesis symptoms began after her abdominal liposuction surgery 4 years ago  EGD 12/1/21 was normal and biopsies were negative for h pylori  Colonoscopy with visualization of the terminal ileum 12/1/21 was normal and biopsies were negative for microscopic colitis  US 12/10/22 s/p mild hepatic steatosis and s/p cholecystectomy  Celiac serology was negative  Gastric emptying study 12/6/22 showed a severely reduced rate of gastric emptying  MR enterography 2/23 was negative for SB crohn's  Pancreatic fecal elastase was normal   24 hours 5-HIAA was negative  Her IBS-D/possible SIBO was treated with a Xifaxan 550mg po TID x 14 days course with significant improvement! She is still having some nausea and abdominal distention but admits to not following the gastroparesis diet  We reviewed the importance of small, frequent low fat meals  She has Reglan to utilize just on an as needed basis with benefit (we reviewed the risk of tardive dyskinesia)  Follow up in 6 months or sooner if needed  ______________________________________________________________________    SUBJECTIVE:  Patient is a pleasant 49year old female who presents to the office for follow up  She has gastroparesis and SIBO  She reports her diarrhea significantly improved after the Xifaxan course  Her bowel movements are dramatically improved  She reports she will still have bouts with the nausea and abdominal distention after eating but has not been following the gastroparesis diet  She uses Reglan just as needed with benefit  Overall she is doing better  She reports having palpitations and is going to see cardiology    She also reports a scalp rash/pruritis and is following with dermatology  She did have a high IgA level and is going to see a Rheumatologist  She has arthritis as well  REVIEW OF SYSTEMS IS OTHERWISE NEGATIVE        Historical Information   Past Medical History:   Diagnosis Date   • Asthma    • Degenerative joint disease    • Depression    • GERD (gastroesophageal reflux disease)    • Lumbar radiculopathy    • PONV (postoperative nausea and vomiting)    • Thyroid nodule      Past Surgical History:   Procedure Laterality Date   • BREAST BIOPSY Left 2020    neg   • CHOLECYSTECTOMY LAPAROSCOPIC N/A 5/6/2022    Procedure: CHOLECYSTECTOMY LAPAROSCOPIC;  Surgeon: Simran Mckeon MD;  Location: [facility];  Service: General   • CYST REMOVAL  10/2020    lt breast    • EGD     • HYSTERECTOMY     • HYSTERECTOMY  1998   • LIPOSUCTION  09/2020   • TUBAL LIGATION       Social History   Social History     Substance and Sexual Activity   Alcohol Use Not Currently    Comment: ocaasional     Social History     Substance and Sexual Activity   Drug Use Never     Social History     Tobacco Use   Smoking Status Never   Smokeless Tobacco Never     Family History   Problem Relation Age of Onset   • Alzheimer's disease Mother    • Cancer Father         stomach/GI   • No Known Problems Sister    • No Known Problems Daughter    • No Known Problems Maternal Grandmother    • No Known Problems Paternal Grandmother    • Colon cancer Maternal Aunt    • Cancer Maternal Aunt         stomach   • No Known Problems Maternal Aunt    • No Known Problems Maternal Aunt    • No Known Problems Maternal Aunt    • No Known Problems Maternal Aunt    • No Known Problems Maternal Aunt    • Colon cancer Maternal Uncle    • Breast cancer Neg Hx        Meds/Allergies       Current Outpatient Medications:   •  albuterol (PROVENTIL HFA,VENTOLIN HFA) 90 mcg/act inhaler  •  Diclofenac Sodium (VOLTAREN) 1 %  •  dicyclomine (BENTYL) 20 mg tablet  •  famotidine (PEPCID) 20 mg "tablet  •  fluocinonide (LIDEX) 0 05 % external solution  •  FLUoxetine (PROzac) 40 MG capsule  •  fluticasone (FLONASE) 50 mcg/act nasal spray  •  hydrOXYzine HCL (ATARAX) 10 mg tablet  •  meclizine (ANTIVERT) 25 mg tablet  •  metoclopramide (REGLAN) 5 mg tablet  •  mometasone (ELOCON) 0 1 % lotion  •  EPINEPHrine (EPIPEN) 0 3 mg/0 3 mL SOAJ  •  omeprazole (PriLOSEC) 40 MG capsule  •  ondansetron (ZOFRAN-ODT) 4 mg disintegrating tablet    Allergies   Allergen Reactions   • Bee Venom Anaphylaxis   • Iodides Other (See Comments)   • [place] Oil - Food Allergy GI Intolerance     Other reaction(s): GI Intolerance, GI Reaction   • Aspirin Other (See Comments)     shaking  convulsions    Other reaction(s): Other (See Comments)  shaking  convulsions  shaking  convulsions   • Metronidazole GI Intolerance     Gi upset     • Sulfamethoxazole-Trimethoprim GI Intolerance     Gi upst    Other reaction(s): GI Intolerance, GI Reaction  Gi upst  Gi upst   • Tape  [Medical Tape] Other (See Comments) and Hives     fever  Other reaction(s): Other  fever           Objective     Blood pressure 108/68, pulse 77, height 5' 3\" (1 6 m), weight 66 1 kg (145 lb 12 8 oz), SpO2 98 %, not currently breastfeeding  Body mass index is 25 83 kg/m²  PHYSICAL EXAM:      General Appearance:   Alert, cooperative, no distress   HEENT:   Normocephalic, atraumatic, anicteric    Neck:  Supple, symmetrical, trachea midline   Lungs:   Clear to auscultation bilaterally; no rales, rhonchi or wheezing; respirations unlabored    Heart[de-identified]   Regular rate and rhythm; no murmur, rub, or gallop  Abdomen:   Soft, non-tender, non-distended; normal bowel sounds; no masses, no organomegaly    Genitalia:   Deferred    Rectal:   Deferred    Extremities:  No cyanosis, clubbing or edema    Pulses:  2+ and symmetric    Skin:  No jaundice   Lymph nodes:  No palpable cervical lymphadenopathy        Lab Results:   No visits with results within 1 Day(s) from this visit   " Latest known visit with results is:   Appointment on 06/02/2023   Component Date Value   • TSH 3RD GENERATON 06/02/2023 2 767    • T3, Free 06/02/2023 3 20    • Free T4 06/02/2023 0 96    • THYROID MICROSOMAL ANTIB* 06/02/2023 <9          Radiology Results:   No results found

## 2023-06-21 DIAGNOSIS — F32.9 REACTIVE DEPRESSION: ICD-10-CM

## 2023-06-21 DIAGNOSIS — F41.9 ANXIETY: ICD-10-CM

## 2023-06-21 RX ORDER — FLUOXETINE HYDROCHLORIDE 40 MG/1
40 CAPSULE ORAL DAILY
Qty: 90 CAPSULE | Refills: 0 | Status: SHIPPED | OUTPATIENT
Start: 2023-06-21 | End: 2023-12-18

## 2023-07-03 ENCOUNTER — OFFICE VISIT (OUTPATIENT)
Age: 48
End: 2023-07-03
Payer: COMMERCIAL

## 2023-07-03 VITALS
SYSTOLIC BLOOD PRESSURE: 116 MMHG | RESPIRATION RATE: 18 BRPM | WEIGHT: 147 LBS | HEART RATE: 77 BPM | HEIGHT: 63 IN | OXYGEN SATURATION: 99 % | TEMPERATURE: 98.6 F | BODY MASS INDEX: 26.05 KG/M2 | DIASTOLIC BLOOD PRESSURE: 74 MMHG

## 2023-07-03 DIAGNOSIS — J32.8 OTHER CHRONIC SINUSITIS: Primary | ICD-10-CM

## 2023-07-03 PROCEDURE — 3725F SCREEN DEPRESSION PERFORMED: CPT

## 2023-07-03 PROCEDURE — 99214 OFFICE O/P EST MOD 30 MIN: CPT

## 2023-07-03 RX ORDER — LORATADINE 10 MG/1
10 TABLET ORAL DAILY
COMMUNITY
Start: 2023-06-28

## 2023-07-03 RX ORDER — AMOXICILLIN AND CLAVULANATE POTASSIUM 875; 125 MG/1; MG/1
1 TABLET, FILM COATED ORAL 2 TIMES DAILY
COMMUNITY
Start: 2023-06-28 | End: 2023-07-03 | Stop reason: ALTCHOICE

## 2023-07-03 RX ORDER — CEPHALEXIN 500 MG/1
500 CAPSULE ORAL 2 TIMES DAILY
Qty: 14 CAPSULE | Refills: 0 | Status: SHIPPED | OUTPATIENT
Start: 2023-07-03 | End: 2023-07-10

## 2023-07-03 NOTE — PROGRESS NOTES
Name: Ana Pabon      : 1975      MRN: [id number]  Encounter Provider: KIRSTIN Tubbs  Encounter Date: 7/3/2023   Encounter department: [facility] [address] [address]. Other chronic sinusitis  Patient presents this visit with complaints of sinus pain, headache, states sinuses on fire. Patient states went to ENT last week, was given Augmentin however that is never worked in the past patient was taking the Augmentin and she stopped as the symptoms are just worsening. Discussed taking doxycycline, however patient is going to the beach and will be out a lot, does not want it; also states cefdinir has not worked in the past.  Patient states that she has taken Keflex and that seems to work better for her- even called her pharmacy during visit and that was what she took when it cleared. Will order the Keflex at this time, given the broad-spectrum; pt educated that Augmentin is the best choice however she said will no longer take the Augmentin, so will order the keflx. Instructed patient to go back to ENT for follow-up if symptoms worsen or fail to improve, states she is getting MRI of sinuses in 3 months. -     cephalexin (KEFLEX) 500 mg capsule; Take 1 capsule (500 mg total) by mouth 2 (two) times a day for 7 days     Subjective      Patient states she has been having sinus pain about little over a week. Patient went to ENT. Patient states was taking antibiotics but they are not working, has a pounding headache still and feels as if sinuses are on fire. Patient states has taken Keflex in the past and that seems to be the only thing that helps her. Sinus Problem  This is a recurrent problem. The current episode started in the past 7 days. The problem has been gradually worsening since onset. There has been no fever. The pain is moderate. Associated symptoms include congestion, headaches and sinus pressure.  Pertinent negatives include no chills, coughing, ear pain, neck pain, shortness of breath, sneezing or sore throat. Past treatments include antibiotics. The treatment provided no relief. Review of Systems   Constitutional: Negative for chills and fever. HENT: Positive for congestion and sinus pressure. Negative for ear pain, sneezing and sore throat. Eyes: Negative for pain and visual disturbance. Respiratory: Negative for cough and shortness of breath. Cardiovascular: Negative for chest pain and palpitations. Gastrointestinal: Negative for abdominal pain and vomiting. Genitourinary: Negative for dysuria and hematuria. Musculoskeletal: Negative for arthralgias, back pain and neck pain. Skin: Negative for color change and rash. Neurological: Positive for headaches. Negative for seizures and syncope. All other systems reviewed and are negative. Current Outpatient Medications on File Prior to Visit   Medication Sig   • albuterol (PROVENTIL HFA,VENTOLIN HFA) 90 mcg/act inhaler INHALE 1-2 PUFFS EVERY 6 HOURS AS NEEDED FOR WHEEZING. • Diclofenac Sodium (VOLTAREN) 1 % Apply 2 g topically 4 (four) times a day   • dicyclomine (BENTYL) 20 mg tablet Take 1 tablet (20 mg total) by mouth 4 (four) times a day as needed (IBS)   • famotidine (PEPCID) 20 mg tablet TAKE 1 TABLET BY MOUTH TWICE A DAY   • fluocinonide (LIDEX) 0.05 % external solution APPLY TWICE A DAY TO SCALP UNTIL RESOLVED   • FLUoxetine (PROzac) 40 MG capsule Take 1 capsule (40 mg total) by mouth daily   • fluticasone (FLONASE) 50 mcg/act nasal spray 1 spray into each nostril 2 (two) times a day   • hydrOXYzine HCL (ATARAX) 10 mg tablet TAKE 1 TABLET BY MOUTH EVERYDAY AT BEDTIME   • loratadine (CLARITIN) 10 mg tablet Take 10 mg by mouth daily   • meclizine (ANTIVERT) 25 mg tablet Take 1 tablet (25 mg total) by mouth every 8 (eight) hours as needed for dizziness   • metoclopramide (REGLAN) 5 mg tablet TAKE 1 TABLET BY MOUTH 3 TIMES A DAY BEFORE MEALS.    • mometasone (ELOCON) 0.1 % lotion APPLY TO AFFECTED AREA TOPICALLY EVERY DAY   • omeprazole (PriLOSEC) 40 MG capsule Take 1 capsule (40 mg total) by mouth daily   • [DISCONTINUED] amoxicillin-clavulanate (AUGMENTIN) 875-125 mg per tablet Take 1 tablet by mouth 2 (two) times a day   • EPINEPHrine (EPIPEN) 0.3 mg/0.3 mL SOAJ Inject 0.3 mL (0.3 mg total) into a muscle once for 1 dose As needed   • ondansetron (ZOFRAN-ODT) 4 mg disintegrating tablet Take 1 tablet (4 mg total) by mouth every 6 (six) hours as needed for nausea (Patient not taking: Reported on 6/2/2023)       Objective     /74 (BP Location: Left arm, Patient Position: Sitting, Cuff Size: Standard)   Pulse 77   Temp 98.6 °F (37 °C) (Tympanic)   Resp 18   Ht 5' 3" (1.6 m)   Wt 66.7 kg (147 lb)   SpO2 99%   BMI 26.04 kg/m²     Physical Exam  Vitals reviewed. Constitutional:       Appearance: Normal appearance. HENT:      Head: Normocephalic. Right Ear: Tympanic membrane normal.      Left Ear: Tympanic membrane normal.      Nose: No congestion or rhinorrhea. Right Turbinates: Not enlarged. Left Turbinates: Not enlarged. Right Sinus: Maxillary sinus tenderness and frontal sinus tenderness present. Left Sinus: Maxillary sinus tenderness and frontal sinus tenderness present. Mouth/Throat:      Mouth: Mucous membranes are moist.      Pharynx: No posterior oropharyngeal erythema. Eyes:      Extraocular Movements: Extraocular movements intact. Conjunctiva/sclera: Conjunctivae normal.      Pupils: Pupils are equal, round, and reactive to light. Cardiovascular:      Rate and Rhythm: Normal rate and regular rhythm. Pulses: Normal pulses. Heart sounds: Normal heart sounds. Pulmonary:      Effort: Pulmonary effort is normal. No respiratory distress. Breath sounds: Normal breath sounds. Abdominal:      General: Bowel sounds are normal.      Palpations: There is no mass.    Musculoskeletal:         General: Normal range of motion. Cervical back: Normal range of motion. Skin:     General: Skin is warm and dry. Neurological:      Mental Status: She is alert and oriented to person, place, and time. Psychiatric:         Mood and Affect: Mood normal.         Behavior: Behavior normal.         Thought Content:  Thought content normal.         Judgment: Judgment normal.       KIRSTIN Curran

## 2023-07-08 DIAGNOSIS — K31.84 GASTROPARESIS: ICD-10-CM

## 2023-07-10 DIAGNOSIS — B37.9 YEAST INFECTION: Primary | ICD-10-CM

## 2023-07-10 RX ORDER — METOCLOPRAMIDE 5 MG/1
TABLET ORAL
Qty: 90 TABLET | Refills: 1 | Status: SHIPPED | OUTPATIENT
Start: 2023-07-10

## 2023-07-10 RX ORDER — FLUCONAZOLE 150 MG/1
TABLET ORAL
Qty: 2 TABLET | Refills: 0 | Status: SHIPPED | OUTPATIENT
Start: 2023-07-10 | End: 2023-07-17

## 2023-07-17 ENCOUNTER — OFFICE VISIT (OUTPATIENT)
Age: 48
End: 2023-07-17
Payer: COMMERCIAL

## 2023-07-17 VITALS — HEIGHT: 63 IN | WEIGHT: 147 LBS | BODY MASS INDEX: 26.05 KG/M2

## 2023-07-17 DIAGNOSIS — L29.9 PRURITUS: ICD-10-CM

## 2023-07-17 DIAGNOSIS — L82.1 SEBORRHEIC KERATOSIS: ICD-10-CM

## 2023-07-17 DIAGNOSIS — D18.01 CHERRY ANGIOMA: ICD-10-CM

## 2023-07-17 DIAGNOSIS — Z13.89 SCREENING FOR SKIN CONDITION: Primary | ICD-10-CM

## 2023-07-17 DIAGNOSIS — D22.9 MULTIPLE NEVI: ICD-10-CM

## 2023-07-17 PROCEDURE — 99214 OFFICE O/P EST MOD 30 MIN: CPT | Performed by: DERMATOLOGY

## 2023-07-17 RX ORDER — KETOCONAZOLE 20 MG/ML
1 SHAMPOO TOPICAL 2 TIMES WEEKLY
Qty: 120 ML | Refills: 1 | Status: SHIPPED | OUTPATIENT
Start: 2023-07-17

## 2023-07-17 NOTE — PATIENT INSTRUCTIONS
MELANOCYTIC NEVI ("Moles")    Melanocytic nevi ("moles") are tan or brown, raised or flat areas of the skin which have an increased number of melanocytes. Melanocytes are the cells in our body which make pigment and account for skin color. Some moles are present at birth (I.e., "congenital nevi"), while others come up later in life (i.e., "acquired nevi"). The sun can stimulate the body to make more moles. Sunburns are not the only thing that triggers more moles. Chronic sun exposure can do it too. Clinically distinguishing a healthy mole from melanoma may be difficult, even for experienced dermatologists. The "ABCDE's" of moles have been suggested as a means of helping to alert a person to a suspicious mole and the possible increased risk of melanoma. The suggestions for raising alert are as follows:    Asymmetry: Healthy moles tend to be symmetric, while melanomas are often asymmetric. Asymmetry means if you draw a line through the mole, the two halves do not match in color, size, shape, or surface texture. Asymmetry can be a result of rapid enlargement of a mole, the development of a raised area on a previously flat lesion, scaling, ulceration, bleeding or scabbing within the mole. Any mole that starts to demonstrate "asymmetry" should be examined promptly by a board certified dermatologist.     Border: Healthy moles tend to have discrete, even borders. The border of a melanoma often blends into the normal skin and does not sharply delineate the mole from normal skin. Any mole that starts to demonstrate "uneven borders" should be examined promptly by a board certified dermatologist.     Color: Healthy moles tend to be one color throughout. Melanomas tend to be made up of different colors ranging from dark black, blue, white, or red.   Any mole that demonstrates a color change should be examined promptly by a board certified dermatologist.     Diameter: Healthy moles tend to be smaller than 0.6 cm in size; an exception are "congenital nevi" that can be larger. Melanomas tend to grow and can often be greater than 0.6 cm (1/4 of an inch, or the size of a pencil eraser). This is only a guideline, and many normal moles may be larger than 0.6 cm without being unhealthy. Any mole that starts to change in size (small to bigger or bigger to smaller) should be examined promptly by a board certified dermatologist.     Evolving: Healthy moles tend to "stay the same."  Melanomas may often show signs of change or evolution such as a change in size, shape, color, or elevation. Any mole that starts to itch, bleed, crust, burn, hurt, or ulcerate or demonstrate a change or evolution should be examined promptly by a board certified dermatologist.      Dysplastic Nevi  Dysplastic moles are moles that fit the ABCDE rules of melanoma but are not identified as melanomas when examined under the microscope. They may indicate an increased risk of melanoma in that person. If there is a family history of melanoma, most experts agree that the person may be at an increased risk for developing a melanoma. Experts still do not agree on what dysplastic moles mean in patients without a personal or family history of melanoma. Dysplastic moles are usually larger than common moles and have different colors within it with irregular borders. The appearance can be very similar to a melanoma. Biopsies of dysplastic moles may show abnormalities which are different from a regular mole. Melanoma  Malignant melanoma is a type of skin cancer that can be deadly if it spreads throughout the body. The incidence of melanoma in the [facility] is growing faster than any other cancer. Melanoma usually grows near the surface of the skin for a period of time, and then begins to grow deeper into the skin. Once it grows deeper into the skin, the risk of spread to other organs greatly increases.  Therefore, early detection and removal of a malignant melanoma may result in a better chance at a complete cure; removal after the tumor has spread may not be as effective, leading to worse clinical outcomes such as death. The true rate of nevus transformation into a melanoma is unknown. It has been estimated that the lifetime risk for any acquired melanocytic nevus on any 21year-old individual transforming into melanoma by age 80 is 0.03% (1 in 3,164) for men and 0.009% (1 in 10,800) for women. The appearance of a "new mole" remains one of the most reliable methods for identifying a malignant melanoma. Occasionally, melanomas appear as rapidly growing, blue-black, dome-shaped bumps within a previous mole or previous area of normal skin. Other times, melanomas are suspected when a mole suddenly appears or changes. Itching, burning, or pain in a pigmented lesion should increase suspicion, but most patients with early melanoma have no skin discomfort whatsoever. Melanoma can occur anywhere on the skin, including areas that are difficult for self-examination. Many melanomas are first noticed by other family members. Suspicious-looking moles may be removed for microscopic examination. You may be able to prevent death from melanoma by doing two simple things:    Try to avoid unnecessary sun exposure and protect your skin when it is exposed to the sun. People who live near the equator, people who have intermittent exposures to large amounts of sun, and people who have had sunburns in childhood or adolescence have an increased risk for melanoma. Sun sense and vigilant sun protection may be keys to helping to prevent melanoma. We recommend wearing UPF-rated sun protective clothing and sunglasses whenever possible and applying a moisturizer-sunscreen combination product (SPF 50+) such as Neutrogena Daily Defense to sun exposed areas of skin at least three times a day.     Have your moles regularly examined by a board certified dermatologist AND by yourself or a family member/friend at home. We recommend that you have your moles examined at least once a year by a board certified dermatologist.  Use your birthday as an annual reminder to have your "Birthday Suit" (I.e., your skin) examined; it is a nice birthday gift to yourself to know that your skin is healthy appearing! Additionally, at-home self examinations may be helpful for detecting a possible melanoma. Use the ABCDEs we discussed and check your moles once a month at home. SEBORRHEIC KERATOSIS  A seborrheic keratosis is a harmless warty spot that appears during adult life as a common sign of skin aging. Seborrheic keratoses can arise on any area of skin, covered or uncovered, with the usual exception of the palms and soles. They do not arise from mucous membranes. Seborrheic keratoses can have highly variable appearance. Seborrheic keratoses are extremely common. It has been estimated that over 90% of adults over the age of 61 years have one or more of them. They occur in males and females of all races, typically beginning to erupt in the 35s or 45s. They are uncommon under the age of 21 years. The precise cause of seborrhoeic keratoses is not known. Seborrhoeic keratoses are considered degenerative in nature. As time goes by, seborrheic keratoses tend to become more numerous. Some people inherit a tendency to develop a very large number of them; some people may have hundreds of them. The name "seborrheic keratosis" is misleading, because these lesions are not limited to a seborrhoeic distribution (scalp, mid-face, chest, upper back), nor are they formed from sebaceous glands, nor are they associated with sebum -- which is greasy. Seborrheic keratosis may also be called "SK," "Seb K," "basal cell papilloma," "senile wart," or "barnacle."      There is no easy way to remove multiple lesions on a single occasion.   Unless a specific lesion is "inflamed" and is causing pain or stinging/burning or is bleeding, most insurance companies do not authorize treatment. ANGIOMA ("CHERRY ANGIOMA")  Figueroa angiomas markedly increase in number from about the age of 36, so it has been estimated that 75% of people over 76years of age have them. Although they also called "senile angiomas," they can occur in young people too - 5% of adolescents have been found to have them. Cherry angiomas are very common in males and females of any age or race, with no difference in sexes or races affected. They are however more noticeable in white skin than in skin of colour. There may be a family history of similar lesions. Eruptive (very large number appearing in a short period of time) cherry angiomas have been rarely reported to be associated with internal malignancy and pregnancy. PRURITUS    Assessment and Plan:  Based on a thorough discussion of this condition and the management approach to it (including a comprehensive discussion of the known risks, side effects and potential benefits of treatment), the patient (family) agrees to implement the following specific plan:  Ketoconazole Shampoo - Wash scalp twice a week    What is pruritus? Pruritus is the medical term for itch. Itch is an unpleasant sensation on the skin that provokes the desire to rub or scratch the area to obtain relief. Itch can cause discomfort and frustration; in severe cases it can lead to disturbed sleep, anxiety and depression. Constant scratching to obtain relief can damage the skin (excoriation, lichenification) and reduce its effectiveness as a major protective barrier. Pruritus is often a symptom of an underlying disease process such as a skin problem, a systemic disease, or abnormal nerve impulses. What are skin signs of pruritus? There are no specific skin signs associated with pruritus, apart from scratch marks (excoriations) and signs of the underlying condition.    Persistent scratching over a period of time may lead to:  Lichenification (thickened skin, lichen simplex)   Prurigo papules and nodules. Who gets pruritus? The epidemiology of pruritus depends on its underlying cause or causes. However, in general, the incidence of chronic pruritus increases with age, it is more common in women, and in those of  background. Mechanisms underlying pruritus  Itch, like pain, can originate anywhere along the neural itch pathway, from the central nervous system (brain and spinal cord) to the peripheral nervous system and the skin. Mechanisms underlying pruritus are complex. The itch signal is transmitted mainly through small, itch-selective C-fibers in the skin in addition to histamine-triggered and non-histaminergic neurons. These connect with secondary neurons which cross the opposite side of the spinothalamic tract and ascend to parts of the brain involved in sensation, emotion, reward and memory. These areas overlap with those activated by pain. Patients with chronic pruritus usually have both peripheral and central hypersensitization (heightened reaction) which means they tend to overreact to noxious stimuli which normally inhibit itch (such as heat and scratching) and also misinterpret non-noxious stimuli as an itch (eg, light touch)    The way scratching stops itching has been explained by an interaction with pain pathways within the dorsal horn of the spinal cord. Localized pruritus  Localized pruritus is pruritus that is confined to a certain part of the body. It can occur in association with a primary rash (eg dermatitis) or may occur because of hypersensitive nerves in the skin (neuropathic pruritus). Neuropathic pruritus is due to compression or degeneration of nerves in the skin, on route to the spine or in the spine itself. Neuropathic itch is sometimes associated with reduced or absent sweating in the affected area of skin.   Typical causes of localized itchy rashes  Scalp: seborrhoeic dermatitis, head lice   Back: Arnulfo disease   Hands: pompholyx, irritant and/or allergic contact dermatitis   Genitals: vulvovaginal Candida albicans infection, lichen sclerosus   Legs: venous eczema   Feet: tinea pedis    Neuropathic causes of localized pruritus without primary rash  Face: trigeminal trophic syndrome   Hand: cheiralgia paraesthetica   Arm: brachioradial pruritus   Back: notalgia paraesthetica/topics/brachioradial-pruritus/   Genital: pruritus vulvae, pruritus ani   Dermatomal: herpes zoster (shingles) during recovery phase  Scratching a localised itch may lead to lichen simplex, prurigo or prurigo nodularis. Systemic causes of pruritus  Sytemic diseases may cause generalised pruritus. This is sometimes called metabolic itch. There is nothing wrong with the skin itself, at least until it's been scratched. Metabolic disorders include chronic renal failure (dialysis) and liver disease (with or without cholestasis). Uraemic pruritus arises in patients undergoing dialysis is due to a combination of xerosis (dry skin), secondary hyperparathyroidism, peripheral neuropathy (nerve changes) and inflammation. Secondary hyperparathyroidism which also occurs in dialysis patients leads to microprecipitation (deposition) of calcium and magnesium salts in the skin, triggering mast cell degeneration, releasing serotonin and histamine. Once chronic pruritus has occurred, there may be secondary changes in the nerves in the skin and central nervous system which heighten the sensation of itch. Hepatogenic pruritus is more common in intrahepatic than extrahepatic cholestasis. Examples of intrahepatic cholestasis is associated with chronic viral hepatitis, primary biliary cirrhosis, pregnancy-related cholestasis. Extra-hepatic cholestasis is associated with pressure on the bile ducts, eg from pancreatic tumours or pseudocysts.    Cholestasis is thought to release toxic substances from the liver, which stimulates neural itch fibres in the skin. Characteristically, cholestatic pruritus is most severe at night; it tends to affect the hands, feet and areas where clothes are rubbing on the skin. Haematological disorders include iron deficiency anaemia and polycythaemia vera. Generalized pruritus along with glossitis (tongue inflammation) and angular cheilitis (inflammation of mouth corners) are seen in iron deficiency anaemia. In polycythaemia vera, itch is usually precipitated by contact with water (aquagenic pruritus), eg after a shower. This is thought to be mediated by the effect of platelets, serotonin and prostaglandins. Endocrine disorders include thyroid disease and diabetes mellitus. In Graves' disease (thyrotoxicosis), increased blood flow, skin temperature and decreased itch threshold mediated by the increase in thyroid hormones, lead to the itch. Pruritus associated with myxoedema and hypothyroidism is rare, and if present, is more likely the result of xerosis (dry skin). In diabetes mellitus, localized itch tends to occur in the perianal/genital region usually due to Candida albicans or dermatophyte infections. It is unclear if metabolic abnormalities such as renal impairment, autonomic failure or diabetic neuropathy contribute to this. Paraneoplastic itch is associated with lymphoma, especially Hodgkin lymphoma, leukemia or a solid organ tumor (eg lung, colon, brain). In Hodgkin lymphoma, pruritus is thought to be caused by histamine release, which may be related to eosinophilia. Infections causing itch include human immunodeficiency virus infection (HIV) and hepatitis C virus. Patients with HIV commonly complain of itch. This may be associated with skin infections/infestations, dry skin, drug reactions, hyperoesinophilia (increased eosinophil levels) and cutaneous T cell lymphoma. There is a possible correlation between intractable pruritus and increased HIV viral load.    In chronic hepatitis C infection, the mechanisms responsible for itch remain unclear. In the absence of cholestasis, pruritus may be related to antiviral therapy; it has been noted to occur in patients treated with combination therapy (interferon celestino and ribavirin). Pruritic skin diseases  Pruritus is often a symptom of many skin diseases. Some of these are included in the following list.  Allergic contact dermatitis   Dry skin   Urticaria   Psoriasis   Atopic dermatitis   Folliculitis   Dermatitis herpetiformis   Lichen simplex   Lichen planus   Bullous pemphigoid   Lice   Scabies   Miliaria   Sunburn   Pityriasis rosea   Mycosis fungoides    Exposure-related pruritus  Pruritus may arise as a result of exposure to certain external factors. Allergens or irritants   Cold, which can cause 'winter itch'   A physical urticaria, such as dermographism   Aquagenic pruritus (itch on exposure to water)   Insects and infestations, eg scabies   Medications (topical or systemic) eg opioids, aspirin    Hormonal reasons for pruritus  About 2% of pregnant women have pruritus without any obvious dermatological cause. In some cases the itch is due to cholestasis (pooling of bile in the gall bladder and liver). It usually occurs in the 3rd trimester and is relieved after giving birth. Generalized itch is also a common symptom of menopause. How is pruritus diagnosed? The first steps of evaluation of an itchy patient are medical history and examination. A thorough history can identify constitutional symptoms that may point towards an underlying systemic disease. Drug triggers such as opioids may be identified, especially if the commencement of the drug relates to the itch. A careful examination can identify dermatological causes for the itch (eg scabies, lichen simplex, pemphigoid) or evidence of chronic skin changes related to the itch. In dermatological causes of pruritus, primary skin lesions will usually suggest the diagnosis.  Patients without primary skin lesions and little evidence of chronic scratching should be investigated for systemic, neuropathic and psychogenic causes. The panel of investigations could include:  Full/complete blood count   Creatinine and renal function tests   Liver function tests   Thyroid function tests   Erythrocyte sedimentation rate   Chest radiography   HIV serology    What treatment is available for itch? The management of pruritus relies on establishing the cause and then either removing or treating the cause to prevent further itching. In many cases, tests are necessary to determine the cause; while these are in progress, treatment to provide symptomatic relief of pruritus may be given. Topical treatments  In addition to specific therapy for any underlying skin or internal disease, topical treatment may include:  Wet dressings or tepid shower to cool the skin   Calamine lotion (contains phenol, which cools the skin): avoid on dry skin and limit use to a few days   Menthol/camphor lotion: gives a chilling sensation   Local anesthetics, such as pramoxine (also called pramocaine), applied to small itchy spots such as insect bites   Regular use of emollients, especially if skin is dry   Mild topical corticosteroids for short periods   Topical calcineurin inhibitors are also used to reduce itch associated with inflammatory skin conditions   Topical doxepin, a tricyclic antidepressant and antihistamine, is an antipruritic used in eczema. Other measures that can be useful in preventing pruritus include avoiding precipitating factors such as rough clothing or fabrics, overheating, and vasodilators if they provoke itching (eg, caffeine, alcohol, spices). Fingernails should be kept short and clean. If the urge to scratch is irresistible then rub the area with your palm. Topical antihistamines should not be used for chronic itch, as they may sensitize the skin and result in allergic contact dermatitis.     Systemic therapy  If pruritus is severe and sleep is disturbed, then treatment with oral medication may be necessary. Some drugs may help to relieve the itch whilst others are given solely for their sedative effects. Antihistamines are most useful in urticaria, in which histamine is released. Use for other pruritic conditions is not supported by randomized control trials. Sedating antihistamines may be used for their sedative effects. Doxepin and amitriptyline are tricyclic antidepressants have antipruritic action and act on the central and peripheral nervous systems. Tetracyclic antidepressants such as mirtazepine and selective serotonin reuptake inhibitors (paroxetine, sertraline, fluoxetine) may also help some patients with severe itch including when it is caused by cholestasis, T-cell lymphoma, malignancy or a neuropathic condition. Anti-epileptic drugs such as sodium valproate, gabapentin and pregabalin may also be of benefit to some patients, e.g., those with itch associated with renal failure or neuropathic itch. The mechanism of action is uncertain. Opioid antagonists such as butorphanol intranasal spray, naltrexone tablets, and naloxone have been effective in patients suffering from intractable pruritus in association with liver disease, atopic eczema and chronic urticaria. Nalfurafine, which is a kappa-opioid agonist has also been studied and shown to reduce itch associated with chronic renal impairment, however it is not widely available. Aspirin is sometimes effective if pruritus is mediated by kinins or prostaglandins and is noted to be effective in patients with pruritus due to polycythaemia vera. Note: aspirin may cause or aggravate itch in some patients. Thalidomide has been successful in treating nodular prurigo and chronic pruritus of various kinds but is rarely used because of serious adverse effects and expense.    Rifampicin is effective for patients with pruritus associated with cholestasis (some forms of liver disease). Isolated case reports in severe itch associated with malignancy have reported success with the NKR1 antagonist, aprepitant (normally used short-term for postoperative or chemotherapy-induced nausea). This is under investigation for neuropathic itch and nodular prurigo. Phototherapy  Broadband ultraviolet B or narrow-band UVB phototherapy alone, or in conjunction with UVA, has been shown to be helpful for pruritus associated with chronic kidney disease, psoriasis, atopic eczema and cutaneous T-cell lymphoma. Behavioral therapy  Behavioral therapy may be used in conjunction with pharmacotherapy to modify behaviours such as coping mechanisms and stress reduction, which help interrupt the itch-scratch cycle. One randomized controlled trial showed short-term benefits in reduction in itch frequency and scratching as well as improvement in coping mechanisms. What is the outcome for pruritus? The management of chronic severe itch is difficult and often requires the use of combination therapy over a long period of time. Identification and treatment of underlying conditions causing pruritus may help in this process. The symptom may quickly disappear or persist for long periods of time.

## 2023-07-17 NOTE — PROGRESS NOTES
[facility] Note     Patient Name: Pema Ortega  Encounter Date: July 17, 2023     Have you been cared for by a [facility] in the last 3 years and, if so, which description applies to you? Yes. I have been here within the last 3 years, and my medical history has NOT changed since that time. I am FEMALE/of child-bearing potential.    REVIEW OF SYSTEMS:  Have you recently had or currently have any of the following? · No changes in my recent health. PAST MEDICAL HISTORY:  Have you personally ever had or currently have any of the following? If "YES," then please provide more detail. · No changes in my medical history. FAMILY HISTORY:  Any "first degree relatives" (parent, brother, sister, or child) with the following? • No changes in my family's known health. PATIENT EXPERIENCE:    • Do you want the Dermatologist to perform a COMPLETE skin exam today including a clinical examination under the "bra and underwear" areas? Yes  • If necessary, do we have your permission to call and leave a detailed message on your Preferred Phone number that includes your specific medical information? Yes      Allergies   Allergen Reactions   • Bee Venom Anaphylaxis   • Iodides Other (See Comments)   • [place] Oil - Food Allergy GI Intolerance     Other reaction(s): GI Intolerance, GI Reaction   • Aspirin Other (See Comments)     shaking  convulsions    Other reaction(s): Other (See Comments)  shaking  convulsions  shaking  convulsions   • Metronidazole GI Intolerance     Gi upset     • Sulfamethoxazole-Trimethoprim GI Intolerance     Gi upst    Other reaction(s): GI Intolerance, GI Reaction  Gi upst  Gi upst   • Tape  [Medical Tape] Other (See Comments) and Hives     fever  Other reaction(s):  Other  fever      Current Outpatient Medications:   •  albuterol (PROVENTIL HFA,VENTOLIN HFA) 90 mcg/act inhaler, INHALE 1-2 PUFFS EVERY 6 HOURS AS NEEDED FOR WHEEZING., Disp: 18 g, Rfl: 3  • Diclofenac Sodium (VOLTAREN) 1 %, Apply 2 g topically 4 (four) times a day, Disp: 100 g, Rfl: 3  •  dicyclomine (BENTYL) 20 mg tablet, Take 1 tablet (20 mg total) by mouth 4 (four) times a day as needed (IBS), Disp: 30 tablet, Rfl: 0  •  famotidine (PEPCID) 20 mg tablet, TAKE 1 TABLET BY MOUTH TWICE A DAY, Disp: 60 tablet, Rfl: 3  •  fluocinonide (LIDEX) 0.05 % external solution, APPLY TWICE A DAY TO SCALP UNTIL RESOLVED, Disp: 60 mL, Rfl: 1  •  FLUoxetine (PROzac) 40 MG capsule, Take 1 capsule (40 mg total) by mouth daily, Disp: 90 capsule, Rfl: 0  •  fluticasone (FLONASE) 50 mcg/act nasal spray, 1 spray into each nostril 2 (two) times a day, Disp: 11.1 mL, Rfl: 2  •  hydrOXYzine HCL (ATARAX) 10 mg tablet, TAKE 1 TABLET BY MOUTH EVERYDAY AT BEDTIME, Disp: 30 tablet, Rfl: 1  •  loratadine (CLARITIN) 10 mg tablet, Take 10 mg by mouth daily, Disp: , Rfl:   •  meclizine (ANTIVERT) 25 mg tablet, Take 1 tablet (25 mg total) by mouth every 8 (eight) hours as needed for dizziness, Disp: 120 tablet, Rfl: 1  •  metoclopramide (REGLAN) 5 mg tablet, TAKE 1 TABLET BY MOUTH 3 TIMES A DAY BEFORE MEALS, Disp: 90 tablet, Rfl: 1  •  mometasone (ELOCON) 0.1 % lotion, APPLY TO AFFECTED AREA TOPICALLY EVERY DAY, Disp: 60 mL, Rfl: 3  •  omeprazole (PriLOSEC) 40 MG capsule, Take 1 capsule (40 mg total) by mouth daily, Disp: 30 capsule, Rfl: 2  •  EPINEPHrine (EPIPEN) 0.3 mg/0.3 mL SOAJ, Inject 0.3 mL (0.3 mg total) into a muscle once for 1 dose As needed, Disp: 0.3 mL, Rfl: 0  •  fluconazole (DIFLUCAN) 150 mg tablet, Take one now and second one in 72hrs (Patient not taking: Reported on 7/17/2023), Disp: 2 tablet, Rfl: 0  •  ondansetron (ZOFRAN-ODT) 4 mg disintegrating tablet, Take 1 tablet (4 mg total) by mouth every 6 (six) hours as needed for nausea (Patient not taking: Reported on 6/2/2023), Disp: 20 tablet, Rfl: 0          • Whom besides the patient is providing clinical information about today's encounter?   o NO ADDITIONAL HISTORIAN (patient alone provided history)    Physical Exam and Assessment/Plan by Diagnosis:    Chief complaint: Pt is a 48 y/o female present for routine skin exam, pt has itchy pimples in the scalp, Lidex solution does not help. Pt had Allergy blood work panel done which she states is elevated but cause is unknown. She has no personal or family history of skin cancer. 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 last year which showed a spongiotic process    MELANOCYTIC NEVI ("Moles")    Physical Exam:  • Anatomic Location Affected: Mostly on sun-exposed areas of the trunk and extremities  • Morphological Description:  Scattered, 1-4mm round to ovoid, symmetrical-appearing, even bordered, skin colored to dark brown macules/papules, mostly in sun-exposed areas  • Pertinent Positives:  • Pertinent Negatives: Additional History of Present Condition:  Present on exam    Assessment and Plan:  Based on a thorough discussion of this condition and the management approach to it (including a comprehensive discussion of the known risks, side effects and potential benefits of treatment), the patient (family) agrees to implement the following specific plan:  • Provided handout with information regarding the ABCDE's of moles   • Recommend routine skin exams every year   • Sun avoidance, protective clothing (known as UPF clothing), and the use of at least SPF 30 sunscreens is advised. Sunscreen should be reapplied every two hours when outside. • Pt also admits to using a tanning bed every other day to a minimum of once a week     SEBORRHEIC KERATOSIS; NON-INFLAMED    Physical Exam:  • Anatomic Location Affected:  scattered across trunk, extremities  • Morphological Description:  Flat and raised, waxy, smooth to warty textured, yellow to brownish-grey to dark brown to blackish, discrete, "stuck-on" appearing papules. • Pertinent Positives:  • Pertinent Negatives:     Additional History of Present Condition:  Patient reports new bumps on the skin. Denies itch, burn, pain, bleeding or ulceration. Present constantly; nothing seems to make it worse or better. No prior treatment. Assessment and Plan:  Based on a thorough discussion of this condition and the management approach to it (including a comprehensive discussion of the known risks, side effects and potential benefits of treatment), the patient (family) agrees to implement the following specific plan:  • Reassured benign      ANGIOMA ("CHERRY ANGIOMA")    Physical Exam:  • Anatomic Location: scattered across sun exposed areas of the trunk and extremities   • Morphologic Description: Firm red to reddish-blue discrete papules  • Pertinent Positives:  • Pertinent Negatives: Additional History of Present Condition:  Present on exam.     Assessment and Plan:  • Reassured benign    PRURITUS    Physical Exam:  • Anatomic Location Affected:  Scalp  • Morphological Description:  Generalized itching on scalp  • Pertinent Positives: No distinct rash papules noted on the elbows  • Pertinent Negatives: Additional History of Present Condition:  Present on exam    Assessment and Plan:  Based on a thorough discussion of this condition and the management approach to it (including a comprehensive discussion of the known risks, side effects and potential benefits of treatment), the patient (family) agrees to implement the following specific plan:  • Ketoconazole Shampoo - Wash scalp twice a week    What is pruritus? Pruritus is the medical term for itch. Itch is an unpleasant sensation on the skin that provokes the desire to rub or scratch the area to obtain relief. Itch can cause discomfort and frustration; in severe cases it can lead to disturbed sleep, anxiety and depression. Constant scratching to obtain relief can damage the skin (excoriation, lichenification) and reduce its effectiveness as a major protective barrier.      Pruritus is often a symptom of an underlying disease process such as a skin problem, a systemic disease, or abnormal nerve impulses. What are skin signs of pruritus? There are no specific skin signs associated with pruritus, apart from scratch marks (excoriations) and signs of the underlying condition. Persistent scratching over a period of time may lead to:  • Lichenification (thickened skin, lichen simplex)   • Prurigo papules and nodules. Who gets pruritus? The epidemiology of pruritus depends on its underlying cause or causes. However, in general, the incidence of chronic pruritus increases with age, it is more common in women, and in those of  background. Mechanisms underlying pruritus  Itch, like pain, can originate anywhere along the neural itch pathway, from the central nervous system (brain and spinal cord) to the peripheral nervous system and the skin. Mechanisms underlying pruritus are complex. • The itch signal is transmitted mainly through small, itch-selective C-fibers in the skin in addition to histamine-triggered and non-histaminergic neurons. • These connect with secondary neurons which cross the opposite side of the spinothalamic tract and ascend to parts of the brain involved in sensation, emotion, reward and memory. These areas overlap with those activated by pain. • Patients with chronic pruritus usually have both peripheral and central hypersensitization (heightened reaction) which means they tend to overreact to noxious stimuli which normally inhibit itch (such as heat and scratching) and also misinterpret non-noxious stimuli as an itch (eg, light touch)    The way scratching stops itching has been explained by an interaction with pain pathways within the dorsal horn of the spinal cord. Localized pruritus  Localized pruritus is pruritus that is confined to a certain part of the body.  It can occur in association with a primary rash (eg dermatitis) or may occur because of hypersensitive nerves in the skin (neuropathic pruritus). Neuropathic pruritus is due to compression or degeneration of nerves in the skin, on route to the spine or in the spine itself. Neuropathic itch is sometimes associated with reduced or absent sweating in the affected area of skin. Typical causes of localized itchy rashes  • Scalp: seborrhoeic dermatitis, head lice   • Back: Arnulfo disease   • Hands: pompholyx, irritant and/or allergic contact dermatitis   • Genitals: vulvovaginal Candida albicans infection, lichen sclerosus   • Legs: venous eczema   • Feet: tinea pedis    Neuropathic causes of localized pruritus without primary rash  • Face: trigeminal trophic syndrome   • Hand: cheiralgia paraesthetica   • Arm: brachioradial pruritus   • Back: notalgia paraesthetica/topics/brachioradial-pruritus/   • Genital: pruritus vulvae, pruritus ani   • Dermatomal: herpes zoster (shingles) during recovery phase  Scratching a localised itch may lead to lichen simplex, prurigo or prurigo nodularis. Systemic causes of pruritus  Sytemic diseases may cause generalised pruritus. This is sometimes called metabolic itch. There is nothing wrong with the skin itself, at least until it's been scratched. Metabolic disorders include chronic renal failure (dialysis) and liver disease (with or without cholestasis). Uraemic pruritus arises in patients undergoing dialysis is due to a combination of xerosis (dry skin), secondary hyperparathyroidism, peripheral neuropathy (nerve changes) and inflammation. • Secondary hyperparathyroidism which also occurs in dialysis patients leads to microprecipitation (deposition) of calcium and magnesium salts in the skin, triggering mast cell degeneration, releasing serotonin and histamine. • Once chronic pruritus has occurred, there may be secondary changes in the nerves in the skin and central nervous system which heighten the sensation of itch. Hepatogenic pruritus is more common in intrahepatic than extrahepatic cholestasis. Examples of intrahepatic cholestasis is associated with chronic viral hepatitis, primary biliary cirrhosis, pregnancy-related cholestasis. Extra-hepatic cholestasis is associated with pressure on the bile ducts, eg from pancreatic tumours or pseudocysts. • Cholestasis is thought to release toxic substances from the liver, which stimulates neural itch fibres in the skin. • Characteristically, cholestatic pruritus is most severe at night; it tends to affect the hands, feet and areas where clothes are rubbing on the skin. Haematological disorders include iron deficiency anaemia and polycythaemia vera. • Generalized pruritus along with glossitis (tongue inflammation) and angular cheilitis (inflammation of mouth corners) are seen in iron deficiency anaemia. • In polycythaemia vera, itch is usually precipitated by contact with water (aquagenic pruritus), eg after a shower. This is thought to be mediated by the effect of platelets, serotonin and prostaglandins. Endocrine disorders include thyroid disease and diabetes mellitus. • In Graves' disease (thyrotoxicosis), increased blood flow, skin temperature and decreased itch threshold mediated by the increase in thyroid hormones, lead to the itch. Pruritus associated with myxoedema and hypothyroidism is rare, and if present, is more likely the result of xerosis (dry skin). • In diabetes mellitus, localized itch tends to occur in the perianal/genital region usually due to Candida albicans or dermatophyte infections. It is unclear if metabolic abnormalities such as renal impairment, autonomic failure or diabetic neuropathy contribute to this. Paraneoplastic itch is associated with lymphoma, especially Hodgkin lymphoma, leukemia or a solid organ tumor (eg lung, colon, brain). • In Hodgkin lymphoma, pruritus is thought to be caused by histamine release, which may be related to eosinophilia.     Infections causing itch include human immunodeficiency virus infection (HIV) and hepatitis C virus. • Patients with HIV commonly complain of itch. This may be associated with skin infections/infestations, dry skin, drug reactions, hyperoesinophilia (increased eosinophil levels) and cutaneous T cell lymphoma. There is a possible correlation between intractable pruritus and increased HIV viral load. • In chronic hepatitis C infection, the mechanisms responsible for itch remain unclear. In the absence of cholestasis, pruritus may be related to antiviral therapy; it has been noted to occur in patients treated with combination therapy (interferon celestino and ribavirin). Pruritic skin diseases  Pruritus is often a symptom of many skin diseases. Some of these are included in the following list.  • Allergic contact dermatitis   • Dry skin   • Urticaria   • Psoriasis   • Atopic dermatitis   • Folliculitis   • Dermatitis herpetiformis   • Lichen simplex   • Lichen planus   • Bullous pemphigoid   • Lice   • Scabies   • Miliaria   • Sunburn   • Pityriasis rosea   • Mycosis fungoides    Exposure-related pruritus  Pruritus may arise as a result of exposure to certain external factors. • Allergens or irritants   • Cold, which can cause 'winter itch'   • A physical urticaria, such as dermographism   • Aquagenic pruritus (itch on exposure to water)   • Insects and infestations, eg scabies   • Medications (topical or systemic) eg opioids, aspirin    Hormonal reasons for pruritus  About 2% of pregnant women have pruritus without any obvious dermatological cause. In some cases the itch is due to cholestasis (pooling of bile in the gall bladder and liver). It usually occurs in the 3rd trimester and is relieved after giving birth. Generalized itch is also a common symptom of menopause. How is pruritus diagnosed? The first steps of evaluation of an itchy patient are medical history and examination.   A thorough history can identify constitutional symptoms that may point towards an underlying systemic disease. Drug triggers such as opioids may be identified, especially if the commencement of the drug relates to the itch. A careful examination can identify dermatological causes for the itch (eg scabies, lichen simplex, pemphigoid) or evidence of chronic skin changes related to the itch. In dermatological causes of pruritus, primary skin lesions will usually suggest the diagnosis. Patients without primary skin lesions and little evidence of chronic scratching should be investigated for systemic, neuropathic and psychogenic causes. The panel of investigations could include:  • Full/complete blood count   • Creatinine and renal function tests   • Liver function tests   • Thyroid function tests   • Erythrocyte sedimentation rate   • Chest radiography   • HIV serology    What treatment is available for itch? The management of pruritus relies on establishing the cause and then either removing or treating the cause to prevent further itching. In many cases, tests are necessary to determine the cause; while these are in progress, treatment to provide symptomatic relief of pruritus may be given. Topical treatments  In addition to specific therapy for any underlying skin or internal disease, topical treatment may include:  • Wet dressings or tepid shower to cool the skin   • Calamine lotion (contains phenol, which cools the skin): avoid on dry skin and limit use to a few days   • Menthol/camphor lotion: gives a chilling sensation   • Local anesthetics, such as pramoxine (also called pramocaine), applied to small itchy spots such as insect bites   • Regular use of emollients, especially if skin is dry   • Mild topical corticosteroids for short periods   • Topical calcineurin inhibitors are also used to reduce itch associated with inflammatory skin conditions   • Topical doxepin, a tricyclic antidepressant and antihistamine, is an antipruritic used in eczema.     Other measures that can be useful in preventing pruritus include avoiding precipitating factors such as rough clothing or fabrics, overheating, and vasodilators if they provoke itching (eg, caffeine, alcohol, spices). Fingernails should be kept short and clean. If the urge to scratch is irresistible then rub the area with your palm. Topical antihistamines should not be used for chronic itch, as they may sensitize the skin and result in allergic contact dermatitis. Systemic therapy  If pruritus is severe and sleep is disturbed, then treatment with oral medication may be necessary. Some drugs may help to relieve the itch whilst others are given solely for their sedative effects. • Antihistamines are most useful in urticaria, in which histamine is released. Use for other pruritic conditions is not supported by randomized control trials. Sedating antihistamines may be used for their sedative effects. • Doxepin and amitriptyline are tricyclic antidepressants have antipruritic action and act on the central and peripheral nervous systems. • Tetracyclic antidepressants such as mirtazepine and selective serotonin reuptake inhibitors (paroxetine, sertraline, fluoxetine) may also help some patients with severe itch including when it is caused by cholestasis, T-cell lymphoma, malignancy or a neuropathic condition. • Anti-epileptic drugs such as sodium valproate, gabapentin and pregabalin may also be of benefit to some patients, e.g., those with itch associated with renal failure or neuropathic itch. The mechanism of action is uncertain. • Opioid antagonists such as butorphanol intranasal spray, naltrexone tablets, and naloxone have been effective in patients suffering from intractable pruritus in association with liver disease, atopic eczema and chronic urticaria. Nalfurafine, which is a kappa-opioid agonist has also been studied and shown to reduce itch associated with chronic renal impairment, however it is not widely available.    • Aspirin is sometimes effective if pruritus is mediated by kinins or prostaglandins and is noted to be effective in patients with pruritus due to polycythaemia vera. Note: aspirin may cause or aggravate itch in some patients. • Thalidomide has been successful in treating nodular prurigo and chronic pruritus of various kinds but is rarely used because of serious adverse effects and expense. • Rifampicin is effective for patients with pruritus associated with cholestasis (some forms of liver disease). Isolated case reports in severe itch associated with malignancy have reported success with the NKR1 antagonist, aprepitant (normally used short-term for postoperative or chemotherapy-induced nausea). This is under investigation for neuropathic itch and nodular prurigo. Phototherapy  Broadband ultraviolet B or narrow-band UVB phototherapy alone, or in conjunction with UVA, has been shown to be helpful for pruritus associated with chronic kidney disease, psoriasis, atopic eczema and cutaneous T-cell lymphoma. Behavioral therapy  Behavioral therapy may be used in conjunction with pharmacotherapy to modify behaviours such as coping mechanisms and stress reduction, which help interrupt the itch-scratch cycle. One randomized controlled trial showed short-term benefits in reduction in itch frequency and scratching as well as improvement in coping mechanisms. What is the outcome for pruritus? The management of chronic severe itch is difficult and often requires the use of combination therapy over a long period of time. Identification and treatment of underlying conditions causing pruritus may help in this process. The symptom may quickly disappear or persist for long periods of time.     Scribe Attestation    I,:  Elie Macias am acting as a scribe while in the presence of the attending physician.:       I,:  Rc Fuentes MD personally performed the services described in this documentation    as scribed in my presence.:

## 2023-07-25 ENCOUNTER — TELEPHONE (OUTPATIENT)
Age: 48
End: 2023-07-25

## 2023-07-25 NOTE — TELEPHONE ENCOUNTER
----- Message from Jurgen Contreras sent at 7/25/2023 12:56 PM EDT -----  Regarding: Shampoo   Contact: [phone]  I was on Zyrtec now I’m on Claritin and it’s still the same itching

## 2023-07-25 NOTE — TELEPHONE ENCOUNTER
----- Message from Merle Caller sent at 7/25/2023  7:59 AM EDT -----  Regarding: Shampoo   Contact: [phone]  That shampoo u ordered is not working. I’ve used it 3 times so far and my scalp still has pimples and itchy bad. Is there any other shampoo that u can order I can try?  Thank u

## 2023-07-26 NOTE — PROGRESS NOTES
Pain Medicine Follow-Up Note    Assessment:  1. Chronic pain syndrome    2. Myofascial pain syndrome    3. Cervical radiculopathy    4. Neck pain        Plan:  Orders Placed This Encounter   Procedures   • MRI cervical spine without contrast     Standing Status:   Future     Standing Expiration Date:   7/28/2027     Scheduling Instructions: There is no preparation for this test. Please leave your jewelry and valuables at home, wedding rings are the exception. All patients will be required to change into a hospital gown and pants. Street clothes are not permitted in the MRI. Magnetic nail polish must be removed prior to arrival for your test. Please bring your insurance cards, a form of photo ID and a list of your medications with you. Arrive 15 minutes prior to your appointment time in order to register. Please bring any prior CT or MRI studies of this area that were not performed at a [facility]. To schedule this appointment, please contact Central Scheduling at [phone]. Prior to your appointment, please make sure you complete the MRI Screening Form when you e-Check in for your appointment. This will be available starting 7 days before your appointment in [address] [place] [address]. You may receive an e-mail with an activation code if you do not have a [facility] account. If you do not have access to a device, we will complete your screening at your appointment. Order Specific Question:   What is the patient's sedation requirement? If Medication for Claustrophobia is selected, order medication at this point. Answer:   No Sedation     Order Specific Question:   Is the patient pregnant? Answer:   No     Order Specific Question:   Does this procedure require the 3T MRI at [facility] or [place]?     Answer:   No     Order Specific Question:   Release to patient through [facility]     Answer:   Immediate     Order Specific Question:   Is order priority selected as STAT?      Answer:   No     Order Specific Question:   Reason for Exam (FREE TEXT)     Answer:   neck pain with radiculopathy after doing conservative management       New Medications Ordered This Visit   Medications   • tiZANidine (ZANAFLEX) 4 mg tablet     Sig: Take 1 tablet (4 mg total) by mouth every 8 (eight) hours as needed for muscle spasms May split tablets in half if experiencing drowiness     Dispense:  60 tablet     Refill:  0       My impressions and treatment recommendations were discussed in detail with the patient who verbalized understanding and had no further questions. Patient presents today with worsening neck pain that radiates into her bilateral shoulders. Patient has had this pain for approximately 3 months and has been going to a chiropractor for over a month, which she feels has worsened her neck pain. Since the patient has trialed conservative measures and still has significant neck pain I recommend that she have an MRI of her cervical spine. On exam the patient does have tenderness along her paraspinal/trapezius muscles bilaterally therefore I feel she would benefit from trialing a muscle relaxer. Tizanidine 4 mg tablet patient may take 1 tablet every 8 hours as needed for pain/muscle spasms she was educated that she may split these tablets in half if it causes her to have daytime drowsiness. Patient suffers from gastroparesis and avoids NSAIDs patient states she will continue to trial Tylenol arthritis. Patient will follow-up after MRI results are in. Follow-up is planned in as needed after MRI time or sooner as warranted. Discharge instructions were provided. I personally saw and examined the patient and I agree with the above discussed plan of care. History of Present Illness:    Pema Ortega is a 49 y.o. female who presents to [address] [place] [address] and [facility] for interval re-evaluation of the above stated pain complaints.  The patient has a past medical and chronic pain history as outlined in the assessment section. She was last seen on 9/2/2021. At today's visit patient states that their pain symptoms are worse with a pain score of 5/10 on the verbal numeric pain scale. The patient's pain is worse in the morning. The patient's pain is constant in nature. And the quality of the patient's pain is described as dull-aching, sharp, throbbing, cramping, pressure-like and shooting. The patient's pain is located in the posterior neck and bilateral shoulders, mid upper back, and low back. Other than as stated above, the patient denies any interval changes in medications, medical condition, mental condition, symptoms, or allergies since the last office visit. Review of Systems:    Review of Systems   Respiratory: Negative for shortness of breath. Cardiovascular: Negative for chest pain. Gastrointestinal: Positive for nausea. Negative for constipation, diarrhea and vomiting. Musculoskeletal: Positive for arthralgias. Negative for gait problem, joint swelling and myalgias. Decreased ROM      Skin: Negative for rash. Neurological: Positive for dizziness. Negative for seizures and weakness. All other systems reviewed and are negative.         Past Medical History:   Diagnosis Date   • Asthma    • Degenerative joint disease    • Depression    • GERD (gastroesophageal reflux disease)    • Lumbar radiculopathy    • PONV (postoperative nausea and vomiting)    • Thyroid nodule        Past Surgical History:   Procedure Laterality Date   • BREAST BIOPSY Left 2020    neg   • CHOLECYSTECTOMY LAPAROSCOPIC N/A 5/6/2022    Procedure: CHOLECYSTECTOMY LAPAROSCOPIC;  Surgeon: Canelo Crowder MD;  Location: [facility] [place];  Service: General   • CYST REMOVAL  10/2020    lt breast    • EGD     • HYSTERECTOMY     • HYSTERECTOMY  1998   • LIPOSUCTION  09/2020   • TUBAL LIGATION         Family History   Problem Relation Age of Onset   • Alzheimer's disease Mother    • Cancer Father         stomach/GI   • No Known Problems Sister    • No Known Problems Daughter    • No Known Problems Maternal Grandmother    • No Known Problems Paternal Grandmother    • Colon cancer Maternal Aunt    • Cancer Maternal Aunt         stomach   • No Known Problems Maternal Aunt    • No Known Problems Maternal Aunt    • No Known Problems Maternal Aunt    • No Known Problems Maternal Aunt    • No Known Problems Maternal Aunt    • Colon cancer Maternal Uncle    • Breast cancer Neg Hx        Social History     Occupational History   • Not on file   Tobacco Use   • Smoking status: Never   • Smokeless tobacco: Never   Vaping Use   • Vaping Use: Never used   Substance and Sexual Activity   • Alcohol use: Not Currently     Comment: ocaasional   • Drug use: Never   • Sexual activity: Yes     Partners: Male     Birth control/protection: None         Current Outpatient Medications:   •  albuterol (PROVENTIL HFA,VENTOLIN HFA) 90 mcg/act inhaler, INHALE 1-2 PUFFS EVERY 6 HOURS AS NEEDED FOR WHEEZING., Disp: 18 g, Rfl: 3  •  Diclofenac Sodium (VOLTAREN) 1 %, Apply 2 g topically 4 (four) times a day, Disp: 100 g, Rfl: 3  •  dicyclomine (BENTYL) 20 mg tablet, Take 1 tablet (20 mg total) by mouth 4 (four) times a day as needed (IBS), Disp: 30 tablet, Rfl: 0  •  famotidine (PEPCID) 20 mg tablet, TAKE 1 TABLET BY MOUTH TWICE A DAY, Disp: 60 tablet, Rfl: 3  •  fluocinonide (LIDEX) 0.05 % external solution, APPLY TWICE A DAY TO SCALP UNTIL RESOLVED, Disp: 60 mL, Rfl: 1  •  FLUoxetine (PROzac) 40 MG capsule, Take 1 capsule (40 mg total) by mouth daily, Disp: 90 capsule, Rfl: 0  •  fluticasone (FLONASE) 50 mcg/act nasal spray, 1 spray into each nostril 2 (two) times a day, Disp: 11.1 mL, Rfl: 2  •  hydrOXYzine HCL (ATARAX) 10 mg tablet, TAKE 1 TABLET BY MOUTH EVERYDAY AT BEDTIME, Disp: 30 tablet, Rfl: 1  •  ketoconazole (NIZORAL) 2 % shampoo, Apply 1 Application topically 2 (two) times a week, Disp: 120 mL, Rfl: 1  •  loratadine (CLARITIN) 10 mg tablet, Take 10 mg by mouth daily, Disp: , Rfl:   •  meclizine (ANTIVERT) 25 mg tablet, Take 1 tablet (25 mg total) by mouth every 8 (eight) hours as needed for dizziness, Disp: 120 tablet, Rfl: 1  •  metoclopramide (REGLAN) 5 mg tablet, TAKE 1 TABLET BY MOUTH 3 TIMES A DAY BEFORE MEALS, Disp: 90 tablet, Rfl: 1  •  mometasone (ELOCON) 0.1 % lotion, APPLY TO AFFECTED AREA TOPICALLY EVERY DAY, Disp: 60 mL, Rfl: 3  •  omeprazole (PriLOSEC) 40 MG capsule, Take 1 capsule (40 mg total) by mouth daily, Disp: 30 capsule, Rfl: 2  •  tiZANidine (ZANAFLEX) 4 mg tablet, Take 1 tablet (4 mg total) by mouth every 8 (eight) hours as needed for muscle spasms May split tablets in half if experiencing drowiness, Disp: 60 tablet, Rfl: 0  •  EPINEPHrine (EPIPEN) 0.3 mg/0.3 mL SOAJ, Inject 0.3 mL (0.3 mg total) into a muscle once for 1 dose As needed, Disp: 0.3 mL, Rfl: 0  •  ondansetron (ZOFRAN-ODT) 4 mg disintegrating tablet, Take 1 tablet (4 mg total) by mouth every 6 (six) hours as needed for nausea (Patient not taking: Reported on 6/2/2023), Disp: 20 tablet, Rfl: 0    Allergies   Allergen Reactions   • Bee Venom Anaphylaxis   • Iodides Other (See Comments)   • Ama Oil - Food Allergy GI Intolerance     Other reaction(s): GI Intolerance, GI Reaction   • Aspirin Other (See Comments)     shaking  convulsions    Other reaction(s): Other (See Comments)  shaking  convulsions  shaking  convulsions   • Metronidazole GI Intolerance     Gi upset     • Sulfamethoxazole-Trimethoprim GI Intolerance     Gi upst    Other reaction(s): GI Intolerance, GI Reaction  Gi upst  Gi upst   • Tape  [Medical Tape] Other (See Comments) and Hives     fever  Other reaction(s):  Other  fever       Physical Exam:    /74 (BP Location: Right arm, Patient Position: Sitting, Cuff Size: Standard)   Pulse 68   Ht 5' 3" (1.6 m)   Wt 68.3 kg (150 lb 9.6 oz)   BMI 26.68 kg/m²     Constitutional:normal, well developed, well nourished, alert, in no distress and non-toxic and no overt pain behavior. Eyes:anicteric  HEENT:grossly intact  Neck:TTP bilateral paraspinal cervical muscles/trapezius muscles, limited range of motion  Pulmonary:even and unlabored  Cardiovascular:No edema or pitting edema present  Skin:Normal without rashes or lesions and well hydrated  Psychiatric:Mood and affect appropriate  Neurologic:Cranial Nerves II-XII grossly intact  Musculoskeletal:antalgic     Cervical Spine Exam    Appearance:  Normal lordosis  Palpation/Tenderness:  left cervical paraspinal tenderness  right cervical paraspinal tenderness  left trapezium tenderness  right trapezium tenderness right more tender than the left  Sensory:  no sensory deficits noted patient states at times depending on her position she does experience numbness and tingling down her bilateral arms  Range of Motion:  Flexion:  No limitation  without pain  Extension:  Moderately limited  with pain  Lateral Flexion - Left:  Minimally limited  with pain  Lateral Flexion - Right:  Moderately limited  with pain  Rotation - Left:  Minimally limited  with pain  Rotation - Right:  Moderately limited  with pain  Motor Strength:  Left Arm Flexion  5/5  Left Arm Extension  5/5  Right Arm Flexion  5/5  Right Arm Extension  5/5  Left Wrist Flexion  5/5  Left Wrist Extension  5/5   Right wrist flexion 5/5  Right wrist extension 5/5  Special Tests:  Left Spurlings:  negative  Right Spurlings  negative      Imaging  MRI cervical spine without contrast    (Results Pending)         Orders Placed This Encounter   Procedures   • MRI cervical spine without contrast       This document was created using speech voice recognition software. Grammatical errors, random word insertions, pronoun errors, and incomplete sentences are an occasional consequence of this system due to software limitations, ambient noise, and hardware issues.    Any formal questions or concerns about content, text, or information contained within the body of this dictation should be directly addressed to the provider for clarification.

## 2023-07-28 ENCOUNTER — OFFICE VISIT (OUTPATIENT)
Dept: PAIN MEDICINE | Facility: CLINIC | Age: 48
End: 2023-07-28
Payer: COMMERCIAL

## 2023-07-28 ENCOUNTER — HOSPITAL ENCOUNTER (EMERGENCY)
Facility: HOSPITAL | Age: 48
Discharge: HOME | End: 2023-07-28
Attending: EMERGENCY MEDICINE | Admitting: EMERGENCY MEDICINE
Payer: COMMERCIAL

## 2023-07-28 VITALS
WEIGHT: 150.6 LBS | DIASTOLIC BLOOD PRESSURE: 74 MMHG | SYSTOLIC BLOOD PRESSURE: 123 MMHG | HEART RATE: 68 BPM | HEIGHT: 63 IN | BODY MASS INDEX: 26.68 KG/M2

## 2023-07-28 VITALS
DIASTOLIC BLOOD PRESSURE: 60 MMHG | TEMPERATURE: 98.3 F | HEART RATE: 75 BPM | OXYGEN SATURATION: 99 % | RESPIRATION RATE: 17 BRPM | WEIGHT: 147 LBS | BODY MASS INDEX: 26.05 KG/M2 | HEIGHT: 63 IN | SYSTOLIC BLOOD PRESSURE: 127 MMHG

## 2023-07-28 DIAGNOSIS — M54.12 CERVICAL RADICULOPATHY: ICD-10-CM

## 2023-07-28 DIAGNOSIS — M79.18 MYOFASCIAL PAIN SYNDROME: ICD-10-CM

## 2023-07-28 DIAGNOSIS — S61.411A LACERATION OF RIGHT HAND WITHOUT FOREIGN BODY, INITIAL ENCOUNTER: Primary | ICD-10-CM

## 2023-07-28 DIAGNOSIS — M54.2 NECK PAIN: ICD-10-CM

## 2023-07-28 DIAGNOSIS — G89.4 CHRONIC PAIN SYNDROME: Primary | ICD-10-CM

## 2023-07-28 PROCEDURE — 99283 EMERGENCY DEPT VISIT LOW MDM: CPT

## 2023-07-28 PROCEDURE — 99214 OFFICE O/P EST MOD 30 MIN: CPT

## 2023-07-28 PROCEDURE — 12002 RPR S/N/AX/GEN/TRNK2.6-7.5CM: CPT | Performed by: NURSE PRACTITIONER

## 2023-07-28 PROCEDURE — 99284 EMERGENCY DEPT VISIT MOD MDM: CPT | Performed by: NURSE PRACTITIONER

## 2023-07-28 RX ORDER — TIZANIDINE 4 MG/1
4 TABLET ORAL EVERY 8 HOURS PRN
Qty: 60 TABLET | Refills: 0 | Status: SHIPPED | OUTPATIENT
Start: 2023-07-28

## 2023-07-28 NOTE — PATIENT INSTRUCTIONS
Tizanidine (By mouth)   Tizanidine ([id number])  Treats muscle spasticity. Brand Name(s): Zanaflex, Zanaflex Capsule   There may be other brand names for this medicine. When This Medicine Should Not Be Used: This medicine is not right for everyone. Do not use if you had an allergic reaction to tizanidine. How to Use This Medicine:   Capsule, Tablet  Take your medicine as directed. Your dose may need to be changed several times to find what works best for you. You may take this medicine with or without food, but always take it the same way every time. Tizanidine works differently depending on whether you take it on an empty stomach or a full stomach. Talk to your doctor if you have any questions about this. Missed dose: Take a dose as soon as you remember. If it is almost time for your next dose, wait until then and take a regular dose. Do not take extra medicine to make up for a missed dose. Store the medicine in a closed container at room temperature, away from heat, moisture, and direct light. Drugs and Foods to Avoid:   Ask your doctor or pharmacist before using any other medicine, including over-the-counter medicines, vitamins, and herbal products. Do not use this medicine together with ciprofloxacin or fluvoxamine. Some foods and medicines can affect how tizanidine works. Tell your doctor if you are using any of the following:  Acyclovir, baclofen, cimetidine, famotidine, ticlopidine, verapamil, zileuton  Birth control pills, blood pressure medicine, medicine for heart rhythm problems (such as amiodarone, mexiletine, propafenone), or medicine to treat an infection (such as levofloxacin, moxifloxacin)  Do not drink alcohol while you are using this medicine. Tell your doctor if you use anything else that makes you sleepy. Some examples are allergy medicine, narcotic pain medicine, and alcohol.   Warnings While Using This Medicine:   Tell your doctor if you are pregnant or breastfeeding, or if you have kidney disease or liver disease. This medicine may cause the following problems:  Low blood pressure  Liver damage  This medicine may make you dizzy or drowsy. Do not drive or do anything else that could be dangerous until you know how this medicine affects you. Stand or sit up slowly if you are dizzy. Do not stop using this medicine suddenly. Your doctor will need to slowly decrease your dose before you stop it completely. Your doctor will do lab tests at regular visits to check on the effects of this medicine. Keep all appointments. Keep all medicine out of the reach of children. Never share your medicine with anyone. Possible Side Effects While Using This Medicine:   Call your doctor right away if you notice any of these side effects: Allergic reaction: Itching or hives, swelling in your face or hands, swelling or tingling in your mouth or throat, chest tightness, trouble breathing  Dark urine or pale stools, nausea, vomiting, loss of appetite, stomach pain, yellow skin or eyes  Lightheadedness, dizziness, or fainting  Seeing or hearing things that are not really there  Slow heartbeat  If you notice these less serious side effects, talk with your doctor:   Dry mouth  Drowsiness or sleepiness  Weakness  If you notice other side effects that you think are caused by this medicine, tell your doctor. Call your doctor for medical advice about side effects. You may report side effects to FDA at 8-584-FDA-4482    © Copyright [address] 2022 Information is for End User's use only and may not be sold, redistributed or otherwise used for commercial purposes. The above information is an  only. It is not intended as medical advice for individual conditions or treatments. Talk to your doctor, nurse or pharmacist before following any medical regimen to see if it is safe and effective for you.

## 2023-07-29 NOTE — ED PROVIDER NOTES
History  Chief Complaint   Patient presents with   • Hand Laceration     Patient co laceration to right palm. Accidentally cut hand with new kitchen knives. Bleeding controlled. Laceration  Location:  Hand  Hand laceration location:  R hand  Length:  4  Depth:  Cutaneous  Bleeding: controlled    Laceration mechanism:  Knife  Pain details:     Severity:  No pain  Foreign body present:  No foreign bodies  Relieved by:  None tried  Worsened by:  Nothing  Ineffective treatments:  None tried  Tetanus status:  Up to date  Associated symptoms: no fever and no rash        Prior to Admission Medications   Prescriptions Last Dose Informant Patient Reported? Taking? Diclofenac Sodium (VOLTAREN) 1 %  Self No No   Sig: Apply 2 g topically 4 (four) times a day   EPINEPHrine (EPIPEN) 0.3 mg/0.3 mL SOAJ  Self No No   Sig: Inject 0.3 mL (0.3 mg total) into a muscle once for 1 dose As needed   FLUoxetine (PROzac) 40 MG capsule   No No   Sig: Take 1 capsule (40 mg total) by mouth daily   albuterol (PROVENTIL HFA,VENTOLIN HFA) 90 mcg/act inhaler  Self No No   Sig: INHALE 1-2 PUFFS EVERY 6 HOURS AS NEEDED FOR WHEEZING.    dicyclomine (BENTYL) 20 mg tablet  Self No No   Sig: Take 1 tablet (20 mg total) by mouth 4 (four) times a day as needed (IBS)   famotidine (PEPCID) 20 mg tablet  Self No No   Sig: TAKE 1 TABLET BY MOUTH TWICE A DAY   fluocinonide (LIDEX) 0.05 % external solution  Self No No   Sig: APPLY TWICE A DAY TO SCALP UNTIL RESOLVED   fluticasone (FLONASE) 50 mcg/act nasal spray  Self No No   Si spray into each nostril 2 (two) times a day   hydrOXYzine HCL (ATARAX) 10 mg tablet   No No   Sig: TAKE 1 TABLET BY MOUTH EVERYDAY AT BEDTIME   ketoconazole (NIZORAL) 2 % shampoo   No No   Sig: Apply 1 Application topically 2 (two) times a week   loratadine (CLARITIN) 10 mg tablet   Yes No   Sig: Take 10 mg by mouth daily   meclizine (ANTIVERT) 25 mg tablet  Self No No   Sig: Take 1 tablet (25 mg total) by mouth every 8 (eight) hours as needed for dizziness   metoclopramide (REGLAN) 5 mg tablet   No No   Sig: TAKE 1 TABLET BY MOUTH 3 TIMES A DAY BEFORE MEALS   mometasone (ELOCON) 0.1 % lotion  Self No No   Sig: APPLY TO AFFECTED AREA TOPICALLY EVERY DAY   omeprazole (PriLOSEC) 40 MG capsule   No No   Sig: Take 1 capsule (40 mg total) by mouth daily   ondansetron (ZOFRAN-ODT) 4 mg disintegrating tablet  Self No No   Sig: Take 1 tablet (4 mg total) by mouth every 6 (six) hours as needed for nausea   Patient not taking: Reported on 6/2/2023   tiZANidine (ZANAFLEX) 4 mg tablet   No No   Sig: Take 1 tablet (4 mg total) by mouth every 8 (eight) hours as needed for muscle spasms May split tablets in half if experiencing drowiness      Facility-Administered Medications: None       Past Medical History:   Diagnosis Date   • Asthma    • Degenerative joint disease    • Depression    • GERD (gastroesophageal reflux disease)    • Lumbar radiculopathy    • PONV (postoperative nausea and vomiting)    • Thyroid nodule        Past Surgical History:   Procedure Laterality Date   • BREAST BIOPSY Left 2020    neg   • CHOLECYSTECTOMY LAPAROSCOPIC N/A 5/6/2022    Procedure: CHOLECYSTECTOMY LAPAROSCOPIC;  Surgeon: Lamberto Evans MD;  Location: [facility];  Service: General   • CYST REMOVAL  10/2020    lt breast    • EGD     • HYSTERECTOMY     • HYSTERECTOMY  1998   • LIPOSUCTION  09/2020   • TUBAL LIGATION         Family History   Problem Relation Age of Onset   • Alzheimer's disease Mother    • Cancer Father         stomach/GI   • No Known Problems Sister    • No Known Problems Daughter    • No Known Problems Maternal Grandmother    • No Known Problems Paternal Grandmother    • Colon cancer Maternal Aunt    • Cancer Maternal Aunt         stomach   • No Known Problems Maternal Aunt    • No Known Problems Maternal Aunt    • No Known Problems Maternal Aunt    • No Known Problems Maternal Aunt    • No Known Problems Maternal Aunt    • Colon cancer Maternal Uncle    • Breast cancer Neg Hx      I have reviewed and agree with the history as documented. E-Cigarette/Vaping   • E-Cigarette Use Never User      E-Cigarette/Vaping Substances   • Nicotine No    • THC No    • CBD No    • Flavoring No    • Other No    • Unknown No      Social History     Tobacco Use   • Smoking status: Never   • Smokeless tobacco: Never   Vaping Use   • Vaping Use: Never used   Substance Use Topics   • Alcohol use: Not Currently     Comment: ocaasional   • Drug use: Never       Review of Systems   Constitutional: Negative for diaphoresis, fatigue and fever. HENT: Negative for congestion, ear pain, nosebleeds and sore throat. Eyes: Negative for photophobia, pain, discharge and visual disturbance. Respiratory: Negative for cough, choking, chest tightness, shortness of breath and wheezing. Cardiovascular: Negative for chest pain and palpitations. Gastrointestinal: Negative for abdominal distention, abdominal pain, diarrhea and vomiting. Genitourinary: Negative for dysuria, flank pain and frequency. Musculoskeletal: Negative for back pain, gait problem and joint swelling. Skin: Positive for wound. Negative for color change and rash. Neurological: Negative for dizziness, syncope and headaches. Psychiatric/Behavioral: Negative for behavioral problems and confusion. The patient is not nervous/anxious. All other systems reviewed and are negative. Physical Exam  Physical Exam  Vitals and nursing note reviewed. Constitutional:       General: She is not in acute distress. Appearance: She is well-developed. She is not ill-appearing or toxic-appearing. HENT:      Head: Normocephalic and atraumatic. Mouth/Throat:      Dentition: Normal dentition. Eyes:      General:         Right eye: No discharge. Left eye: No discharge. Cardiovascular:      Rate and Rhythm: Normal rate and regular rhythm.    Pulmonary:      Effort: Pulmonary effort is normal. No accessory muscle usage or respiratory distress. Abdominal:      General: There is no distension. Tenderness: There is no guarding. Musculoskeletal:         General: Normal range of motion. Cervical back: Normal range of motion and neck supple. Skin:     General: Skin is warm and dry. Comments: 3 cm superficial laceration to right palm. No deep tissue involved. Neurological:      Mental Status: She is alert and oriented to person, place, and time. Coordination: Coordination normal.   Psychiatric:         Behavior: Behavior is cooperative. Vital Signs  ED Triage Vitals [07/28/23 2034]   Temperature Pulse Respirations Blood Pressure SpO2   98.3 °F (36.8 °C) 75 17 127/60 99 %      Temp Source Heart Rate Source Patient Position - Orthostatic VS BP Location FiO2 (%)   Oral Monitor Sitting Right arm --      Pain Score       --           Vitals:    07/28/23 2034   BP: 127/60   Pulse: 75   Patient Position - Orthostatic VS: Sitting         Visual Acuity      ED Medications  Medications - No data to display    Diagnostic Studies  Results Reviewed     None                 No orders to display              Procedures  Universal Protocol:  Consent: Verbal consent obtained. Risks and benefits: risks, benefits and alternatives were discussed  Consent given by: patient  Patient identity confirmed: verbally with patient and arm band    Laceration repair    Date/Time: 7/28/2023 9:23 PM    Performed by: KIRSTIN Mcneil  Authorized by: KIRSTIN Mcneil  Body area: upper extremity  Location details: right hand  Laceration length: 3 cm      Procedure Details:  Irrigation solution: tap water (At home)  Irrigation method: tap  Skin closure: glue and Steri-Strips  Approximation: close  Dressing: gauze roll               ED Course                               SBIRT 22yo+    Flowsheet Row Most Recent Value   Initial Alcohol Screen: US AUDIT-C     1.  How often do you have a drink containing alcohol? 0 Filed at: 07/28/2023 2111   2. How many drinks containing alcohol do you have on a typical day you are drinking? 0 Filed at: 07/28/2023 2111   3a. Male UNDER 65: How often do you have five or more drinks on one occasion? 0 Filed at: 07/28/2023 2111   3b. FEMALE Any Age, or MALE 65+: How often do you have 4 or more drinks on one occassion? 0 Filed at: 07/28/2023 2111   Audit-C Score 0 Filed at: 07/28/2023 2111   GLENDY: How many times in the past year have you. .. Used an illegal drug or used a prescription medication for non-medical reasons? Never Filed at: 07/28/2023 2111                    Medical Decision Making  Patient sustained a superficial laceration. Offered suturing or skin glue. Patient opted for skin glue which was reinforced with Steri-Strips and dressed accordingly for protection    Laceration of right hand without foreign body, initial encounter: acute illness or injury      Disposition  Final diagnoses:   Laceration of right hand without foreign body, initial encounter     Time reflects when diagnosis was documented in both MDM as applicable and the Disposition within this note     Time User Action Codes Description Comment    7/28/2023  9:21 PM Mariposajocy Galarzaon Add [F08.098B] Laceration of right hand without foreign body, initial encounter       ED Disposition     ED Disposition   Discharge    Condition   Stable    Date/Time   Fri Jul 28, 2023  9:21 PM    Comment   Debbie Kennedy discharge to home/self care.                Follow-up Information     Follow up With Specialties Details Why Contact Info Additional Information    [address] [place] [address] Emergency Department Emergency Medicine  As needed [address] [facility] [id number] [address] [facility] Emergency Department, [place], [place], [address]          Discharge Medication List as of 7/28/2023  9:22 PM      CONTINUE these medications which have NOT CHANGED    Details albuterol (PROVENTIL HFA,VENTOLIN HFA) 90 mcg/act inhaler INHALE 1-2 PUFFS EVERY 6 HOURS AS NEEDED FOR WHEEZING., Normal      Diclofenac Sodium (VOLTAREN) 1 % Apply 2 g topically 4 (four) times a day, Starting Tue 12/27/2022, Normal      dicyclomine (BENTYL) 20 mg tablet Take 1 tablet (20 mg total) by mouth 4 (four) times a day as needed (IBS), Starting Mon 1/9/2023, Normal      EPINEPHrine (EPIPEN) 0.3 mg/0.3 mL SOAJ Inject 0.3 mL (0.3 mg total) into a muscle once for 1 dose As needed, Starting Tue 5/16/2023, Normal      famotidine (PEPCID) 20 mg tablet TAKE 1 TABLET BY MOUTH TWICE A DAY, Normal      fluocinonide (LIDEX) 0.05 % external solution APPLY TWICE A DAY TO SCALP UNTIL RESOLVED, Normal      FLUoxetine (PROzac) 40 MG capsule Take 1 capsule (40 mg total) by mouth daily, Starting Wed 6/21/2023, Until Mon 12/18/2023, Normal      fluticasone (FLONASE) 50 mcg/act nasal spray 1 spray into each nostril 2 (two) times a day, Starting Fri 10/28/2022, Normal      hydrOXYzine HCL (ATARAX) 10 mg tablet TAKE 1 TABLET BY MOUTH EVERYDAY AT BEDTIME, Normal      ketoconazole (NIZORAL) 2 % shampoo Apply 1 Application topically 2 (two) times a week, Starting Mon 7/17/2023, Normal      loratadine (CLARITIN) 10 mg tablet Take 10 mg by mouth daily, Starting Wed 6/28/2023, Historical Med      meclizine (ANTIVERT) 25 mg tablet Take 1 tablet (25 mg total) by mouth every 8 (eight) hours as needed for dizziness, Starting Mon 10/31/2022, Normal      metoclopramide (REGLAN) 5 mg tablet TAKE 1 TABLET BY MOUTH 3 TIMES A DAY BEFORE MEALS, Normal      mometasone (ELOCON) 0.1 % lotion APPLY TO AFFECTED AREA TOPICALLY EVERY DAY, Normal      omeprazole (PriLOSEC) 40 MG capsule Take 1 capsule (40 mg total) by mouth daily, Starting Mon 6/12/2023, Until Sun 9/10/2023, Normal      ondansetron (ZOFRAN-ODT) 4 mg disintegrating tablet Take 1 tablet (4 mg total) by mouth every 6 (six) hours as needed for nausea, Starting Tue 5/16/2023, Normal tiZANidine (ZANAFLEX) 4 mg tablet Take 1 tablet (4 mg total) by mouth every 8 (eight) hours as needed for muscle spasms May split tablets in half if experiencing drowiness, Starting Fri 7/28/2023, Normal             No discharge procedures on file.     PDMP Review     None          ED Provider  Electronically Signed by           KIRSTIN Carpio  07/28/23 0633

## 2023-08-04 DIAGNOSIS — G47.00 INSOMNIA, UNSPECIFIED TYPE: ICD-10-CM

## 2023-08-04 DIAGNOSIS — F41.9 ANXIETY: ICD-10-CM

## 2023-08-04 DIAGNOSIS — R42 VERTIGO: ICD-10-CM

## 2023-08-04 DIAGNOSIS — F32.9 REACTIVE DEPRESSION: ICD-10-CM

## 2023-08-04 RX ORDER — HYDROXYZINE HYDROCHLORIDE 10 MG/1
TABLET, FILM COATED ORAL
Qty: 30 TABLET | Refills: 1 | Status: SHIPPED | OUTPATIENT
Start: 2023-08-04

## 2023-08-04 RX ORDER — FLUOXETINE HYDROCHLORIDE 40 MG/1
40 CAPSULE ORAL DAILY
Qty: 90 CAPSULE | Refills: 0 | Status: SHIPPED | OUTPATIENT
Start: 2023-08-04 | End: 2023-11-02

## 2023-08-04 RX ORDER — MECLIZINE HYDROCHLORIDE 25 MG/1
25 TABLET ORAL EVERY 8 HOURS PRN
Qty: 90 TABLET | Refills: 0 | Status: SHIPPED | OUTPATIENT
Start: 2023-08-04 | End: 2023-08-11

## 2023-08-11 DIAGNOSIS — M79.18 MYOFASCIAL PAIN SYNDROME: ICD-10-CM

## 2023-08-11 DIAGNOSIS — K21.9 GASTROESOPHAGEAL REFLUX DISEASE WITHOUT ESOPHAGITIS: ICD-10-CM

## 2023-08-11 DIAGNOSIS — R42 VERTIGO: ICD-10-CM

## 2023-08-11 RX ORDER — MECLIZINE HYDROCHLORIDE 25 MG/1
25 TABLET ORAL EVERY 8 HOURS PRN
Qty: 90 TABLET | Refills: 1 | Status: SHIPPED | OUTPATIENT
Start: 2023-08-11

## 2023-08-11 RX ORDER — TIZANIDINE 4 MG/1
4 TABLET ORAL EVERY 8 HOURS PRN
Qty: 60 TABLET | Refills: 0 | OUTPATIENT
Start: 2023-08-11

## 2023-08-11 RX ORDER — OMEPRAZOLE 40 MG/1
40 CAPSULE, DELAYED RELEASE ORAL DAILY
Qty: 90 CAPSULE | Refills: 1 | Status: SHIPPED | OUTPATIENT
Start: 2023-08-11 | End: 2024-02-07

## 2023-08-11 NOTE — TELEPHONE ENCOUNTER
S/W pt who states she is taking tizanidine 1 x a day sometimes 1 x every 2 days. Denies side effects and when nurse asked if it is helping pt stated "yes and no." She does not need a refill currently. Nurse advised pt to call our office when roughly 5 days are left of prescription. Pt verbalized understanding and appreciative of call.

## 2023-08-13 ENCOUNTER — HOSPITAL ENCOUNTER (OUTPATIENT)
Dept: MRI IMAGING | Facility: HOSPITAL | Age: 48
Discharge: HOME | End: 2023-08-13
Payer: COMMERCIAL

## 2023-08-13 DIAGNOSIS — M54.2 NECK PAIN: ICD-10-CM

## 2023-08-13 DIAGNOSIS — M54.12 CERVICAL RADICULOPATHY: ICD-10-CM

## 2023-08-13 PROCEDURE — 72141 MRI NECK SPINE W/O DYE: CPT

## 2023-08-13 PROCEDURE — G1004 CDSM NDSC: HCPCS

## 2023-08-17 ENCOUNTER — TELEPHONE (OUTPATIENT)
Dept: PAIN MEDICINE | Facility: CLINIC | Age: 48
End: 2023-08-17

## 2023-08-17 DIAGNOSIS — M50.120 CERVICAL DISC DISORDER WITH RADICULOPATHY OF MID-CERVICAL REGION: Primary | ICD-10-CM

## 2023-08-17 NOTE — TELEPHONE ENCOUNTER
I can refer you to surgery but generally they do not consider you a surgical candidate unless you have weakness. They might be able to offer you other suggestions. The spinal surgeon for [facility] in [place] is Dr. Rigoberto Dietrich. We are an interventional pain management group, so unfortunately our first suggestion are typically injections. Hermes Reyes

## 2023-08-17 NOTE — TELEPHONE ENCOUNTER
----- Message from Mendel Michaels, [address] [facility] sent at 8/17/2023 10:53 AM EDT -----  So osteophytes are bone spurs that depending on where they are located can cause pain but you also have a disc protrusion which is going into the canal causing moderate canal stenosis which means that the space in the central canal is getting very narrow and that it is actually touching your spinal cord at that area. You may benefit from an epidural injection into your cervical area the highest week ago is C7-T1 which if we inject at this level may provide you benefit up 2-3 levels. Let me know if this is something you would like to pursue.

## 2023-08-17 NOTE — TELEPHONE ENCOUNTER
Caller: Gisselle Virk    Doctor: Opal Mon     Reason for call: Patient returning call from rn    Call back#: [phone]

## 2023-08-17 NOTE — TELEPHONE ENCOUNTER
S/w pt and advised of same. Pt states that she has had 3 lumbar injections that did not help her back pain and were very painful. If they helped and were painful she would consider it, but she is not interested in an injection. Pt questioned if a surgical referral is appropriate.

## 2023-08-29 ENCOUNTER — TELEPHONE (OUTPATIENT)
Age: 48
End: 2023-08-29

## 2023-08-29 NOTE — TELEPHONE ENCOUNTER
----- Message from Misael Mitchell sent at 8/29/2023 12:03 PM EDT -----  Regarding: PPD test  Contact: [phone]  May I have a script for a ppd test for work?

## 2023-08-30 ENCOUNTER — OFFICE VISIT (OUTPATIENT)
Age: 48
End: 2023-08-30
Payer: COMMERCIAL

## 2023-08-30 VITALS — WEIGHT: 147 LBS | HEIGHT: 63 IN | BODY MASS INDEX: 26.05 KG/M2

## 2023-08-30 DIAGNOSIS — J34.89 NASAL VESTIBULITIS: Primary | ICD-10-CM

## 2023-08-30 PROCEDURE — 99213 OFFICE O/P EST LOW 20 MIN: CPT | Performed by: DERMATOLOGY

## 2023-08-30 NOTE — PROGRESS NOTES
[facility] Note     Patient Name: Luba Bains  Encounter Date: August 30, 2023     Have you been cared for by a [facility] in the last 3 years and, if so, which description applies to you? Yes. I have been here within the last 3 years, and my medical history has NOT changed since that time. I am FEMALE/of child-bearing potential.    REVIEW OF SYSTEMS:  Have you recently had or currently have any of the following? · No changes in my recent health. PAST MEDICAL HISTORY:  Have you personally ever had or currently have any of the following? If "YES," then please provide more detail. · No changes in my medical history. FAMILY HISTORY:  Any "first degree relatives" (parent, brother, sister, or child) with the following? • No changes in my family's known health. PATIENT EXPERIENCE:    • Do you want the Dermatologist to perform a COMPLETE skin exam today including a clinical examination under the "bra and underwear" areas? Yes  • If necessary, do we have your permission to call and leave a detailed message on your Preferred Phone number that includes your specific medical information? Yes      Allergies   Allergen Reactions   • Bee Venom Anaphylaxis   • Iodides Other (See Comments)   • [place] Oil - Food Allergy GI Intolerance     Other reaction(s): GI Intolerance, GI Reaction   • Aspirin Other (See Comments)     shaking  convulsions    Other reaction(s): Other (See Comments)  shaking  convulsions  shaking  convulsions   • Metronidazole GI Intolerance     Gi upset     • Sulfamethoxazole-Trimethoprim GI Intolerance     Gi upst    Other reaction(s): GI Intolerance, GI Reaction  Gi upst  Gi upst   • Tape  [Medical Tape] Other (See Comments) and Hives     fever  Other reaction(s):  Other  fever      Current Outpatient Medications:   •  albuterol (PROVENTIL HFA,VENTOLIN HFA) 90 mcg/act inhaler, INHALE 1-2 PUFFS EVERY 6 HOURS AS NEEDED FOR WHEEZING., Disp: 18 g, Rfl: 3  • Diclofenac Sodium (VOLTAREN) 1 %, Apply 2 g topically 4 (four) times a day, Disp: 100 g, Rfl: 3  •  dicyclomine (BENTYL) 20 mg tablet, Take 1 tablet (20 mg total) by mouth 4 (four) times a day as needed (IBS), Disp: 30 tablet, Rfl: 0  •  EPINEPHrine (EPIPEN) 0.3 mg/0.3 mL SOAJ, Inject 0.3 mL (0.3 mg total) into a muscle once for 1 dose As needed, Disp: 0.3 mL, Rfl: 0  •  famotidine (PEPCID) 20 mg tablet, TAKE 1 TABLET BY MOUTH TWICE A DAY, Disp: 60 tablet, Rfl: 3  •  fluocinonide (LIDEX) 0.05 % external solution, APPLY TWICE A DAY TO SCALP UNTIL RESOLVED, Disp: 60 mL, Rfl: 1  •  FLUoxetine (PROzac) 40 MG capsule, TAKE 1 CAPSULE (40 MG TOTAL) BY MOUTH DAILY. , Disp: 90 capsule, Rfl: 0  •  fluticasone (FLONASE) 50 mcg/act nasal spray, 1 spray into each nostril 2 (two) times a day, Disp: 11.1 mL, Rfl: 2  •  hydrOXYzine HCL (ATARAX) 10 mg tablet, TAKE 1 TABLET BY MOUTH EVERYDAY AT BEDTIME, Disp: 30 tablet, Rfl: 1  •  ketoconazole (NIZORAL) 2 % shampoo, Apply 1 Application topically 2 (two) times a week, Disp: 120 mL, Rfl: 1  •  loratadine (CLARITIN) 10 mg tablet, Take 10 mg by mouth daily, Disp: , Rfl:   •  meclizine (ANTIVERT) 25 mg tablet, TAKE 1 TABLET (25 MG TOTAL) BY MOUTH EVERY 8 (EIGHT) HOURS AS NEEDED FOR DIZZINESS., Disp: 90 tablet, Rfl: 1  •  metoclopramide (REGLAN) 5 mg tablet, TAKE 1 TABLET BY MOUTH 3 TIMES A DAY BEFORE MEALS, Disp: 90 tablet, Rfl: 1  •  mometasone (ELOCON) 0.1 % lotion, APPLY TO AFFECTED AREA TOPICALLY EVERY DAY, Disp: 60 mL, Rfl: 3  •  omeprazole (PriLOSEC) 40 MG capsule, TAKE 1 CAPSULE (40 MG TOTAL) BY MOUTH DAILY. , Disp: 90 capsule, Rfl: 1  •  tiZANidine (ZANAFLEX) 4 mg tablet, Take 1 tablet (4 mg total) by mouth every 8 (eight) hours as needed for muscle spasms May split tablets in half if experiencing drowiness, Disp: 60 tablet, Rfl: 0  •  ondansetron (ZOFRAN-ODT) 4 mg disintegrating tablet, Take 1 tablet (4 mg total) by mouth every 6 (six) hours as needed for nausea (Patient not taking: Reported on 6/2/2023), Disp: 20 tablet, Rfl: 0          • Whom besides the patient is providing clinical information about today's encounter?   o NO ADDITIONAL HISTORIAN (patient alone provided history)    Physical Exam and Assessment/Plan by Diagnosis:      CHIEF COMPLAINT    49year old male or female patient presents today for a sore in her nose that appeared last Thursday night. Patient went to Urgent Care and told that it was a cold sore and to use Abbriva. .  Patient has a No history of skin cancer        NASAL VESTIBULITIS    Physical Exam:  • Anatomic Location Affected:  Left Nares  • Morphological Description:  Scaling, swollen and erythema  • Pertinent Positives:  • Pertinent Negatives: Additional History of Present Condition:  Patient said this occurred last Thursday and was applying triple antiseptic.     Assessment and Plan:  Based on a thorough discussion of this condition and the management approach to it (including a comprehensive discussion of the known risks, side effects and potential benefits of treatment), the patient (family) agrees to implement the following specific plan:  • Mupirocin 2% Ointment Three Times a Day for a Week        Scribe Attestation    I,:  Danielle Haynes MA am acting as a scribe while in the presence of the attending physician.:       I,:  Lay Bhandari MD personally performed the services described in this documentation    as scribed in my presence.:

## 2023-08-30 NOTE — PATIENT INSTRUCTIONS
NASAL VESTIBULITIS    Assessment and Plan:  Based on a thorough discussion of this condition and the management approach to it (including a comprehensive discussion of the known risks, side effects and potential benefits of treatment), the patient (family) agrees to implement the following specific plan:  Mupirocin 2% Ointment Three Times a Day for a Week

## 2023-09-05 ENCOUNTER — OFFICE VISIT (OUTPATIENT)
Dept: CARDIOLOGY CLINIC | Facility: CLINIC | Age: 48
End: 2023-09-05
Payer: COMMERCIAL

## 2023-09-05 VITALS
HEART RATE: 72 BPM | SYSTOLIC BLOOD PRESSURE: 100 MMHG | RESPIRATION RATE: 16 BRPM | BODY MASS INDEX: 26.58 KG/M2 | DIASTOLIC BLOOD PRESSURE: 68 MMHG | HEIGHT: 63 IN | WEIGHT: 150 LBS | OXYGEN SATURATION: 98 %

## 2023-09-05 DIAGNOSIS — R00.2 INTERMITTENT PALPITATIONS: Primary | ICD-10-CM

## 2023-09-05 DIAGNOSIS — R07.9 CHEST PAIN, UNSPECIFIED TYPE: ICD-10-CM

## 2023-09-05 PROCEDURE — 99204 OFFICE O/P NEW MOD 45 MIN: CPT | Performed by: INTERNAL MEDICINE

## 2023-09-05 PROCEDURE — 93000 ELECTROCARDIOGRAM COMPLETE: CPT | Performed by: INTERNAL MEDICINE

## 2023-09-05 NOTE — PROGRESS NOTES
Located within [facility]  [address], [facility] [address], [address] [place] [address]  Tel: [phone]      NAME: Gilberto Ferguson  AGE: 49 y.o. SEX: female  : 1975  MRN: [id number]      Chief Complaint:  Chief Complaint   Patient presents with   • New Patient Visit         History of Present Illness:   41-year-old female who states that at random times she suddenly feels her heart beating fast / pounding and then she feels discomfort in the lower chest and upper abdomen. She gets diaphoretic and feels "anxious" and has to get up before the episode passes off a few minutes. She has had a few of these episodes over the last many months and a couple have even woken her up from sleep. Patient denies SOB, lightheadedness, syncope, swelling feet, orthopnea, PND, claudication.       Past Medical History:  Past Medical History:   Diagnosis Date   • Asthma    • Degenerative joint disease    • Depression    • GERD (gastroesophageal reflux disease)    • Lumbar radiculopathy    • PONV (postoperative nausea and vomiting)    • Thyroid nodule          Past Surgical History:  Past Surgical History:   Procedure Laterality Date   • BREAST BIOPSY Left     neg   • CHOLECYSTECTOMY LAPAROSCOPIC N/A 2022    Procedure: CHOLECYSTECTOMY LAPAROSCOPIC;  Surgeon: Sary Goddard MD;  Location: [facility];  Service: General   • CYST REMOVAL  10/2020    lt breast    • EGD     • HYSTERECTOMY     • HYSTERECTOMY     • LIPOSUCTION  2020   • TUBAL LIGATION           Family History:  Family History   Problem Relation Age of Onset   • Alzheimer's disease Mother    • Cancer Father         stomach/GI   • No Known Problems Sister    • No Known Problems Daughter    • No Known Problems Maternal Grandmother    • No Known Problems Paternal Grandmother    • Colon cancer Maternal Aunt    • Cancer Maternal Aunt         stomach   • No Known Problems Maternal Aunt    • No Known Problems Maternal Aunt    • No Known Problems Maternal Aunt    • No Known Problems Maternal Aunt    • No Known Problems Maternal Aunt    • Colon cancer Maternal Uncle    • Breast cancer Neg Hx          Social History:  Social History     Socioeconomic History   • Marital status:      Spouse name: None   • Number of children: None   • Years of education: None   • Highest education level: None   Occupational History   • None   Tobacco Use   • Smoking status: Never   • Smokeless tobacco: Never   Vaping Use   • Vaping Use: Never used   Substance and Sexual Activity   • Alcohol use: Not Currently     Comment: ocaasional   • Drug use: Never   • Sexual activity: Yes     Partners: Male     Birth control/protection: None   Other Topics Concern   • None   Social History Narrative   • None     Social Determinants of Health     Financial Resource Strain: Not on file   Food Insecurity: Not on file   Transportation Needs: Not on file   Physical Activity: Unknown (8/12/2022)    Exercise Vital Sign    • Days of Exercise per Week: 0 days    • Minutes of Exercise per Session: Not on file   Stress: Not on file   Social Connections: Not on file   Intimate Partner Violence: Not on file   Housing Stability: Not on file         Active Problems:  Patient Active Problem List   Diagnosis   • Generalized abdominal pain   • Epigastric lump   • Chronic pain syndrome   • Chronic bilateral low back pain with right-sided sciatica   • Lumbar radiculopathy   • Lumbar degenerative disc disease   • DISH (diffuse idiopathic skeletal hyperostosis)   • PONV (postoperative nausea and vomiting)   • Dyskinesia of gallbladder   • Postoperative visit   • Epigastric pain   • Gastroparesis   • Benign cyst of left breast in female   • Thyroid nodule   • Major depressive disorder with single episode, in full remission ([address])   • Lipodystrophy   • Gastroesophageal reflux disease without esophagitis         The following portions of the patient's history were reviewed and updated as appropriate: past medical history, past surgical history, past family history,  past social history, current medications, allergies and problem list.      Review of Systems:  Constitutional: Denies fever, chills  Eyes: Denies eye redness, eye discharge  ENT: Denies hearing loss, sneezing, nasal discharge, sore throat   Respiratory: Denies cough, expectoration, shortness of breath  Cardiovascular: Denies lower extremity swelling  Gastrointestinal: Denies abdominal pain, nausea, vomiting, diarrhea  Genito-Urinary: Denies dysuria, incontinence  Musculoskeletal: +back pain, +neck pain  Neurologic: Denies lightheadedness, syncope, headache, seizures  Endocrine: Denies polydipsia, temperature intolerance  Allergy and Immunology: Denies hives, insect bite sensitivity  Hematological and Lymphatic: Denies bleeding problems, swollen glands   Psychological: Denies depression, suicidal ideation, anxiety, panic  Dermatological: Denies pruritus, rash, skin lesion changes      Vitals:  Vitals:    09/05/23 1459   BP: 100/68   Pulse: 72   Resp: 16   SpO2: 98%       Body mass index is 26.57 kg/m². Weight (last 2 days)     Date/Time Weight    09/05/23 1459 68 (150)            Physical Examination:  General: Patient is not in acute distress. Awake, alert, oriented in time, place and person. Responding to commands  Head: Normocephalic. Atraumatic  Eyes: Both pupils normal sized, round and reactive to light. Nonicteric  ENT: Normal external ear canals  Neck: Supple. JVP not raised. Trachea central. No thyromegaly  Lungs: Bilateral bronchovascular breath sounds with no crackles or rhonchi  Chest wall: No tenderness  Cardiovascular: RRR. S1 and S2 normal. No murmur, rub or gallop  Gastrointestinal: Abdomen soft, nontender. No guarding or rigidity. Liver and spleen not palpable. Bowel sounds present  Neurologic: Patient is awake, alert, oriented in time, place and person. Responding to commands. Moving all extremities  Integumentary:  No skin rash  Lymphatic: No cervical lymphadenopathy  Back: Symmetric. No CVA tenderness  Extremities: No clubbing, cyanosis or edema      Laboratory Results:  CBC with diff:   Lab Results   Component Value Date    WBC 8.67 03/04/2023    RBC 4.20 03/04/2023    HGB 13.0 03/04/2023    HCT 38.9 03/04/2023    MCV 93 03/04/2023    MCH 31.0 03/04/2023    RDW 12.0 03/04/2023     03/04/2023       CMP:  Lab Results   Component Value Date    CREATININE 0.61 03/04/2023    BUN 10 03/04/2023    K 4.0 03/04/2023     (H) 03/04/2023    CO2 26 03/04/2023    ALKPHOS 107 03/11/2023    ALT 39 03/11/2023    AST 27 03/11/2023    BILIDIR 0.11 03/11/2023       Lab Results   Component Value Date    HGBA1C 5.5 08/13/2022       Lipid Profile:   No results found for: "CHOL"  Lab Results   Component Value Date    HDL 54 03/04/2023    HDL 48 (L) 08/13/2022    HDL 49 (L) 04/16/2022     Lab Results   Component Value Date    LDLCALC 110 (H) 03/04/2023    LDLCALC 114 (H) 08/13/2022    LDLCALC 115 (H) 04/16/2022     Lab Results   Component Value Date    TRIG 53 03/04/2023    TRIG 60 08/13/2022    TRIG 47 04/16/2022       EKG: Reviewed by me. 9/5/2023. Normal sinus rhythm.       Medications:    Current Outpatient Medications:   •  albuterol (PROVENTIL HFA,VENTOLIN HFA) 90 mcg/act inhaler, INHALE 1-2 PUFFS EVERY 6 HOURS AS NEEDED FOR WHEEZING., Disp: 18 g, Rfl: 3  •  Diclofenac Sodium (VOLTAREN) 1 %, Apply 2 g topically 4 (four) times a day, Disp: 100 g, Rfl: 3  •  dicyclomine (BENTYL) 20 mg tablet, Take 1 tablet (20 mg total) by mouth 4 (four) times a day as needed (IBS), Disp: 30 tablet, Rfl: 0  •  EPINEPHrine (EPIPEN) 0.3 mg/0.3 mL SOAJ, Inject 0.3 mL (0.3 mg total) into a muscle once for 1 dose As needed, Disp: 0.3 mL, Rfl: 0  •  famotidine (PEPCID) 20 mg tablet, TAKE 1 TABLET BY MOUTH TWICE A DAY, Disp: 60 tablet, Rfl: 3  •  fluocinonide (LIDEX) 0.05 % external solution, APPLY TWICE A DAY TO SCALP UNTIL RESOLVED, Disp: 60 mL, Rfl: 1  •  FLUoxetine (PROzac) 40 MG capsule, TAKE 1 CAPSULE (40 MG TOTAL) BY MOUTH DAILY. , Disp: 90 capsule, Rfl: 0  •  fluticasone (FLONASE) 50 mcg/act nasal spray, 1 spray into each nostril 2 (two) times a day, Disp: 11.1 mL, Rfl: 2  •  hydrOXYzine HCL (ATARAX) 10 mg tablet, TAKE 1 TABLET BY MOUTH EVERYDAY AT BEDTIME, Disp: 30 tablet, Rfl: 1  •  ketoconazole (NIZORAL) 2 % shampoo, Apply 1 Application topically 2 (two) times a week, Disp: 120 mL, Rfl: 1  •  loratadine (CLARITIN) 10 mg tablet, Take 10 mg by mouth daily, Disp: , Rfl:   •  meclizine (ANTIVERT) 25 mg tablet, TAKE 1 TABLET (25 MG TOTAL) BY MOUTH EVERY 8 (EIGHT) HOURS AS NEEDED FOR DIZZINESS., Disp: 90 tablet, Rfl: 1  •  metoclopramide (REGLAN) 5 mg tablet, TAKE 1 TABLET BY MOUTH 3 TIMES A DAY BEFORE MEALS, Disp: 90 tablet, Rfl: 1  •  mometasone (ELOCON) 0.1 % lotion, APPLY TO AFFECTED AREA TOPICALLY EVERY DAY, Disp: 60 mL, Rfl: 3  •  mupirocin (BACTROBAN) 2 % ointment, Apply topically 3 (three) times a day, Disp: 15 g, Rfl: 0  •  omeprazole (PriLOSEC) 40 MG capsule, TAKE 1 CAPSULE (40 MG TOTAL) BY MOUTH DAILY. , Disp: 90 capsule, Rfl: 1  •  tiZANidine (ZANAFLEX) 4 mg tablet, Take 1 tablet (4 mg total) by mouth every 8 (eight) hours as needed for muscle spasms May split tablets in half if experiencing drowiness, Disp: 60 tablet, Rfl: 0      Allergies: Allergies   Allergen Reactions   • Bee Venom Anaphylaxis   • Iodides Other (See Comments)   • Varnell Oil - Food Allergy GI Intolerance     Other reaction(s): GI Intolerance, GI Reaction   • Aspirin Other (See Comments)     shaking  convulsions    Other reaction(s):  Other (See Comments)  shaking  convulsions  shaking  convulsions   • Metronidazole GI Intolerance     Gi upset     • Sulfamethoxazole-Trimethoprim GI Intolerance     Gi upst    Other reaction(s): GI Intolerance, GI Reaction  Gi upst  Gi upst   • Tape  [Medical Tape] Other (See Comments) and Hives fever  Other reaction(s): Other  fever         Assessment and Plan:  1. Intermittent palpitations  2. Chest pain, unspecified type    EKG done in the clinic reviewed with the patient. Event monitor ordered for evaluation. 2D echocardiogram ordered for EF, WMA  Exercise nuclear stress test ordered for evaluation     - POCT ECG  - Echo complete w/ contrast if indicated; Future  - NM myocardial perfusion spect (stress and/or rest); Future      Recommend aggressive risk factor modification and therapeutic lifestyle changes. Low-salt, low-calorie, low-fat, low-cholesterol diet with regular exercise and to optimize weight. I will defer the ordering and monitoring of necessity lab studies to you, but I am available and happy to review and manage any of the data at your request in the future. Discussed concepts of atherosclerosis, including signs and symptoms of cardiac disease. Previous studies were reviewed. Safety measures were reviewed. Questions were entertained and answered. Patient was advised to report any problems requiring medical attention. Follow-up with PCP and appropriate specialist and lab work as discussed. Return for follow up visit as scheduled or earlier, if needed. Thank you for allowing me to participate in the care and evaluation of your patient. Should you have any questions, please feel free to contact me.       Teresa Lugo MD  9/4/9459,7:08 PM

## 2023-09-06 ENCOUNTER — OFFICE VISIT (OUTPATIENT)
Dept: FAMILY MEDICINE CLINIC | Facility: CLINIC | Age: 48
End: 2023-09-06
Payer: COMMERCIAL

## 2023-09-06 VITALS
OXYGEN SATURATION: 98 % | HEIGHT: 63 IN | WEIGHT: 152 LBS | DIASTOLIC BLOOD PRESSURE: 70 MMHG | BODY MASS INDEX: 26.93 KG/M2 | HEART RATE: 71 BPM | SYSTOLIC BLOOD PRESSURE: 110 MMHG

## 2023-09-06 DIAGNOSIS — Z13.6 SCREENING FOR CARDIOVASCULAR CONDITION: ICD-10-CM

## 2023-09-06 DIAGNOSIS — R74.8 ELEVATED PANCREATIC ENZYME: ICD-10-CM

## 2023-09-06 DIAGNOSIS — R76.8 ELEVATED ANTI-TISSUE TRANSGLUTAMINASE (TTG) IGA LEVEL: ICD-10-CM

## 2023-09-06 DIAGNOSIS — Z13.21 ENCOUNTER FOR VITAMIN DEFICIENCY SCREENING: ICD-10-CM

## 2023-09-06 DIAGNOSIS — E04.1 THYROID NODULE: ICD-10-CM

## 2023-09-06 DIAGNOSIS — R23.8 SCALP IRRITATION: Primary | ICD-10-CM

## 2023-09-06 DIAGNOSIS — Z13.1 SCREENING FOR DIABETES MELLITUS: ICD-10-CM

## 2023-09-06 DIAGNOSIS — Z13.0 SCREENING FOR DEFICIENCY ANEMIA: ICD-10-CM

## 2023-09-06 PROCEDURE — 99204 OFFICE O/P NEW MOD 45 MIN: CPT | Performed by: NURSE PRACTITIONER

## 2023-09-06 NOTE — PROGRESS NOTES
Name: Misael Mitchell      : 1975      MRN: [id number]  Encounter Provider: KIRSTIN Sanchez  Encounter Date: 2023   Encounter department: [address] [place] [address]     1. Scalp irritation  Comments:  Given info for new dermatologist, may need punch biopsy of scalp. Advised to avoid dying hair, to use Lidex prn. Advised SEEN or Vanicream shampoo. 2. Elevated anti-tissue transglutaminase (tTG) IgA level  Comments:  Patient denies symptoms, to obtain labs as ordered. Orders:  -     IgG, IgA, IgM; Future  -     RF Screen w/ Reflex to Titer; Future  -     Hepatitis panel, acute; Future    3. Thyroid nodule  Assessment & Plan:  Has not had ultrasound in several years. To obtain labs and ultrasound as ordered. Orders:  -     TSH, 3rd generation with Free T4 reflex; Future  -     US thyroid; Future; Expected date: 2023    4. Elevated pancreatic enzyme  -     Amylase; Future  -     Lipase; Future    5. Screening for cardiovascular condition  -     Lipid panel; Future    6. Screening for deficiency anemia  -     CBC and differential; Future    7. Screening for diabetes mellitus  -     Comprehensive metabolic panel; Future  -     Hemoglobin A1C; Future; Expected date: 2023    8. Encounter for vitamin deficiency screening  -     Vitamin D 25 hydroxy; Future    BMI Counseling: Body mass index is 26.93 kg/m². The BMI is above normal. Nutrition recommendations include decreasing portion sizes, encouraging healthy choices of fruits and vegetables, consuming healthier snacks, limiting drinks that contain sugar, moderation in carbohydrate intake, increasing intake of lean protein, reducing intake of saturated and trans fat and reducing intake of cholesterol. Exercise recommendations include exercising 3-5 times per week and strength training exercises. No pharmacotherapy was ordered.  Rationale for BMI follow-up plan is due to patient being overweight or obese. Subjective      Patient presents to the office for establishment of care. Patient was previously seen by [facility]. Last physical was 4/14/2022. Established with OB/GYN, history of hysterectomy. Mammo scheduled for this month. Patient currently follows cardiology, dermatology, GI, and pain management. Patient voices concern regarding having elevated IgA during blood work at the end of last year. Was found in a Celiac profile while patient was having work-up for GI issues. Was diagnosed with gastroparesis. As per patient, elevation was "never addressed."  Denies symptoms when eating gluten. Denies worsening GI symptoms or changes in bowels. Notes continued itchy/irritated scalp. Sees dermatology, has been placed on ketoconazole shampoo, Lidex solution and other creams in the past, but have been ineffective. Notes irritation of scalp for the past 3 years. Patient does dye hair every few weeks. Review of Systems   Constitutional: Negative for chills, fatigue and unexpected weight change. HENT: Negative for congestion, sore throat and trouble swallowing. Eyes: Negative for photophobia and visual disturbance. Respiratory: Negative for cough and shortness of breath. Cardiovascular: Negative for chest pain and palpitations. Gastrointestinal: Negative for blood in stool, nausea and vomiting. Genitourinary: Negative for decreased urine volume. Musculoskeletal: Negative for arthralgias and myalgias. Skin: Negative for color change and rash. Neurological: Negative for dizziness, light-headedness and headaches. Hematological: Negative for adenopathy. Does not bruise/bleed easily. Psychiatric/Behavioral: Negative for confusion and dysphoric mood. The patient is not nervous/anxious.         Current Outpatient Medications on File Prior to Visit   Medication Sig   • albuterol (PROVENTIL HFA,VENTOLIN HFA) 90 mcg/act inhaler INHALE 1-2 PUFFS EVERY 6 HOURS AS NEEDED FOR WHEEZING. • Diclofenac Sodium (VOLTAREN) 1 % Apply 2 g topically 4 (four) times a day   • dicyclomine (BENTYL) 20 mg tablet Take 1 tablet (20 mg total) by mouth 4 (four) times a day as needed (IBS)   • EPINEPHrine (EPIPEN) 0.3 mg/0.3 mL SOAJ Inject 0.3 mL (0.3 mg total) into a muscle once for 1 dose As needed   • famotidine (PEPCID) 20 mg tablet TAKE 1 TABLET BY MOUTH TWICE A DAY   • FLUoxetine (PROzac) 40 MG capsule TAKE 1 CAPSULE (40 MG TOTAL) BY MOUTH DAILY. • fluticasone (FLONASE) 50 mcg/act nasal spray 1 spray into each nostril 2 (two) times a day   • hydrOXYzine HCL (ATARAX) 10 mg tablet TAKE 1 TABLET BY MOUTH EVERYDAY AT BEDTIME   • ketoconazole (NIZORAL) 2 % shampoo Apply 1 Application topically 2 (two) times a week   • loratadine (CLARITIN) 10 mg tablet Take 10 mg by mouth daily   • meclizine (ANTIVERT) 25 mg tablet TAKE 1 TABLET (25 MG TOTAL) BY MOUTH EVERY 8 (EIGHT) HOURS AS NEEDED FOR DIZZINESS. • metoclopramide (REGLAN) 5 mg tablet TAKE 1 TABLET BY MOUTH 3 TIMES A DAY BEFORE MEALS   • mometasone (ELOCON) 0.1 % lotion APPLY TO AFFECTED AREA TOPICALLY EVERY DAY   • mupirocin (BACTROBAN) 2 % ointment Apply topically 3 (three) times a day   • omeprazole (PriLOSEC) 40 MG capsule TAKE 1 CAPSULE (40 MG TOTAL) BY MOUTH DAILY. • tiZANidine (ZANAFLEX) 4 mg tablet Take 1 tablet (4 mg total) by mouth every 8 (eight) hours as needed for muscle spasms May split tablets in half if experiencing drowiness   • [DISCONTINUED] fluocinonide (LIDEX) 0.05 % external solution APPLY TWICE A DAY TO SCALP UNTIL RESOLVED (Patient not taking: Reported on 9/6/2023)       Objective     /70   Pulse 71   Ht 5' 3" (1.6 m)   Wt 68.9 kg (152 lb)   SpO2 98%   BMI 26.93 kg/m²     Physical Exam  Vitals reviewed. Constitutional:       General: She is not in acute distress. Appearance: Normal appearance. She is not ill-appearing. HENT:      Head: Normocephalic and atraumatic.       Right Ear: Tympanic membrane, ear canal and external ear normal.      Left Ear: Tympanic membrane, ear canal and external ear normal.      Nose: Nose normal.      Mouth/Throat:      Mouth: Mucous membranes are moist.      Pharynx: Oropharynx is clear. Eyes:      Conjunctiva/sclera: Conjunctivae normal.      Pupils: Pupils are equal, round, and reactive to light. Neck:      Thyroid: No thyroid mass or thyromegaly. Cardiovascular:      Rate and Rhythm: Normal rate and regular rhythm. Pulses: Normal pulses. Heart sounds: Normal heart sounds. No murmur heard. Pulmonary:      Effort: Pulmonary effort is normal.      Breath sounds: Normal breath sounds. Abdominal:      General: Bowel sounds are normal.      Palpations: Abdomen is soft. Tenderness: There is no abdominal tenderness. Musculoskeletal:         General: Normal range of motion. Cervical back: Normal range of motion and neck supple. Right lower leg: No edema. Left lower leg: No edema. Skin:     General: Skin is warm and dry. Capillary Refill: Capillary refill takes less than 2 seconds. Comments: Areas of redness observed to left side of scalp behind ear, no scaling, crusting noted. Neurological:      General: No focal deficit present. Mental Status: She is alert and oriented to person, place, and time.    Psychiatric:         Mood and Affect: Mood normal.         Behavior: Behavior normal.       KIRSTIN Jack

## 2023-09-06 NOTE — PATIENT INSTRUCTIONS
Avoid fragrances, hair dye  Can continue using Lidex solution  Consider just using Vanicream shampoo or SEEN

## 2023-09-09 ENCOUNTER — APPOINTMENT (OUTPATIENT)
Dept: LAB | Facility: HOSPITAL | Age: 48
End: 2023-09-09
Payer: COMMERCIAL

## 2023-09-09 DIAGNOSIS — R76.8 ELEVATED ANTI-TISSUE TRANSGLUTAMINASE (TTG) IGA LEVEL: ICD-10-CM

## 2023-09-09 DIAGNOSIS — Z13.21 ENCOUNTER FOR VITAMIN DEFICIENCY SCREENING: ICD-10-CM

## 2023-09-09 DIAGNOSIS — Z13.0 SCREENING FOR DEFICIENCY ANEMIA: ICD-10-CM

## 2023-09-09 DIAGNOSIS — E04.1 THYROID NODULE: ICD-10-CM

## 2023-09-09 DIAGNOSIS — R74.8 ELEVATED PANCREATIC ENZYME: ICD-10-CM

## 2023-09-09 DIAGNOSIS — Z13.6 SCREENING FOR CARDIOVASCULAR CONDITION: ICD-10-CM

## 2023-09-09 DIAGNOSIS — Z13.1 SCREENING FOR DIABETES MELLITUS: ICD-10-CM

## 2023-09-09 LAB
25(OH)D3 SERPL-MCNC: 58.8 NG/ML (ref 30–100)
ALBUMIN SERPL BCP-MCNC: 4.2 G/DL (ref 3.5–5)
ALP SERPL-CCNC: 91 U/L (ref 34–104)
ALT SERPL W P-5'-P-CCNC: 23 U/L (ref 7–52)
AMYLASE SERPL-CCNC: 36 IU/L (ref 29–103)
ANION GAP SERPL CALCULATED.3IONS-SCNC: 5 MMOL/L
AST SERPL W P-5'-P-CCNC: 20 U/L (ref 13–39)
BASOPHILS # BLD AUTO: 0.05 THOUSANDS/ÂΜL (ref 0–0.1)
BASOPHILS NFR BLD AUTO: 1 % (ref 0–1)
BILIRUB SERPL-MCNC: 0.73 MG/DL (ref 0.2–1)
BUN SERPL-MCNC: 12 MG/DL (ref 5–25)
CALCIUM SERPL-MCNC: 9.3 MG/DL (ref 8.4–10.2)
CHLORIDE SERPL-SCNC: 107 MMOL/L (ref 96–108)
CHOLEST SERPL-MCNC: 183 MG/DL
CO2 SERPL-SCNC: 27 MMOL/L (ref 21–32)
CREAT SERPL-MCNC: 0.61 MG/DL (ref 0.6–1.3)
EOSINOPHIL # BLD AUTO: 0.17 THOUSAND/ÂΜL (ref 0–0.61)
EOSINOPHIL NFR BLD AUTO: 2 % (ref 0–6)
ERYTHROCYTE [DISTWIDTH] IN BLOOD BY AUTOMATED COUNT: 12.3 % (ref 11.6–15.1)
EST. AVERAGE GLUCOSE BLD GHB EST-MCNC: 114 MG/DL
GFR SERPL CREATININE-BSD FRML MDRD: 107 ML/MIN/1.73SQ M
GLUCOSE P FAST SERPL-MCNC: 92 MG/DL (ref 65–99)
HBA1C MFR BLD: 5.6 %
HCT VFR BLD AUTO: 38.3 % (ref 34.8–46.1)
HDLC SERPL-MCNC: 51 MG/DL
HGB BLD-MCNC: 12.8 G/DL (ref 11.5–15.4)
IGA SERPL-MCNC: 381 MG/DL (ref 66–433)
IGG SERPL-MCNC: 1073 MG/DL (ref 635–1741)
IGM SERPL-MCNC: 126 MG/DL (ref 45–281)
IMM GRANULOCYTES # BLD AUTO: 0.02 THOUSAND/UL (ref 0–0.2)
IMM GRANULOCYTES NFR BLD AUTO: 0 % (ref 0–2)
LDLC SERPL CALC-MCNC: 122 MG/DL (ref 0–100)
LIPASE SERPL-CCNC: 23 U/L (ref 11–82)
LYMPHOCYTES # BLD AUTO: 1.88 THOUSANDS/ÂΜL (ref 0.6–4.47)
LYMPHOCYTES NFR BLD AUTO: 24 % (ref 14–44)
MCH RBC QN AUTO: 30.7 PG (ref 26.8–34.3)
MCHC RBC AUTO-ENTMCNC: 33.4 G/DL (ref 31.4–37.4)
MCV RBC AUTO: 92 FL (ref 82–98)
MONOCYTES # BLD AUTO: 0.61 THOUSAND/ÂΜL (ref 0.17–1.22)
MONOCYTES NFR BLD AUTO: 8 % (ref 4–12)
NEUTROPHILS # BLD AUTO: 5.28 THOUSANDS/ÂΜL (ref 1.85–7.62)
NEUTS SEG NFR BLD AUTO: 65 % (ref 43–75)
NONHDLC SERPL-MCNC: 132 MG/DL
NRBC BLD AUTO-RTO: 0 /100 WBCS
PLATELET # BLD AUTO: 317 THOUSANDS/UL (ref 149–390)
PMV BLD AUTO: 9.4 FL (ref 8.9–12.7)
POTASSIUM SERPL-SCNC: 3.9 MMOL/L (ref 3.5–5.3)
PROT SERPL-MCNC: 7.6 G/DL (ref 6.4–8.4)
RBC # BLD AUTO: 4.17 MILLION/UL (ref 3.81–5.12)
SODIUM SERPL-SCNC: 139 MMOL/L (ref 135–147)
TRIGL SERPL-MCNC: 51 MG/DL
TSH SERPL DL<=0.05 MIU/L-ACNC: 1.94 UIU/ML (ref 0.45–4.5)
WBC # BLD AUTO: 8.01 THOUSAND/UL (ref 4.31–10.16)

## 2023-09-09 PROCEDURE — 80074 ACUTE HEPATITIS PANEL: CPT

## 2023-09-09 PROCEDURE — 83690 ASSAY OF LIPASE: CPT

## 2023-09-09 PROCEDURE — 82306 VITAMIN D 25 HYDROXY: CPT

## 2023-09-09 PROCEDURE — 36415 COLL VENOUS BLD VENIPUNCTURE: CPT

## 2023-09-09 PROCEDURE — 83036 HEMOGLOBIN GLYCOSYLATED A1C: CPT

## 2023-09-09 PROCEDURE — 80061 LIPID PANEL: CPT

## 2023-09-09 PROCEDURE — 86430 RHEUMATOID FACTOR TEST QUAL: CPT

## 2023-09-09 PROCEDURE — 80053 COMPREHEN METABOLIC PANEL: CPT

## 2023-09-09 PROCEDURE — 86431 RHEUMATOID FACTOR QUANT: CPT

## 2023-09-09 PROCEDURE — 82150 ASSAY OF AMYLASE: CPT

## 2023-09-09 PROCEDURE — 85025 COMPLETE CBC W/AUTO DIFF WBC: CPT

## 2023-09-09 PROCEDURE — 82784 ASSAY IGA/IGD/IGG/IGM EACH: CPT

## 2023-09-09 PROCEDURE — 84443 ASSAY THYROID STIM HORMONE: CPT

## 2023-09-10 LAB
HAV IGM SER QL: NORMAL
HBV CORE IGM SER QL: NORMAL
HBV SURFACE AG SER QL: NORMAL
HCV AB SER QL: NORMAL

## 2023-09-11 ENCOUNTER — PATIENT MESSAGE (OUTPATIENT)
Age: 48
End: 2023-09-11

## 2023-09-11 LAB
CRYOGLOB RF SER-ACNC: ABNORMAL [IU]/ML
RHEUMATOID FACT SER QL LA: POSITIVE

## 2023-09-12 DIAGNOSIS — R76.8 RHEUMATOID FACTOR POSITIVE: Primary | ICD-10-CM

## 2023-09-13 ENCOUNTER — TELEPHONE (OUTPATIENT)
Dept: CARDIOLOGY CLINIC | Facility: CLINIC | Age: 48
End: 2023-09-13

## 2023-09-13 NOTE — TELEPHONE ENCOUNTER
Patient had a 2 week Holter monitor placed and after her shower while drying it fell off. Pt states the spot were it was placed it is red and raw and she does not want to put it back on. Pt would like to know what should she do?        Pt can be reached at [phone]

## 2023-09-13 NOTE — TELEPHONE ENCOUNTER
Pt has worn the event monitor for 8 days and is now experiencing redness/irritaion to monitor site.  Please advise

## 2023-09-15 DIAGNOSIS — K31.84 GASTROPARESIS: ICD-10-CM

## 2023-09-15 RX ORDER — METOCLOPRAMIDE 5 MG/1
TABLET ORAL
Qty: 90 TABLET | Refills: 1 | Status: SHIPPED | OUTPATIENT
Start: 2023-09-15

## 2023-09-19 ENCOUNTER — TELEPHONE (OUTPATIENT)
Dept: PLASTIC SURGERY | Facility: CLINIC | Age: 48
End: 2023-09-19

## 2023-09-19 NOTE — TELEPHONE ENCOUNTER
Left message for patient to 1 verify what she was coming in for (Dr. Vidhya Webster does not believe he can do anything for her-she needs to see a new dermatologist) and 2 if it is a valid appointment for plastics, reschedule her to another day as he will be in surgery.

## 2023-09-21 ENCOUNTER — CLINICAL SUPPORT (OUTPATIENT)
Dept: CARDIOLOGY CLINIC | Facility: CLINIC | Age: 48
End: 2023-09-21
Payer: COMMERCIAL

## 2023-09-21 DIAGNOSIS — R00.2 INTERMITTENT PALPITATIONS: ICD-10-CM

## 2023-09-21 PROCEDURE — 93244 EXT ECG>48HR<7D REV&INTERPJ: CPT | Performed by: INTERNAL MEDICINE

## 2023-09-23 ENCOUNTER — HOSPITAL ENCOUNTER (OUTPATIENT)
Dept: ULTRASOUND IMAGING | Facility: HOSPITAL | Age: 48
Discharge: HOME | End: 2023-09-23
Payer: COMMERCIAL

## 2023-09-23 DIAGNOSIS — E04.1 THYROID NODULE: ICD-10-CM

## 2023-09-23 PROCEDURE — 76536 US EXAM OF HEAD AND NECK: CPT

## 2023-09-25 ENCOUNTER — APPOINTMENT (OUTPATIENT)
Dept: RADIOLOGY | Facility: CLINIC | Age: 48
End: 2023-09-25
Payer: COMMERCIAL

## 2023-09-25 ENCOUNTER — TELEPHONE (OUTPATIENT)
Dept: CARDIOLOGY CLINIC | Facility: CLINIC | Age: 48
End: 2023-09-25

## 2023-09-25 ENCOUNTER — OFFICE VISIT (OUTPATIENT)
Dept: OBGYN CLINIC | Facility: CLINIC | Age: 48
End: 2023-09-25
Payer: COMMERCIAL

## 2023-09-25 VITALS
BODY MASS INDEX: 26.93 KG/M2 | DIASTOLIC BLOOD PRESSURE: 76 MMHG | WEIGHT: 152 LBS | HEIGHT: 63 IN | HEART RATE: 70 BPM | SYSTOLIC BLOOD PRESSURE: 118 MMHG

## 2023-09-25 DIAGNOSIS — M54.2 NECK PAIN: Primary | ICD-10-CM

## 2023-09-25 DIAGNOSIS — M54.2 NECK PAIN: ICD-10-CM

## 2023-09-25 DIAGNOSIS — M50.30 DDD (DEGENERATIVE DISC DISEASE), CERVICAL: ICD-10-CM

## 2023-09-25 DIAGNOSIS — G95.20 SPINAL CORD COMPRESSION (HCC): ICD-10-CM

## 2023-09-25 PROCEDURE — 99214 OFFICE O/P EST MOD 30 MIN: CPT | Performed by: ORTHOPAEDIC SURGERY

## 2023-09-25 PROCEDURE — 72050 X-RAY EXAM NECK SPINE 4/5VWS: CPT

## 2023-09-25 NOTE — TELEPHONE ENCOUNTER
----- Message from Braulio Alexis sent at 9/24/2023  8:37 AM EDT -----  Regarding: Test question  Contact: [phone]  Is there anything to worry about on my amb extended holter monitor?   I seen some findings just worried

## 2023-09-25 NOTE — PROGRESS NOTES
[address]  [place] [address]  [phone]    HISTORY OF PRESENT ILLNESS:    Yenifer Abad is a 49 y.o. female who presents for evaluation of neck pain. Patient was last seen in the office on 7/07/2021 for evaluation of lumbar spine. Patient was referred today by Iván Hamilton at pain management. Patient reports she has neck pain as long as she can remember. Pain is in the posterior aspect of neck going into upper back as well as into her shoulder blades. Patient denies any radiation of pain into her upper extremities. Patient denies any numbness or tingling in upper extremities. Patient did approximately 2 months of chiropractic care without relief. Patient has not had injections or physical therapy for her neck. ALLERGIES:   Allergies   Allergen Reactions   • Bee Venom Anaphylaxis   • Iodides Other (See Comments)   • [place] Oil - Food Allergy GI Intolerance     Other reaction(s): GI Intolerance, GI Reaction   • Aspirin Other (See Comments)     shaking  convulsions    Other reaction(s): Other (See Comments)  shaking  convulsions  shaking  convulsions   • Metronidazole GI Intolerance     Gi upset     • Sulfamethoxazole-Trimethoprim GI Intolerance     Gi upst    Other reaction(s): GI Intolerance, GI Reaction  Gi upst  Gi upst   • Tape  [Medical Tape] Other (See Comments) and Hives     fever  Other reaction(s):  Other  fever       MEDICATIONS:    Current Outpatient Medications:   •  albuterol (PROVENTIL HFA,VENTOLIN HFA) 90 mcg/act inhaler, INHALE 1-2 PUFFS EVERY 6 HOURS AS NEEDED FOR WHEEZING., Disp: 18 g, Rfl: 3  •  Diclofenac Sodium (VOLTAREN) 1 %, Apply 2 g topically 4 (four) times a day, Disp: 100 g, Rfl: 3  •  dicyclomine (BENTYL) 20 mg tablet, Take 1 tablet (20 mg total) by mouth 4 (four) times a day as needed (IBS), Disp: 30 tablet, Rfl: 0  •  famotidine (PEPCID) 20 mg tablet, TAKE 1 TABLET BY MOUTH TWICE A DAY, Disp: 60 tablet, Rfl: 3  •  FLUoxetine (PROzac) 40 MG capsule, TAKE 1 CAPSULE (40 MG TOTAL) BY MOUTH DAILY. , Disp: 90 capsule, Rfl: 0  •  fluticasone (FLONASE) 50 mcg/act nasal spray, 1 spray into each nostril 2 (two) times a day, Disp: 11.1 mL, Rfl: 2  •  hydrOXYzine HCL (ATARAX) 10 mg tablet, TAKE 1 TABLET BY MOUTH EVERYDAY AT BEDTIME, Disp: 30 tablet, Rfl: 1  •  ketoconazole (NIZORAL) 2 % shampoo, Apply 1 Application topically 2 (two) times a week, Disp: 120 mL, Rfl: 1  •  loratadine (CLARITIN) 10 mg tablet, Take 10 mg by mouth daily, Disp: , Rfl:   •  meclizine (ANTIVERT) 25 mg tablet, TAKE 1 TABLET (25 MG TOTAL) BY MOUTH EVERY 8 (EIGHT) HOURS AS NEEDED FOR DIZZINESS., Disp: 90 tablet, Rfl: 1  •  metoclopramide (REGLAN) 5 mg tablet, TAKE 1 TABLET BY MOUTH 3 TIMES A DAY BEFORE MEALS, Disp: 90 tablet, Rfl: 1  •  mometasone (ELOCON) 0.1 % lotion, APPLY TO AFFECTED AREA TOPICALLY EVERY DAY, Disp: 60 mL, Rfl: 3  •  mupirocin (BACTROBAN) 2 % ointment, Apply topically 3 (three) times a day, Disp: 15 g, Rfl: 0  •  omeprazole (PriLOSEC) 40 MG capsule, TAKE 1 CAPSULE (40 MG TOTAL) BY MOUTH DAILY. , Disp: 90 capsule, Rfl: 1  •  tiZANidine (ZANAFLEX) 4 mg tablet, Take 1 tablet (4 mg total) by mouth every 8 (eight) hours as needed for muscle spasms May split tablets in half if experiencing drowiness, Disp: 60 tablet, Rfl: 0  •  EPINEPHrine (EPIPEN) 0.3 mg/0.3 mL SOAJ, Inject 0.3 mL (0.3 mg total) into a muscle once for 1 dose As needed, Disp: 0.3 mL, Rfl: 0     PAST MEDICAL HISTORY:   Past Medical History:   Diagnosis Date   • Asthma    • Degenerative joint disease    • Depression    • GERD (gastroesophageal reflux disease)    • Lumbar radiculopathy    • PONV (postoperative nausea and vomiting)    • Thyroid nodule        PAST SURGICAL HISTORY:  Past Surgical History:   Procedure Laterality Date   • BREAST BIOPSY Left 2020    neg   • CHOLECYSTECTOMY LAPAROSCOPIC N/A 5/6/2022    Procedure: CHOLECYSTECTOMY LAPAROSCOPIC;  Surgeon: Alex Maharaj, MD;  Location: [facility];  Service: General   • CYST REMOVAL  10/2020    lt breast    • EGD     • HYSTERECTOMY     • HYSTERECTOMY  1998   • LIPOSUCTION  09/2020   • TUBAL LIGATION         SOCIAL HISTORY:  Social History     Tobacco Use   Smoking Status Never   Smokeless Tobacco Never          PHYSICAL EXAM:  49 y.o. female sitting comfortably on exam table in no apparent distress. Ambulates with normal gait with slightly slowed when turning   Able to go up on toes and heels   Able to balance on one leg  TTP over thoracic spine  5/5 motor strength with normal sensation in bilateral upper extremities  2+ deep tendon reflexes bilateral upper extremities except 3+ left biceps reflex. 3+ quads reflexes bilaterally  2+ patellar reflexes bilaterally  Absent haskins sign bilaterally  Slightly slowed rapid alternating movements      RADIOGRAPHIC STUDIES:  1. MRI, L-spine 3/17/2021, mild multilevel degenerative disc disease. Moderate degenerative changes L3-4. Moderate to severe degenerative changes at L5-S1. Posterior central disc protrusion with slight predominance on the left side with minimal lateral recess stenosis at L5-S1. Facet hypertrophy and mild lateral recess stenosis L4-5.     2. CT, abdomen, 06/03/2021, multilevel thoracic and lumbar degenerative disc disease, diffuse idiopathic skeletal hyperostosis, moderate degenerative disc disease L3-4 L4-5 and L5-S1 with posterior osteophyte formation. No evidence of bony stenosis. No evidence of congenital stenosis. 3. MRI, cervical spine, 8/13/2023: Multilevel cervical degenerative disc disease, most significant at C5-6 and C6-7. There is evidence of posterior ossified formation at this point resulting in spinal cord compression and spinal cord indentation. The spinal cord has not flattened. There is no some myelomalacia. 4. Xrays, cervical spine, 9/25/2023: Severe degenerative changes at C5-6 and C6-7.   Mild to moderate degenerative changes at other levels of the cervical spine. ASSESSMENT:  1. Neck pain  -     XR spine cervical complete 4 or 5 vw non injury; Future; Expected date: 09/25/2023    2. DDD (degenerative disc disease), cervical  -     Ambulatory Referral to Spine Surgery    3. Spinal cord compression [facility])        PLAN:  49 y.o. female with neck pain, cervical DDD, and cervical spinal cord compression. MRI of cervical spine was reviewed today showing multilevel degenerative disc disease with disc protrusions at C5-6 and C6-7 causing spinal cord compression. There is no evidence of myelomalacia. On physical exam there was mild findings that may be suggestive of early myelopathy including some evidence of hyperreflexia. The physical exam however is not conclusive and is not significant enough to justify surgical treatment of spinal cord compression. Respect to her neck pain, she has not been treated appropriately and surgery is not an option. There is a role for physical therapy. If physical therapy fails, injections can be attempted. If she fails conservative measures, then surgical intervention may be indicated as long as she does understand that surgical treatment of neck pain has a very high failure rate. I did explain to her that the surgical treatment L4 decompression of the spinal cord is different and surgical treatment of neck pain. We will continue to monitor physical exam closely every three months for the foreseeable future. In regards to the chronic neck pain, surgical intervention is not necessary. We will refer patient to physical therapy at this time for evaluation. Patient may also consider injections if physical therapy is not helpful. Patient should not seek chiropractic care due to severe degenerative disc disease. The patient also has some issues with urinary incontinence. She states that after she urinates, she continues to drain. She has had 3 children through vaginal birth.   I did suggest that she discuss this issue with her gynecologist.    Patient will follow-up in 3 months for repeat myelopathy check.       Scribe Attestation    I,:  Zahra Yost PA-C am acting as a scribe while in the presence of the attending physician.:       I,:  Aniya Nicole MD personally performed the services described in this documentation    as scribed in my presence.:

## 2023-09-27 NOTE — RESULT ENCOUNTER NOTE
[facility]    Event Recorder Results    Indication: Palpitations    Duration of monitorin days 13 hours    Results:   Patient had a min HR of 28 bpm, max HR of 164 bpm, and avg HR of 75 bpm.   Predominant underlying rhythm was Sinus Rhythm. Second Degree AV Block-Mobitz I (Wenckebach) was present.    Isolated SVEs were rare (<1.0%)  Isolated VEs were rare (<1.0%)  Ventricular Bigeminy was present      Interpretation:  NSR  Brief second-degree AV block-Mobitz 1  Rare ectopy

## 2023-09-28 ENCOUNTER — TELEPHONE (OUTPATIENT)
Dept: CARDIOLOGY CLINIC | Facility: CLINIC | Age: 48
End: 2023-09-28

## 2023-09-28 NOTE — TELEPHONE ENCOUNTER
----- Message from Di Leblanc MD sent at 9/28/2023 12:33 PM EDT -----  Please call and inform the patient that the event monitor was okay

## 2023-09-29 DIAGNOSIS — E04.1 THYROID NODULE: Primary | ICD-10-CM

## 2023-10-03 ENCOUNTER — TELEPHONE (OUTPATIENT)
Dept: FAMILY MEDICINE CLINIC | Facility: CLINIC | Age: 48
End: 2023-10-03

## 2023-10-04 ENCOUNTER — OFFICE VISIT (OUTPATIENT)
Age: 48
End: 2023-10-04
Payer: COMMERCIAL

## 2023-10-04 VITALS
HEIGHT: 63 IN | BODY MASS INDEX: 27.29 KG/M2 | SYSTOLIC BLOOD PRESSURE: 100 MMHG | WEIGHT: 154 LBS | DIASTOLIC BLOOD PRESSURE: 60 MMHG | HEART RATE: 91 BPM | OXYGEN SATURATION: 98 %

## 2023-10-04 DIAGNOSIS — K58.0 IRRITABLE BOWEL SYNDROME WITH DIARRHEA: ICD-10-CM

## 2023-10-04 DIAGNOSIS — K31.84 GASTROPARESIS: ICD-10-CM

## 2023-10-04 DIAGNOSIS — K21.9 GASTROESOPHAGEAL REFLUX DISEASE WITHOUT ESOPHAGITIS: ICD-10-CM

## 2023-10-04 DIAGNOSIS — Z80.0 FAMILY HISTORY OF STOMACH CANCER: Primary | ICD-10-CM

## 2023-10-04 PROCEDURE — 99214 OFFICE O/P EST MOD 30 MIN: CPT | Performed by: PHYSICIAN ASSISTANT

## 2023-10-04 NOTE — PATIENT INSTRUCTIONS
Scheduled date of EGD(as of today): 12/2/23  Physician performing EGD: Karely Flores  Location of EGD: [place]  Instructions reviewed with patient by: Niya RODRÍGUEZ  Clearances:

## 2023-10-05 NOTE — PROGRESS NOTES
[facility] - Outpatient Follow-up Note  Luba Bains 49 y.o. female MRN: [id number]  Encounter: [id number]          ASSESSMENT AND PLAN:      1. Family history of stomach cancer  2. Gastroparesis  3. Gastroesophageal reflux disease  4. Irritable bowel syndrome with diarrhea    Patient presents for follow up: she has gastroparesis, GERD and IBS-D. Her gastroparesis symptoms began after her abdominal liposuction surgery 4 years ago. EGD 12/1/21 was normal and biopsies were negative for h pylori. Colonoscopy with visualization of the terminal ileum 12/1/21 was normal and biopsies were negative for microscopic colitis. US 12/10/22 s/p mild hepatic steatosis and s/p cholecystectomy. Celiac serology was negative. Gastric emptying study 12/6/22 showed a severely reduced rate of gastric emptying. MR enterography 2/23 was negative for SB crohn's. Pancreatic fecal elastase was normal.  24 hours 5-HIAA was negative. Her IBS-D/possible SIBO was treated with a Xifaxan 550mg po TID x 14 days course with significant improvement! She reports some continued nausea and early satiety. She is concerned as her father had stomach cancer. Will plan for repeat EGD to investigate. We reviewed the importance of dietary modifications with small, frequent low fat meals. She has Reglan to utilize just on an as needed basis with benefit (we reviewed the risk of tardive dyskinesia). ______________________________________________________________________    SUBJECTIVE:  Patient is a pleasant 49year old female who presents to the office for follow up. She reports her father had stomach cancer which concerns her and she would like a repeat EGD to evaluate her anatomy. She has intermittent nausea and early satiety. She has known gastroparesis. She is not strictly following the gastroparesis diet. Her diarrhea resolved with the prior Xifaxan course for IBS-D/possible SIBO.   No melena or rectal bleeding. REVIEW OF SYSTEMS IS OTHERWISE NEGATIVE.       Historical Information   Past Medical History:   Diagnosis Date   • Asthma    • Degenerative joint disease    • Depression    • GERD (gastroesophageal reflux disease)    • Lumbar radiculopathy    • PONV (postoperative nausea and vomiting)    • Thyroid nodule      Past Surgical History:   Procedure Laterality Date   • BREAST BIOPSY Left 2020    neg   • CHOLECYSTECTOMY LAPAROSCOPIC N/A 5/6/2022    Procedure: CHOLECYSTECTOMY LAPAROSCOPIC;  Surgeon: Ryan Solano MD;  Location: [facility];  Service: General   • CYST REMOVAL  10/2020    lt breast    • EGD     • HYSTERECTOMY     • HYSTERECTOMY  1998   • LIPOSUCTION  09/2020   • TUBAL LIGATION       Social History   Social History     Substance and Sexual Activity   Alcohol Use Not Currently    Comment: ocaasional     Social History     Substance and Sexual Activity   Drug Use Never     Social History     Tobacco Use   Smoking Status Never   Smokeless Tobacco Never     Family History   Problem Relation Age of Onset   • Alzheimer's disease Mother    • Cancer Father         stomach/GI   • No Known Problems Sister    • No Known Problems Daughter    • No Known Problems Maternal Grandmother    • No Known Problems Paternal Grandmother    • Colon cancer Maternal Aunt    • Cancer Maternal Aunt         stomach   • No Known Problems Maternal Aunt    • No Known Problems Maternal Aunt    • No Known Problems Maternal Aunt    • No Known Problems Maternal Aunt    • No Known Problems Maternal Aunt    • Colon cancer Maternal Uncle    • Breast cancer Neg Hx        Meds/Allergies       Current Outpatient Medications:   •  albuterol (PROVENTIL HFA,VENTOLIN HFA) 90 mcg/act inhaler  •  Diclofenac Sodium (VOLTAREN) 1 %  •  dicyclomine (BENTYL) 20 mg tablet  •  famotidine (PEPCID) 20 mg tablet  •  FLUoxetine (PROzac) 40 MG capsule  •  fluticasone (FLONASE) 50 mcg/act nasal spray  •  hydrOXYzine HCL (ATARAX) 10 mg tablet  • ketoconazole (NIZORAL) 2 % shampoo  •  loratadine (CLARITIN) 10 mg tablet  •  meclizine (ANTIVERT) 25 mg tablet  •  metoclopramide (REGLAN) 5 mg tablet  •  mometasone (ELOCON) 0.1 % lotion  •  mupirocin (BACTROBAN) 2 % ointment  •  omeprazole (PriLOSEC) 40 MG capsule  •  tiZANidine (ZANAFLEX) 4 mg tablet  •  EPINEPHrine (EPIPEN) 0.3 mg/0.3 mL SOAJ    Allergies   Allergen Reactions   • Bee Venom Anaphylaxis   • Iodides Other (See Comments)   • [place] Oil - Food Allergy GI Intolerance     Other reaction(s): GI Intolerance, GI Reaction   • Aspirin Other (See Comments)     shaking  convulsions    Other reaction(s): Other (See Comments)  shaking  convulsions  shaking  convulsions   • Metronidazole GI Intolerance     Gi upset     • Sulfamethoxazole-Trimethoprim GI Intolerance     Gi upst    Other reaction(s): GI Intolerance, GI Reaction  Gi upst  Gi upst   • Tape  [Medical Tape] Other (See Comments) and Hives     fever  Other reaction(s): Other  fever           Objective     Blood pressure 100/60, pulse 91, height 5' 3" (1.6 m), weight 69.9 kg (154 lb), SpO2 98 %, not currently breastfeeding. Body mass index is 27.28 kg/m². PHYSICAL EXAM:      General Appearance:   Alert, cooperative, no distress   HEENT:   Normocephalic, atraumatic, anicteric.     Neck:  Supple, symmetrical, trachea midline   Lungs:   Clear to auscultation bilaterally; no rales, rhonchi or wheezing; respirations unlabored    Heart[de-identified]   Regular rate and rhythm; no murmur, rub, or gallop. Abdomen:   Soft, non-tender, non-distended; normal bowel sounds; no masses, no organomegaly    Genitalia:   Deferred    Rectal:   Deferred    Extremities:  No cyanosis, clubbing or edema    Pulses:  2+ and symmetric    Skin:  No jaundice, rashes, or lesions    Lymph nodes:  No palpable cervical lymphadenopathy        Lab Results:   No visits with results within 1 Day(s) from this visit.    Latest known visit with results is:   Appointment on 09/09/2023   Component Date Value   • WBC 09/09/2023 8.01    • RBC 09/09/2023 4.17    • Hemoglobin 09/09/2023 12.8    • Hematocrit 09/09/2023 38.3    • MCV 09/09/2023 92    • MCH 09/09/2023 30.7    • MCHC 09/09/2023 33.4    • RDW 09/09/2023 12.3    • MPV 09/09/2023 9.4    • Platelets 19/57/1616 317    • nRBC 09/09/2023 0    • Neutrophils Relative 09/09/2023 65    • Immat GRANS % 09/09/2023 0    • Lymphocytes Relative 09/09/2023 24    • Monocytes Relative 09/09/2023 8    • Eosinophils Relative 09/09/2023 2    • Basophils Relative 09/09/2023 1    • Neutrophils Absolute 09/09/2023 5.28    • Immature Grans Absolute 09/09/2023 0.02    • Lymphocytes Absolute 09/09/2023 1.88    • Monocytes Absolute 09/09/2023 0.61    • Eosinophils Absolute 09/09/2023 0.17    • Basophils Absolute 09/09/2023 0.05    • Sodium 09/09/2023 139    • Potassium 09/09/2023 3.9    • Chloride 09/09/2023 107    • CO2 09/09/2023 27    • ANION GAP 09/09/2023 5    • BUN 09/09/2023 12    • Creatinine 09/09/2023 0.61    • Glucose, Fasting 09/09/2023 92    • Calcium 09/09/2023 9.3    • AST 09/09/2023 20    • ALT 09/09/2023 23    • Alkaline Phosphatase 09/09/2023 91    • Total Protein 09/09/2023 7.6    • Albumin 09/09/2023 4.2    • Total Bilirubin 09/09/2023 0.73    • eGFR 09/09/2023 107    • Hemoglobin A1C 09/09/2023 5.6    • EAG 09/09/2023 114    • Cholesterol 09/09/2023 183    • Triglycerides 09/09/2023 51    • HDL, Direct 09/09/2023 51    • LDL Calculated 09/09/2023 122 (H)    • Non-HDL-Chol (CHOL-HDL) 09/09/2023 132    • TSH 3RD GENERATON 09/09/2023 1.942    • Vit D, 25-Hydroxy 09/09/2023 58.8    • Amylase 09/09/2023 36    • Lipase 09/09/2023 23    • IGA 09/09/2023 381    • IGG 09/09/2023 1,073    • IGM 09/09/2023 126    • Rheumatoid Factor 09/09/2023 Positive (A)    • Hepatitis B Surface Ag 09/09/2023 Non-reactive    • Hep A IgM 09/09/2023 Non-reactive    • Hepatitis C Ab 09/09/2023 Non-reactive    • Hep B C IgM 09/09/2023 Non-reactive    • RF Quantitation 09/09/2023 16 IU/mL (A)          Radiology Results:   XR spine cervical complete 4 or 5 vw non injury    Result Date: 9/30/2023  Narrative: CERVICAL SPINE INDICATION:   M54.2: Cervicalgia. COMPARISON:  None VIEWS:  XR SPINE CERVICAL COMPLETE 4 OR 5 VW NON INJURY FINDINGS: No fracture. No subluxation, no abnormal motion during flexion and extension Degenerative disc disease of the cervical spine noted, most pronounced at C5-C6 and C6-C7. Endplate spurring is present. The prevertebral soft tissues are within normal limits. The lung apices are clear. Impression: Degenerative disc disease of the cervical spine most advanced at C5-C6 and C6-C7 Workstation performed: [id number]     US thyroid    Result Date: 9/29/2023  Narrative: THYROID ULTRASOUND INDICATION:    E04.1: Nontoxic single thyroid nodule. COMPARISON: January 2021 TECHNIQUE:   Ultrasound of the thyroid was performed with a high frequency linear transducer in transverse and sagittal planes including volumetric imaging sweeps as well as traditional still imaging technique. FINDINGS:  Normal homogeneous smooth echotexture. Right lobe: 4.4 x 1.3 x 1.6 cm. Volume 4.3 mL Left lobe:  4.2 x 1.3 x 1.3 cm. Volume 3.3 mL Isthmus: 0.2  cm. Nodule #1. Image 13. RIGHT lower pole nodule measuring 1.1 x 0.9 x 1.0 cm. Unchanged from prior. COMPOSITION:  2 points, solid or almost completely solid . ECHOGENICITY:  1 point, hyperechoic or isoechoic. SHAPE:  0 points, wider-than-tall. MARGIN: Smooth0 points, smooth. ECHOGENIC FOCI:  2 points, peripheral(rim) calcifications. TI-RADS Classification: TR 5 (7 or > points), Highly suspicious. FNA if > 1 cm. Follow if > 0.5 cm. Impression: Stable rim calcified right lower pole nodule, TI-RADS 5. Next follow-up suggested in 1 year. The study was marked in EPIC for significant notification. Reference: ACR Thyroid Imaging, Reporting and Data System (TI-RADS): White Paper of the [employer].  J AM Skylar Radiol 1543;86:[id number]. (additional recommendations based on American Thyroid Association 2015 guidelines.) Workstation performed: [id number]

## 2023-10-06 ENCOUNTER — HOSPITAL ENCOUNTER (OUTPATIENT)
Dept: NON INVASIVE DIAGNOSTICS | Facility: CLINIC | Age: 48
Discharge: HOME | End: 2023-10-06
Payer: COMMERCIAL

## 2023-10-06 ENCOUNTER — EVALUATION (OUTPATIENT)
Dept: PHYSICAL THERAPY | Facility: CLINIC | Age: 48
End: 2023-10-06
Payer: COMMERCIAL

## 2023-10-06 VITALS
SYSTOLIC BLOOD PRESSURE: 100 MMHG | HEIGHT: 63 IN | WEIGHT: 154 LBS | DIASTOLIC BLOOD PRESSURE: 60 MMHG | BODY MASS INDEX: 27.29 KG/M2 | HEART RATE: 67 BPM

## 2023-10-06 DIAGNOSIS — R00.2 INTERMITTENT PALPITATIONS: ICD-10-CM

## 2023-10-06 DIAGNOSIS — M54.2 NECK PAIN: ICD-10-CM

## 2023-10-06 DIAGNOSIS — R07.9 CHEST PAIN, UNSPECIFIED TYPE: ICD-10-CM

## 2023-10-06 DIAGNOSIS — M50.30 DDD (DEGENERATIVE DISC DISEASE), CERVICAL: ICD-10-CM

## 2023-10-06 LAB
AORTIC ROOT: 3 CM
APICAL FOUR CHAMBER EJECTION FRACTION: 67 %
ASCENDING AORTA: 2.5 CM
E WAVE DECELERATION TIME: 260 MS
FRACTIONAL SHORTENING: 37 % (ref 28–44)
INTERVENTRICULAR SEPTUM IN DIASTOLE (PARASTERNAL SHORT AXIS VIEW): 0.8 CM
INTERVENTRICULAR SEPTUM: 0.8 CM (ref 0.6–1.1)
LAAS-AP2: 10.4 CM2
LAAS-AP4: 11.8 CM2
LEFT ATRIUM SIZE: 2.6 CM
LEFT ATRIUM VOLUME (MOD BIPLANE): 22 ML
LEFT ATRIUM VOLUME INDEX (MOD BIPLANE): 12.7 ML/M2
LEFT INTERNAL DIMENSION IN SYSTOLE: 2.7 CM (ref 2.1–4)
LEFT VENTRICLE DIASTOLIC VOLUME (MOD BIPLANE): 59 ML
LEFT VENTRICLE SYSTOLIC VOLUME (MOD BIPLANE): 21 ML
LEFT VENTRICULAR INTERNAL DIMENSION IN DIASTOLE: 4.3 CM (ref 3.5–6)
LEFT VENTRICULAR POSTERIOR WALL IN END DIASTOLE: 0.7 CM
LEFT VENTRICULAR STROKE VOLUME: 58 ML
LV EF: 64 %
LVSV (TEICH): 58 ML
MV E'TISSUE VEL-SEP: 11 CM/S
MV PEAK A VEL: 0.51 M/S
MV PEAK E VEL: 91 CM/S
RIGHT ATRIUM AREA SYSTOLE A4C: 14 CM2
RIGHT VENTRICLE ID DIMENSION: 3.2 CM
SL CV LEFT ATRIUM LENGTH A2C: 4.3 CM
SL CV LV EF: 60
SL CV PED ECHO LEFT VENTRICLE DIASTOLIC VOLUME (MOD BIPLANE) 2D: 85 ML
SL CV PED ECHO LEFT VENTRICLE SYSTOLIC VOLUME (MOD BIPLANE) 2D: 26 ML
TR MAX PG: 18 MMHG
TR PEAK VELOCITY: 2.1 M/S
TRICUSPID ANNULAR PLANE SYSTOLIC EXCURSION: 2.4 CM
TRICUSPID VALVE PEAK REGURGITATION VELOCITY: 2.13 M/S

## 2023-10-06 PROCEDURE — 97140 MANUAL THERAPY 1/> REGIONS: CPT | Performed by: PHYSICAL THERAPIST

## 2023-10-06 PROCEDURE — 97162 PT EVAL MOD COMPLEX 30 MIN: CPT | Performed by: PHYSICAL THERAPIST

## 2023-10-06 PROCEDURE — 93306 TTE W/DOPPLER COMPLETE: CPT | Performed by: INTERNAL MEDICINE

## 2023-10-06 PROCEDURE — 93306 TTE W/DOPPLER COMPLETE: CPT
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
## 2023-10-09 DIAGNOSIS — R42 VERTIGO: ICD-10-CM

## 2023-10-09 DIAGNOSIS — K31.84 GASTROPARESIS: ICD-10-CM

## 2023-10-09 RX ORDER — METOCLOPRAMIDE 5 MG/1
5 TABLET ORAL
Qty: 90 TABLET | Refills: 0 | Status: SHIPPED | OUTPATIENT
Start: 2023-10-09

## 2023-10-09 RX ORDER — MECLIZINE HYDROCHLORIDE 25 MG/1
25 TABLET ORAL EVERY 8 HOURS PRN
Qty: 90 TABLET | Refills: 0 | Status: SHIPPED | OUTPATIENT
Start: 2023-10-09

## 2023-10-10 ENCOUNTER — OFFICE VISIT (OUTPATIENT)
Dept: PHYSICAL THERAPY | Facility: CLINIC | Age: 48
End: 2023-10-10
Payer: COMMERCIAL

## 2023-10-10 DIAGNOSIS — M50.30 DDD (DEGENERATIVE DISC DISEASE), CERVICAL: ICD-10-CM

## 2023-10-10 DIAGNOSIS — M54.2 NECK PAIN: Primary | ICD-10-CM

## 2023-10-10 PROCEDURE — 97110 THERAPEUTIC EXERCISES: CPT

## 2023-10-10 PROCEDURE — 97140 MANUAL THERAPY 1/> REGIONS: CPT

## 2023-10-10 PROCEDURE — 97112 NEUROMUSCULAR REEDUCATION: CPT

## 2023-10-10 NOTE — PROGRESS NOTES
Daily Note     Today's date: 10/10/2023  Patient name: Shlomo Luz  : 1975  MRN: [id number]  Referring provider: Rocío Carballo PA-C  Dx:   Encounter Diagnosis     ICD-10-CM    1. Neck pain  M54.2       2. DDD (degenerative disc disease), cervical  M50.30           Start Time: 9945  Stop Time: 1623  Total time in clinic (min): 38 minutes    Subjective:  Patient reports no new complaints or incidents. Denies any pain post IE      Objective: See treatment diary below      Assessment:   Patient was able complete therapy with in tolerance and without reported onset of neck pain. Cueing required throughout on sequencing, positioning, and mechanics of exercises. Tactile and heavy cues including demonstration and performance in seated position to ensure proper muscular recruitment with postural and scapular re-education. HEP was created and instructed on      Plan: Continue per plan of care. Progress treatment as tolerated.        Diagnosis: Chronic neck pain   Precautions: Hx of RA, chronic pain   POC Expires: 23   Re-evaluation Date: 11/3/23   FOTO Scores/Date: Goal - 54; 10/6 - 45   Visit Count 1/10 2/10      Manuals 10/6 10/10      Cervical ROT bilaterally KD SERGEI      Cervical traction KD in slightly flexed position  SERGEI      SOR KD SERGEI      Ther Ex 10/6       Seated thoracic ext w/ ball NV 3"x10       Cervical ROT w/ towel NV 3"x10                                                       HEP   Created and instructed      Neuro Re-Ed 10/6       UBE for postural re-ed/strengthening NV L1 x 5 mins retro      Prone scapular retraction/depression  NV Seated 10"x10 unable prone      Prone scapular retraction/depression w/ lift off NV       T-band Rows/Ext   GTB x10                             Ther Act                                                                                 Modalities

## 2023-10-12 ENCOUNTER — OFFICE VISIT (OUTPATIENT)
Dept: PHYSICAL THERAPY | Facility: CLINIC | Age: 48
End: 2023-10-12
Payer: COMMERCIAL

## 2023-10-12 DIAGNOSIS — M54.2 NECK PAIN: Primary | ICD-10-CM

## 2023-10-12 DIAGNOSIS — M50.30 DDD (DEGENERATIVE DISC DISEASE), CERVICAL: ICD-10-CM

## 2023-10-12 PROCEDURE — 97110 THERAPEUTIC EXERCISES: CPT | Performed by: PHYSICAL THERAPIST

## 2023-10-12 PROCEDURE — 97112 NEUROMUSCULAR REEDUCATION: CPT | Performed by: PHYSICAL THERAPIST

## 2023-10-12 PROCEDURE — 97140 MANUAL THERAPY 1/> REGIONS: CPT | Performed by: PHYSICAL THERAPIST

## 2023-10-12 NOTE — PROGRESS NOTES
Daily Note     Today's date: 10/12/2023  Patient name: Antonia Olivia  : 1975  MRN: [id number]  Referring provider: Gertrude Lovelace PA-C  Dx:   Encounter Diagnosis     ICD-10-CM    1. Neck pain  M54.2       2. DDD (degenerative disc disease), cervical  M50.30           Start Time: 1630  Stop Time: 1710  Total time in clinic (min): 40 minutes    Subjective: Pt reports pain was pretty severe this morning at 10/10 but has gradually diminished through the day and currently 5/10. Pt reports scapular soreness after last visit but no increase in pain. Objective: See treatment diary below      Assessment: Pt continues to respond well to manual interventions with good reduction in pain symptoms, more relief with cervical spine in slight flexion with traction, remains with bilateral limitations with passive cervical rotation L>R with slight discomfort. Cues required during seated cervical rotation to avoid compensatory trunk rotation to isolate cervical spine. Pt with significant improvement in form with prone scapular retraction after initial cues for proper retraction/depression without pain but notable fatigue/mm quivering with lift off component. HEP was updated and reviewed. Plan: Continue per plan of care. Progress treatment as tolerated.        Diagnosis: Chronic neck pain   Precautions: Hx of RA, chronic pain   POC Expires: 23   Re-evaluation Date: 11/3/23   FOTO Scores/Date: Goal - 54; 10/6 - 45   Visit Count 1/10 2/10 3/10     Manuals 10/6 10/10 10/12     Cervical ROT bilaterally KD SERGEI KD     Cervical traction KD in slightly flexed position  SERGEI KD in slightly flexed position      SOR KD SERGEI KD     Ther Ex 10/6  10/12     Seated thoracic ext w/ ball NV 3"x10  15x3"     Cervical ROT w/ towel NV 3"x10  15x3"                                                      HEP   Created and instructed Updated/reviewed      Neuro Re-Ed 10/6  10/12     UBE for postural re-ed/strengthening NV L1 x 5 mins retro L1 x 5 min retro     Prone scapular retraction/depression  NV Seated 10"x10 unable prone Prone 10x10"     Prone scapular retraction/depression w/ lift off NV  Prone 10x10"     T-band Rows/Ext   GTB 2x10 ea                            Ther Act                                                                                 Modalities

## 2023-10-16 ENCOUNTER — TELEPHONE (OUTPATIENT)
Dept: PAIN MEDICINE | Facility: CLINIC | Age: 48
End: 2023-10-16

## 2023-10-16 ENCOUNTER — TELEPHONE (OUTPATIENT)
Age: 48
End: 2023-10-16

## 2023-10-16 NOTE — TELEPHONE ENCOUNTER
Caller: Patricia Nam     Doctor: Dr Dow     Reason for call: Patient calling stating she would like a back brace now please advise     Call back#: [phone]

## 2023-10-17 ENCOUNTER — APPOINTMENT (OUTPATIENT)
Dept: PHYSICAL THERAPY | Facility: CLINIC | Age: 48
End: 2023-10-17
Payer: COMMERCIAL

## 2023-10-18 ENCOUNTER — OFFICE VISIT (OUTPATIENT)
Dept: CARDIOLOGY CLINIC | Facility: CLINIC | Age: 48
End: 2023-10-18
Payer: COMMERCIAL

## 2023-10-18 VITALS
RESPIRATION RATE: 16 BRPM | WEIGHT: 150 LBS | OXYGEN SATURATION: 98 % | BODY MASS INDEX: 26.58 KG/M2 | HEIGHT: 63 IN | HEART RATE: 68 BPM | DIASTOLIC BLOOD PRESSURE: 68 MMHG | SYSTOLIC BLOOD PRESSURE: 112 MMHG

## 2023-10-18 DIAGNOSIS — K21.9 GASTROESOPHAGEAL REFLUX DISEASE WITHOUT ESOPHAGITIS: ICD-10-CM

## 2023-10-18 DIAGNOSIS — R00.2 INTERMITTENT PALPITATIONS: Primary | ICD-10-CM

## 2023-10-18 PROCEDURE — 99213 OFFICE O/P EST LOW 20 MIN: CPT | Performed by: NURSE PRACTITIONER

## 2023-10-18 NOTE — PROGRESS NOTES
Cardiology Office Note    Charisma Carmona 49 y.o. female MRN: [id number]    10/18/23          Assessment/Plan:    1. Palpitations, resolved  - Currently resolved  - Patient to complete stress testing on 11/3/2023    2. Asthma    3. GERD    Follow up: 3 months or sooner as needed    1. Intermittent palpitations        2. Gastroesophageal reflux disease without esophagitis            HPI: Charisma Carmona is a 49y.o. year old female with a past medical history of GERD, DJD, asthma and palpitations who presents today for follow-up appointment. She was seen in this office in September 2023 and at that time had complained of experiencing palpitations as well as lower chest pain and upper abdominal discomfort. She was ordered a stress test which is scheduled for November 3 and did complete a 1 week cardiac event monitor which revealed normal sinus rhythm with rare ectopy and ventricular bigeminy, an episode of brief second-degree AV block/Wenckebach, a minimum heart rate of 28, maximum heart rate of 164 and an average heart rate of 75 -these results were reviewed with her. She also had a TTE performed which revealed normal systolic function with an EF of 60%, no regional wall motion abnormalities. Today she states that she was doing well and has not had recurrence of her palpitations. She states that her job is stressful and that she did follow-up with her PCP who believes that her palpitations are secondary to anxiety. She denies chest pain, dyspnea on exertion or rest, lower extremity edema, or palpitations and was instructed to call  the office or seek medical attention if any such symptoms develop. All medications reviewed and patient is tolerating medications without side effects. She was instructed that if she has should have recurrence of her chest/abdominal discomfort in the palpitation she should notify the office right away.   She was also instructed if she should have dizziness or passing out episodes she should report to the emergency room for further evaluation. Patient Active Problem List   Diagnosis    Generalized abdominal pain    Epigastric lump    Chronic pain syndrome    Chronic bilateral low back pain with right-sided sciatica    Lumbar radiculopathy    Lumbar degenerative disc disease    DISH (diffuse idiopathic skeletal hyperostosis)    PONV (postoperative nausea and vomiting)    Dyskinesia of gallbladder    Postoperative visit    Epigastric pain    Gastroparesis    Benign cyst of left breast in female    Thyroid nodule    Major depressive disorder with single episode, in full remission (HCC)    Lipodystrophy    Gastroesophageal reflux disease without esophagitis       Allergies   Allergen Reactions    Bee Venom Anaphylaxis    Iodides Other (See Comments)    Elrama Oil - Food Allergy GI Intolerance     Other reaction(s): GI Intolerance, GI Reaction    Aspirin Other (See Comments)     shaking  convulsions    Other reaction(s): Other (See Comments)  shaking  convulsions  shaking  convulsions    Metronidazole GI Intolerance     Gi upset      Sulfamethoxazole-Trimethoprim GI Intolerance     Gi upst    Other reaction(s): GI Intolerance, GI Reaction  Gi upst  Gi upst    Tape  [Medical Tape] Other (See Comments) and Hives     fever  Other reaction(s):  Other  fever         Current Outpatient Medications:     albuterol (PROVENTIL HFA,VENTOLIN HFA) 90 mcg/act inhaler, INHALE 1-2 PUFFS EVERY 6 HOURS AS NEEDED FOR WHEEZING., Disp: 18 g, Rfl: 3    Diclofenac Sodium (VOLTAREN) 1 %, Apply 2 g topically 4 (four) times a day, Disp: 100 g, Rfl: 3    dicyclomine (BENTYL) 20 mg tablet, Take 1 tablet (20 mg total) by mouth 4 (four) times a day as needed (IBS), Disp: 30 tablet, Rfl: 0    EPINEPHrine (EPIPEN) 0.3 mg/0.3 mL SOAJ, Inject 0.3 mL (0.3 mg total) into a muscle once for 1 dose As needed, Disp: 0.3 mL, Rfl: 0    famotidine (PEPCID) 20 mg tablet, TAKE 1 TABLET BY MOUTH TWICE A DAY, Disp: 60 tablet, Rfl: 3    FLUoxetine (PROzac) 40 MG capsule, TAKE 1 CAPSULE (40 MG TOTAL) BY MOUTH DAILY. , Disp: 90 capsule, Rfl: 0    fluticasone (FLONASE) 50 mcg/act nasal spray, 1 spray into each nostril 2 (two) times a day, Disp: 11.1 mL, Rfl: 2    hydrOXYzine HCL (ATARAX) 10 mg tablet, TAKE 1 TABLET BY MOUTH EVERYDAY AT BEDTIME, Disp: 30 tablet, Rfl: 1    ketoconazole (NIZORAL) 2 % shampoo, Apply 1 Application topically 2 (two) times a week, Disp: 120 mL, Rfl: 1    loratadine (CLARITIN) 10 mg tablet, Take 1 tablet (10 mg total) by mouth daily, Disp: 90 tablet, Rfl: 1    meclizine (ANTIVERT) 25 mg tablet, Take 1 tablet (25 mg total) by mouth every 8 (eight) hours as needed for dizziness, Disp: 90 tablet, Rfl: 0    metoclopramide (REGLAN) 5 mg tablet, Take 1 tablet (5 mg total) by mouth 3 (three) times a day before meals, Disp: 90 tablet, Rfl: 0    mometasone (ELOCON) 0.1 % lotion, APPLY TO AFFECTED AREA TOPICALLY EVERY DAY, Disp: 60 mL, Rfl: 3    mupirocin (BACTROBAN) 2 % ointment, Apply topically 3 (three) times a day, Disp: 15 g, Rfl: 0    omeprazole (PriLOSEC) 40 MG capsule, TAKE 1 CAPSULE (40 MG TOTAL) BY MOUTH DAILY. , Disp: 90 capsule, Rfl: 1    tiZANidine (ZANAFLEX) 4 mg tablet, Take 1 tablet (4 mg total) by mouth every 8 (eight) hours as needed for muscle spasms May split tablets in half if experiencing drowiness, Disp: 60 tablet, Rfl: 0    Past Medical History:   Diagnosis Date    Asthma     Degenerative joint disease     Depression     GERD (gastroesophageal reflux disease)     Lumbar radiculopathy     PONV (postoperative nausea and vomiting)     Thyroid nodule        Family History   Problem Relation Age of Onset    Alzheimer's disease Mother     Cancer Father         stomach/GI    No Known Problems Sister     No Known Problems Daughter     No Known Problems Maternal Grandmother     No Known Problems Paternal Grandmother     Colon cancer Maternal Aunt     Cancer Maternal Aunt         stomach    No Known Problems Maternal Aunt     No Known Problems Maternal Aunt     No Known Problems Maternal Aunt     No Known Problems Maternal Aunt     No Known Problems Maternal Aunt     Colon cancer Maternal Uncle     Breast cancer Neg Hx        Past Surgical History:   Procedure Laterality Date    BREAST BIOPSY Left 2020    neg    CHOLECYSTECTOMY LAPAROSCOPIC N/A 5/6/2022    Procedure: CHOLECYSTECTOMY LAPAROSCOPIC;  Surgeon: Barbara Shin MD;  Location: [facility];  Service: General    CYST REMOVAL  10/2020    lt breast     EGD      HYSTERECTOMY      HYSTERECTOMY  1998    LIPOSUCTION  09/2020    TUBAL LIGATION         Social History     Socioeconomic History    Marital status:      Spouse name: Not on file    Number of children: Not on file    Years of education: Not on file    Highest education level: Not on file   Occupational History    Not on file   Tobacco Use    Smoking status: Never    Smokeless tobacco: Never   Vaping Use    Vaping Use: Never used   Substance and Sexual Activity    Alcohol use: Not Currently     Comment: ocaasional    Drug use: Never    Sexual activity: Yes     Partners: Male     Birth control/protection: None   Other Topics Concern    Not on file   Social History Narrative    Not on file     Social Determinants of Health     Financial Resource Strain: Not on file   Food Insecurity: Not on file   Transportation Needs: Not on file   Physical Activity: Unknown (8/12/2022)    Exercise Vital Sign     Days of Exercise per Week: 0 days     Minutes of Exercise per Session: Not on file   Stress: Not on file   Social Connections: Not on file   Intimate Partner Violence: Not on file   Housing Stability: Not on file       Review of symptoms:   Constitution:  Negative  HEENT:  Negative  Cardiovascular:  Negative  Respiratory:  Negative  Skin:  Negative  Gastrointestinal:  Negative  Genitourinary:  Negative  Musculoskeletal:  Negative  Neurological:  Negative  Endocrine:  Negative  Psychological:  Negative    Vitals: BP 112/68 (BP Location: Left arm, Patient Position: Sitting, Cuff Size: Standard)   Pulse 68   Resp 16   Ht 5' 3" (1.6 m)   Wt 68 kg (150 lb)   SpO2 98%   BMI 26.57 kg/m²         Physical Exam:     GEN: Alert and oriented x 3, in no acute distress. Well appearing and well nourished. HEENT: Sclera anicteric, conjunctivae pink, mucous membranes moist.   NECK: Supple, no carotid bruits, no significant JVD. Trachea midline. HEART: Regular rhythm, normal S1 and S2, no murmurs, clicks, gallops or rubs. LUNGS: Clear to auscultation bilaterally; no wheezes, rales, or rhonchi. No increased work of breathing or signs of respiratory distress. ABDOMEN: Soft, nontender, nondistended, normoactive bowel sounds. EXTREMITIES: Skin warm and well perfused, no clubbing, cyanosis, or edema. NEURO: No focal findings. Normal speech. Mood and affect normal.   SKIN: Normal without suspicious lesions on exposed skin.

## 2023-10-19 ENCOUNTER — OFFICE VISIT (OUTPATIENT)
Dept: PHYSICAL THERAPY | Facility: CLINIC | Age: 48
End: 2023-10-19
Payer: COMMERCIAL

## 2023-10-19 DIAGNOSIS — M50.30 DDD (DEGENERATIVE DISC DISEASE), CERVICAL: ICD-10-CM

## 2023-10-19 DIAGNOSIS — M54.2 NECK PAIN: Primary | ICD-10-CM

## 2023-10-19 PROCEDURE — 97110 THERAPEUTIC EXERCISES: CPT | Performed by: PHYSICAL THERAPIST

## 2023-10-19 PROCEDURE — 97140 MANUAL THERAPY 1/> REGIONS: CPT | Performed by: PHYSICAL THERAPIST

## 2023-10-19 PROCEDURE — 97112 NEUROMUSCULAR REEDUCATION: CPT | Performed by: PHYSICAL THERAPIST

## 2023-10-19 NOTE — PROGRESS NOTES
Daily Note     Today's date: 10/19/2023  Patient name: Bhragav Gasca  : 1975  MRN: [id number]  Referring provider: Namita Kenny PA-C  Dx:   Encounter Diagnosis     ICD-10-CM    1. Neck pain  M54.2       2. DDD (degenerative disc disease), cervical  M50.30           Start Time: 0800  Stop Time: 0843  Total time in clinic (min): 43 minutes    Subjective: Pt reports feeling much better after last visit and feels good improvements in neck pain symptoms since enrolling in therapy. Pt rates current pain 3/10. Objective: See treatment diary below      Assessment: Pt continues to respond excellently to manual therapy and notable improvement observed in passive cervical rotation, however right remains more limited compared to left with c/o stretch and mild discomfort at end range. Pt with much improved scapular mechanics in prone and less reliant on cues for form, however remains easily fatigued with lift off component. Progressed scapular strengthening with addition of bilateral horizontal abd and bilateral ER with cues for proper form/sequencing. Pt denies increase in pain post tx, only muscle fatigue. Will continue to progress next visit as symptoms allow. Plan: Continue per plan of care. Progress treatment as tolerated.        Diagnosis: Chronic neck pain   Precautions: Hx of RA, chronic pain   POC Expires: 23   Re-evaluation Date: 11/3/23   FOTO Scores/Date: Goal - 54; 10/6 -    Visit Count 1/10 2/10 310 4/10    Manuals 10/6 10/10 10/12 1019    Cervical ROT bilaterally KD SERGEI KD KD    Cervical traction KD in slightly flexed position  SERGEI KD in slightly flexed position  KD in slightly flexed position     SOR KD SERGEI KD KD    Ther Ex 10/6  10/12 10/19    Seated thoracic ext w/ ball NV 3"x10  15x3" 15x3"     Cervical ROT w/ towel NV 3"x10  15x3"  15x3"                                                     HEP   Created and instructed Updated/reviewed  Updated     Neuro Re-Ed 10/6  10/12 10/19    UBE for postural re-ed/strengthening NV L1 x 5 mins retro L1 x 5 min retro L1 x 5 min retro    Prone scapular retraction/depression  NV Seated 10"x10 unable prone Prone 10x10" Prone 10x10"     Prone scapular retraction/depression w/ lift off NV  Prone 10x10" Prone 10x10"     T-band Rows/Ext   GTB 2x10 ea Grn 2x10 ea (increase to blue NV)    Bilateral horizontal abd    Grn 2x10    Bilateral ER    Grn 2x10                           Ther Act                                                                                 Modalities

## 2023-10-19 NOTE — TELEPHONE ENCOUNTER
Previous pt of Dr. Yeboah, OV scheduled for 11/3.    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 States she has had injections in the past ([facility]) without relief, and she would prefer not to repeat injections as she does not feel they will be effective.     RN advised SP would likely need to eval pt in OV prior to making any further recommendations but update would be forwarded for review. RN advised will cb only if further recommendations prior to OV, otherwise SP will further discuss at appt. Pt verbalized understanding.

## 2023-10-24 ENCOUNTER — APPOINTMENT (OUTPATIENT)
Dept: PHYSICAL THERAPY | Facility: CLINIC | Age: 48
End: 2023-10-24
Payer: COMMERCIAL

## 2023-10-25 ENCOUNTER — OFFICE VISIT (OUTPATIENT)
Dept: PHYSICAL THERAPY | Facility: CLINIC | Age: 48
End: 2023-10-25
Payer: COMMERCIAL

## 2023-10-25 DIAGNOSIS — M54.2 NECK PAIN: Primary | ICD-10-CM

## 2023-10-25 DIAGNOSIS — M50.30 DDD (DEGENERATIVE DISC DISEASE), CERVICAL: ICD-10-CM

## 2023-10-25 PROCEDURE — 97140 MANUAL THERAPY 1/> REGIONS: CPT

## 2023-10-25 PROCEDURE — 97112 NEUROMUSCULAR REEDUCATION: CPT

## 2023-10-25 PROCEDURE — 97110 THERAPEUTIC EXERCISES: CPT

## 2023-10-25 NOTE — PROGRESS NOTES
Daily Note     Today's date: 10/25/2023  Patient name: Lc Espinal  : 1975  MRN: [id number]  Referring provider: Vamsi Soares PA-C  Dx:   Encounter Diagnosis     ICD-10-CM    1. Neck pain  M54.2       2. DDD (degenerative disc disease), cervical  M50.30           Start Time: 1757  Stop Time: 1835  Total time in clinic (min): 38 minutes    Subjective: Pt reports the neck has been doing good lately. She reports she has been seeing a chiropractor and is getting custom orthotics for her shoes to help with low back pain she has been having. Objective: See treatment diary below      Assessment: Cueing provided for completion of prone exercises with proper mechanics. Good response noted with cervical traction. Rows/ext progressed to blue theraband today with good tolerance. Tolerated treatment well. Patient demonstrated fatigue post treatment and would benefit from continued PT      Plan: Continue per plan of care. Progress treatment as tolerated.        Diagnosis: Chronic neck pain   Precautions: Hx of RA, chronic pain   POC Expires: 23   Re-evaluation Date: 11/3/23   FOTO Scores/Date: Goal - 54; 10/6 -    Visit Count 1/10 2/10 3/10 4/10 5/10   Manuals 10/6 10/10 10/12 1019 10/25    Cervical ROT bilaterally KD SERGEI KD KD AM   Cervical traction KD in slightly flexed position  SERGEI KD in slightly flexed position  KD in slightly flexed position  AM   SOR KD SERGEI KD KD AM    Ther Ex 10/6  10/12 10/19    Seated thoracic ext w/ ball NV 3"x10  15x3" 15x3"  15x3"   Cervical ROT w/ towel NV 3"x10  15x3"  15x3"  15x3"                                                    HEP   Created and instructed Updated/reviewed  Updated     Neuro Re-Ed 10/6  10/12 10/19    UBE for postural re-ed/strengthening NV L1 x 5 mins retro L1 x 5 min retro L1 x 5 min retro L1 x 5 retro    Prone scapular retraction/depression  NV Seated 10"x10 unable prone Prone 10x10" Prone 10x10"  Prone 10x10"    Prone scapular retraction/depression w/ lift off NV  Prone 10x10" Prone 10x10"  Prone 10x10"    T-band Rows/Ext   GTB 2x10 ea Grn 2x10 ea (increase to blue NV) Blue 2x10 ea   Bilateral horizontal abd    Grn 2x10 Grn 2x10    Bilateral ER    Grn 2x10 Grn 2x10                           Ther Act                                                                                 Modalities

## 2023-10-26 ENCOUNTER — OFFICE VISIT (OUTPATIENT)
Dept: PHYSICAL THERAPY | Facility: CLINIC | Age: 48
End: 2023-10-26
Payer: COMMERCIAL

## 2023-10-26 DIAGNOSIS — M54.2 NECK PAIN: Primary | ICD-10-CM

## 2023-10-26 PROCEDURE — 97110 THERAPEUTIC EXERCISES: CPT

## 2023-10-26 PROCEDURE — 97140 MANUAL THERAPY 1/> REGIONS: CPT

## 2023-10-26 PROCEDURE — 97112 NEUROMUSCULAR REEDUCATION: CPT

## 2023-10-26 NOTE — PROGRESS NOTES
Daily Note     Today's date: 10/26/2023  Patient name: Luba Bains  : 1975  MRN: [id number]  Referring provider: Jacquelyn Gonzalez PA-C  Dx:   Encounter Diagnosis     ICD-10-CM    1. Neck pain  M54.2           Start Time:   Stop Time:   Total time in clinic (min): 40 minutes    Subjective: patient reports full body soreness coming into therapy. States having RA flair up      Objective: See treatment diary below      Assessment:  patient demonstrates improved control with re-education of postural and scapular muscularity. Required min cueing to ensure proper muscular activation. Cueing on hold time to address pacing. Required demonstration and cues for corrective t-band based exercises. Was able to complete therapy with in tolerance  Plan: Continue per plan of care. Progress treatment as tolerated.        Diagnosis: Chronic neck pain   Precautions: Hx of RA, chronic pain   POC Expires: 23   Re-evaluation Date: 11/3/23   FOTO Scores/Date: Goal - 54; 10/6 -    Visit Count 6/10 2/10 3/10 4/10 5/10   Manuals 10/26 10/10 10/12 1019 10/25    Cervical ROT bilaterally SERGEI SERGEI KD KD AM   Cervical traction SERGEI SERGEI KD in slightly flexed position  KD in slightly flexed position  AM   SOR SERGEI SERGEI KD KD AM    Ther Ex 10/26  10/12 10/19    Seated thoracic ext w/ ball 3" x15 3"x10  15x3" 15x3"  15x3"   Cervical ROT w/ towel 3"x15 3"x10  15x3"  15x3"  15x3"                                                    HEP   Created and instructed Updated/reviewed  Updated     Neuro Re-Ed 10/26  10/12 10/19    UBE for postural re-ed/strengthening L1 x 5 mins retro L1 x 5 mins retro L1 x 5 min retro L1 x 5 min retro L1 x 5 retro    Prone scapular retraction/depression  10"x10 Seated 10"x10 unable prone Prone 10x10" Prone 10x10"  Prone 10x10"    Prone scapular retraction/depression w/ lift off 10"x10   Prone 10x10" Prone 10x10"  Prone 10x10"    T-band Rows/Ext BTB 2x10ea   GTB 2x10 ea Grn 2x10 ea (increase to blue NV) Blue 2x10 ea   Bilateral horizontal abd GTB 2x10    Grn 2x10 Grn 2x10    Bilateral ER GTB 2x10    Grn 2x10 Grn 2x10                           Ther Act                                                                                 Modalities

## 2023-10-27 ENCOUNTER — TELEPHONE (OUTPATIENT)
Dept: OTHER | Facility: OTHER | Age: 48
End: 2023-10-27

## 2023-10-27 NOTE — TELEPHONE ENCOUNTER
Patient is calling regarding cancelling an appointment.     Date/Time: 10/27/2023 4:20pm    Patient was rescheduled: YES [] NO [x]    Patient requesting call back to reschedule: YES [x] NO []

## 2023-10-31 ENCOUNTER — TELEPHONE (OUTPATIENT)
Dept: CARDIOLOGY CLINIC | Facility: CLINIC | Age: 48
End: 2023-10-31

## 2023-10-31 ENCOUNTER — OFFICE VISIT (OUTPATIENT)
Dept: PHYSICAL THERAPY | Facility: CLINIC | Age: 48
End: 2023-10-31
Payer: COMMERCIAL

## 2023-10-31 DIAGNOSIS — M50.30 DDD (DEGENERATIVE DISC DISEASE), CERVICAL: ICD-10-CM

## 2023-10-31 DIAGNOSIS — M54.2 NECK PAIN: Primary | ICD-10-CM

## 2023-10-31 PROCEDURE — 97112 NEUROMUSCULAR REEDUCATION: CPT

## 2023-10-31 PROCEDURE — 97110 THERAPEUTIC EXERCISES: CPT

## 2023-10-31 PROCEDURE — 97140 MANUAL THERAPY 1/> REGIONS: CPT

## 2023-10-31 NOTE — TELEPHONE ENCOUNTER
Patient Bela [phone] contacting office scheduled to have a stress test on 11/14/23. Patient is inquiring if she needs to hold any meds.

## 2023-10-31 NOTE — PROGRESS NOTES
Daily Note     Today's date: 10/31/2023  Patient name: Gilberto Ferguson  : 1975  MRN: [id number]  Referring provider: Timo Edwards PA-C  Dx:   Encounter Diagnosis     ICD-10-CM    1. Neck pain  M54.2       2. DDD (degenerative disc disease), cervical  M50.30           Start Time: 1627  Stop Time: 1705  Total time in clinic (min): 38 minutes    Subjective: Pt reports some low back pain/soreness from visit with chiropractor. She denies any other changes or sig pain at the start of her session       Objective: See treatment diary below      Assessment:  Bee tolerated treatment well with consistent cuing throughout. TE's were performed with the same resistance and reps. No new TE's were performed today. Following treatment, the patient demonstrated fatigue post treatment, exhibited good technique with therapeutic exercises, and would benefit from continued PT. Theraband exercises were printed for patient as part of HEP  To access your Home Exercise Program:  Scan     OR     Visit  [email or web address]  Access Code: [email or web address]    Plan: Continue per plan of care.       Diagnosis: Chronic neck pain    Precautions: Hx of RA, chronic pain    POC Expires: 23    Re-evaluation Date: 11/3/23    FOTO Scores/Date: Goal - 54; 10/6 -     Visit Count 6/10 2/10 3/10 4/10 5/10 6/10   Manuals 10/26 10/10 10/12 1019 10/25  10/31   Cervical ROT bilaterally SERGEI SERGEI KD KD AM FH   Cervical traction SERGEI SERGEI KD in slightly flexed position  KD in slightly flexed position  AM FH   SOR SERGEI SERGEI KD KD AM  FH   Ther Ex 10/26  10/12 10/19     Seated thoracic ext w/ ball 3" x15 3"x10  15x3" 15x3"  15x3" 15x3''   Cervical ROT w/ towel 3"x15 3"x10  15x3"  15x3"  15x3"  15x3''                                                         HEP   Created and instructed Updated/reviewed  Updated      Neuro Re-Ed 10/26  10/12 10/19     UBE for postural re-ed/strengthening L1 x 5 mins retro L1 x 5 mins retro L1 x 5 min retro L1 x 5 min retro L1 x 5 retro  L1 x 5 retro   Prone scapular retraction/depression  10"x10 Seated 10"x10 unable prone Prone 10x10" Prone 10x10"  Prone 10x10"  Prone 10x10"    Prone scapular retraction/depression w/ lift off 10"x10   Prone 10x10" Prone 10x10"  Prone 10x10"  Prone 10x10"    T-band Rows/Ext BTB 2x10ea   GTB 2x10 ea Grn 2x10 ea (increase to blue NV) Blue 2x10 ea Blue 2x10   Bilateral horizontal abd GTB 2x10    Grn 2x10 Grn 2x10  Grn 2x10   Bilateral ER GTB 2x10    Grn 2x10 Grn 2x10  Grn 2x10                            Ther Act                                                                                         Modalities

## 2023-11-02 ENCOUNTER — OFFICE VISIT (OUTPATIENT)
Dept: PHYSICAL THERAPY | Facility: CLINIC | Age: 48
End: 2023-11-02
Payer: COMMERCIAL

## 2023-11-02 DIAGNOSIS — M54.2 NECK PAIN: Primary | ICD-10-CM

## 2023-11-02 DIAGNOSIS — M50.30 DDD (DEGENERATIVE DISC DISEASE), CERVICAL: ICD-10-CM

## 2023-11-02 PROCEDURE — 97110 THERAPEUTIC EXERCISES: CPT

## 2023-11-02 PROCEDURE — 97112 NEUROMUSCULAR REEDUCATION: CPT

## 2023-11-02 PROCEDURE — 97140 MANUAL THERAPY 1/> REGIONS: CPT

## 2023-11-02 NOTE — PROGRESS NOTES
Daily Note     Today's date: 2023  Patient name: Artemio Ang  : 1975  MRN: [id number]  Referring provider: Sydney Sow PA-C  Dx:   Encounter Diagnosis     ICD-10-CM    1. Neck pain  M54.2       2. DDD (degenerative disc disease), cervical  M50.30           Start Time: 4975  Stop Time: 1625  Total time in clinic (min): 39 minutes    Subjective:  patient reports overall improvement. Reports less neck pain. Reports mor LB pain. Objective: See treatment diary below      Assessment:  patient was able to complete therapy without onset of reported neck pain stating only normal muscular soreness. Required cueing throughout on sequencing of exercises along with corrective t-band mechanics and postioning. Was able to progress to standing BL shoulder AROM with 2# load demonstrating good control. Plan: Continue per plan of care. Progress treatment as tolerated.        Diagnosis: Chronic neck pain    Precautions: Hx of RA, chronic pain    POC Expires: 23    Re-evaluation Date: 11/3/23    FOTO Scores/Date: Goal - 54; 10/6 -     Visit Count 7/10 2/10 3/10 4/10 5/10 6/10   Manuals 11/2 10/10 10/12 1019 10/25  10/31   Cervical ROT bilaterally SERGEI SERGEI KD KD AM FH   Cervical traction SERGEI SERGEI KD in slightly flexed position  KD in slightly flexed position  AM FH   SOR SERGEI SERGEI KD KD AM  FH   Ther Ex 11/2  10/12 10/19     Seated thoracic ext w/ ball 3" x15 3"x10  15x3" 15x3"  15x3" 15x3''   Cervical ROT w/ towel 3"x15 3"x10  15x3"  15x3"  15x3"  15x3''   Standing Shoulder AROM Flex/abd 2# 2x10                                                      HEP   Created and instructed Updated/reviewed  Updated      Neuro Re-Ed 11/2  10/12 10/19     UBE for postural re-ed/strengthening L3 x 5 mins retro L1 x 5 mins retro L1 x 5 min retro L1 x 5 min retro L1 x 5 retro  L1 x 5 retro   Prone scapular retraction/depression  10"x10 Seated 10"x10 unable prone Prone 10x10" Prone 10x10"  Prone 10x10"  Prone 10x10" Prone scapular retraction/depression w/ lift off 10"x10   Prone 10x10" Prone 10x10"  Prone 10x10"  Prone 10x10"    T-band Rows/Ext BTB 2x10ea   GTB 2x10 ea Grn 2x10 ea (increase to blue NV) Blue 2x10 ea Blue 2x10   Bilateral horizontal abd GTB 2x10    Grn 2x10 Grn 2x10  Grn 2x10   Bilateral ER GTB 2x10    Grn 2x10 Grn 2x10  Grn 2x10                            Ther Act                                                                                         Modalities

## 2023-11-03 ENCOUNTER — OFFICE VISIT (OUTPATIENT)
Dept: FAMILY MEDICINE CLINIC | Facility: CLINIC | Age: 48
End: 2023-11-03
Payer: COMMERCIAL

## 2023-11-03 VITALS
BODY MASS INDEX: 26.86 KG/M2 | DIASTOLIC BLOOD PRESSURE: 70 MMHG | OXYGEN SATURATION: 99 % | HEART RATE: 65 BPM | WEIGHT: 151.6 LBS | TEMPERATURE: 98.3 F | SYSTOLIC BLOOD PRESSURE: 110 MMHG | HEIGHT: 63 IN

## 2023-11-03 DIAGNOSIS — N39.43 POST-VOID DRIBBLING: ICD-10-CM

## 2023-11-03 DIAGNOSIS — E78.00 ELEVATED LDL CHOLESTEROL LEVEL: ICD-10-CM

## 2023-11-03 DIAGNOSIS — R10.11 RUQ PAIN: Primary | ICD-10-CM

## 2023-11-03 DIAGNOSIS — R76.8 RHEUMATOID FACTOR POSITIVE: ICD-10-CM

## 2023-11-03 PROCEDURE — 99214 OFFICE O/P EST MOD 30 MIN: CPT | Performed by: NURSE PRACTITIONER

## 2023-11-03 NOTE — PATIENT INSTRUCTIONS
Abdominal Pain   AMBULATORY CARE:   Abdominal pain  may be felt anywhere between the bottom of your rib cage and your groin. Acute pain usually lasts less than 3 months. Chronic pain lasts longer than 3 months. Your pain may be sharp or dull. The pain may stay in the same place or move around. You may have the pain all the time, or it may come and go. Depending on the cause, you may also have nausea, vomiting, fever, or diarrhea. Call your local emergency number (911 in the [address]) if:   You have chest pain or shortness of breath. Seek care immediately if:   You have pulsing pain in your upper abdomen or lower back that suddenly becomes constant. Your pain is in the right lower abdominal area and worsens with movement. You have a fever over 100.4°F (38°C) or shaking chills. You are vomiting and cannot keep food or liquids down. Your pain does not improve or gets worse over the next 8 to 12 hours. You see blood in your vomit or bowel movements, or they look black and tarry. Your skin or the whites of your eyes turn yellow. You are a woman and have a large amount of vaginal bleeding that is not your monthly period. Call your doctor if:   You have pain in your lower back. You are a man and have pain in your testicles. You have pain when you urinate. You have questions or concerns about your condition or care. The cause of your abdominal pain  may not be found. The following are common causes:  Overeating, gas pains, or food poisoning    Constipation or diarrhea    An injury    Appendicitis, a hernia, or an ulcer    Infection or a blockage    A liver, gallbladder, or kidney condition    Treatment for abdominal pain  may include any of the following:  Medicines  may be given to calm your stomach or prevent vomiting. Prescription pain medicine  may be given. Ask your healthcare provider how to take this medicine safely. Some prescription pain medicines contain acetaminophen. Do not take other medicines that contain acetaminophen without talking to your healthcare provider. Too much acetaminophen may cause liver damage. Prescription pain medicine may cause constipation. Ask your healthcare provider how to prevent or treat constipation. Relaxation therapy  may be used along with pain medicine. Surgery  may be needed, depending on the cause. Manage or prevent abdominal pain:   Apply heat  on your abdomen for 20 to 30 minutes every 2 hours for as many days as directed. Heat helps decrease pain and muscle spasms. Make changes to the foods you eat, if needed. Do not eat foods that cause abdominal pain or other symptoms. Eat small meals more often. The following changes may also help:    Eat more high-fiber foods if you are constipated. High-fiber foods include fruits, vegetables, whole-grain foods, and legumes such as quiros beans. Do not eat foods that cause gas if you have bloating. Examples include broccoli, cabbage, beans, and carbonated drinks. Do not eat foods or drinks that contain sorbitol or fructose if you have diarrhea and bloating. Some examples are fruit juices, candy, jelly, and sugar-free gum. Do not eat high-fat foods. Examples include fried foods, cheeseburgers, hot dogs, and desserts. Make changes to the liquids you drink, if needed. Do not drink liquids that cause pain or make it worse, such as orange juice. Drink liquids throughout the day to stay hydrated. The following changes may also help:    Drink more liquids to prevent dehydration from diarrhea or vomiting. Ask your healthcare provider how much liquid to drink each day and which liquids are best for you. Limit or do not have caffeine. Caffeine may make symptoms such as heartburn or nausea worse. Limit or do not drink alcohol. Alcohol can make your abdominal pain worse. Ask your healthcare provider if it is okay for you to drink alcohol.  Also ask how much is okay for you to drink. A drink of alcohol is 12 ounces of beer, ½ ounce of liquor, or 5 ounces of wine. Keep a diary of your abdominal pain. A diary may help your healthcare provider learn what is causing your pain. Include when the pain happens, how long it lasts, and what the pain feels like. Write down any other symptoms you have with abdominal pain. Also write down what you eat, and what symptoms you have after you eat. Manage stress. Stress may cause abdominal pain. Your healthcare provider may recommend relaxation techniques and deep breathing exercises to help decrease your stress. Your healthcare provider may recommend you talk to someone about your stress or anxiety, such as a counselor or a friend. Get plenty of sleep. Exercise regularly. Do not smoke. Nicotine and other chemicals in cigarettes can damage your esophagus and stomach. Ask your healthcare provider for information if you currently smoke and need help to quit. E-cigarettes or smokeless tobacco still contain nicotine. Talk to your healthcare provider before you use these products. Follow up with your doctor as directed:  Write down your questions so you remember to ask them during your visits. © Copyright [employer] 2023 Information is for End User's use only and may not be sold, redistributed or otherwise used for commercial purposes. The above information is an  only. It is not intended as medical advice for individual conditions or treatments. Talk to your doctor, nurse or pharmacist before following any medical regimen to see if it is safe and effective for you.

## 2023-11-03 NOTE — PROGRESS NOTES
Name: Lg Modi      : 1975      MRN: [id number]  Encounter Provider: KIRSTIN John  Encounter Date: 11/3/2023   Encounter department: [address] [place] [address]     1. RUQ pain  Comments:  Advised to avoid fatty foods, to obtain blood work and ultrasound as ordered. To seek immediate care for worsening/concerning symptoms. Orders:  -     US right upper quadrant; Future; Expected date: 2023  -     Hepatic function panel; Future  -     CBC and differential; Future  -     Comprehensive metabolic panel; Future  -     Lipase; Future  -     Amylase; Future  -     Urinalysis with microscopic  -     Urine culture; Future    2. Post-void dribbling  Comments:  Discussed Kegel exercises, avoiding bladder irritants like caffeine, frequent urination. Pain urine studies as ordered  Orders:  -     Comprehensive metabolic panel; Future  -     Urinalysis with microscopic  -     Urine culture; Future    3. Elevated LDL cholesterol level  Comments:  Recent blood work reviewed with patient. Discussed managing with low-fat, high-fiber diet. 4. Rheumatoid factor positive  Comments:  Recent blood work reviewed with patient. Patient has follow-up with rheumatology this month. Subjective     Abdominal Pain  This is a recurrent problem. The current episode started in the past 7 days. The onset quality is sudden. The problem occurs constantly. The most recent episode lasted 3 days. The problem has been unchanged. The pain is located in the RUQ. The pain is at a severity of 4/10. The pain is moderate. The quality of the pain is aching and cramping. The abdominal pain does not radiate. Associated symptoms include anorexia and nausea. Pertinent negatives include no arthralgias, belching, constipation, diarrhea, dysuria, fever, flatus, frequency, headaches, hematochezia, hematuria, melena, myalgias, vomiting or weight loss.  Nothing aggravates the pain. The pain is relieved by Nothing. She has tried nothing for the symptoms. Her past medical history is significant for abdominal surgery. There is no history of colon cancer, Crohn's disease, gallstones, GERD, irritable bowel syndrome, pancreatitis, PUD or ulcerative colitis. Review of Systems   Constitutional:  Positive for appetite change. Negative for activity change, fatigue, fever, unexpected weight change and weight loss. HENT:  Negative for sore throat and trouble swallowing. Eyes:  Negative for photophobia and visual disturbance. Respiratory:  Negative for cough and shortness of breath. Cardiovascular:  Negative for chest pain and palpitations. Gastrointestinal:  Positive for abdominal pain, anorexia and nausea. Negative for blood in stool, constipation, diarrhea, flatus, hematochezia, melena and vomiting. Genitourinary:  Negative for decreased urine volume, dysuria, flank pain, frequency, hematuria and urgency. Musculoskeletal:  Negative for arthralgias and myalgias. Skin:  Negative for color change and rash. Neurological:  Negative for dizziness, weakness, light-headedness and headaches. Hematological:  Negative for adenopathy. Does not bruise/bleed easily. Psychiatric/Behavioral:  Negative for confusion.         Past Medical History:   Diagnosis Date    Asthma     Degenerative joint disease     Depression     GERD (gastroesophageal reflux disease)     Lumbar radiculopathy     PONV (postoperative nausea and vomiting)     Thyroid nodule      Past Surgical History:   Procedure Laterality Date    BREAST BIOPSY Left 2020    neg    CHOLECYSTECTOMY LAPAROSCOPIC N/A 5/6/2022    Procedure: CHOLECYSTECTOMY LAPAROSCOPIC;  Surgeon: Volodymyr Marshall MD;  Location: [facility];  Service: General    CYST REMOVAL  10/2020    lt breast     EGD      HYSTERECTOMY      HYSTERECTOMY  1998    LIPOSUCTION  09/2020    TUBAL LIGATION       Family History   Problem Relation Age of Onset Alzheimer's disease Mother     Cancer Father         stomach/GI    No Known Problems Sister     No Known Problems Daughter     No Known Problems Maternal Grandmother     No Known Problems Paternal Grandmother     Colon cancer Maternal Aunt     Cancer Maternal Aunt         stomach    No Known Problems Maternal Aunt     No Known Problems Maternal Aunt     No Known Problems Maternal Aunt     No Known Problems Maternal Aunt     No Known Problems Maternal Aunt     Colon cancer Maternal Uncle     Breast cancer Neg Hx      Social History     Socioeconomic History    Marital status:      Spouse name: None    Number of children: None    Years of education: None    Highest education level: None   Occupational History    None   Tobacco Use    Smoking status: Never    Smokeless tobacco: Never   Vaping Use    Vaping Use: Never used   Substance and Sexual Activity    Alcohol use: Not Currently     Comment: ocaasional    Drug use: Never    Sexual activity: Yes     Partners: Male     Birth control/protection: None   Other Topics Concern    None   Social History Narrative    None     Social Determinants of Health     Financial Resource Strain: Not on file   Food Insecurity: Not on file   Transportation Needs: Not on file   Physical Activity: Unknown (8/12/2022)    Exercise Vital Sign     Days of Exercise per Week: 0 days     Minutes of Exercise per Session: Not on file   Stress: Not on file   Social Connections: Not on file   Intimate Partner Violence: Not on file   Housing Stability: Not on file     Current Outpatient Medications on File Prior to Visit   Medication Sig    albuterol (PROVENTIL HFA,VENTOLIN HFA) 90 mcg/act inhaler INHALE 1-2 PUFFS EVERY 6 HOURS AS NEEDED FOR WHEEZING.     Diclofenac Sodium (VOLTAREN) 1 % Apply 2 g topically 4 (four) times a day    dicyclomine (BENTYL) 20 mg tablet Take 1 tablet (20 mg total) by mouth 4 (four) times a day as needed (IBS)    EPINEPHrine (EPIPEN) 0.3 mg/0.3 mL SOAJ Inject 0.3 mL (0.3 mg total) into a muscle once for 1 dose As needed    famotidine (PEPCID) 20 mg tablet TAKE 1 TABLET BY MOUTH TWICE A DAY (Patient taking differently: Take 20 mg by mouth if needed for indigestion or heartburn)    FLUoxetine (PROzac) 40 MG capsule TAKE 1 CAPSULE (40 MG TOTAL) BY MOUTH DAILY. fluticasone (FLONASE) 50 mcg/act nasal spray 1 spray into each nostril 2 (two) times a day    hydrOXYzine HCL (ATARAX) 10 mg tablet TAKE 1 TABLET BY MOUTH EVERYDAY AT BEDTIME    ketoconazole (NIZORAL) 2 % shampoo Apply 1 Application topically 2 (two) times a week    meclizine (ANTIVERT) 25 mg tablet Take 1 tablet (25 mg total) by mouth every 8 (eight) hours as needed for dizziness    metoclopramide (REGLAN) 5 mg tablet Take 1 tablet (5 mg total) by mouth 3 (three) times a day before meals    mometasone (ELOCON) 0.1 % lotion APPLY TO AFFECTED AREA TOPICALLY EVERY DAY    mupirocin (BACTROBAN) 2 % ointment Apply topically 3 (three) times a day    omeprazole (PriLOSEC) 40 MG capsule TAKE 1 CAPSULE (40 MG TOTAL) BY MOUTH DAILY. tiZANidine (ZANAFLEX) 4 mg tablet Take 1 tablet (4 mg total) by mouth every 8 (eight) hours as needed for muscle spasms May split tablets in half if experiencing drowiness    [DISCONTINUED] loratadine (CLARITIN) 10 mg tablet Take 1 tablet (10 mg total) by mouth daily (Patient not taking: Reported on 11/3/2023)     Allergies   Allergen Reactions    Bee Venom Anaphylaxis    Iodides Other (See Comments)    [place] Oil - Food Allergy GI Intolerance     Other reaction(s): GI Intolerance, GI Reaction    Aspirin Other (See Comments)     shaking  convulsions    Other reaction(s):  Other (See Comments)  shaking  convulsions  shaking  convulsions    Metronidazole GI Intolerance     Gi upset      Sulfamethoxazole-Trimethoprim GI Intolerance     Gi upst    Other reaction(s): GI Intolerance, GI Reaction  Gi upst  Gi upst    Tape  [Medical Tape] Other (See Comments) and Hives     fever  Other reaction(s): Other  fever     Immunization History   Administered Date(s) Administered    COVID-19 MODERNA VACC 0.5 ML IM 03/30/2021, 04/27/2021, 11/01/2021    Hep A / Hep B 06/11/2012, 06/11/2012    Hep A, adult 12/09/2011, 12/09/2011    Hep B, adult 12/09/2011, 12/09/2011, 02/09/2012, 02/09/2012    INFLUENZA 10/14/2021    Tuberculin Skin Test 12/04/2013, 12/13/2013, 07/11/2014, 02/09/2018, 01/03/2020, 01/07/2020    Tuberculin Skin Test-PPD Intradermal 12/04/2013, 12/04/2013, 12/13/2013, 12/13/2013, 07/11/2014, 07/11/2014, 02/09/2018, 02/09/2018, 01/03/2020, 01/03/2020, 01/07/2020, 01/07/2020, 12/14/2021       Objective     /70 (BP Location: Right arm, Patient Position: Sitting, Cuff Size: Standard)   Pulse 65   Temp 98.3 °F (36.8 °C) (Tympanic)   Ht 5' 3" (1.6 m)   Wt 68.8 kg (151 lb 9.6 oz)   SpO2 99%   BMI 26.85 kg/m²     Physical Exam  Vitals reviewed. Constitutional:       General: She is not in acute distress. Appearance: She is well-developed. She is not ill-appearing. HENT:      Head: Normocephalic and atraumatic. Mouth/Throat:      Mouth: Mucous membranes are moist.      Pharynx: Oropharynx is clear. Eyes:      Extraocular Movements: Extraocular movements intact. Pupils: Pupils are equal, round, and reactive to light. Cardiovascular:      Rate and Rhythm: Normal rate and regular rhythm. Heart sounds: Normal heart sounds. No murmur heard. Pulmonary:      Effort: Pulmonary effort is normal.      Breath sounds: Normal breath sounds. Abdominal:      General: Bowel sounds are increased. Palpations: Abdomen is soft. Tenderness: There is abdominal tenderness in the right upper quadrant. There is no right CVA tenderness, left CVA tenderness, guarding or rebound. Skin:     General: Skin is warm and dry. Neurological:      General: No focal deficit present. Mental Status: She is alert and oriented to person, place, and time.    Psychiatric:         Mood and Affect: Mood normal.         Behavior: Behavior normal.       KIRSTIN Jaime

## 2023-11-07 ENCOUNTER — OFFICE VISIT (OUTPATIENT)
Dept: PHYSICAL THERAPY | Facility: CLINIC | Age: 48
End: 2023-11-07
Payer: COMMERCIAL

## 2023-11-07 DIAGNOSIS — M54.50 CHRONIC BILATERAL LOW BACK PAIN WITHOUT SCIATICA: ICD-10-CM

## 2023-11-07 DIAGNOSIS — K31.84 GASTROPARESIS: ICD-10-CM

## 2023-11-07 DIAGNOSIS — M54.2 NECK PAIN: Primary | ICD-10-CM

## 2023-11-07 DIAGNOSIS — G89.29 CHRONIC BILATERAL LOW BACK PAIN WITHOUT SCIATICA: ICD-10-CM

## 2023-11-07 DIAGNOSIS — M50.30 DDD (DEGENERATIVE DISC DISEASE), CERVICAL: ICD-10-CM

## 2023-11-07 PROCEDURE — 97112 NEUROMUSCULAR REEDUCATION: CPT | Performed by: PHYSICAL THERAPIST

## 2023-11-07 PROCEDURE — 97164 PT RE-EVAL EST PLAN CARE: CPT | Performed by: PHYSICAL THERAPIST

## 2023-11-07 RX ORDER — METOCLOPRAMIDE 5 MG/1
5 TABLET ORAL
Qty: 90 TABLET | Refills: 5 | Status: SHIPPED | OUTPATIENT
Start: 2023-11-07
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
## 2023-11-08 ENCOUNTER — OFFICE VISIT (OUTPATIENT)
Dept: DERMATOLOGY | Facility: CLINIC | Age: 48
End: 2023-11-08
Payer: COMMERCIAL

## 2023-11-08 VITALS — HEIGHT: 63 IN | TEMPERATURE: 98.2 F | WEIGHT: 151 LBS | BODY MASS INDEX: 26.75 KG/M2

## 2023-11-08 DIAGNOSIS — L30.9 ECZEMA, UNSPECIFIED TYPE: Primary | ICD-10-CM

## 2023-11-08 DIAGNOSIS — L01.00 IMPETIGO: ICD-10-CM

## 2023-11-08 PROCEDURE — 99214 OFFICE O/P EST MOD 30 MIN: CPT | Performed by: DERMATOLOGY

## 2023-11-08 RX ORDER — CLOBETASOL PROPIONATE 0.5 MG/G
CREAM TOPICAL 2 TIMES DAILY
Qty: 30 G | Refills: 1 | Status: SHIPPED | OUTPATIENT
Start: 2023-11-08

## 2023-11-08 NOTE — PATIENT INSTRUCTIONS
FOLLOW UP ATOPIC DERMATITIS     Assessment and Plan:  Based on a thorough discussion of this condition and the management approach to it (including a comprehensive discussion of the known risks, side effects and potential benefits of treatment), the patient (family) agrees to implement the following specific plan:  - Recommend sensitive skin care regimen  - detergent free of dyes and perfumes (example, free and clear). Try washing clothes with extra rinse cycle. - Short lukewarm showers  - White dove bar soap  Recommend using Vanicream Shampoo  - Recommend moisturizing whole body with plain Vaseline or Creams multiple times a day (examples: Cetaphil, CeraVe. and Eucerin)  - avoid aerosols and fragrances in the home (candles, plug ins, perfume, air freshener, etc)   Recommend that you stop using current hair products/ shampoos/ conditioners, hair color/ dyes, any perfumes or perfumed lotions. Once you have eliminated these products you can slowly start to re introduce them and see if your skin can tolerate them. Clobetasol 0.05% Cream: Apply topically twice a day to affected areas; use for 14 days straight, taking a one week break in between uses. This is a strong topical steroid and prolonged use can thin your skin. Assessment and Plan:   Atopic Dermatitis is a chronic, itchy skin condition that is very common in children but may occur at any age. It is also known as “eczema” or “atopic eczema.” It is the most common form of dermatitis. Atopic dermatitis usually occurs in people who have an “atopic tendency.”  This means they may develop any or all of these closely linked conditions: Atopic dermatitis, asthma, hay fever (allergic rhinitis), eosinophilic esophagitis, and gastroenteritis. Often these conditions run within families with a parent, child or sibling also affected. A family history of asthma, eczema or hay fever is particularly useful in diagnosing atopic dermatitis in infants.     Atopic dermatitis arises because of a complex interaction of genetic and environmental factors. These include defects in skin barrier function making the skin more susceptible to irritation by soap and other contact irritants, the weather, temperature and non-specific triggers. There is also an element of immune system dysregulation that is often present. By definition, it is chronic and has a "waxing-waning" nature; flares should be expected but with good education and treatment strategies can be minimized. Some specific tips we discussed:  Dry skin care. Using only mild cleansers (hypoallergenic and without fragrances) and fragrance free detergent (not “unscented” products which contain a masking agent); we discussed avoiding irritants/fragranced products. The importance of regular application of moisturizers daily (at least 3 times a day)  The known and theoretical side effects of steroids at length, including but not limited to atrophy of skin and increased pressure in eye (glaucoma) and clouding of the eye's lens (cataracts) if used in or around the eye for extended durations. The specific over-the-counter interventions and medications. Side effects, risks and benefits of topical and oral medications discussed. After lengthy discussion of etiology and treatment options, we decided to implement the following personalized treatment plan:    IMPETIGO    Assessment and Plan:  Based on a thorough discussion of this condition and the management approach to it (including a comprehensive discussion of the known risks, side effects and potential benefits of treatment), the patient (family) agrees to implement the following specific plan:  Recommend mixing Mupirocin mixed with Clobetasol 0.05% Cream: apply topically twice a day for 3 days only and then continue to apply just the Mupirocin Ointment daily as needed. What is impetigo?   Impetigo is due to localized, superficial and infection with Staphylococcus aureus and/or Streptococcus pyogenes. Ecthyma is a deeper infection caused by the same organisms. The infection does not affect hair follicles unlike a boil. These infections may complicate wound healing, infestations and all forms of dermatitis. On the other hand, infections may also lead to dermatitis flare ups. Impetigo is often found in children and is also highly contagious. Most people get impetigo through skin-to-skin contact with someone who has it. Children and athletes like wrestlers and football players often get it this way. It's also possible to get it by using something infected with the bacteria that cause impetigo such as an infected towel or sports equipment. Wearing infected clothing is another way to get impetigo. Other predisposing causes include  Climatic conditions (humidity, occlusive clothing)  Underlying skin disease (atopic dermatitis, hidradenitis suppurativa)  Iron deficiency  Diabetes mellitus  Defective neutrophil function (treated with oral vitamin C)  Immunodeficiency, including hypogammaglobulinemia and HIV infection    What are the symptoms of impetigo? Staphylococcal impetigo is characterized by surface honey-yellow crusting or blisters. It tends to be itchy. Streptococcal impetigo is characterized by crusting and ulceration. Ecthyma results in scabs covering full skin thickness ulcers. These deeper infections may be painful. Starts with one or more sores, which are often itchy. The sores quickly burst, and the skin can be red or raw where the sores have broken open. Glands near the sores may feel swollen. Crusts, usually honey-colored, form. The skin heals without scarring, unless scratching cuts deep into the skin. The infection can spread to other areas of the body, where you'll see this process begin all over again. This is one reason treatment is so important. A severe form, bullous impetigo, is due to S. aureus that produces an exfoliative exotoxin, exfoliatin.  This produces a split between the stratum granulosum and stratum spinosum within the epidermis, causing blisters to form. Blisters appear that contain a cloudy or yellow fluid. The blisters become limp and transparent and then break open. Crusty sores form where the blisters have broken open. The skin tends to heal without scarring. Ecthyma:  Ecthyma (ec-thy-ma) can develop when impetigo goes untreated. This is a more serious type of infection because it goes deeper into the skin. When a person has ecthyma, you'll see:  Painful blisters  Blisters turn into deep, open sores  Thick crusts develop, often with redness on the surrounding skin  Because the infection goes deeper into the skin, you may see scars once the skin heals\  How do we treat impetigo? Dermatologists often prescribe an antibiotic that you apply to the skin, such as mupirocin or retapamulin. The [place] Food and Drug Administration (FDA) has approved retapamulin to treat impetigo in children as young as 6 months old. Mupirocin is FDA approved to treat people 15years of age and older. When necessary, a dermatologist may prescribe one of these medicines to treat a child younger than the FDA-approved age. This is called off-label use and is legal. It can also be very helpful. If a dermatologist prescribes an antibiotic you apply to the skin, you would apply it to the skin that is affected by impetigo. If you experience several outbreaks, you may need to apply it inside the nostrils. The bacteria that cause impetigo often thrive in the nostrils. Meticulous wound care and antiseptics (povidone iodine, chlorhexidine, triclosan and others) as local application and cleanser  Sometimes stronger medicine is necessary for more extensive or recurrent infections. Your dermatologist can prescribe an antibiotic that you take by mouth. First line treatment should be with flucloxacillin or dicloxacillin.  In penicillin-allergic patients, erythromycin may be used but there is a higher rate of resistance. A few patients need injections of an antibiotic. Your dermatologist may take swabs from active lesions and nostrils to determine antibiotic sensitivity. Along with local patterns of bacterial strains can guide which antibiostic to use. Choices include:  Cephalexin  Co-amoxiclav  Trimethoprim + sulphamethoxazole  Ciprofloxacin  Fusidic acid  Rifampicin  Clindamycin  In general, Staphylococcal infections are contagious, requiring careful attention to hygiene.   Wash hands frequently  Use antiseptics for bathing  Hot wash clothing, bedding, towels  Avoid sharing clothing and towels

## 2023-11-08 NOTE — PROGRESS NOTES
[facility] Note     Patient Name: Michael Mercado  Encounter Date: 11/8/2023     Have you been cared for by a [facility] in the last 3 years and, if so, which description applies to you? Yes. I have been here within the last 3 years, and my medical history has NOT changed since that time. I am FEMALE/of child-bearing potential.    REVIEW OF SYSTEMS:  Have you recently had or currently have any of the following? No changes in my recent health. PAST MEDICAL HISTORY:  Have you personally ever had or currently have any of the following? If "YES," then please provide more detail. No changes in my medical history. HISTORY OF IMMUNOSUPPRESSION: Do you have a history of any of the following:  Systemic Immunosuppression such as Diabetes, Biologic or Immunotherapy, Chemotherapy, Organ Transplantation, Bone Marrow Transplantation? No     Answering "YES" requires the addition of the dotphrase "IMMUNOSUPPRESSED" as the first diagnosis of the patient's visit. FAMILY HISTORY:  Any "first degree relatives" (parent, brother, sister, or child) with the following? No changes in my family's known health. PATIENT EXPERIENCE:    Do you want the Dermatologist to perform a COMPLETE skin exam today including a clinical examination under the "bra and underwear" areas? NO  If necessary, do we have your permission to call and leave a detailed message on your Preferred Phone number that includes your specific medical information? Yes      Allergies   Allergen Reactions    Bee Venom Anaphylaxis    Iodides Other (See Comments)    Aurora Oil - Food Allergy GI Intolerance     Other reaction(s): GI Intolerance, GI Reaction    Aspirin Other (See Comments)     shaking  convulsions    Other reaction(s):  Other (See Comments)  shaking  convulsions  shaking  convulsions    Metronidazole GI Intolerance     Gi upset      Sulfamethoxazole-Trimethoprim GI Intolerance     Gi upst    Other reaction(s): GI Intolerance, GI Reaction  Gi upst  Gi upst    Tape  [Medical Tape] Other (See Comments) and Hives     fever  Other reaction(s): Other  fever      Current Outpatient Medications:     albuterol (PROVENTIL HFA,VENTOLIN HFA) 90 mcg/act inhaler, INHALE 1-2 PUFFS EVERY 6 HOURS AS NEEDED FOR WHEEZING., Disp: 18 g, Rfl: 3    Diclofenac Sodium (VOLTAREN) 1 %, Apply 2 g topically 4 (four) times a day, Disp: 100 g, Rfl: 3    dicyclomine (BENTYL) 20 mg tablet, Take 1 tablet (20 mg total) by mouth 4 (four) times a day as needed (IBS), Disp: 30 tablet, Rfl: 0    EPINEPHrine (EPIPEN) 0.3 mg/0.3 mL SOAJ, Inject 0.3 mL (0.3 mg total) into a muscle once for 1 dose As needed, Disp: 0.3 mL, Rfl: 0    famotidine (PEPCID) 20 mg tablet, TAKE 1 TABLET BY MOUTH TWICE A DAY (Patient taking differently: Take 20 mg by mouth if needed for indigestion or heartburn), Disp: 60 tablet, Rfl: 3    FLUoxetine (PROzac) 40 MG capsule, TAKE 1 CAPSULE (40 MG TOTAL) BY MOUTH DAILY. , Disp: 90 capsule, Rfl: 0    fluticasone (FLONASE) 50 mcg/act nasal spray, 1 spray into each nostril 2 (two) times a day, Disp: 11.1 mL, Rfl: 2    hydrOXYzine HCL (ATARAX) 10 mg tablet, TAKE 1 TABLET BY MOUTH EVERYDAY AT BEDTIME, Disp: 30 tablet, Rfl: 1    ketoconazole (NIZORAL) 2 % shampoo, Apply 1 Application topically 2 (two) times a week, Disp: 120 mL, Rfl: 1    meclizine (ANTIVERT) 25 mg tablet, Take 1 tablet (25 mg total) by mouth every 8 (eight) hours as needed for dizziness, Disp: 90 tablet, Rfl: 0    metoclopramide (REGLAN) 5 mg tablet, TAKE 1 TABLET BY MOUTH 3 TIMES A DAY BEFORE MEALS., Disp: 90 tablet, Rfl: 5    mometasone (ELOCON) 0.1 % lotion, APPLY TO AFFECTED AREA TOPICALLY EVERY DAY, Disp: 60 mL, Rfl: 3    mupirocin (BACTROBAN) 2 % ointment, Apply topically 3 (three) times a day, Disp: 15 g, Rfl: 0    omeprazole (PriLOSEC) 40 MG capsule, TAKE 1 CAPSULE (40 MG TOTAL) BY MOUTH DAILY. , Disp: 90 capsule, Rfl: 1    tiZANidine (ZANAFLEX) 4 mg tablet, Take 1 tablet (4 mg total) by mouth every 8 (eight) hours as needed for muscle spasms May split tablets in half if experiencing drowiness, Disp: 60 tablet, Rfl: 0          Whom besides the patient is providing clinical information about today's encounter? NO ADDITIONAL HISTORIAN (patient alone provided history)    Physical Exam and Assessment/Plan by Diagnosis:    FOLLOW UP ATOPIC DERMATITIS     Physical Exam:  Anatomic Location Affected:  Behind ears, elbows and mid chest  Morphological Description:  Pink slightly scaly papules  Body Surface Area Today:  1%  Overall Severity: mild  Pertinent Positives:  Pertinent Negatives: Additional History of Present Condition:  Previously seeing Dr. Jean Marie Javier for itchy scalp, elbows. Biopsy proven eczema done 5/24/22. Previous treatment has included Ketoconazole 2% Shampoo, Fluocinonide 0.05 % cream and solution, Mometasone 0.1% lotion all with no relief. Assessment and Plan:  Based on a thorough discussion of this condition and the management approach to it (including a comprehensive discussion of the known risks, side effects and potential benefits of treatment), the patient (family) agrees to implement the following specific plan:  - Recommend sensitive skin care regimen  - detergent free of dyes and perfumes (example, free and clear). Try washing clothes with extra rinse cycle. - Short lukewarm showers  - White dove bar soap  Recommend using Vanicream Shampoo  - Recommend moisturizing whole body with plain Vaseline or Creams multiple times a day (examples: Cetaphil, CeraVe. and Eucerin)  - avoid aerosols and fragrances in the home (candles, plug ins, perfume, air freshener, etc)   Recommend that you stop using current hair products/ shampoos/ conditioners, hair color/ dyes, any perfumes or perfumed lotions. Once you have eliminated these products you can slowly start to re introduce them and see if your skin can tolerate them.    Clobetasol 0.05% Cream: Apply topically twice a day to affected areas; use for 14 days straight, taking a one week break in between uses. This is a strong topical steroid and prolonged use can thin your skin. Assessment and Plan:   Atopic Dermatitis is a chronic, itchy skin condition that is very common in children but may occur at any age. It is also known as “eczema” or “atopic eczema.” It is the most common form of dermatitis. Atopic dermatitis usually occurs in people who have an “atopic tendency.”  This means they may develop any or all of these closely linked conditions: Atopic dermatitis, asthma, hay fever (allergic rhinitis), eosinophilic esophagitis, and gastroenteritis. Often these conditions run within families with a parent, child or sibling also affected. A family history of asthma, eczema or hay fever is particularly useful in diagnosing atopic dermatitis in infants. Atopic dermatitis arises because of a complex interaction of genetic and environmental factors. These include defects in skin barrier function making the skin more susceptible to irritation by soap and other contact irritants, the weather, temperature and non-specific triggers. There is also an element of immune system dysregulation that is often present. By definition, it is chronic and has a "waxing-waning" nature; flares should be expected but with good education and treatment strategies can be minimized. Some specific tips we discussed:  Dry skin care. Using only mild cleansers (hypoallergenic and without fragrances) and fragrance free detergent (not “unscented” products which contain a masking agent); we discussed avoiding irritants/fragranced products.   The importance of regular application of moisturizers daily (at least 3 times a day)  The known and theoretical side effects of steroids at length, including but not limited to atrophy of skin and increased pressure in eye (glaucoma) and clouding of the eye's lens (cataracts) if used in or around the eye for extended durations. The specific over-the-counter interventions and medications. Side effects, risks and benefits of topical and oral medications discussed. After lengthy discussion of etiology and treatment options, we decided to implement the following personalized treatment plan:      EDUCATION AS INTERVENTION! WHAT IS ATOPIC DERMATITIS? Atopic dermatitis (also called “eczema”) is a condition of the skin where the skin is dry, red, and itchy. The main function of the skin is to provide a barrier from the environment and is also the first defense of the immune system. In atopic dermatitis the skin barrier is decreased or disrupted, and the skin is easily irritated. As a result, moisture escapes the skin more easily, and environmental allergens and microbes can enter the skin more easily. Consequently, the skin's immune system is altered. If there are increased allergic type cells in the skin, the skin may become red and “hyper-excitable.” This leads to itching and a subsequent rash. WHY DO PEOPLE GET ATOPIC DERMATITIS? There is no single answer because many factors are involved. It is likely a combination of genetic makeup and environmental triggers and/or exposures. Excessive drying or sweating of the skin, Irritating soaps, dust mites, and pet dander are some of the more common triggers. There is no blood test that can be done to confirm this diagnosis. The history and appearance of the skin is usually sufficient for a diagnosis. However, in some cases if the rash does not fit with the history or respond appropriately to treatment, a skin biopsy may be helpful. Many children do outgrow atopic dermatitis or get better; however, many continue to have sensitive skin into adulthood. Asthma and hay fever are often seen in many patients with atopic dermatitis; however, asthma flares do not necessarily occur at the same time as skin flares.      PREVENTING FLARES OF ATOPIC DERMATITIS  The first step is to maintain the skin's barrier function. Keep the skin well moisturized. Avoid irritants and triggers. Use prescribed medicine when there are red or rough areas to help the skin to return to normal as quickly as possible. Try to limit scratching. If you keep the skin well moisturized, and avoid coming in contact with things you know irritate your child's skin, there will be less flares. However, some flares of atopic dermatitis are beyond your control. You should work with your health care provider to come up with a plan that minimizes flares while minimizing long term use of medications that suppress the immune system. WHAT ARE SOME OF THE TRIGGERS? Triggers are different for different people. The most common triggers are:  Heat and sweat for some individuals, cold weather for others. House dust mites, pet fur. Wool; synthetic fabrics like nylon; dyed fabrics. Tobacco smoke   Fragrances in: shampoos, soaps, lotions, laundry detergents, fabric softeners. Saliva or prolonged exposure to water. WHAT ABOUT FOOD ALLERGIES? This is a very controversial topic, as many believe that food allergies are responsible for skin flares. In some cases, specific foods may cause worsening of atopic dermatitis; however this occurs in a minority of cases and usually happens within a few hours of ingestion. While food allergy is more common in children with eczema, foods are specific triggers for flares in only a small percentage of children. If you notice that the skin flares after certain foods you can see if eliminating one food at time makes a difference, as long as your child can still enjoy a well-balanced diet. There are blood (RAST) and skin (PRICK) tests that can check for allergies, but they are often positive in children who are not truly allergic. Therefore it is important that you work with your allergist and dermatologist to determine which foods are relevant and causing true symptoms.   Extreme food elimination diets without the guidance of your doctor, which have become more popular in recent years, may even result in worsening of the skin rash due to malnutrition and avoidance of essential nutrients. TREATMENT  Treatments are aimed at minimizing exposure to irritating factors and decreasing  the skin inflammation which results in an itchy rash. There are many different treatment options, which depend on your child's rash, its location, and severity. Topical treatments include corticosteroids and steroid-like creams such as Protopic, Elidel, and Eucrisa, which are believed to not thin the skin. Please read the discussions below regarding risks and benefits of all of these creams. Occasionally bacterial or viral infections can occur which flare the skin and require oral and/or topical antibiotics or antivirals. In some cases bleach baths 2-3 times weekly can be helpful to prevent recurrent infection. For severe disease, strong oral medications such as corticosteroids, methotrexate or azathioprine (Imuran) may be needed. These medications require close monitoring and follow-up. You should discuss the risks/ benefits/alternatives of these medications with your health care provider to come up with the best treatment plan for your child. 1) Use moisturizer all over the entire body at least THREE TIMES a day. This keeps the skin moisturized to restore the barrier function. Find a cream or ointment that your child likes - this is the most important. The medicines do not work in the bottle. The thicker the moisturizer, generally the better barrier it provides. Ointments often moisturize better than creams; and creams work better than lotions. Lotions are more useful during the summer when thick greasy ointments are uncomfortable. If you put moisturizer on the skin after bathing, while the skin is damp, it is twice as effective. The moisturizer provides a seal holding the water in the skin. You may bathe your child in warm - not hot - water, for short periods of time (no more than 5-10 minutes at a time) once a day if they like. Lightly pat your child dry with a towel and, while the skin is still damp, (within 3 minutes) apply a moisturizer from head to toe. If your child is using a medicated cream, apply it and allow it to absorb completely BEFORE you apply the moisturizer. 2) Apply the prescription medication TWICE A DAY to only the red, [place] [address] [facility] [address]  Put the medication on your fingers and gently rub it into the areas. Usually the medicine will help an area within a few days time. Try to put the medicine on for two days after you have noticed that the redness is no longer present; this will help the redness from returning. The severity of the rash and the strength and usage of the medication will determine how quickly you see improvement. It is important that you do not overuse steroid creams, and if you notice a thin, shiny appearance to the skin or broken blood vessels, you should stop using the cream and consult your health care provider regarding possible overuse/overthinning of the skin. The face, armpits and groin have particularly thin and sensitive skin and are therefore most at risk for bad results if steroids are over-used in these sites. 3) Avoid triggers. Some children have specific things that trigger itching and rashes, while others may have none that can be identified. It may require a little bit of trial and error to see what applies to your child. Also, triggers can change over time for your child. The most common triggers are listed above; start with these. Avoid the use of fabric softeners in the washing machine or dryer sheets (unless they are fragrance-free). Try to use laundry detergents, soaps and shampoos that are fragrance-free. You may find it helpful to double-rinse your clothes.   Some children are sensitive to house dust mites and they may benefit from a plastic mattress wrap. While food allergy is more common in children with eczema, foods are specific triggers for flares in only a small percentage of children. If you notice that the skin flares after certain foods you can see if eliminating one food at time makes a difference, as long as your child can still enjoy a well-balanced diet. 4) Consider using a medication like an anti-histamine by mouth to help control the itching. Scratching only makes the skin more reactive and the barrier function even more disrupted. It can cause both children and their parents to lose sleep! There are different types of anti-itch medications. Some cause more drowsiness than others. Both types are acceptable depending on your child and your preference. Start with Benadryl and if that does not work, ask for a prescription “antihistamine.”    5) About the prescription creams:  Corticosteroid creams and ointments (generally things with "-one" or "telma" on the end of their names): The strength of the cream or ointment depends on the name of the active ingredient. The numbers at the end do not indicate the relative strength. Thus triamcinolone 0.1% ointment, considered a mid-strength corticosteroid, is much stronger than hydrocortisone 1% even though the number following the name is much lower. Topical corticosteroids are very effective in treating atopic dermatitis. When used in the manner prescribed (to rashy areas of skin and for no more than a few weeks at a time to any one area) they are very safe. These are corticosteroids and are anti-inflammatory, not the “anabolic steroids” like those used illegally by some athletes. Topical non-steroid creams and ointments (immunomodulators): These creams and ointments are also called topical calcineurin inhibitors (TCIs).   These include Protopic ointment and Elidel cream. Crisaborole 2% Alvebambi Hanson) is a prescription ointment that targets an enzyme called PDE4 (phosphodiesterase 4). It is used on the skin topically to treat mild-to-moderate eczema in adults and children 3years of age and older. In total, these nonsteroidal prescriptions are used to help decrease itching and redness in the skin. They are not as strong as most steroid creams; however, it is believed that they do not thin the skin when overused. They are generally used as second-line medications, though they may be used alone or in conjunction with topical steroids. In sensitive areas such as the face, underarms or groin, they are often recommended. They can sting inflamed skin, but are generally well tolerated once the skin is healing. The FDA placed a “black-box” warning on both Elidel and Protopic in 2006 based on animal studies using the medications. Some animals developed skin cancer and lymphoma. Subsequently, the FDA released a statement that there is no causal relationship between the two medications and cancer. Because of this concern, there are ongoing studies to evaluate this relationship in humans. So far, there are studies that support the safety of these medications. One showed that the rates of cancer in patient using these medications topically were less than the rates of the general population and another showed that in patient's using the medication over a large area of the body, the levels of the medication in the blood was undetectable. As for Eucrisa, this product is only approved for the topical treatment of mild-to-moderate eczema in patients 3years of age and older; use of the medication in kids younger than 2 is considered “off label” and has not been formally studied. Burning and stinging are the most commonly reported side effects of this medication.   Rarely, this product has been known to cause hives and hypersensitivity reactions; discontinue its use if you develop severe itching, swelling, or redness in the area of application. IMPETIGO    Physical Exam:  Anatomic Location Affected:  Left nostril  Morphological Description:  crust  Pertinent Positives:  Pertinent Negatives: Additional History of Present Condition:  Previously prescribed Mupirocin    Assessment and Plan:  Based on a thorough discussion of this condition and the management approach to it (including a comprehensive discussion of the known risks, side effects and potential benefits of treatment), the patient (family) agrees to implement the following specific plan:  Recommend mixing Mupirocin mixed with Clobetasol 0.05% Cream: apply topically twice a day for 3 days only and then continue to apply just the Mupirocin Ointment daily as needed. What is impetigo? Impetigo is due to localized, superficial and infection with Staphylococcus aureus and/or Streptococcus pyogenes. Ecthyma is a deeper infection caused by the same organisms. The infection does not affect hair follicles unlike a boil. These infections may complicate wound healing, infestations and all forms of dermatitis. On the other hand, infections may also lead to dermatitis flare ups. Impetigo is often found in children and is also highly contagious. Most people get impetigo through skin-to-skin contact with someone who has it. Children and athletes like wrestlers and football players often get it this way. It's also possible to get it by using something infected with the bacteria that cause impetigo such as an infected towel or sports equipment. Wearing infected clothing is another way to get impetigo. Other predisposing causes include  Climatic conditions (humidity, occlusive clothing)  Underlying skin disease (atopic dermatitis, hidradenitis suppurativa)  Iron deficiency  Diabetes mellitus  Defective neutrophil function (treated with oral vitamin C)  Immunodeficiency, including hypogammaglobulinemia and HIV infection    What are the symptoms of impetigo?     Staphylococcal impetigo is characterized by surface honey-yellow crusting or blisters. It tends to be itchy. Streptococcal impetigo is characterized by crusting and ulceration. Ecthyma results in scabs covering full skin thickness ulcers. These deeper infections may be painful. Starts with one or more sores, which are often itchy. The sores quickly burst, and the skin can be red or raw where the sores have broken open. Glands near the sores may feel swollen. Crusts, usually honey-colored, form. The skin heals without scarring, unless scratching cuts deep into the skin. The infection can spread to other areas of the body, where you'll see this process begin all over again. This is one reason treatment is so important. A severe form, bullous impetigo, is due to S. aureus that produces an exfoliative exotoxin, exfoliatin. This produces a split between the stratum granulosum and stratum spinosum within the epidermis, causing blisters to form. Blisters appear that contain a cloudy or yellow fluid. The blisters become limp and transparent and then break open. Crusty sores form where the blisters have broken open. The skin tends to heal without scarring. Ecthyma:  Ecthyma (ec-thy-ma) can develop when impetigo goes untreated. This is a more serious type of infection because it goes deeper into the skin. When a person has ecthyma, you'll see:  Painful blisters  Blisters turn into deep, open sores  Thick crusts develop, often with redness on the surrounding skin  Because the infection goes deeper into the skin, you may see scars once the skin heals\  How do we treat impetigo? Dermatologists often prescribe an antibiotic that you apply to the skin, such as mupirocin or retapamulin. The [place]. Food and Drug Administration (FDA) has approved retapamulin to treat impetigo in children as young as 6 months old. Mupirocin is FDA approved to treat people 15years of age and older.  When necessary, a dermatologist may prescribe one of these medicines to treat a child younger than the FDA-approved age. This is called off-label use and is legal. It can also be very helpful. If a dermatologist prescribes an antibiotic you apply to the skin, you would apply it to the skin that is affected by impetigo. If you experience several outbreaks, you may need to apply it inside the nostrils. The bacteria that cause impetigo often thrive in the nostrils. Meticulous wound care and antiseptics (povidone iodine, chlorhexidine, triclosan and others) as local application and cleanser  Sometimes stronger medicine is necessary for more extensive or recurrent infections. Your dermatologist can prescribe an antibiotic that you take by mouth. First line treatment should be with flucloxacillin or dicloxacillin. In penicillin-allergic patients, erythromycin may be used but there is a higher rate of resistance. A few patients need injections of an antibiotic. Your dermatologist may take swabs from active lesions and nostrils to determine antibiotic sensitivity. Along with local patterns of bacterial strains can guide which antibiostic to use. Choices include:  Cephalexin  Co-amoxiclav  Trimethoprim + sulphamethoxazole  Ciprofloxacin  Fusidic acid  Rifampicin  Clindamycin  In general, Staphylococcal infections are contagious, requiring careful attention to hygiene.   Wash hands frequently  Use antiseptics for bathing  Hot wash clothing, bedding, towels  Avoid sharing clothing and towels         Scribe Attestation      I,:  Katelyn Shay am acting as a scribe while in the presence of the attending physician.:       I,:  Kathya Payton MD personally performed the services described in this documentation    as scribed in my presence.:

## 2023-11-09 ENCOUNTER — OFFICE VISIT (OUTPATIENT)
Dept: PHYSICAL THERAPY | Facility: CLINIC | Age: 48
End: 2023-11-09
Payer: COMMERCIAL

## 2023-11-09 DIAGNOSIS — L29.9 PRURITUS: ICD-10-CM

## 2023-11-09 DIAGNOSIS — M54.2 NECK PAIN: Primary | ICD-10-CM

## 2023-11-09 DIAGNOSIS — R42 VERTIGO: ICD-10-CM

## 2023-11-09 DIAGNOSIS — M54.50 CHRONIC BILATERAL LOW BACK PAIN WITHOUT SCIATICA: ICD-10-CM

## 2023-11-09 DIAGNOSIS — M50.30 DDD (DEGENERATIVE DISC DISEASE), CERVICAL: ICD-10-CM

## 2023-11-09 DIAGNOSIS — J34.89 NASAL VESTIBULITIS: ICD-10-CM

## 2023-11-09 DIAGNOSIS — G89.29 CHRONIC BILATERAL LOW BACK PAIN WITHOUT SCIATICA: ICD-10-CM

## 2023-11-09 PROCEDURE — 97110 THERAPEUTIC EXERCISES: CPT

## 2023-11-09 PROCEDURE — 97112 NEUROMUSCULAR REEDUCATION: CPT

## 2023-11-09 RX ORDER — MECLIZINE HYDROCHLORIDE 25 MG/1
25 TABLET ORAL EVERY 8 HOURS PRN
Qty: 90 TABLET | Refills: 0 | Status: SHIPPED | OUTPATIENT
Start: 2023-11-09

## 2023-11-09 NOTE — PROGRESS NOTES
Daily Note     Today's date: 2023  Patient name: Luba Bains  : 1975  MRN: [id number]  Referring provider: Jacquelyn Gonzalez PA-C  Dx:   Encounter Diagnosis     ICD-10-CM    1. Neck pain  M54.2       2. DDD (degenerative disc disease), cervical  M50.30       3. Chronic bilateral low back pain without sciatica  M54.50     G89.29                      Subjective: pt reports her neck and back feel pretty good today. Objective: See treatment diary below      Assessment: Tolerated treatment well. Patient would benefit from continued PT. Cueing for posture and technique t/o session. Plan: Continue per plan of care. Diagnosis: Chronic neck pain; Chronic low back pain (added 23)   Precautions: Hx of RA, chronic pain   POC Expires: 23     Re-evaluation Date: 23     FOTO Scores/Date: Goal - 54; 10/6 - 45;  - 53;  Low back Goal - 50, Low back  - 56   Visit Count 7/10 1/10 2/10  5/10   Manuals   10    Cervical ROT bilaterally SERGEI    AM   Cervical traction SERGEI    AM   SOR SERGEI    AM    Ther Ex      Seated thoracic ext w/ ball 3" x15  3" x15  15x3"   Cervical ROT w/ towel 3"x15  3"x15  15x3"    Standing Shoulder AROM Flex/abd 2# 2x10                LTR   5" 10x                             HEP         Neuro Re-Ed      UBE for postural re-ed/strengthening L3 x 5 mins retro  L3 x 5 mins retro  L1 x 5 retro    Prone scapular retraction/depression  10"x10  10"x10  Prone 10x10"    Prone scapular retraction/depression w/ lift off 10"x10   10"x10  Prone 10x10"    T-band Rows/Ext BTB 2x10ea     Blue 2x10 ea   Bilateral horizontal abd GTB 2x10   GTB 2x10   Grn 2x10    Bilateral ER GTB 2x10   GTB 2x10   Grn 2x10                    TA isometrics  10x10"  10x 10"     Abd bracing w/ mini march  20x ea alt 20x ea alt     Abd bracing w/ SLR  2x10 2x10     Bridge w/ band  10x5" blue X20 5" blue             HEP    Creation/instruction        Ther Act                                                                             Modalities

## 2023-11-11 RX ORDER — KETOCONAZOLE 20 MG/ML
1 SHAMPOO TOPICAL 2 TIMES WEEKLY
Qty: 120 ML | Refills: 1 | Status: SHIPPED | OUTPATIENT
Start: 2023-11-13

## 2023-11-14 ENCOUNTER — APPOINTMENT (OUTPATIENT)
Dept: PHYSICAL THERAPY | Facility: CLINIC | Age: 48
End: 2023-11-14
Payer: COMMERCIAL

## 2023-11-15 ENCOUNTER — TELEPHONE (OUTPATIENT)
Age: 48
End: 2023-11-15

## 2023-11-15 NOTE — TELEPHONE ENCOUNTER
Patients GI provider:  [facility]    Number to return call: [phone]     Reason for call: Pt calling needs order for EGD to be faxed to [phone] she is transferring her care she moved they need order to proceed please contact pt once this is complete so she can call to schedule     Scheduled procedure/appointment date if applicable: Apt/procedure

## 2023-11-16 ENCOUNTER — APPOINTMENT (OUTPATIENT)
Dept: PHYSICAL THERAPY | Facility: CLINIC | Age: 48
End: 2023-11-16
Payer: COMMERCIAL

## 2023-11-21 ENCOUNTER — APPOINTMENT (OUTPATIENT)
Dept: PHYSICAL THERAPY | Facility: CLINIC | Age: 48
End: 2023-11-21
Payer: COMMERCIAL

## 2023-11-28 ENCOUNTER — TELEPHONE (OUTPATIENT)
Age: 48
End: 2023-11-28

## 2023-11-28 ENCOUNTER — APPOINTMENT (OUTPATIENT)
Dept: PHYSICAL THERAPY | Facility: CLINIC | Age: 48
End: 2023-11-28
Payer: COMMERCIAL

## 2023-11-28 DIAGNOSIS — M54.2 NECK PAIN: Primary | ICD-10-CM

## 2023-11-28 DIAGNOSIS — K82.8 DYSKINESIA OF GALLBLADDER: ICD-10-CM

## 2023-11-28 NOTE — TELEPHONE ENCOUNTER
Caller: Patient    Doctor: Sepideh Tolliver    Reason for call: Calling to request a new PT script for her neck and back. She will be going to a  location.     Call back#: [phone]

## 2023-11-30 ENCOUNTER — APPOINTMENT (OUTPATIENT)
Dept: PHYSICAL THERAPY | Facility: CLINIC | Age: 48
End: 2023-11-30
Payer: COMMERCIAL

## 2023-12-01 ENCOUNTER — HOSPITAL ENCOUNTER (OUTPATIENT)
Dept: NON INVASIVE DIAGNOSTICS | Facility: CLINIC | Age: 48
Discharge: HOME | End: 2023-12-01
Payer: COMMERCIAL

## 2023-12-01 ENCOUNTER — TELEPHONE (OUTPATIENT)
Age: 48
End: 2023-12-01

## 2023-12-01 VITALS
DIASTOLIC BLOOD PRESSURE: 66 MMHG | BODY MASS INDEX: 26.75 KG/M2 | HEART RATE: 73 BPM | WEIGHT: 151 LBS | HEIGHT: 63 IN | OXYGEN SATURATION: 100 % | SYSTOLIC BLOOD PRESSURE: 134 MMHG

## 2023-12-01 DIAGNOSIS — R07.9 CHEST PAIN, UNSPECIFIED TYPE: ICD-10-CM

## 2023-12-01 DIAGNOSIS — R00.2 INTERMITTENT PALPITATIONS: ICD-10-CM

## 2023-12-01 LAB
MAX HR PERCENT: 92 %
MAX HR: 157 BPM
NUC STRESS DIASTOLIC VOLUME INDEX: 51 ML/M2
NUC STRESS EJECTION FRACTION: 73 %
NUC STRESS SYSTOLIC VOLUME INDEX: 14 ML/M2
RATE PRESSURE PRODUCT: NORMAL
SL CV REST NUCLEAR ISOTOPE DOSE: 10.36 MCI
SL CV STRESS NUCLEAR ISOTOPE DOSE: 31.4 MCI
SL CV STRESS RECOVERY BP: NORMAL MMHG
SL CV STRESS RECOVERY HR: 87 BPM
SL CV STRESS RECOVERY O2 SAT: 100 %
SL CV STRESS STAGE REACHED: 3
STRESS BASELINE BP: NORMAL MMHG
STRESS BASELINE HR: 73 BPM
STRESS O2 SAT REST: 100 %
STRESS PEAK HR: 157 BPM
STRESS POST ESTIMATED WORKLOAD: 10.1 METS
STRESS POST EXERCISE DUR MIN: 7 MIN
STRESS POST O2 SAT PEAK: 100 %
STRESS POST PEAK BP: 184 MMHG
STRESS/REST PERFUSION RATIO: 1.04

## 2023-12-01 PROCEDURE — 93016 CV STRESS TEST SUPVJ ONLY: CPT | Performed by: INTERNAL MEDICINE

## 2023-12-01 PROCEDURE — A9502 TC99M TETROFOSMIN: HCPCS

## 2023-12-01 PROCEDURE — 78452 HT MUSCLE IMAGE SPECT MULT: CPT

## 2023-12-01 PROCEDURE — 93017 CV STRESS TEST TRACING ONLY: CPT

## 2023-12-01 PROCEDURE — 93018 CV STRESS TEST I&R ONLY: CPT | Performed by: INTERNAL MEDICINE

## 2023-12-01 PROCEDURE — G1004 CDSM NDSC: HCPCS

## 2023-12-01 PROCEDURE — 78452 HT MUSCLE IMAGE SPECT MULT: CPT | Performed by: INTERNAL MEDICINE

## 2023-12-01 NOTE — TELEPHONE ENCOUNTER
Scheduled date of EGD(as of today):1/2/24  Physician performing EGD:   Location of EGD: [facility]      Procedure R/Sd

## 2023-12-02 LAB
MAX HR PERCENT: 85 %
MAX HR: 155 BPM
STRESS BASELINE BP: NORMAL MMHG
STRESS BASELINE HR: 71 BPM
STRESS POST ESTIMATED WORKLOAD: 10.1 METS
STRESS POST PEAK HR: 157 BPM
STRESS POST PEAK SYSTOLIC BP: 184 MMHG
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
## 2023-12-15 ENCOUNTER — OFFICE VISIT (OUTPATIENT)
Dept: RHEUMATOLOGY | Facility: CLINIC | Age: 48
End: 2023-12-15
Payer: COMMERCIAL

## 2023-12-15 VITALS
WEIGHT: 150 LBS | SYSTOLIC BLOOD PRESSURE: 128 MMHG | BODY MASS INDEX: 26.58 KG/M2 | HEIGHT: 63 IN | DIASTOLIC BLOOD PRESSURE: 80 MMHG

## 2023-12-15 DIAGNOSIS — R76.8 RHEUMATOID FACTOR POSITIVE: ICD-10-CM

## 2023-12-15 PROCEDURE — 99203 OFFICE O/P NEW LOW 30 MIN: CPT | Performed by: STUDENT IN AN ORGANIZED HEALTH CARE EDUCATION/TRAINING PROGRAM

## 2023-12-21 ENCOUNTER — TELEPHONE (OUTPATIENT)
Dept: FAMILY MEDICINE CLINIC | Facility: CLINIC | Age: 48
End: 2023-12-21

## 2023-12-21 NOTE — TELEPHONE ENCOUNTER
Called pt and left detailed message letting her know we received a fax from the [place] [employer] for a disability claim/benefits regarding us to send over chart notes, clinical findings, etc. Also to let us know who she's seeing as her primary physician as she established with us but also has a office visit on 11/17/23 with [facility] with a Dr. Mcdonald as a new patient and that she was new to the area. To call us back to let us know if she's staying with us or has moved and is seeing Dr. Mcdonald and  to talk about the fax we received.

## 2023-12-21 NOTE — TELEPHONE ENCOUNTER
Pt called back after leaving [facility] and verified with me that she will be staying with us and only established with the [facility] one time and is back in our area. Will continue with the disability claim benefits. She said she's trying to get early group home/social security benefits as she said it's difficult to work with her back and neck pain.

## 2023-12-22 ENCOUNTER — TELEPHONE (OUTPATIENT)
Age: 48
End: 2023-12-22

## 2023-12-22 NOTE — TELEPHONE ENCOUNTER
Scheduled date of EGD(as of today): 02/05/2024    Physician performing EGD: Noy    Location of EGD: [facility]    Instructions reviewed with patient by: AL    Clearances: None

## 2023-12-24 ENCOUNTER — HOSPITAL ENCOUNTER (OUTPATIENT)
Dept: ULTRASOUND IMAGING | Facility: HOSPITAL | Age: 48
Discharge: HOME | End: 2023-12-24
Payer: COMMERCIAL

## 2023-12-24 DIAGNOSIS — R10.11 RUQ PAIN: ICD-10-CM

## 2023-12-24 DIAGNOSIS — G47.00 INSOMNIA, UNSPECIFIED TYPE: ICD-10-CM

## 2023-12-24 PROCEDURE — 76705 ECHO EXAM OF ABDOMEN: CPT

## 2023-12-26 ENCOUNTER — TELEPHONE (OUTPATIENT)
Age: 48
End: 2023-12-26

## 2023-12-26 ENCOUNTER — HOSPITAL ENCOUNTER (OUTPATIENT)
Age: 48
Discharge: HOME | End: 2023-12-26
Payer: COMMERCIAL

## 2023-12-26 DIAGNOSIS — Z12.31 ENCOUNTER FOR SCREENING MAMMOGRAM FOR MALIGNANT NEOPLASM OF BREAST: ICD-10-CM

## 2023-12-26 PROCEDURE — 77067 SCR MAMMO BI INCL CAD: CPT

## 2023-12-26 PROCEDURE — 77063 BREAST TOMOSYNTHESIS BI: CPT

## 2023-12-26 NOTE — TELEPHONE ENCOUNTER
Pt called to reschedule procedure - wanted sooner appt    Scheduled date of EGD(as of today): 1/2/24  Physician performing EGD: JORGE  Location of EGD: [facility]  Instructions reviewed with patient by: Yes

## 2023-12-27 ENCOUNTER — TELEPHONE (OUTPATIENT)
Age: 48
End: 2023-12-27

## 2023-12-27 RX ORDER — HYDROXYZINE HYDROCHLORIDE 10 MG/1
TABLET, FILM COATED ORAL
Qty: 30 TABLET | Refills: 1 | Status: SHIPPED | OUTPATIENT
Start: 2023-12-27

## 2024-01-07 DIAGNOSIS — F32.9 REACTIVE DEPRESSION: ICD-10-CM

## 2024-01-07 DIAGNOSIS — F41.9 ANXIETY: ICD-10-CM

## 2024-01-08 ENCOUNTER — OFFICE VISIT (OUTPATIENT)
Dept: OBGYN CLINIC | Facility: CLINIC | Age: 49
End: 2024-01-08
Payer: COMMERCIAL

## 2024-01-08 ENCOUNTER — APPOINTMENT (OUTPATIENT)
Dept: LAB | Facility: HOSPITAL | Age: 49
End: 2024-01-08
Payer: COMMERCIAL

## 2024-01-08 VITALS
SYSTOLIC BLOOD PRESSURE: 117 MMHG | DIASTOLIC BLOOD PRESSURE: 77 MMHG | HEART RATE: 81 BPM | WEIGHT: 146 LBS | HEIGHT: 63 IN | BODY MASS INDEX: 25.87 KG/M2

## 2024-01-08 DIAGNOSIS — M54.2 NECK PAIN: ICD-10-CM

## 2024-01-08 DIAGNOSIS — N39.43 POST-VOID DRIBBLING: ICD-10-CM

## 2024-01-08 DIAGNOSIS — R10.11 RUQ PAIN: ICD-10-CM

## 2024-01-08 DIAGNOSIS — M75.41 IMPINGEMENT SYNDROME OF RIGHT SHOULDER: Primary | ICD-10-CM

## 2024-01-08 DIAGNOSIS — M50.30 DDD (DEGENERATIVE DISC DISEASE), CERVICAL: ICD-10-CM

## 2024-01-08 DIAGNOSIS — G95.20 SPINAL CORD COMPRESSION (HCC): ICD-10-CM

## 2024-01-08 LAB
ALBUMIN SERPL BCP-MCNC: 4.1 G/DL (ref 3.5–5)
ALP SERPL-CCNC: 86 U/L (ref 34–104)
ALT SERPL W P-5'-P-CCNC: 24 U/L (ref 7–52)
AMYLASE SERPL-CCNC: 33 IU/L (ref 29–103)
ANION GAP SERPL CALCULATED.3IONS-SCNC: 4 MMOL/L
AST SERPL W P-5'-P-CCNC: 22 U/L (ref 13–39)
BACTERIA UR QL AUTO: ABNORMAL /HPF
BASOPHILS # BLD AUTO: 0.06 THOUSANDS/ÂΜL (ref 0–0.1)
BASOPHILS NFR BLD AUTO: 1 % (ref 0–1)
BILIRUB DIRECT SERPL-MCNC: 0.02 MG/DL (ref 0–0.2)
BILIRUB SERPL-MCNC: 0.45 MG/DL (ref 0.2–1)
BILIRUB UR QL STRIP: NEGATIVE
BUN SERPL-MCNC: 12 MG/DL (ref 5–25)
CALCIUM SERPL-MCNC: 8.8 MG/DL (ref 8.4–10.2)
CHLORIDE SERPL-SCNC: 107 MMOL/L (ref 96–108)
CLARITY UR: CLEAR
CO2 SERPL-SCNC: 29 MMOL/L (ref 21–32)
COLOR UR: ABNORMAL
CREAT SERPL-MCNC: 0.69 MG/DL (ref 0.6–1.3)
EOSINOPHIL # BLD AUTO: 1.11 THOUSAND/ÂΜL (ref 0–0.61)
EOSINOPHIL NFR BLD AUTO: 12 % (ref 0–6)
ERYTHROCYTE [DISTWIDTH] IN BLOOD BY AUTOMATED COUNT: 11.8 % (ref 11.6–15.1)
GFR SERPL CREATININE-BSD FRML MDRD: 102 ML/MIN/1.73SQ M
GLUCOSE P FAST SERPL-MCNC: 94 MG/DL (ref 65–99)
GLUCOSE UR STRIP-MCNC: NEGATIVE MG/DL
HCT VFR BLD AUTO: 39 % (ref 34.8–46.1)
HGB BLD-MCNC: 13.1 G/DL (ref 11.5–15.4)
HGB UR QL STRIP.AUTO: NEGATIVE
IMM GRANULOCYTES # BLD AUTO: 0.02 THOUSAND/UL (ref 0–0.2)
IMM GRANULOCYTES NFR BLD AUTO: 0 % (ref 0–2)
KETONES UR STRIP-MCNC: NEGATIVE MG/DL
LEUKOCYTE ESTERASE UR QL STRIP: NEGATIVE
LIPASE SERPL-CCNC: 27 U/L (ref 11–82)
LYMPHOCYTES # BLD AUTO: 2.37 THOUSANDS/ÂΜL (ref 0.6–4.47)
LYMPHOCYTES NFR BLD AUTO: 27 % (ref 14–44)
MCH RBC QN AUTO: 30.9 PG (ref 26.8–34.3)
MCHC RBC AUTO-ENTMCNC: 33.6 G/DL (ref 31.4–37.4)
MCV RBC AUTO: 92 FL (ref 82–98)
MONOCYTES # BLD AUTO: 0.54 THOUSAND/ÂΜL (ref 0.17–1.22)
MONOCYTES NFR BLD AUTO: 6 % (ref 4–12)
MUCOUS THREADS UR QL AUTO: ABNORMAL
NEUTROPHILS # BLD AUTO: 4.82 THOUSANDS/ÂΜL (ref 1.85–7.62)
NEUTS SEG NFR BLD AUTO: 54 % (ref 43–75)
NITRITE UR QL STRIP: NEGATIVE
NON-SQ EPI CELLS URNS QL MICRO: ABNORMAL /HPF
NRBC BLD AUTO-RTO: 0 /100 WBCS
PH UR STRIP.AUTO: 6.5 [PH]
PLATELET # BLD AUTO: 278 THOUSANDS/UL (ref 149–390)
PMV BLD AUTO: 9.5 FL (ref 8.9–12.7)
POTASSIUM SERPL-SCNC: 3.7 MMOL/L (ref 3.5–5.3)
PROT SERPL-MCNC: 7.1 G/DL (ref 6.4–8.4)
PROT UR STRIP-MCNC: NEGATIVE MG/DL
RBC # BLD AUTO: 4.24 MILLION/UL (ref 3.81–5.12)
RBC #/AREA URNS AUTO: ABNORMAL /HPF
SODIUM SERPL-SCNC: 140 MMOL/L (ref 135–147)
SP GR UR STRIP.AUTO: 1.02 (ref 1–1.03)
UROBILINOGEN UR STRIP-ACNC: <2 MG/DL
WBC # BLD AUTO: 8.92 THOUSAND/UL (ref 4.31–10.16)
WBC #/AREA URNS AUTO: ABNORMAL /HPF

## 2024-01-08 PROCEDURE — 99214 OFFICE O/P EST MOD 30 MIN: CPT | Performed by: ORTHOPAEDIC SURGERY

## 2024-01-08 PROCEDURE — 83690 ASSAY OF LIPASE: CPT

## 2024-01-08 PROCEDURE — 80053 COMPREHEN METABOLIC PANEL: CPT

## 2024-01-08 PROCEDURE — 85025 COMPLETE CBC W/AUTO DIFF WBC: CPT

## 2024-01-08 PROCEDURE — 82150 ASSAY OF AMYLASE: CPT

## 2024-01-08 PROCEDURE — 82248 BILIRUBIN DIRECT: CPT

## 2024-01-08 PROCEDURE — 36415 COLL VENOUS BLD VENIPUNCTURE: CPT

## 2024-01-08 PROCEDURE — 87086 URINE CULTURE/COLONY COUNT: CPT

## 2024-01-08 NOTE — PROGRESS NOTES
[facility] ORTHOPEDIC SPINE SURGERY  DR.AMIR JOSIE MD  [address] [facility] [address] [phone]    HISTORY OF PRESENT ILLNESS:    Patricia Nam is a 48 y.o. female who presents for follow up of cervical spine.  She overall feels about the same.  Today she complains of cervical pain with bilateral shoulder and periscapular pain and occasional right hand numbness and headaches.  He is unsure what aggravates or alleviates.  She did attend physical therapy about 10x with benefit.  She did try chiropractor with limited benefit for neck yet benefit to lumbar spine.  She denies past injections or surgery of neck or shoulders.      She has recent onset right shoulder pain.  In the past she has gone to physical therapy.  She is also seen a chiropractor for neck and lower back.  She denies cervical manipulations.  She said that the stretching of the lower back was helpful but the chiropractic treatment of the neck has not been helpful.        ALLERGIES:   Allergies   Allergen Reactions    Bee Venom Anaphylaxis    Iodides Other (See Comments)    [place] Oil - Food Allergy GI Intolerance     Other reaction(s): GI Intolerance, GI Reaction    Aspirin Other (See Comments)     shaking  convulsions    Other reaction(s): Other (See Comments)  shaking  convulsions  shaking  convulsions    Metronidazole GI Intolerance     Gi upset      Sulfamethoxazole-Trimethoprim GI Intolerance     Gi upst    Other reaction(s): GI Intolerance, GI Reaction  Gi upst  Gi upst    Tape  [Medical Tape] Other (See Comments) and Hives     fever  Other reaction(s): Other  fever       MEDICATIONS:    Current Outpatient Medications:     albuterol (PROVENTIL HFA,VENTOLIN HFA) 90 mcg/act inhaler, INHALE 1-2 PUFFS EVERY 6 HOURS AS NEEDED FOR WHEEZING., Disp: 18 g, Rfl: 3    clobetasol (TEMOVATE) 0.05 % cream, Apply topically 2 (two) times a day To affected areas for 14 days straight. Take a one week break in between uses., Disp: 30 g, Rfl: 1     Diclofenac Sodium (VOLTAREN) 1 %, Apply 2 g topically 4 (four) times a day, Disp: 100 g, Rfl: 3    dicyclomine (BENTYL) 20 mg tablet, Take 1 tablet (20 mg total) by mouth 4 (four) times a day as needed (IBS), Disp: 30 tablet, Rfl: 0    famotidine (PEPCID) 20 mg tablet, TAKE 1 TABLET BY MOUTH TWICE A DAY (Patient taking differently: Take 20 mg by mouth if needed for indigestion or heartburn), Disp: 60 tablet, Rfl: 3    fluticasone (FLONASE) 50 mcg/act nasal spray, 1 spray into each nostril 2 (two) times a day, Disp: 11.1 mL, Rfl: 2    hydrOXYzine HCL (ATARAX) 10 mg tablet, TAKE 1 TABLET BY MOUTH EVERYDAY AT BEDTIME, Disp: 30 tablet, Rfl: 1    meclizine (ANTIVERT) 25 mg tablet, TAKE 1 TABLET (25 MG TOTAL) BY MOUTH EVERY 8 (EIGHT) HOURS AS NEEDED FOR DIZZINESS., Disp: 90 tablet, Rfl: 0    metoclopramide (REGLAN) 5 mg tablet, TAKE 1 TABLET BY MOUTH 3 TIMES A DAY BEFORE MEALS., Disp: 90 tablet, Rfl: 5    mometasone (ELOCON) 0.1 % lotion, APPLY TO AFFECTED AREA TOPICALLY EVERY DAY, Disp: 60 mL, Rfl: 3    mupirocin (BACTROBAN) 2 % ointment, APPLY TO AFFECTED AREA 3 TIMES A DAY, Disp: 22 g, Rfl: 1    omeprazole (PriLOSEC) 40 MG capsule, TAKE 1 CAPSULE (40 MG TOTAL) BY MOUTH DAILY., Disp: 90 capsule, Rfl: 1    tiZANidine (ZANAFLEX) 4 mg tablet, Take 1 tablet (4 mg total) by mouth every 8 (eight) hours as needed for muscle spasms, Disp: 60 tablet, Rfl: 0    EPINEPHrine (EPIPEN) 0.3 mg/0.3 mL SOAJ, Inject 0.3 mL (0.3 mg total) into a muscle once for 1 dose As needed, Disp: 0.3 mL, Rfl: 0    FLUoxetine (PROzac) 40 MG capsule, TAKE 1 CAPSULE (40 MG TOTAL) BY MOUTH DAILY., Disp: 90 capsule, Rfl: 0    ketoconazole (NIZORAL) 2 % shampoo, APPLY 1 APPLICATION TOPICALLY 2 (TWO) TIMES A WEEK (Patient not taking: Reported on 12/15/2023), Disp: 120 mL, Rfl: 1     PAST MEDICAL HISTORY:   Past Medical History:   Diagnosis Date    Asthma     Degenerative joint disease     Depression     GERD (gastroesophageal reflux disease)     Lumbar  radiculopathy     PONV (postoperative nausea and vomiting)     Thyroid nodule        PAST SURGICAL HISTORY:  Past Surgical History:   Procedure Laterality Date    BREAST BIOPSY Left 2020    neg    CHOLECYSTECTOMY LAPAROSCOPIC N/A 5/6/2022    Procedure: CHOLECYSTECTOMY LAPAROSCOPIC;  Surgeon: iZon Walker MD;  Location: [facility];  Service: General    CYST REMOVAL  10/2020    lt breast     EGD      HYSTERECTOMY      HYSTERECTOMY  1998    LIPOSUCTION  09/2020    TUBAL LIGATION         SOCIAL HISTORY:  Social History     Tobacco Use   Smoking Status Never   Smokeless Tobacco Never          PHYSICAL EXAM:  48 y.o. female sitting comfortably on exam table in no apparent distress.   Ambulates with normal gait with slightly slowed when turning   Able to go up on toes and heels   Able to balance on one leg  TTP over thoracic spine  5/5 motor strength with normal sensation in bilateral upper extremities  2+ deep tendon reflexes bilateral upper extremities except 3+ left biceps reflex.  3+ quads reflexes bilaterally  2+ patellar reflexes bilaterally  Absent haskins sign bilaterally  Slightly slowed rapid alternating movements left > right upper extremity.  Normal alternating motion bilateral lower extremity        RADIOGRAPHIC STUDIES:  1. MRI, L-spine 3/17/2021, mild multilevel degenerative disc disease.  Moderate degenerative changes L3-4.  Moderate to severe degenerative changes at L5-S1.  Posterior central disc protrusion with slight predominance on the left side with minimal lateral recess stenosis at L5-S1.  Facet hypertrophy and mild lateral recess stenosis L4-5.     2. CT, abdomen, 06/03/2021, multilevel thoracic and lumbar degenerative disc disease, diffuse idiopathic skeletal hyperostosis, moderate degenerative disc disease L3-4 L4-5 and L5-S1 with posterior osteophyte formation.  No evidence of bony stenosis.  No evidence of congenital stenosis.    3. MRI, cervical spine, 8/13/2023: Multilevel cervical  degenerative disc disease, most significant at C5-6 and C6-7.  There is evidence of posterior ossified formation at this point resulting in spinal cord compression and spinal cord indentation.  The spinal cord has not flattened.  There is no some myelomalacia.    4. Xrays, cervical spine, 9/25/2023: Severe degenerative changes at C5-6 and C6-7.  Mild to moderate degenerative changes at other levels of the cervical spine.      ASSESSMENT:  1. Impingement syndrome of right shoulder  -     Ambulatory referral to Physical Therapy; Future    2. Neck pain  -     Ambulatory referral to Physical Therapy; Future    3. DDD (degenerative disc disease), cervical  -     Ambulatory referral to Physical Therapy; Future    4. Spinal cord compression (HCC)  -     Ambulatory referral to Physical Therapy; Future          PLAN:  48 y.o. female with neck pain, cervical DDD, and cervical spinal cord compression.      MRI of cervical spine was reviewed today showing multilevel degenerative disc disease with disc protrusions at C5-6 and C6-7 causing spinal cord compression. There is no evidence of myelomalacia. On physical exam there was mild findings that may be suggestive of early myelopathy including some evidence of hyperreflexia.  The physical exam however is not conclusive and is not significant enough to justify surgical treatment of spinal cord compression.  I again reviewed the radiographic studies and performed thorough examination.  There is no changes on physical examination.  At this time her neurological function appears to be stable.  I will see her back in 3 months for evaluation of the cervical spine to rule out progressive myelopathy.    Respect to her neck pain, she has not been treated appropriately and surgery is not an option.  On physical examination she appears to be suffering from right shoulder impingement.  She has had any treatment.  I did discuss role of physical therapy and injections.  I will go ahead and  start physical therapy and will refer to sports medicine for evaluation of right shoulder impingement.  I suggest that she go through at least a few sessions of physical therapy to see if it improves before going for evaluation.    Patient referred to  for right shoulder impingement.      Work restrictions:  Patient should not lift greater than 20 lbs with right arm until follow in 3 months.    Follow up in 3 months for evaluation of cervical stenosis spinal cord compression.  At this time she is not myelopathic.       Scribe Attestation      I,:  Rikki Hill am acting as a scribe while in the presence of the attending physician.:       I,:  Brock Olson MD personally performed the services described in this documentation    as scribed in my presence.:

## 2024-01-08 NOTE — LETTER
January 8, 2024     Patient: Patricia Nam  YOB: 1975  Date of Visit: 1/8/2024      To Whom it May Concern:    Patricia Nam is under my professional care. Patricia was seen in my office on 1/8/2024. Patient should not lift greater than 20 lbs with right arm until follow in 3 months.    If you have any questions or concerns, please don't hesitate to call.         Sincerely,          Brock Olson MD        CC: No Recipients

## 2024-01-09 LAB — BACTERIA UR CULT: NORMAL

## 2024-01-09 RX ORDER — FLUOXETINE HYDROCHLORIDE 40 MG/1
40 CAPSULE ORAL DAILY
Qty: 90 CAPSULE | Refills: 0 | Status: SHIPPED | OUTPATIENT
Start: 2024-01-09 | End: 2024-04-08

## 2024-01-15 ENCOUNTER — APPOINTMENT (OUTPATIENT)
Dept: RADIOLOGY | Facility: CLINIC | Age: 49
End: 2024-01-15
Payer: COMMERCIAL

## 2024-01-15 ENCOUNTER — OFFICE VISIT (OUTPATIENT)
Dept: OBGYN CLINIC | Facility: CLINIC | Age: 49
End: 2024-01-15
Payer: COMMERCIAL

## 2024-01-15 VITALS
SYSTOLIC BLOOD PRESSURE: 114 MMHG | WEIGHT: 145.8 LBS | BODY MASS INDEX: 25.83 KG/M2 | HEART RATE: 63 BPM | DIASTOLIC BLOOD PRESSURE: 75 MMHG | HEIGHT: 63 IN

## 2024-01-15 DIAGNOSIS — L30.9 ECZEMA, UNSPECIFIED TYPE: ICD-10-CM

## 2024-01-15 DIAGNOSIS — J01.10 ACUTE NON-RECURRENT FRONTAL SINUSITIS: ICD-10-CM

## 2024-01-15 DIAGNOSIS — M79.18 MYOFASCIAL PAIN SYNDROME: ICD-10-CM

## 2024-01-15 DIAGNOSIS — G47.00 INSOMNIA, UNSPECIFIED TYPE: ICD-10-CM

## 2024-01-15 DIAGNOSIS — K58.9 IRRITABLE BOWEL SYNDROME, UNSPECIFIED TYPE: ICD-10-CM

## 2024-01-15 DIAGNOSIS — M75.31 CALCIFIC TENDINITIS OF RIGHT SHOULDER: Primary | ICD-10-CM

## 2024-01-15 DIAGNOSIS — M25.511 ACUTE PAIN OF RIGHT SHOULDER: ICD-10-CM

## 2024-01-15 DIAGNOSIS — M25.511 RIGHT SHOULDER PAIN, UNSPECIFIED CHRONICITY: ICD-10-CM

## 2024-01-15 DIAGNOSIS — K31.84 GASTROPARESIS: ICD-10-CM

## 2024-01-15 DIAGNOSIS — G89.4 CHRONIC PAIN SYNDROME: ICD-10-CM

## 2024-01-15 PROCEDURE — 99213 OFFICE O/P EST LOW 20 MIN: CPT | Performed by: ORTHOPAEDIC SURGERY

## 2024-01-15 PROCEDURE — 73030 X-RAY EXAM OF SHOULDER: CPT

## 2024-01-15 RX ORDER — CEFDINIR 300 MG/1
CAPSULE ORAL
COMMUNITY
Start: 2024-01-11

## 2024-01-15 RX ORDER — CLOBETASOL PROPIONATE 0.5 MG/G
CREAM TOPICAL 2 TIMES DAILY
Qty: 30 G | Refills: 0 | Status: SHIPPED | OUTPATIENT
Start: 2024-01-15

## 2024-01-15 NOTE — PROGRESS NOTES
Patient Name:  Patricia Nam  MRN:  [id number]    Assessment & Plan     1. Calcific tendinitis of right shoulder  -     Ambulatory referral to Physical Therapy; Future    2. Acute pain of right shoulder  -     XR shoulder 2+ vw right; Future; Expected date: 01/15/2024  -     Ambulatory referral to Physical Therapy; Future        48 y.o. female with Right shoulder calcific tendinitis.   X-rays reviewed in office today with patient.   Treatment options  were discussed including physical therapy, corticosteroid injection, oral and topical medications  Patient declined corticosteroid injection today  She will begin outpatient physical therapy  Follow up in 3 months for reevaluation.     Chief Complaint     Right shoulder pain    History of the Present Illness     Patricia Nam is a RHD 48 y.o. female with Right shoulder pain referred to me by Dr. Olson. She has had pain since November. She does not remember a specific injury, buit thinks it may be from moving a client. She doesn't know what make the pain better or worse. Pain is managed with Tylenol or Alleve. She has not tried physical therapy or injections. She has a history of degenerative disc disease in her cervical spine, most significant at C5-6 and C6-7 with spinal cord compressed. She works in home health care.     Review of Systems     Review of Systems   Constitutional:  Negative for chills and fever.   HENT:  Negative for ear pain and sore throat.    Eyes:  Negative for pain and visual disturbance.   Respiratory:  Negative for cough and shortness of breath.    Cardiovascular:  Negative for chest pain and palpitations.   Gastrointestinal:  Negative for abdominal pain and vomiting.   Genitourinary:  Negative for dysuria and hematuria.   Musculoskeletal:  Negative for arthralgias and back pain.   Skin:  Negative for color change and rash.   Neurological:  Negative for seizures and syncope.   All other systems reviewed and are negative.      Physical  "Exam     /75   Pulse 63   Ht 5' 3\" (1.6 m)   Wt 66.1 kg (145 lb 12.8 oz)   BMI 25.83 kg/m²     Right Shoulder:   Active range of motion   120 degrees forward flexion  110 degrees abduction  70 degrees external rotation   1 level restriction internal rotation    Passive range of motion   160 degrees of forward flexion     There is no tenderness present over the shoulder.   Supraspinatus testing 5/5  Infraspinatus testing 4+/5  Subscapularis testing 5/5  Fischer test is negative   The patient is neurovascularly intact distally in the extremity.      Eyes:  Anicteric sclerae.  Neck:  Supple.  Lungs:  Normal respiratory effort.  Cardiovascular:  Capillary refill is less than 2 seconds.  Skin:  Intact without erythema.  Neurologic:  Sensation grossly intact to light touch.  Psychiatric:  Mood and affect are appropriate.    Data Review     I have personally reviewed pertinent films in PACS, and my interpretation follows:    X-rays taken 1/15/2024 of Right shoulder demonstrate calcific tendinitis. Well maintained glenohumeral joint space.     Past Medical History:   Diagnosis Date    Asthma     Degenerative joint disease     Depression     GERD (gastroesophageal reflux disease)     Lumbar radiculopathy     PONV (postoperative nausea and vomiting)     Thyroid nodule        Past Surgical History:   Procedure Laterality Date    BREAST BIOPSY Left 2020    neg    CHOLECYSTECTOMY LAPAROSCOPIC N/A 5/6/2022    Procedure: CHOLECYSTECTOMY LAPAROSCOPIC;  Surgeon: Zion Walker MD;  Location: [facility];  Service: General    CYST REMOVAL  10/2020    lt breast     EGD      HYSTERECTOMY      HYSTERECTOMY  1998    LIPOSUCTION  09/2020    TUBAL LIGATION         Allergies   Allergen Reactions    Bee Venom Anaphylaxis    Iodides Other (See Comments)    [place] Oil - Food Allergy GI Intolerance     Other reaction(s): GI Intolerance, GI Reaction    Aspirin Other (See Comments)     shaking  convulsions    Other reaction(s): Other " (See Comments)  shaking  convulsions  shaking  convulsions    Metronidazole GI Intolerance     Gi upset      Sulfamethoxazole-Trimethoprim GI Intolerance     Gi upst    Other reaction(s): GI Intolerance, GI Reaction  Gi upst  Gi upst    Tape  [Medical Tape] Other (See Comments) and Hives     fever  Other reaction(s): Other  fever       Current Outpatient Medications on File Prior to Visit   Medication Sig Dispense Refill    albuterol (PROVENTIL HFA,VENTOLIN HFA) 90 mcg/act inhaler INHALE 1-2 PUFFS EVERY 6 HOURS AS NEEDED FOR WHEEZING. 18 g 3    cefdinir (OMNICEF) 300 mg capsule       clobetasol (TEMOVATE) 0.05 % cream Apply topically 2 (two) times a day To affected areas for 14 days straight. Take a one week break in between uses. 30 g 1    Diclofenac Sodium (VOLTAREN) 1 % Apply 2 g topically 4 (four) times a day 100 g 3    dicyclomine (BENTYL) 20 mg tablet Take 1 tablet (20 mg total) by mouth 4 (four) times a day as needed (IBS) 30 tablet 0    famotidine (PEPCID) 20 mg tablet TAKE 1 TABLET BY MOUTH TWICE A DAY (Patient taking differently: Take 20 mg by mouth if needed for indigestion or heartburn) 60 tablet 3    FLUoxetine (PROzac) 40 MG capsule TAKE 1 CAPSULE (40 MG TOTAL) BY MOUTH DAILY. 90 capsule 0    fluticasone (FLONASE) 50 mcg/act nasal spray 1 spray into each nostril 2 (two) times a day 11.1 mL 2    hydrOXYzine HCL (ATARAX) 10 mg tablet TAKE 1 TABLET BY MOUTH EVERYDAY AT BEDTIME 30 tablet 1    meclizine (ANTIVERT) 25 mg tablet TAKE 1 TABLET (25 MG TOTAL) BY MOUTH EVERY 8 (EIGHT) HOURS AS NEEDED FOR DIZZINESS. 90 tablet 0    metoclopramide (REGLAN) 5 mg tablet TAKE 1 TABLET BY MOUTH 3 TIMES A DAY BEFORE MEALS. 90 tablet 5    mupirocin (BACTROBAN) 2 % ointment APPLY TO AFFECTED AREA 3 TIMES A DAY 22 g 1    omeprazole (PriLOSEC) 40 MG capsule TAKE 1 CAPSULE (40 MG TOTAL) BY MOUTH DAILY. 90 capsule 1    tiZANidine (ZANAFLEX) 4 mg tablet Take 1 tablet (4 mg total) by mouth every 8 (eight) hours as needed for  muscle spasms 60 tablet 0    [DISCONTINUED] ketoconazole (NIZORAL) 2 % shampoo APPLY 1 APPLICATION TOPICALLY 2 (TWO) TIMES A WEEK 120 mL 1    EPINEPHrine (EPIPEN) 0.3 mg/0.3 mL SOAJ Inject 0.3 mL (0.3 mg total) into a muscle once for 1 dose As needed 0.3 mL 0    mometasone (ELOCON) 0.1 % lotion APPLY TO AFFECTED AREA TOPICALLY EVERY DAY (Patient not taking: Reported on 1/15/2024) 60 mL 3     No current facility-administered medications on file prior to visit.       Social History     Tobacco Use    Smoking status: Never    Smokeless tobacco: Never   Vaping Use    Vaping status: Never Used   Substance Use Topics    Alcohol use: Not Currently     Comment: ocaasional    Drug use: Never       Family History   Problem Relation Age of Onset    Alzheimer's disease Mother     Cancer Father         stomach/GI    No Known Problems Sister     No Known Problems Daughter     No Known Problems Maternal Grandmother     No Known Problems Paternal Grandmother     Colon cancer Maternal Aunt     Cancer Maternal Aunt         stomach    No Known Problems Maternal Aunt     No Known Problems Maternal Aunt     No Known Problems Maternal Aunt     No Known Problems Maternal Aunt     No Known Problems Maternal Aunt     Colon cancer Maternal Uncle     Breast cancer Neg Hx              Procedures Performed     Procedures  None      Michael Bain DO  Scribe Attestation      I,:  Maria G Vilchis am acting as a scribe while in the presence of the attending physician.:       I,:  Michael Bain DO personally performed the services described in this documentation    as scribed in my presence.:

## 2024-01-16 RX ORDER — DICYCLOMINE HCL 20 MG
20 TABLET ORAL 4 TIMES DAILY PRN
Qty: 30 TABLET | Refills: 5 | Status: SHIPPED | OUTPATIENT
Start: 2024-01-16

## 2024-01-16 RX ORDER — METOCLOPRAMIDE 5 MG/1
5 TABLET ORAL
Qty: 90 TABLET | Refills: 5 | Status: SHIPPED | OUTPATIENT
Start: 2024-01-16

## 2024-01-16 RX ORDER — TIZANIDINE 4 MG/1
4 TABLET ORAL EVERY 8 HOURS PRN
Qty: 30 TABLET | Refills: 0 | Status: SHIPPED | OUTPATIENT
Start: 2024-01-16

## 2024-01-17 RX ORDER — FLUTICASONE PROPIONATE 50 MCG
1 SPRAY, SUSPENSION (ML) NASAL 2 TIMES DAILY
Qty: 11.1 ML | Refills: 0 | Status: SHIPPED | OUTPATIENT
Start: 2024-01-17

## 2024-01-17 RX ORDER — HYDROXYZINE HYDROCHLORIDE 10 MG/1
10 TABLET, FILM COATED ORAL
Qty: 30 TABLET | Refills: 0 | Status: SHIPPED | OUTPATIENT
Start: 2024-01-17

## 2024-01-29 ENCOUNTER — EVALUATION (OUTPATIENT)
Dept: PHYSICAL THERAPY | Facility: CLINIC | Age: 49
End: 2024-01-29
Payer: COMMERCIAL

## 2024-01-29 DIAGNOSIS — M54.2 NECK PAIN: Primary | ICD-10-CM

## 2024-01-29 PROCEDURE — 97110 THERAPEUTIC EXERCISES: CPT | Performed by: PHYSICAL THERAPIST

## 2024-01-29 PROCEDURE — 97162 PT EVAL MOD COMPLEX 30 MIN: CPT | Performed by: PHYSICAL THERAPIST
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
## 2024-02-01 ENCOUNTER — OFFICE VISIT (OUTPATIENT)
Dept: FAMILY MEDICINE CLINIC | Facility: CLINIC | Age: 49
End: 2024-02-01
Payer: COMMERCIAL

## 2024-02-01 VITALS
DIASTOLIC BLOOD PRESSURE: 70 MMHG | OXYGEN SATURATION: 100 % | TEMPERATURE: 98.6 F | HEIGHT: 63 IN | SYSTOLIC BLOOD PRESSURE: 102 MMHG | BODY MASS INDEX: 26.15 KG/M2 | WEIGHT: 147.6 LBS | HEART RATE: 63 BPM

## 2024-02-01 DIAGNOSIS — R53.83 FATIGUE, UNSPECIFIED TYPE: Primary | ICD-10-CM

## 2024-02-01 DIAGNOSIS — R89.8 EOSINOPHILS INCREASED: ICD-10-CM

## 2024-02-01 PROCEDURE — 99214 OFFICE O/P EST MOD 30 MIN: CPT | Performed by: NURSE PRACTITIONER

## 2024-02-01 NOTE — PROGRESS NOTES
Name: Patricia Nam      : 1975      MRN: [id number]  Encounter Provider: KIRSTIN Isbell  Encounter Date: 2024   Encounter department: [facility] [address] [place] [address]    Assessment & Plan     1. Fatigue, unspecified type  Comments:  Discussed with patient portance of self-care, recent physical activity, adequate nutrition and hydration.  To obtain blood work as ordered.  Orders:  -     CBC and differential; Future  -     Comprehensive metabolic panel; Future  -     TSH, 3rd generation with Free T4 reflex; Future  -     Vitamin D 25 hydroxy; Future  -     Hemoglobin A1C; Future  -     Iron Panel (Includes Ferritin, Iron Sat%, Iron, and TIBC); Future  -     Folate; Future  -     Vitamin B12; Future    2. Eosinophils increased  Comments:  Patient notes continued skin issues/pruritus despite multiple topical remedies.  Will refer to allergist for consult  Orders:  -     Ambulatory Referral to Allergy; Future           Subjective      Patient presents office for evaluation of fatigue.  As per patient, has been having difficulty waking up in the morning.  Notes decreased energy and fatigue throughout the day.   Patient, sleeps 8 hours throughout the night.  Notes her sleep is good.  She denies symptoms related to anxiety or depression.  Patient denies chest pain, shortness of breath, palpitations, weakness, syncope.  Denies unexplained weight loss, night sweats, unexplained bleeding/bruising      Review of Systems   Constitutional:  Positive for fatigue. Negative for activity change, appetite change and unexpected weight change.   HENT:  Negative for sore throat and trouble swallowing.    Eyes:  Negative for photophobia and visual disturbance.   Respiratory:  Negative for cough, chest tightness and shortness of breath.    Cardiovascular:  Negative for chest pain and palpitations.   Gastrointestinal:  Negative for abdominal pain, nausea and vomiting.   Endocrine: Negative  for polydipsia, polyphagia and polyuria.   Genitourinary:  Negative for decreased urine volume.   Musculoskeletal:  Negative for arthralgias and myalgias.   Skin:  Negative for color change.   Neurological:  Negative for dizziness, weakness, light-headedness, numbness and headaches.   Hematological:  Negative for adenopathy. Does not bruise/bleed easily.   Psychiatric/Behavioral:  Negative for agitation, confusion, dysphoric mood and sleep disturbance. The patient is not nervous/anxious.        Current Outpatient Medications on File Prior to Visit   Medication Sig    albuterol (PROVENTIL HFA,VENTOLIN HFA) 90 mcg/act inhaler INHALE 1-2 PUFFS EVERY 6 HOURS AS NEEDED FOR WHEEZING.    clobetasol (TEMOVATE) 0.05 % cream Apply topically 2 (two) times a day To affected areas for 14 days straight. Take a one week break in between uses.    Diclofenac Sodium (VOLTAREN) 1 % Apply 2 g topically 4 (four) times a day    dicyclomine (BENTYL) 20 mg tablet Take 1 tablet (20 mg total) by mouth 4 (four) times a day as needed (IBS)    EPINEPHrine (EPIPEN) 0.3 mg/0.3 mL SOAJ Inject 0.3 mL (0.3 mg total) into a muscle once for 1 dose As needed    famotidine (PEPCID) 20 mg tablet TAKE 1 TABLET BY MOUTH TWICE A DAY (Patient taking differently: Take 20 mg by mouth if needed for indigestion or heartburn)    FLUoxetine (PROzac) 40 MG capsule TAKE 1 CAPSULE (40 MG TOTAL) BY MOUTH DAILY.    fluticasone (FLONASE) 50 mcg/act nasal spray 1 spray into each nostril 2 (two) times a day    hydrOXYzine HCL (ATARAX) 10 mg tablet Take 1 tablet (10 mg total) by mouth daily at bedtime    meclizine (ANTIVERT) 25 mg tablet TAKE 1 TABLET (25 MG TOTAL) BY MOUTH EVERY 8 (EIGHT) HOURS AS NEEDED FOR DIZZINESS.    metoclopramide (REGLAN) 5 mg tablet Take 1 tablet (5 mg total) by mouth 3 (three) times a day before meals    mupirocin (BACTROBAN) 2 % ointment APPLY TO AFFECTED AREA 3 TIMES A DAY    omeprazole (PriLOSEC) 40 MG capsule TAKE 1 CAPSULE (40 MG TOTAL) BY  "MOUTH DAILY.    tiZANidine (ZANAFLEX) 4 mg tablet Take 1 tablet (4 mg total) by mouth every 8 (eight) hours as needed for muscle spasms    mometasone (ELOCON) 0.1 % lotion APPLY TO AFFECTED AREA TOPICALLY EVERY DAY (Patient not taking: Reported on 1/15/2024)    [DISCONTINUED] cefdinir (OMNICEF) 300 mg capsule  (Patient not taking: Reported on 2/1/2024)       Objective     /70 (BP Location: Left arm, Patient Position: Sitting, Cuff Size: Standard)   Pulse 63   Temp 98.6 °F (37 °C) (Tympanic)   Ht 5' 3\" (1.6 m)   Wt 67 kg (147 lb 9.6 oz)   SpO2 100%   BMI 26.15 kg/m²     Physical Exam  Vitals reviewed.   Constitutional:       General: She is not in acute distress.     Appearance: Normal appearance. She is not ill-appearing.   HENT:      Head: Normocephalic and atraumatic.      Right Ear: Tympanic membrane, ear canal and external ear normal.      Left Ear: Tympanic membrane, ear canal and external ear normal.      Nose: Nose normal.      Mouth/Throat:      Mouth: Mucous membranes are moist.      Pharynx: Oropharynx is clear.   Eyes:      Conjunctiva/sclera: Conjunctivae normal.      Pupils: Pupils are equal, round, and reactive to light.   Cardiovascular:      Rate and Rhythm: Normal rate and regular rhythm.      Pulses: Normal pulses.      Heart sounds: Normal heart sounds.   Pulmonary:      Effort: Pulmonary effort is normal.      Breath sounds: Normal breath sounds.   Abdominal:      General: Bowel sounds are normal.      Palpations: Abdomen is soft.      Tenderness: There is no abdominal tenderness.   Musculoskeletal:         General: Normal range of motion.      Cervical back: Normal range of motion and neck supple.      Right lower leg: No edema.      Left lower leg: No edema.   Lymphadenopathy:      Cervical: No cervical adenopathy.   Neurological:      General: No focal deficit present.      Mental Status: She is alert and oriented to person, place, and time.   Psychiatric:         Mood and Affect: " Mood normal.         Behavior: Behavior normal.       KIRSTIN Isbell

## 2024-02-01 NOTE — PATIENT INSTRUCTIONS
Fatigue   AMBULATORY CARE:   Fatigue  is mental and physical exhaustion that does not get better with rest. Fatigue may make daily activities difficult or cause extreme sleepiness. It is normal to feel tired sometimes, but long-term fatigue may be a sign of serious illness.  Seek care immediately if:   You have chest pain.     You have difficulty breathing.    Contact your healthcare provider if:   You have a cough that gets worse, or does not go away.     You see blood in your urine or bowel movement.     You have numbness or tingling around your mouth or in an arm or leg.     You faint, feel dizzy, or have vision changes.     You have swelling in your lymph nodes.     You are a woman and have vaginal bleeding that is not normal for you, or is not expected.     You lose weight without trying, or you have trouble eating.     You feel weak or have muscle pain.     You have pain or swelling in your joints.    You have questions or concerns about your condition or care.    Manage fatigue:   Keep a fatigue diary.  Include anything that makes you feel more tired or less tired. Bring the diary with you to follow-up visits with your provider.    Exercise as directed.  Exercise can help you feel more alert. Exercise can also help you manage stress or relieve depression. Try to get at least 30 minutes of exercise most days of the week.         Keep a regular sleep schedule.  Go to bed and wake up at the same times every day. Limit naps to 1 hour each day. A nap can improve fatigue, but a long nap may make it harder to go to sleep at night.    Plan and limit your activities.  Limit the number of activities such as shopping and cleaning you do each day. If possible, try to spread out your trips throughout the week. Plan ahead so you are not rushing to get something done. Only do activities that you have the energy to complete. Take breaks between activities. Ask for help if you need it. Another person may be able to drive you  or help with daily activities.    Eat a variety of healthy foods.  Healthy foods include fruits, vegetables, whole-grain breads, low-fat dairy products, beans, lean meats, and fish. Good nutrition can help manage fatigue.         Limit caffeine and alcohol.  These can make it difficult to fall or stay asleep. Women should limit alcohol to 1 drink a day. Men should limit alcohol to 2 drinks a day. A drink of alcohol is 12 ounces of beer, 5 ounces of wine, or 1½ ounces of liquor. Ask our healthcare provider how much caffeine is safe for you.    Do not smoke.  Nicotine and other chemicals in cigarettes and cigars can cause lung damage and increase fatigue. Ask your healthcare provider for information if you currently smoke and need help to quit. E-cigarettes or smokeless tobacco still contain nicotine. Talk to your healthcare provider before you use these products.    Follow up with your healthcare provider as directed:  You may need more tests. Your healthcare provider may refer you to a specialist. Write down your questions so you remember to ask them during your visits.  © Copyright [employer] 2023 Information is for End User's use only and may not be sold, redistributed or otherwise used for commercial purposes.  The above information is an  only. It is not intended as medical advice for individual conditions or treatments. Talk to your doctor, nurse or pharmacist before following any medical regimen to see if it is safe and effective for you.

## 2024-02-02 ENCOUNTER — OFFICE VISIT (OUTPATIENT)
Dept: PHYSICAL THERAPY | Facility: CLINIC | Age: 49
End: 2024-02-02
Payer: COMMERCIAL

## 2024-02-02 DIAGNOSIS — M54.2 NECK PAIN: Primary | ICD-10-CM

## 2024-02-02 PROCEDURE — 97112 NEUROMUSCULAR REEDUCATION: CPT

## 2024-02-02 PROCEDURE — 97140 MANUAL THERAPY 1/> REGIONS: CPT

## 2024-02-02 PROCEDURE — 97110 THERAPEUTIC EXERCISES: CPT

## 2024-02-02 NOTE — PROGRESS NOTES
"Daily Note     Today's date: 2024  Patient name: Patricia Nam  : 1975  MRN: [id number]  Referring provider: Mary Calvert PA-C  Dx:   Encounter Diagnosis     ICD-10-CM    1. Neck pain  M54.2                      Subjective: Upon presentation patient's chief complaint is headache and cervical \"stiffness\".   Patient reports interrupted sleep with cervical \"soreness\"  and B UE numbness and pins and needles\" which she reports \"goes away\" with repositioning.       Objective: See treatment diary below      Assessment: Tolerated treatment  demonstrating full PROM/AROM of right shoulder . Note cervical ROM limitations,specifically  with right rotation. Patient demonstrated fatigue post treatment, exhibited good technique with therapeutic exercises, and would benefit from continued PT      Plan: Continue per plan of care.  Progress treatment as tolerated.       Precautions: neck pain , Right  shoulder pain   [email or web address] Access Code: [email or web address]    POC expires Unit limit Auth Expiration date PT/OT/ST + Visit Limit?   3/29/24 bomn pending bomn                           Visit/Unit Tracking  AUTH Status:  Date              pending Used 1 2              Remaining  tbd                        Manuals                         Manual cerv traction  db LA           Trigger points scap border / R UT db LA           Right shoulder aAROM   LA                                                               Neuro Re-Ed                          Back rolls/ scap retract 20x ea 20x each           Pect stretch 20s x 3 :20  3x           Cerv retract 2s x 20 :02  20x           UT stretch 20s x 3 :20  3x           Lev stretch  :20  3x                        Cerv ROM w/gentle self OP  :05  10x each           SNAGS rotation   :10  5x each                                     Ther Ex             UBE  Alt  x6'           TB lpd , rows, no monies             Prone 45, 90 x 2             Supine cane cp, " flexion   10x each                                                                                                                                             Ther Activity                                         Gait Training                                       Modalities             P c-spine/ R shoulder   C spine  10'

## 2024-02-03 ENCOUNTER — APPOINTMENT (OUTPATIENT)
Dept: LAB | Facility: HOSPITAL | Age: 49
End: 2024-02-03
Payer: COMMERCIAL

## 2024-02-03 DIAGNOSIS — R53.83 FATIGUE, UNSPECIFIED TYPE: ICD-10-CM

## 2024-02-03 LAB
25(OH)D3 SERPL-MCNC: 56.2 NG/ML (ref 30–100)
ALBUMIN SERPL BCP-MCNC: 4.2 G/DL (ref 3.5–5)
ALP SERPL-CCNC: 113 U/L (ref 34–104)
ALT SERPL W P-5'-P-CCNC: 31 U/L (ref 7–52)
ANION GAP SERPL CALCULATED.3IONS-SCNC: 5 MMOL/L
AST SERPL W P-5'-P-CCNC: 24 U/L (ref 13–39)
BASOPHILS # BLD AUTO: 0.05 THOUSANDS/ÂΜL (ref 0–0.1)
BASOPHILS NFR BLD AUTO: 1 % (ref 0–1)
BILIRUB SERPL-MCNC: 0.41 MG/DL (ref 0.2–1)
BUN SERPL-MCNC: 15 MG/DL (ref 5–25)
CALCIUM SERPL-MCNC: 9.6 MG/DL (ref 8.4–10.2)
CHLORIDE SERPL-SCNC: 105 MMOL/L (ref 96–108)
CO2 SERPL-SCNC: 29 MMOL/L (ref 21–32)
CREAT SERPL-MCNC: 0.59 MG/DL (ref 0.6–1.3)
EOSINOPHIL # BLD AUTO: 0.63 THOUSAND/ÂΜL (ref 0–0.61)
EOSINOPHIL NFR BLD AUTO: 7 % (ref 0–6)
ERYTHROCYTE [DISTWIDTH] IN BLOOD BY AUTOMATED COUNT: 11.8 % (ref 11.6–15.1)
FERRITIN SERPL-MCNC: 71 NG/ML (ref 11–307)
FOLATE SERPL-MCNC: >22.3 NG/ML
GFR SERPL CREATININE-BSD FRML MDRD: 107 ML/MIN/1.73SQ M
GLUCOSE P FAST SERPL-MCNC: 92 MG/DL (ref 65–99)
HCT VFR BLD AUTO: 40.4 % (ref 34.8–46.1)
HGB BLD-MCNC: 13.2 G/DL (ref 11.5–15.4)
IMM GRANULOCYTES # BLD AUTO: 0.03 THOUSAND/UL (ref 0–0.2)
IMM GRANULOCYTES NFR BLD AUTO: 0 % (ref 0–2)
IRON SATN MFR SERPL: 32 % (ref 15–50)
IRON SERPL-MCNC: 91 UG/DL (ref 50–212)
LYMPHOCYTES # BLD AUTO: 2.18 THOUSANDS/ÂΜL (ref 0.6–4.47)
LYMPHOCYTES NFR BLD AUTO: 25 % (ref 14–44)
MCH RBC QN AUTO: 30.2 PG (ref 26.8–34.3)
MCHC RBC AUTO-ENTMCNC: 32.7 G/DL (ref 31.4–37.4)
MCV RBC AUTO: 92 FL (ref 82–98)
MONOCYTES # BLD AUTO: 0.6 THOUSAND/ÂΜL (ref 0.17–1.22)
MONOCYTES NFR BLD AUTO: 7 % (ref 4–12)
NEUTROPHILS # BLD AUTO: 5.42 THOUSANDS/ÂΜL (ref 1.85–7.62)
NEUTS SEG NFR BLD AUTO: 60 % (ref 43–75)
NRBC BLD AUTO-RTO: 0 /100 WBCS
PLATELET # BLD AUTO: 296 THOUSANDS/UL (ref 149–390)
PMV BLD AUTO: 9.8 FL (ref 8.9–12.7)
POTASSIUM SERPL-SCNC: 4.4 MMOL/L (ref 3.5–5.3)
PROT SERPL-MCNC: 7.2 G/DL (ref 6.4–8.4)
RBC # BLD AUTO: 4.37 MILLION/UL (ref 3.81–5.12)
SODIUM SERPL-SCNC: 139 MMOL/L (ref 135–147)
TIBC SERPL-MCNC: 282 UG/DL (ref 250–450)
TSH SERPL DL<=0.05 MIU/L-ACNC: 2.59 UIU/ML (ref 0.45–4.5)
UIBC SERPL-MCNC: 191 UG/DL (ref 155–355)
VIT B12 SERPL-MCNC: 426 PG/ML (ref 180–914)
WBC # BLD AUTO: 8.91 THOUSAND/UL (ref 4.31–10.16)

## 2024-02-03 PROCEDURE — 82728 ASSAY OF FERRITIN: CPT

## 2024-02-03 PROCEDURE — 82306 VITAMIN D 25 HYDROXY: CPT

## 2024-02-03 PROCEDURE — 82746 ASSAY OF FOLIC ACID SERUM: CPT

## 2024-02-03 PROCEDURE — 36415 COLL VENOUS BLD VENIPUNCTURE: CPT

## 2024-02-03 PROCEDURE — 80053 COMPREHEN METABOLIC PANEL: CPT

## 2024-02-03 PROCEDURE — 83540 ASSAY OF IRON: CPT

## 2024-02-03 PROCEDURE — 84443 ASSAY THYROID STIM HORMONE: CPT

## 2024-02-03 PROCEDURE — 82607 VITAMIN B-12: CPT

## 2024-02-03 PROCEDURE — 83550 IRON BINDING TEST: CPT

## 2024-02-03 PROCEDURE — 85025 COMPLETE CBC W/AUTO DIFF WBC: CPT

## 2024-02-06 ENCOUNTER — OFFICE VISIT (OUTPATIENT)
Dept: PHYSICAL THERAPY | Facility: CLINIC | Age: 49
End: 2024-02-06
Payer: COMMERCIAL

## 2024-02-06 DIAGNOSIS — M54.2 NECK PAIN: Primary | ICD-10-CM

## 2024-02-06 PROCEDURE — 97112 NEUROMUSCULAR REEDUCATION: CPT

## 2024-02-06 PROCEDURE — 97110 THERAPEUTIC EXERCISES: CPT

## 2024-02-06 PROCEDURE — 97140 MANUAL THERAPY 1/> REGIONS: CPT

## 2024-02-06 NOTE — PROGRESS NOTES
Daily Note     Today's date: 2024  Patient name: Patricia Nam  : 1975  MRN: [id number]  Referring provider: Mary Calvert PA-C  Dx:   Encounter Diagnosis     ICD-10-CM    1. Neck pain  M54.2                      Subjective: SPR =6-7/10.  Patient reported relief of symptoms post today's intervention.  Patient admitted to poor-> no compliance with current HEP.  Patient noted direct correlation between exercise performance and relief of cervical symptoms.      Objective: See treatment diary below      Assessment: Tolerated treatment  and progressions without reported aggravation of symptoms . Encouraged compliance with HEP. Patient demonstrated fatigue post treatment, exhibited good technique with therapeutic exercises, and would benefit from continued PT      Plan: Continue per plan of care.  Progress treatment as tolerated.       Precautions: neck pain , Right  shoulder pain   [email or web address] Access Code: [email or web address]    POC expires Unit limit Auth Expiration date PT/OT/ST + Visit Limit?   3/29/24 bomn pending bomn                           Visit/Unit Tracking  AUTH Status:  Date             pending Used 1 2 3             Remaining  tbd                        Manuals                        Manual cerv traction  db LA LA          Trigger points scap border / R UT db LA LA          Right shoulder aAROM   LA LA                                                              Neuro Re-Ed                          Back rolls/ scap retract 20x ea 20x each 20x each          Pect stretch 20s x 3 :20  3x :20  3x          Cerv retract 2s x 20 :02  20x :02  20x          UT stretch 20s x 3 :20  3x :20  3x          Lev stretch  :20  3x :20  3x                       Cerv ROM w/gentle self OP  :05  10x each :05  10x each          SNAGS rotation   :10  5x each :10  5x each                                    Ther Ex             UBE  Alt  x6' Alt  6'          TB lpd , rows, no monies              Prone 45, 90 x 2             Supine cane cp, flexion   10x each 2#  20x each                                                                                                                                            Ther Activity                                         Gait Training                                       Modalities             MHP c-spine/ R shoulder   C spine  10' 5'   C spine  seated

## 2024-02-07 ENCOUNTER — HOSPITAL ENCOUNTER (OUTPATIENT)
Dept: MAMMOGRAPHY | Facility: CLINIC | Age: 49
Discharge: HOME | End: 2024-02-07
Payer: COMMERCIAL

## 2024-02-07 ENCOUNTER — HOSPITAL ENCOUNTER (OUTPATIENT)
Dept: ULTRASOUND IMAGING | Facility: CLINIC | Age: 49
Discharge: HOME | End: 2024-02-07
Payer: COMMERCIAL

## 2024-02-07 VITALS — BODY MASS INDEX: 26.05 KG/M2 | HEIGHT: 63 IN | WEIGHT: 147 LBS

## 2024-02-07 DIAGNOSIS — R92.8 ABNORMAL MAMMOGRAM: ICD-10-CM

## 2024-02-07 PROCEDURE — 76642 ULTRASOUND BREAST LIMITED: CPT

## 2024-02-07 PROCEDURE — 77066 DX MAMMO INCL CAD BI: CPT

## 2024-02-07 PROCEDURE — G0279 TOMOSYNTHESIS, MAMMO: HCPCS

## 2024-02-09 ENCOUNTER — OFFICE VISIT (OUTPATIENT)
Dept: PHYSICAL THERAPY | Facility: CLINIC | Age: 49
End: 2024-02-09
Payer: COMMERCIAL

## 2024-02-09 DIAGNOSIS — M54.2 NECK PAIN: Primary | ICD-10-CM

## 2024-02-09 PROCEDURE — 97110 THERAPEUTIC EXERCISES: CPT | Performed by: PHYSICAL THERAPIST

## 2024-02-09 PROCEDURE — 97112 NEUROMUSCULAR REEDUCATION: CPT | Performed by: PHYSICAL THERAPIST

## 2024-02-09 NOTE — PROGRESS NOTES
"Daily Note     Today's date: 2024  Patient name: Patricia Nam  : 1975  MRN: [id number]  Referring provider: Mary Calvert PA-C  Dx:   Encounter Diagnosis     ICD-10-CM    1. Neck pain  M54.2                      Subjective: \" I feel pretty good patient reports\"     5/10 vps today    Patient reports that she feels like she is moving with greater ease , right shoulder and cervical spine      Objective: See treatment diary below      Assessment: Tolerated treatment well overall -   Right shoulder AAROM full with mild tightness only elicited at end range flexion    Plan: Continue per plan of care.  Progress treatment as tolerated.       Precautions: neck pain , Right  shoulder pain   [email or web address] Access Code: [email or web address]    POC expires Unit limit Auth Expiration date PT/OT/ST + Visit Limit?   3/29/24 bomn pending bomn                           Visit/Unit Tracking  AUTH Status:  Date            pending Used 1 2 3 4 - foto            Remaining  tbd                        Manuals                       Manual cerv traction  db LA LA          Trigger points scap border / R UT db LA LA db         Right shoulder aAROM   LA LA db                                                             Neuro Re-Ed                          Back rolls/ scap retract 20x ea 20x each 20x each 20x ea         Pect stretch 20s x 3 :20  3x :20  3x 20s x 3         Cerv retract 2s x 20 :02  20x :02  20x 2s x 20         UT stretch 20s x 3 :20  3x :20  3x 20s x 3         Lev stretch  :20  3x :20  3x 20s x 3                       Cerv ROM w/gentle self OP  :05  10x each :05  10x each 5s x 10 ea          SNAGS rotation   :10  5x each :10  5x each 10s x 5 ea                                    Ther Ex             UBE  Alt  x6' Alt  6' Alt 6'          TB ext , rows,     Btb x 20         TB no monies             Prone 45, 90 x 2    20x ea          Supine cane cp, flexion   10x each 2#  20x each " 2.5# x 20 ea          Supine serratus/ triceps    2# x 20 ea                                                                                                                               Ther Activity                                         Gait Training                                       Modalities             MHP c-spine/ R shoulder   C spine  10' 5'   C spine  seated 10' c-spine

## 2024-02-14 ENCOUNTER — APPOINTMENT (OUTPATIENT)
Dept: PHYSICAL THERAPY | Facility: CLINIC | Age: 49
End: 2024-02-14
Payer: COMMERCIAL

## 2024-02-16 ENCOUNTER — OFFICE VISIT (OUTPATIENT)
Dept: PHYSICAL THERAPY | Facility: CLINIC | Age: 49
End: 2024-02-16
Payer: COMMERCIAL

## 2024-02-16 DIAGNOSIS — M54.2 NECK PAIN: Primary | ICD-10-CM

## 2024-02-16 PROCEDURE — 97112 NEUROMUSCULAR REEDUCATION: CPT

## 2024-02-16 PROCEDURE — 97110 THERAPEUTIC EXERCISES: CPT

## 2024-02-16 NOTE — PROGRESS NOTES
Daily Note     Today's date: 2024  Patient name: Patricia Nam  : 1975  MRN: [id number]  Referring provider: Mary Calvert PA-C  Dx:   Encounter Diagnosis     ICD-10-CM    1. Neck pain  M54.2                      Subjective: Patient noted overall decline in frequency and intensity of cervical and right shoulder discomfort with improved AROM.      Objective: See treatment diary below      Assessment: Tolerated treatment  and progressions very well without reported aggravation of pain symptoms . Patient exhibited good technique with therapeutic exercises and would benefit from continued PT      Plan: Continue per plan of care.      Precautions: neck pain , Right  shoulder pain   [email or web address] Access Code: [email or web address]    POC expires Unit limit Auth Expiration date PT/OT/ST + Visit Limit?   3/29/24 bomn pending bomn                           Visit/Unit Tracking  AUTH Status:  Date           pending Used 1 2 3 4 - foto 5           Remaining  tbd                        Manuals                      Manual cerv traction  db LA LA          Trigger points scap border / R UT db LA LA db         Right shoulder aAROM   LA LA db LA                                                            Neuro Re-Ed                          Back rolls/ scap retract 20x ea 20x each 20x each 20x ea 20x each        Pect stretch 20s x 3 :20  3x :20  3x 20s x 3 :20  3x        Cerv retract 2s x 20 :02  20x :02  20x 2s x 20 :02  20x        UT stretch 20s x 3 :20  3x :20  3x 20s x 3 :20  3x        Lev stretch  :20  3x :20  3x 20s x 3  :20  3x                     Cerv ROM w/gentle self OP  :05  10x each :05  10x each 5s x 10 ea  :05  10x each        SNAGS rotation   :10  5x each :10  5x each 10s x 5 ea  :10  10x                                  Ther Ex             UBE  Alt  x6' Alt  6' Alt 6'  Alt   6'        TB ext , rows,     Btb x 20 BTB  20x each        TB no monies              Prone 45, 90 x 2    20x ea  20x each        Supine cane cp, flexion   10x each 2#  20x each 2.5# x 20 ea  3#  20x each        Supine serratus/ triceps    2# x 20 ea  3#  20x each        Standing B shoulder flex, scap and abd     20x each                                                                                                                Ther Activity                                         Gait Training                                       Modalities             MHP c-spine/ R shoulder   C spine  10' 5'   C spine  seated 10' c-spine Pt def

## 2024-02-21 ENCOUNTER — OFFICE VISIT (OUTPATIENT)
Dept: PHYSICAL THERAPY | Facility: CLINIC | Age: 49
End: 2024-02-21
Payer: COMMERCIAL

## 2024-02-21 DIAGNOSIS — M54.2 NECK PAIN: Primary | ICD-10-CM

## 2024-02-21 PROCEDURE — 97112 NEUROMUSCULAR REEDUCATION: CPT

## 2024-02-21 PROCEDURE — 97110 THERAPEUTIC EXERCISES: CPT

## 2024-02-21 NOTE — PROGRESS NOTES
"Daily Note     Today's date: 2024  Patient name: Patricia Nam  : 1975  MRN: [id number]  Referring provider: Mary Calvert PA-C  Dx:   Encounter Diagnosis     ICD-10-CM    1. Neck pain  M54.2                      Subjective: \"It's doing better!\" Patient's chief complaint is \"stiffness\" of cervical spine.  SPR=4-5/10.  Patient continues to report HA \"at least 1x per week\".      Objective: See treatment diary below      Assessment: Tolerated treatment well. VC's required for technique, pacing and for compensatory strategies. Patient demonstrated fatigue post treatment, exhibited good technique with therapeutic exercises, and would benefit from continued PT      Plan: Continue per plan of care.  Progress treatment as tolerated.       Precautions: neck pain , Right  shoulder pain   [email or web address] Access Code: [email or web address]    POC expires Unit limit Auth Expiration date PT/OT/ST + Visit Limit?   3/29/24 bomn pending bomn                           Visit/Unit Tracking  AUTH Status:  Date          pending Used 1 2 3 4 - foto 5 6          Remaining  tbd                        Manuals                     Manual cerv traction  db LA LA          Trigger points scap border / R UT db LA LA db         Right shoulder aAROM   LA LA db LA LA                                                           Neuro Re-Ed                          Back rolls/ scap retract 20x ea 20x each 20x each 20x ea 20x each 20x each       Pect stretch 20s x 3 :20  3x :20  3x 20s x 3 :20  3x :20  3x       Cerv retract 2s x 20 :02  20x :02  20x 2s x 20 :02  20x :02  20x       UT stretch 20s x 3 :20  3x :20  3x 20s x 3 :20  3x :20  3x       Lev stretch  :20  3x :20  3x 20s x 3  :20  3x :20  3x                    Cerv ROM w/gentle self OP  :05  10x each :05  10x each 5s x 10 ea  :05  10x each :05  10x each       SNAGS rotation   :10  5x each :10  5x each 10s x 5 ea  :10  10x " :10  10x                                 Ther Ex             UBE  Alt  x6' Alt  6' Alt 6'  Alt   6' Alt 6'       TB ext , rows,     Btb x 20 BTB  20x each BTB  20x each       TB no monies             Prone 45, 90 x 2    20x ea  20x each 20x each       Supine cane cp, flexion   10x each 2#  20x each 2.5# x 20 ea  3#  20x each NP       Supine serratus/ triceps    2# x 20 ea  3#  20x each 3#  20x each       Standing B shoulder flex, scap and abd     20x each 1#  20x each                                                                                                               Ther Activity                                         Gait Training                                       Modalities             MHP c-spine/ R shoulder   C spine  10' 5'   C spine  seated 10' c-spine Pt def   Pt def  No P!

## 2024-02-22 DIAGNOSIS — R42 VERTIGO: ICD-10-CM

## 2024-02-22 DIAGNOSIS — R10.13 EPIGASTRIC PAIN: ICD-10-CM

## 2024-02-22 DIAGNOSIS — Z91.030 H/O BEE STING ALLERGY: ICD-10-CM

## 2024-02-22 DIAGNOSIS — G89.4 CHRONIC PAIN SYNDROME: ICD-10-CM

## 2024-02-22 DIAGNOSIS — J01.10 ACUTE NON-RECURRENT FRONTAL SINUSITIS: ICD-10-CM

## 2024-02-22 RX ORDER — EPINEPHRINE 0.3 MG/.3ML
0.3 INJECTION SUBCUTANEOUS ONCE
Start: 2024-02-22 | End: 2024-02-22

## 2024-02-22 RX ORDER — FAMOTIDINE 20 MG/1
20 TABLET, FILM COATED ORAL 2 TIMES DAILY
Start: 2024-02-22

## 2024-02-22 RX ORDER — MECLIZINE HYDROCHLORIDE 25 MG/1
25 TABLET ORAL EVERY 8 HOURS PRN
Qty: 90 TABLET | Refills: 0 | Status: SHIPPED | OUTPATIENT
Start: 2024-02-22 | End: 2024-02-23 | Stop reason: SDUPTHER

## 2024-02-22 RX ORDER — FLUTICASONE PROPIONATE 50 MCG
1 SPRAY, SUSPENSION (ML) NASAL 2 TIMES DAILY
Qty: 11.1 ML | Refills: 0 | Status: SHIPPED | OUTPATIENT
Start: 2024-02-22

## 2024-02-23 DIAGNOSIS — F32.9 REACTIVE DEPRESSION: ICD-10-CM

## 2024-02-23 DIAGNOSIS — R42 VERTIGO: ICD-10-CM

## 2024-02-23 DIAGNOSIS — F41.9 ANXIETY: ICD-10-CM

## 2024-02-23 RX ORDER — MECLIZINE HYDROCHLORIDE 25 MG/1
25 TABLET ORAL EVERY 8 HOURS PRN
Qty: 90 TABLET | Refills: 0 | OUTPATIENT
Start: 2024-02-23

## 2024-02-23 RX ORDER — FLUOXETINE HYDROCHLORIDE 40 MG/1
40 CAPSULE ORAL DAILY
Qty: 90 CAPSULE | Refills: 0 | Status: SHIPPED | OUTPATIENT
Start: 2024-02-23 | End: 2024-05-23

## 2024-02-23 RX ORDER — MECLIZINE HYDROCHLORIDE 25 MG/1
25 TABLET ORAL EVERY 8 HOURS PRN
Qty: 90 TABLET | Refills: 0 | Status: SHIPPED | OUTPATIENT
Start: 2024-02-23

## 2024-02-23 NOTE — TELEPHONE ENCOUNTER
Patient called asking why her Me[facility] refill was denied. [facility]   [facility]

## 2024-02-26 DIAGNOSIS — K21.9 GASTROESOPHAGEAL REFLUX DISEASE WITHOUT ESOPHAGITIS: ICD-10-CM

## 2024-02-26 RX ORDER — OMEPRAZOLE 40 MG/1
40 CAPSULE, DELAYED RELEASE ORAL DAILY
Qty: 90 CAPSULE | Refills: 1 | Status: SHIPPED | OUTPATIENT
Start: 2024-02-26 | End: 2024-08-24

## 2024-02-28 ENCOUNTER — APPOINTMENT (OUTPATIENT)
Dept: PHYSICAL THERAPY | Facility: CLINIC | Age: 49
End: 2024-02-28
Payer: COMMERCIAL

## 2024-02-28 ENCOUNTER — OFFICE VISIT (OUTPATIENT)
Dept: FAMILY MEDICINE CLINIC | Facility: CLINIC | Age: 49
End: 2024-02-28
Payer: COMMERCIAL

## 2024-02-28 VITALS
DIASTOLIC BLOOD PRESSURE: 78 MMHG | SYSTOLIC BLOOD PRESSURE: 116 MMHG | OXYGEN SATURATION: 99 % | HEART RATE: 76 BPM | WEIGHT: 150 LBS | TEMPERATURE: 99.4 F | HEIGHT: 63 IN | BODY MASS INDEX: 26.58 KG/M2

## 2024-02-28 DIAGNOSIS — N60.81 SEBACEOUS CYST OF BREAST, RIGHT: ICD-10-CM

## 2024-02-28 DIAGNOSIS — G89.29 CHRONIC LEFT-SIDED HEADACHES: Primary | ICD-10-CM

## 2024-02-28 DIAGNOSIS — R51.9 CHRONIC LEFT-SIDED HEADACHES: Primary | ICD-10-CM

## 2024-02-28 DIAGNOSIS — R09.81 NASAL CONGESTION: ICD-10-CM

## 2024-02-28 PROCEDURE — 99214 OFFICE O/P EST MOD 30 MIN: CPT | Performed by: NURSE PRACTITIONER

## 2024-02-28 RX ORDER — AZELASTINE 1 MG/ML
1 SPRAY, METERED NASAL 2 TIMES DAILY
Qty: 1 ML | Refills: 1 | Status: SHIPPED | OUTPATIENT
Start: 2024-02-28

## 2024-02-28 NOTE — PROGRESS NOTES
"Name: Patricia Nam      : 1975      MRN: [id number]  Encounter Provider: KIRSTIN Isbell  Encounter Date: 2024   Encounter department: [facility] [address]    Assessment & Plan     1. Chronic left-sided headaches  Assessment & Plan:  Advised patient to keep a headache journal.  Discussed avoidance of MSG, sodium, caffeine.  Discussed importance of increased hydration, nutrition, adequate sleep.  Discussed treatment options including natural supplements, abortive treatment, prophylactic treatment.  Patient wishes to start with natural supplements.  Discussed with patient use of magnesium oxide 400 mg twice daily and riboflavin 100 mg twice daily.  Advised to return for worsening of symptoms      2. Sebaceous cyst of breast, right  Comments:  Patient was made aware cyst of right breast has increased in size.  Patient is interested in having this removed, will refer to breast surgery for consult.  Orders:  -     Ambulatory Referral to Breast Surgery; Future    3. Nasal congestion  Comments:  Advised increase hydration, steam, moist, humidified air, saline rinses twice daily, azelastine as prescribed.  Orders:  -     azelastine (ASTELIN) 0.1 % nasal spray; 1 spray into each nostril 2 (two) times a day Use in each nostril as directed           Subjective      Patient presents to the office for evaluation of headaches.  Has been occurring for \"years.\"  Occurs weekly.  Located on left side of head.  Notes pain as \"throbbing.\" + nausea.  Headache does not have unique pattern, not influenced by food or stress.  She notes family history of migraines.  Patient denies photophobia, phonophobia, dizziness, visual disturbances.  Been taking Tylenol and Motrin for pain.    Patient also notes increased congestion with sinus pressure.  Symptoms started 2 days ago.  Patient with history of chronic sinusitis, does follow with ENT, but could not get an appointment until " next month.  Patient notes earlier in the week, had increased rhinorrhea, but now has increased congestion.  Has taking Sudafed, Tylenol.  Denies fever, chills, body aches, sore throat, ear pain      Review of Systems   Constitutional:  Negative for chills and fever.   HENT:  Positive for congestion, postnasal drip, sinus pressure and sinus pain. Negative for ear pain, sore throat and trouble swallowing.    Eyes:  Negative for photophobia and visual disturbance.   Respiratory:  Negative for cough, shortness of breath and wheezing.    Cardiovascular:  Negative for chest pain and palpitations.   Gastrointestinal:  Negative for abdominal pain, nausea and vomiting.   Genitourinary:  Negative for decreased urine volume.   Musculoskeletal:  Negative for arthralgias and myalgias.   Neurological:  Positive for headaches. Negative for dizziness, speech difficulty, weakness, light-headedness and numbness.   Hematological:  Negative for adenopathy. Does not bruise/bleed easily.   Psychiatric/Behavioral:  Negative for confusion.        Current Outpatient Medications on File Prior to Visit   Medication Sig    albuterol (PROVENTIL HFA,VENTOLIN HFA) 90 mcg/act inhaler INHALE 1-2 PUFFS EVERY 6 HOURS AS NEEDED FOR WHEEZING.    Diclofenac Sodium (VOLTAREN) 1 % Apply 2 g topically 4 (four) times a day    dicyclomine (BENTYL) 20 mg tablet Take 1 tablet (20 mg total) by mouth 4 (four) times a day as needed (IBS)    famotidine (PEPCID) 20 mg tablet Take 1 tablet (20 mg total) by mouth 2 (two) times a day    FLUoxetine (PROzac) 40 MG capsule Take 1 capsule (40 mg total) by mouth daily    fluticasone (FLONASE) 50 mcg/act nasal spray 1 spray into each nostril 2 (two) times a day    hydrOXYzine HCL (ATARAX) 10 mg tablet Take 1 tablet (10 mg total) by mouth daily at bedtime    meclizine (ANTIVERT) 25 mg tablet Take 1 tablet (25 mg total) by mouth every 8 (eight) hours as needed for dizziness    metoclopramide (REGLAN) 5 mg tablet Take 1  "tablet (5 mg total) by mouth 3 (three) times a day before meals    omeprazole (PriLOSEC) 40 MG capsule Take 1 capsule (40 mg total) by mouth daily    tiZANidine (ZANAFLEX) 4 mg tablet Take 1 tablet (4 mg total) by mouth every 8 (eight) hours as needed for muscle spasms    EPINEPHrine (EPIPEN) 0.3 mg/0.3 mL SOAJ Inject 0.3 mL (0.3 mg total) into a muscle once for 1 dose As needed    [DISCONTINUED] clobetasol (TEMOVATE) 0.05 % cream Apply topically 2 (two) times a day To affected areas for 14 days straight. Take a one week break in between uses.    [DISCONTINUED] mometasone (ELOCON) 0.1 % lotion APPLY TO AFFECTED AREA TOPICALLY EVERY DAY (Patient not taking: Reported on 1/15/2024)    [DISCONTINUED] mupirocin (BACTROBAN) 2 % ointment APPLY TO AFFECTED AREA 3 TIMES A DAY       Objective     /78 (BP Location: Left arm, Patient Position: Sitting)   Pulse 76   Temp 99.4 °F (37.4 °C)   Ht 5' 3\" (1.6 m)   Wt 68 kg (150 lb)   SpO2 99%   BMI 26.57 kg/m²     Physical Exam  Vitals reviewed.   Constitutional:       General: She is not in acute distress.     Appearance: Normal appearance. She is not ill-appearing.   HENT:      Head: Normocephalic and atraumatic.      Right Ear: Tympanic membrane, ear canal and external ear normal.      Left Ear: Tympanic membrane, ear canal and external ear normal.      Nose: Congestion present.      Left Turbinates: Swollen.      Right Sinus: Maxillary sinus tenderness and frontal sinus tenderness present.      Left Sinus: Maxillary sinus tenderness and frontal sinus tenderness present.      Mouth/Throat:      Mouth: Mucous membranes are moist.      Pharynx: Oropharynx is clear.   Eyes:      Conjunctiva/sclera: Conjunctivae normal.      Pupils: Pupils are equal, round, and reactive to light.   Cardiovascular:      Rate and Rhythm: Normal rate and regular rhythm.      Pulses: Normal pulses.      Heart sounds: Normal heart sounds. No murmur heard.  Pulmonary:      Effort: Pulmonary " effort is normal.      Breath sounds: Normal breath sounds.   Musculoskeletal:         General: Normal range of motion.      Cervical back: Normal range of motion and neck supple.   Lymphadenopathy:      Cervical: No cervical adenopathy.   Skin:     General: Skin is warm and dry.   Neurological:      General: No focal deficit present.      Mental Status: She is alert and oriented to person, place, and time.      GCS: GCS eye subscore is 4. GCS verbal subscore is 5. GCS motor subscore is 6.      Cranial Nerves: Cranial nerves 2-12 are intact.      Sensory: Sensation is intact.      Motor: Motor function is intact.      Gait: Gait is intact.   Psychiatric:         Mood and Affect: Mood normal.         Behavior: Behavior normal.       KIRSTIN Isbell

## 2024-02-28 NOTE — ASSESSMENT & PLAN NOTE
Advised patient to keep a headache journal.  Discussed avoidance of MSG, sodium, caffeine.  Discussed importance of increased hydration, nutrition, adequate sleep.  Discussed treatment options including natural supplements, abortive treatment, prophylactic treatment.  Patient wishes to start with natural supplements.  Discussed with patient use of magnesium oxide 400 mg twice daily and riboflavin 100 mg twice daily.  Advised to return for worsening of symptoms

## 2024-02-28 NOTE — PATIENT INSTRUCTIONS
Headache    4 LIFESTYLE FACTORS CAN MAKE A HUGE IMPACT  Based on research, there are 4 lifestyle factors that can greatly influence your headaches/migraine:    Sleep Hygiene  Diet  Stress Levels  Hydration    Life is busy and complicated, and it's hard to know how these 4 major factors could be affecting your migraines without tracking. By taking a few notes down on these 4 factors each day, you can start to see how they are affecting the severity and frequency of your headaches/migraines.    TRACK YOUR MIGRAINE  When tracking the 4 major lifestyle factors, it's equally important to track your headache/migraine attacks. Information is power; the more you can track the better. Some things to track are:    Daily Routine (Schedule, events, etc.)  Symptoms & Frequency  Severity  Medication  Environment (weather, mood, stress)    Techniques to combat migraines     Try relaxation soco - Guided meditation and relaxation apps could be a good way to unwind and focus and listen to your body. Try Head Space or other apps.   Yoga - There are may free yoga classes on YouTube the one I like is Yoga with Sakina   Calming techniques - Learn self-calming techniques from [employer]. https://www.[employer]/health-resources/turning-point/programs/resilience-toolbox/self-calming  Mindfulness for migraine - Stress reduction and processing techniques to manage migraine. https://americanmigrainefoundation.org/resource-library/mindfulness-for-migraine/  Guided relaxation - Deirdre Da Silva, PhD has free guided relaxation audio files that can help destress and calm your mind and body. https://[employer]/relaxation/  Supplements to try:         Vitamin B2 (riboflavin) 100 mg daily twice daily        Magnesium oxide 400 mg, can start at bedtime, and if tolerated, can increase to twice daily        Vitamin D3 4558-7235 units daily         Coenzyme Q10 200mg daily     https://[employer]

## 2024-02-29 ENCOUNTER — TELEPHONE (OUTPATIENT)
Dept: FAMILY MEDICINE CLINIC | Facility: CLINIC | Age: 49
End: 2024-02-29

## 2024-02-29 ENCOUNTER — TELEPHONE (OUTPATIENT)
Age: 49
End: 2024-02-29

## 2024-02-29 NOTE — TELEPHONE ENCOUNTER
Caller: Patient     Doctor/Office: GI    Call regarding :  Missed call      Call was transferred to: GI

## 2024-02-29 NOTE — TELEPHONE ENCOUNTER
Please inform patient that I recommend use of doxycycline.  If she wishes to have Cipro, she should contact her ENT.

## 2024-03-06 DIAGNOSIS — M50.30 DDD (DEGENERATIVE DISC DISEASE), CERVICAL: ICD-10-CM

## 2024-03-06 DIAGNOSIS — M54.2 NECK PAIN: Primary | ICD-10-CM

## 2024-03-12 PROBLEM — M05.9 RHEUMATOID ARTHRITIS WITH POSITIVE RHEUMATOID FACTOR (HCC): Chronic | Status: ACTIVE | Noted: 2023-11-17

## 2024-03-15 NOTE — PROGRESS NOTES
Pain Medicine Follow-Up Note    Assessment:  1. Chronic pain syndrome    2. Neck pain    3. DDD (degenerative disc disease), cervical    4. Cervical stenosis of spinal canal    5. Cervical radiculopathy    6. Bilateral occipital neuralgia        Plan:  Orders Placed This Encounter   Procedures    FL spine and pain procedure     Standing Status:   Future     Standing Expiration Date:   3/19/2028     Order Specific Question:   Reason for Exam:     Answer:   FREDDIE - C7-T1     Order Specific Question:   Is the patient pregnant?     Answer:   No     Order Specific Question:   Anticoagulant hold needed?     Answer:   nsaids    FL spine and pain procedure     Standing Status:   Future     Standing Expiration Date:   3/20/2028     Order Specific Question:   Reason for Exam:     Answer:   bilateral occipital nerve block in office appointment     Order Specific Question:   Is the patient pregnant?     Answer:   No     Order Specific Question:   Anticoagulant hold needed?     Answer:   no       New Medications Ordered This Visit   Medications    ibuprofen (MOTRIN) 800 mg tablet     Sig: Take 1 tablet (800 mg total) by mouth every 8 (eight) hours as needed for moderate pain     Dispense:  30 tablet     Refill:  0       My impressions and treatment recommendations were discussed in detail with the patient who verbalized understanding and had no further questions.      Patient returns to the office with worsening cervical and bilateral shoulder pain as well as stating that she has sharp pains that shoot up the back of her head and wrapping around her ears.  Patient describes occipital neuralgia however has a negative Tinel's sign.  The patient may benefit from an occipital nerve block.  Also discussed the fact that the patient's pain radiates into her bilateral shoulders and she feels that it is also shooting down further down her arms.  Patient may trial a cervical epidural steroid injection at C7-T1 since the patient does have  foraminal stenosis in her C-spine. Complete risks and benefits including bleeding, infection, tissue reaction, nerve injury and allergic reaction were discussed. The approach was demonstrated using models and literature was provided. Verbal and written consent was obtained.    Follow-up is planned in 4 weeks after injections time or sooner as warranted.  Discharge instructions were provided. I personally saw and examined the patient and I agree with the above discussed plan of care.    History of Present Illness:    Patricia Nam is a 48 y.o. female who presents to [facility] for interval re-evaluation of the above stated pain complaints. The patient has a past medical and chronic pain history as outlined in the assessment section. She was last seen on 7/28/2024.    At today's visit patient states that their pain symptoms are worse with a pain score of 7/10 on the verbal numeric pain scale.  The patient's pain is worse in the morning.  The patient's pain is constant in nature.  And the quality of the patient's pain is described as burning, dull-aching, sharp, throbbing, cramping, pressure-like, and numbness.  The patient's pain is located in the posterior neck radiating into her bilateral shoulders and her upper back.  Patient also reporting headaches that radiate and wrap around her bilateral ears.    Other than as stated above, the patient denies any interval changes in medications, medical condition, mental condition, symptoms, or allergies since the last office visit.         Review of Systems:    Review of Systems   Respiratory:  Negative for shortness of breath.    Cardiovascular:  Negative for chest pain.   Gastrointestinal:  Positive for nausea. Negative for constipation, diarrhea and vomiting.   Musculoskeletal:  Negative for arthralgias, gait problem, joint swelling and myalgias.        DROM  Joint stiffness   Skin:  Negative for rash.   Neurological:  Positive for dizziness.  Negative for seizures and weakness.   All other systems reviewed and are negative.        Past Medical History:   Diagnosis Date    Allergic     Asthma     Degenerative joint disease     Depression     GERD (gastroesophageal reflux disease)     Lumbar radiculopathy     PONV (postoperative nausea and vomiting)     Thyroid nodule        Past Surgical History:   Procedure Laterality Date    BREAST BIOPSY Left 2020    neg    CHOLECYSTECTOMY LAPAROSCOPIC N/A 5/6/2022    Procedure: CHOLECYSTECTOMY LAPAROSCOPIC;  Surgeon: Zion Walker MD;  Location: [facility];  Service: General    CYST REMOVAL  10/2020    lt breast     EGD      HYSTERECTOMY      HYSTERECTOMY  1998    LIPOSUCTION  09/2020    TUBAL LIGATION         Family History   Problem Relation Age of Onset    Alzheimer's disease Mother     Cancer Father         stomach/GI    Heart disease Father     No Known Problems Sister     No Known Problems Daughter     No Known Problems Maternal Grandmother     No Known Problems Paternal Grandmother     Colon cancer Maternal Aunt     Cancer Maternal Aunt         stomach    No Known Problems Maternal Aunt     No Known Problems Maternal Aunt     No Known Problems Maternal Aunt     No Known Problems Maternal Aunt     No Known Problems Maternal Aunt     Colon cancer Maternal Uncle     Depression Cousin     Diabetes Brother     Asthma Brother     Cancer Cousin     Breast cancer Neg Hx        Social History     Occupational History    Not on file   Tobacco Use    Smoking status: Never    Smokeless tobacco: Never   Vaping Use    Vaping status: Never Used   Substance and Sexual Activity    Alcohol use: Yes     Comment: Not very often    Drug use: Never    Sexual activity: Yes     Partners: Male     Birth control/protection: None     Comment: With one person for 5 years         Current Outpatient Medications:     albuterol (PROVENTIL HFA,VENTOLIN HFA) 90 mcg/act inhaler, INHALE 1-2 PUFFS EVERY 6 HOURS AS NEEDED FOR WHEEZING., Disp: 18  g, Rfl: 3    azelastine (ASTELIN) 0.1 % nasal spray, 1 spray into each nostril 2 (two) times a day Use in each nostril as directed, Disp: 1 mL, Rfl: 1    Diclofenac Sodium (VOLTAREN) 1 %, Apply 2 g topically 4 (four) times a day, Disp: 100 g, Rfl: 0    EPINEPHrine (EPIPEN) 0.3 mg/0.3 mL SOAJ, Inject 0.3 mL (0.3 mg total) into a muscle once for 1 dose As needed, Disp: , Rfl:     famotidine (PEPCID) 20 mg tablet, Take 1 tablet (20 mg total) by mouth 2 (two) times a day, Disp: , Rfl:     fluocinonide (LIDEX) 0.05 % external solution, Apply topically 2 (two) times a day, Disp: 60 mL, Rfl: 0    FLUoxetine (PROzac) 40 MG capsule, Take 1 capsule (40 mg total) by mouth daily, Disp: 90 capsule, Rfl: 0    hydrOXYzine HCL (ATARAX) 10 mg tablet, Take 1 tablet (10 mg total) by mouth daily at bedtime, Disp: 30 tablet, Rfl: 0    ibuprofen (MOTRIN) 800 mg tablet, Take 1 tablet (800 mg total) by mouth every 8 (eight) hours as needed for moderate pain, Disp: 30 tablet, Rfl: 0    meclizine (ANTIVERT) 25 mg tablet, Take 1 tablet (25 mg total) by mouth every 8 (eight) hours as needed for dizziness, Disp: 90 tablet, Rfl: 0    metoclopramide (REGLAN) 5 mg tablet, Take 1 tablet (5 mg total) by mouth 3 (three) times a day before meals, Disp: 90 tablet, Rfl: 5    omeprazole (PriLOSEC) 40 MG capsule, Take 1 capsule (40 mg total) by mouth daily, Disp: 90 capsule, Rfl: 1    tiZANidine (ZANAFLEX) 4 mg tablet, Take 1 tablet (4 mg total) by mouth every 8 (eight) hours as needed for muscle spasms, Disp: 30 tablet, Rfl: 0    Allergies   Allergen Reactions    Bee Venom Anaphylaxis    Iodides Other (See Comments)    [place] Oil - Food Allergy GI Intolerance     Other reaction(s): GI Intolerance, GI Reaction    Aspirin Other (See Comments)     shaking  convulsions    Other reaction(s): Other (See Comments)  shaking  convulsions  shaking  convulsions    Metronidazole GI Intolerance     Gi upset      Sulfamethoxazole-Trimethoprim GI Intolerance     Gi  "upst    Other reaction(s): GI Intolerance, GI Reaction  Gi upst  Gi upst    Tape  [Medical Tape] Other (See Comments) and Hives     fever  Other reaction(s): Other  fever       Physical Exam:    /70   Pulse 64   Ht 5' 3\" (1.6 m)   Wt 68.9 kg (152 lb)   BMI 26.93 kg/m²     Constitutional:normal, well developed, well nourished, alert, in no distress and non-toxic and no overt pain behavior.  Eyes:anicteric  HEENT:grossly intact  Neck: Tenderness along the cervical paraspinal muscles negative Tinel's sign along occipital nerve  Pulmonary:even and unlabored  Cardiovascular:No edema or pitting edema present  Skin:Normal without rashes or lesions and well hydrated  Psychiatric:Mood and affect appropriate  Neurologic:Cranial Nerves II-XII grossly intact  Musculoskeletal:normal      Imaging    CERVICAL SPINE         9/25/23     INDICATION:   M54.2: Cervicalgia.     COMPARISON:  None     VIEWS:  XR SPINE CERVICAL COMPLETE 4 OR 5 VW NON INJURY        FINDINGS:     No fracture.     No subluxation, no abnormal motion during flexion and extension     Degenerative disc disease of the cervical spine noted, most pronounced at C5-C6 and C6-C7. Endplate spurring is present.     The prevertebral soft tissues are within normal limits.     The lung apices are clear.     IMPRESSION:     Degenerative disc disease of the cervical spine most advanced at C5-C6 and C6-C7        FL spine and pain procedure    (Results Pending)   FL spine and pain procedure    (Results Pending)         Orders Placed This Encounter   Procedures    FL spine and pain procedure    FL spine and pain procedure       This document was created using speech voice recognition software.   Grammatical errors, random word insertions, pronoun errors, and incomplete sentences are an occasional consequence of this system due to software limitations, ambient noise, and hardware issues.   Any formal questions or concerns about content, text, or information contained " within the body of this dictation should be directly addressed to the provider for clarification.

## 2024-03-19 ENCOUNTER — TELEPHONE (OUTPATIENT)
Dept: RADIOLOGY | Facility: CLINIC | Age: 49
End: 2024-03-19

## 2024-03-19 ENCOUNTER — OFFICE VISIT (OUTPATIENT)
Dept: PAIN MEDICINE | Facility: CLINIC | Age: 49
End: 2024-03-19
Payer: COMMERCIAL

## 2024-03-19 VITALS
WEIGHT: 152 LBS | BODY MASS INDEX: 26.93 KG/M2 | HEIGHT: 63 IN | HEART RATE: 64 BPM | SYSTOLIC BLOOD PRESSURE: 105 MMHG | DIASTOLIC BLOOD PRESSURE: 70 MMHG

## 2024-03-19 DIAGNOSIS — M50.30 DDD (DEGENERATIVE DISC DISEASE), CERVICAL: ICD-10-CM

## 2024-03-19 DIAGNOSIS — M48.02 CERVICAL STENOSIS OF SPINAL CANAL: ICD-10-CM

## 2024-03-19 DIAGNOSIS — G89.4 CHRONIC PAIN SYNDROME: Primary | ICD-10-CM

## 2024-03-19 DIAGNOSIS — M54.2 NECK PAIN: ICD-10-CM

## 2024-03-19 DIAGNOSIS — M54.81 BILATERAL OCCIPITAL NEURALGIA: ICD-10-CM

## 2024-03-19 DIAGNOSIS — M54.12 CERVICAL RADICULOPATHY: ICD-10-CM

## 2024-03-19 PROCEDURE — 99214 OFFICE O/P EST MOD 30 MIN: CPT

## 2024-03-19 RX ORDER — IBUPROFEN 800 MG/1
800 TABLET ORAL EVERY 8 HOURS PRN
Qty: 30 TABLET | Refills: 0 | Status: SHIPPED | OUTPATIENT
Start: 2024-03-19

## 2024-03-19 NOTE — TELEPHONE ENCOUNTER
Pt scheduled for FREDDIE with Dr Dow on 4/16/24.     Pt just started prescription Motrin, which will need to be held -- message sent to clinical team.    Pt given instructions in office and via myc message.    Have you completed PT/HEP/Chiro in the past 6 months for dedicated area? Yes with [facility]  If yes, how long did you complete? Approx 1 month  What was the frequency? 2 times a week  Did it provide relief? no  If no, reason therapy was not completed?

## 2024-03-19 NOTE — PATIENT INSTRUCTIONS
Epidural Steroid Injection, Ambulatory Care   GENERAL INFORMATION:   What do I need to know about an epidural steroid injection?  An epidural steroid injection (JAZMINE) is a procedure to inject steroid medicine into the epidural space. The epidural space is between your spinal cord and vertebrae. Steroids reduce inflammation and fluid buildup in your spine that may be causing pain. You may be given pain medicine along with the steroids.   How do I prepare for an JAZMINE?  Your healthcare provider will talk to you about how to prepare for your procedure. He will tell you what medicines to take or not take on the day of your procedure. You may need to stop taking blood thinners or other medicines several days before your procedure. You may need to adjust any diabetes medicine you take on the day of your procedure. Steroid medicine can increase your blood sugar level.   What will happen during an JAZMINE?   You will be given medicine to numb the procedure area. You will be awake for the procedure, but you will not feel pain. You may also be given medicine to help you relax during the procedure. Contrast liquid will be used to help your healthcare provider see the area better. Tell the healthcare provider if you have ever had an allergic reaction to contrast liquid.    Your healthcare provider may place the needle into your neck area, middle of your back, or tailbone area. He may inject the medicine next to the nerves that are causing your pain. He may instead inject the medicine into a larger area of the epidural space. This helps the medicine spread to more nerves. Your healthcare provider will use a fluoroscope to help guide the needle to the right place. A fluoroscope is a type of x-ray. After the procedure, a bandage will be placed over the injection site to prevent infection.  What are the risks of an JAZMINE?  You may have temporary or permanent nerve damage or paralysis. You may have bleeding or develop a serious infection,  such as meningitis (swelling of the brain coverings). An abscess may also develop. You may need surgery to fix the abscess. You may have a seizure, anxiety, or trouble sleeping. If you are a man, you may have temporary erectile dysfunction (not able to have an erection).   CARE AGREEMENT:   You have the right to help plan your care. Learn about your health condition and how it may be treated. Discuss treatment options with your caregivers to decide what care you want to receive. You always have the right to refuse treatment. The above information is an  only. It is not intended as medical advice for individual conditions or treatments. Talk to your doctor, nurse or pharmacist before following any medical regimen to see if it is safe and effective for you.  © 2014 [employer] Information is for End User's use only and may not be sold, redistributed or otherwise used for commercial purposes. All illustrations and images included in CareNotes® are the copyrighted property of A.D.A.M., Inc. or [employer].

## 2024-03-19 NOTE — TELEPHONE ENCOUNTER
Pt scheduled for FREDDIE with Dr Dow on 4/16/24.    Pt was just [facility] Motrin 800mg [facility] on [facility]/24.

## 2024-03-20 ENCOUNTER — TELEPHONE (OUTPATIENT)
Dept: PAIN MEDICINE | Facility: CLINIC | Age: 49
End: 2024-03-20

## 2024-04-08 ENCOUNTER — TELEPHONE (OUTPATIENT)
Age: 49
End: 2024-04-08

## 2024-04-08 NOTE — TELEPHONE ENCOUNTER
Caller: Patient     Doctor/Office: Wesley    Call regarding :  Calling to reschedule apt      Call was transferred to: Ortho

## 2024-04-12 RX ORDER — SODIUM CHLORIDE, SODIUM LACTATE, POTASSIUM CHLORIDE, CALCIUM CHLORIDE 600; 310; 30; 20 MG/100ML; MG/100ML; MG/100ML; MG/100ML
125 INJECTION, SOLUTION INTRAVENOUS CONTINUOUS
OUTPATIENT
Start: 2024-04-12

## 2024-04-15 ENCOUNTER — OFFICE VISIT (OUTPATIENT)
Dept: OBGYN CLINIC | Facility: CLINIC | Age: 49
End: 2024-04-15
Payer: COMMERCIAL

## 2024-04-15 VITALS
WEIGHT: 152.4 LBS | HEIGHT: 63 IN | HEART RATE: 76 BPM | DIASTOLIC BLOOD PRESSURE: 63 MMHG | SYSTOLIC BLOOD PRESSURE: 109 MMHG | BODY MASS INDEX: 27 KG/M2

## 2024-04-15 DIAGNOSIS — M54.2 NECK PAIN: Primary | ICD-10-CM

## 2024-04-15 DIAGNOSIS — M50.30 DDD (DEGENERATIVE DISC DISEASE), CERVICAL: ICD-10-CM

## 2024-04-15 PROCEDURE — 99213 OFFICE O/P EST LOW 20 MIN: CPT | Performed by: ORTHOPAEDIC SURGERY

## 2024-04-15 NOTE — PROGRESS NOTES
[facility] ORTHOPEDIC SPINE SURGERY  DR.AMIR JOSIE MD  [address] [address] [phone]    HISTORY OF PRESENT ILLNESS:    Patricia Nam is a 48 y.o. female who presents for follow-up of cervical spine. Patient was last seen in the office on 1/08/2024 where patient was referred to physical therapy. Patient reports she did physical therapy, which did not provide relief. Patient is currently seeing pain management and is scheduled for injections. Patient reports she continues to have neck pain that causes headaches and dizziness. Patient presents today for routine myelopathy check.       ALLERGIES:   Allergies   Allergen Reactions    Bee Venom Anaphylaxis    Iodides Other (See Comments)    [place] Oil - Food Allergy GI Intolerance     Other reaction(s): GI Intolerance, GI Reaction    Aspirin Other (See Comments)     shaking  convulsions    Other reaction(s): Other (See Comments)  shaking  convulsions  shaking  convulsions    Metronidazole GI Intolerance     Gi upset      Sulfamethoxazole-Trimethoprim GI Intolerance     Gi upst    Other reaction(s): GI Intolerance, GI Reaction  Gi upst  Gi upst    Tape  [Medical Tape] Other (See Comments) and Hives     fever  Other reaction(s): Other  fever       MEDICATIONS:    Current Outpatient Medications:     albuterol (PROVENTIL HFA,VENTOLIN HFA) 90 mcg/act inhaler, INHALE 1-2 PUFFS EVERY 6 HOURS AS NEEDED FOR WHEEZING., Disp: 18 g, Rfl: 3    azelastine (ASTELIN) 0.1 % nasal spray, 1 spray into each nostril 2 (two) times a day Use in each nostril as directed, Disp: 1 mL, Rfl: 1    Diclofenac Sodium (VOLTAREN) 1 %, Apply 2 g topically 4 (four) times a day, Disp: 100 g, Rfl: 0    EPINEPHrine (EPIPEN) 0.3 mg/0.3 mL SOAJ, Inject 0.3 mL (0.3 mg total) into a muscle once for 1 dose As needed, Disp: , Rfl:     famotidine (PEPCID) 20 mg tablet, Take 1 tablet (20 mg total) by mouth 2 (two) times a day, Disp: , Rfl:     fluocinonide (LIDEX) 0.05 % external  solution, Apply topically 2 (two) times a day, Disp: 60 mL, Rfl: 0    FLUoxetine (PROzac) 40 MG capsule, Take 1 capsule (40 mg total) by mouth daily, Disp: 90 capsule, Rfl: 0    hydrOXYzine HCL (ATARAX) 10 mg tablet, Take 1 tablet (10 mg total) by mouth daily at bedtime, Disp: 30 tablet, Rfl: 0    ibuprofen (MOTRIN) 800 mg tablet, Take 1 tablet (800 mg total) by mouth every 8 (eight) hours as needed for moderate pain, Disp: 30 tablet, Rfl: 0    meclizine (ANTIVERT) 25 mg tablet, Take 1 tablet (25 mg total) by mouth every 8 (eight) hours as needed for dizziness, Disp: 90 tablet, Rfl: 0    metoclopramide (REGLAN) 5 mg tablet, Take 1 tablet (5 mg total) by mouth 3 (three) times a day before meals, Disp: 90 tablet, Rfl: 5    omeprazole (PriLOSEC) 40 MG capsule, Take 1 capsule (40 mg total) by mouth daily, Disp: 90 capsule, Rfl: 1    tiZANidine (ZANAFLEX) 4 mg tablet, Take 1 tablet (4 mg total) by mouth every 8 (eight) hours as needed for muscle spasms, Disp: 30 tablet, Rfl: 0     PAST MEDICAL HISTORY:   Past Medical History:   Diagnosis Date    Allergic     Asthma     Degenerative joint disease     Depression     GERD (gastroesophageal reflux disease)     Lumbar radiculopathy     PONV (postoperative nausea and vomiting)     Thyroid nodule        PAST SURGICAL HISTORY:  Past Surgical History:   Procedure Laterality Date    BREAST BIOPSY Left 2020    neg    CHOLECYSTECTOMY LAPAROSCOPIC N/A 5/6/2022    Procedure: CHOLECYSTECTOMY LAPAROSCOPIC;  Surgeon: Zion Walker MD;  Location: [facility] OR;  Service: General    CYST REMOVAL  10/2020    lt breast     EGD      HYSTERECTOMY      HYSTERECTOMY  1998    LIPOSUCTION  09/2020    TUBAL LIGATION         SOCIAL HISTORY:  Social History     Tobacco Use   Smoking Status Never   Smokeless Tobacco Never          PHYSICAL EXAM:  48 y.o. female sitting comfortably on exam chair in no apparent distress.   Ambulates with normal gait  Able to go up on toes and heels   Able to balance on  one leg  No significant TTP over cervical spine  5/5 motor strength with normal sensation in bilateral upper extremities  2+ deep tendon reflexes bilateral upper extremities   3+ patellar reflexes bilaterally  Absent achilles reflexes bilaterally   Absent haskins sign bilaterally  Normal rapid alternating movements bilateral upper extremities  Normal rapid alternating movements bilateral lower extremity      RADIOGRAPHIC STUDIES:  1. MRI, L-spine 3/17/2021, mild multilevel degenerative disc disease.  Moderate degenerative changes L3-4.  Moderate to severe degenerative changes at L5-S1.  Posterior central disc protrusion with slight predominance on the left side with minimal lateral recess stenosis at L5-S1.  Facet hypertrophy and mild lateral recess stenosis L4-5.  2. CT, abdomen, 06/03/2021, multilevel thoracic and lumbar degenerative disc disease, diffuse idiopathic skeletal hyperostosis, moderate degenerative disc disease L3-4 L4-5 and L5-S1 with posterior osteophyte formation.  No evidence of bony stenosis.  No evidence of congenital stenosis.  3. MRI, cervical spine, 8/13/2023: Multilevel cervical degenerative disc disease, most significant at C5-6 and C6-7.  There is evidence of posterior ossified formation at this point resulting in spinal cord compression and spinal cord indentation.  The spinal cord has not flattened.  There is no some myelomalacia.  4. Xrays, cervical spine, 9/25/2023: Severe degenerative changes at C5-6 and C6-7.  Mild to moderate degenerative changes at other levels of the cervical spine.      ASSESSMENT:  1. Neck pain    2. DDD (degenerative disc disease), cervical        PLAN:  48 y.o. female with neck pain, cervical DDD, and cervical spinal cord compression.     It was discussed that there are no significant changes in physical exam concerning for myelopathy. Patient will continue to require routine myelopathy checks every 6 months for evaluation of myelopathy. Patient may continue  with pain management for treatment of neck pain.     Patient will follow-up in 6 months for repeat myelopathy check.      Scribe Attestation      I,:  Mary Calvert PA-C am acting as a scribe while in the presence of the attending physician.:       I,:  Brock Olson MD personally performed the services described in this documentation    as scribed in my presence.:

## 2024-04-30 ENCOUNTER — HOSPITAL ENCOUNTER (OUTPATIENT)
Dept: RADIOLOGY | Facility: CLINIC | Age: 49
Discharge: HOME | End: 2024-04-30
Payer: COMMERCIAL

## 2024-04-30 VITALS
RESPIRATION RATE: 20 BRPM | OXYGEN SATURATION: 100 % | SYSTOLIC BLOOD PRESSURE: 131 MMHG | DIASTOLIC BLOOD PRESSURE: 76 MMHG | HEART RATE: 90 BPM | TEMPERATURE: 99 F

## 2024-04-30 DIAGNOSIS — M48.02 CERVICAL STENOSIS OF SPINAL CANAL: ICD-10-CM

## 2024-04-30 DIAGNOSIS — M50.30 DDD (DEGENERATIVE DISC DISEASE), CERVICAL: ICD-10-CM

## 2024-04-30 DIAGNOSIS — M54.12 CERVICAL RADICULOPATHY: ICD-10-CM

## 2024-04-30 PROCEDURE — 62321 NJX INTERLAMINAR CRV/THRC: CPT | Performed by: STUDENT IN AN ORGANIZED HEALTH CARE EDUCATION/TRAINING PROGRAM

## 2024-04-30 RX ORDER — METHYLPREDNISOLONE ACETATE 80 MG/ML
80 INJECTION, SUSPENSION INTRA-ARTICULAR; INTRALESIONAL; INTRAMUSCULAR; PARENTERAL; SOFT TISSUE ONCE
Status: COMPLETED | OUTPATIENT
Start: 2024-04-30 | End: 2024-04-30

## 2024-04-30 RX ADMIN — METHYLPREDNISOLONE ACETATE 80 MG: 80 INJECTION, SUSPENSION INTRA-ARTICULAR; INTRALESIONAL; INTRAMUSCULAR; PARENTERAL; SOFT TISSUE at 15:53

## 2024-04-30 NOTE — DISCHARGE INSTR - LAB
Epidural Steroid Injection   WHAT YOU NEED TO KNOW:   An epidural steroid injection (JAZMINE) is a procedure to inject steroid medicine into the epidural space. The epidural space is between your spinal cord and vertebrae. Steroids reduce inflammation and fluid buildup in your spine that may be causing pain. You may be given pain medicine along with the steroids.          ACTIVITY  Do not drive or operate machinery today.  No strenuous activity today - bending, lifting, etc.  You may resume normal activites starting tomorrow - start slowly and as tolerated.  You may shower today, but no tub baths or hot tubs.  You may have numbness for several hours from the local anesthetic. Please use caution and common sense, especially with weight-bearing activities.    CARE OF THE INJECTION SITE  If you have soreness or pain, apply ice to the area today (20 minutes on/20 minutes off).  Starting tomorrow, you may use warm, moist heat or ice if needed.  You may have an increase or change in your discomfort for 36-48 hours after your treatment.  Apply ice and continue with any pain medication you have been prescribed.  Notify the [facility] if you have any of the following: redness, drainage, swelling, headache, stiff neck or fever above 100°F.    SPECIAL INSTRUCTIONS  Our office will contact you in approximately 7 days for a progress report.    MEDICATIONS  Continue to take all routine medications.  Our office may have instructed you to hold some medications.    As no general anesthesia was used in today's procedure, you should not experience any side effects related to anesthesia.     If you are diabetic, the steroids used in today's injection may temporarily increase your blood sugar levels after the first few days after your injection. Please keep a close eye on your sugars and alert the doctor who manages your diabetes if your sugars are significantly high from your baseline or you are symptomatic.     If you have a  problem specifically related to your procedure, please call our office at [phone].  Problems not related to your procedure should be directed to your primary care physician.

## 2024-04-30 NOTE — H&P
History of Present Illness: The patient is a 48 y.o. female who presents with complaints of neck pain and headache    Past Medical History:   Diagnosis Date    Allergic     Asthma     Degenerative joint disease     Depression     GERD (gastroesophageal reflux disease)     Lumbar radiculopathy     PONV (postoperative nausea and vomiting)     Thyroid nodule        Past Surgical History:   Procedure Laterality Date    BREAST BIOPSY Left 2020    neg    CHOLECYSTECTOMY LAPAROSCOPIC N/A 5/6/2022    Procedure: CHOLECYSTECTOMY LAPAROSCOPIC;  Surgeon: Zion Walker MD;  Location: [facility];  Service: General    CYST REMOVAL  10/2020    lt breast     EGD      HYSTERECTOMY      HYSTERECTOMY  1998    LIPOSUCTION  09/2020    TUBAL LIGATION           Current Outpatient Medications:     albuterol (PROVENTIL HFA,VENTOLIN HFA) 90 mcg/act inhaler, INHALE 1-2 PUFFS EVERY 6 HOURS AS NEEDED FOR WHEEZING., Disp: 18 g, Rfl: 3    azelastine (ASTELIN) 0.1 % nasal spray, 1 spray into each nostril 2 (two) times a day Use in each nostril as directed, Disp: 1 mL, Rfl: 1    Diclofenac Sodium (VOLTAREN) 1 %, Apply 2 g topically 4 (four) times a day, Disp: 100 g, Rfl: 0    EPINEPHrine (EPIPEN) 0.3 mg/0.3 mL SOAJ, Inject 0.3 mL (0.3 mg total) into a muscle once for 1 dose As needed, Disp: , Rfl:     famotidine (PEPCID) 20 mg tablet, Take 1 tablet (20 mg total) by mouth 2 (two) times a day, Disp: , Rfl:     fluocinonide (LIDEX) 0.05 % external solution, Apply topically 2 (two) times a day, Disp: 60 mL, Rfl: 0    FLUoxetine (PROzac) 40 MG capsule, Take 1 capsule (40 mg total) by mouth daily, Disp: 90 capsule, Rfl: 0    hydrOXYzine HCL (ATARAX) 10 mg tablet, Take 1 tablet (10 mg total) by mouth daily at bedtime, Disp: 30 tablet, Rfl: 0    ibuprofen (MOTRIN) 800 mg tablet, Take 1 tablet (800 mg total) by mouth every 8 (eight) hours as needed for moderate pain, Disp: 30 tablet, Rfl: 0    meclizine (ANTIVERT) 25 mg tablet, Take 1 tablet (25 mg total)  by mouth every 8 (eight) hours as needed for dizziness, Disp: 90 tablet, Rfl: 0    metoclopramide (REGLAN) 5 mg tablet, Take 1 tablet (5 mg total) by mouth 3 (three) times a day before meals, Disp: 90 tablet, Rfl: 5    omeprazole (PriLOSEC) 40 MG capsule, Take 1 capsule (40 mg total) by mouth daily, Disp: 90 capsule, Rfl: 1    tiZANidine (ZANAFLEX) 4 mg tablet, Take 1 tablet (4 mg total) by mouth every 8 (eight) hours as needed for muscle spasms, Disp: 30 tablet, Rfl: 0    Allergies   Allergen Reactions    Bee Venom Anaphylaxis    Iodides Other (See Comments)    [place] Oil - Food Allergy GI Intolerance     Other reaction(s): GI Intolerance, GI Reaction    Aspirin Other (See Comments)     shaking  convulsions    Other reaction(s): Other (See Comments)  shaking  convulsions  shaking  convulsions    Metronidazole GI Intolerance     Gi upset      Sulfamethoxazole-Trimethoprim GI Intolerance     Gi upst    Other reaction(s): GI Intolerance, GI Reaction  Gi upst  Gi upst    Tape  [Medical Tape] Other (See Comments) and Hives     fever  Other reaction(s): Other  fever       Physical Exam:   Vitals:    04/30/24 1531   BP: 126/77   Pulse: 90   Resp: 20   Temp: 99 °F (37.2 °C)   SpO2: 100%     General: Awake, Alert, Oriented x 3, Mood and affect appropriate  Respiratory: Respirations even and unlabored  Cardiovascular: Peripheral pulses intact; no edema  Musculoskeletal Exam: neck non erythematous no lesions    ASA Score: 3    Patient/Chart Verification  Patient ID Verified: Verbal  ID Band Applied: No  Consents Confirmed: To be obtained in the Pre-Procedure area  H&P( within 30 days) Verified: To be obtained in the Pre-Procedure area  Interval H&P(within 24 hr) Complete (required for Outpatients and Surgery Admit only): To be obtained in the Pre-Procedure area  Allergies Reviewed: Yes  Anticoag/NSAID held?: Yes (ibuprofen x 1 week)  Currently on antibiotics?: No  Pregnancy denied?: Yes    Assessment:   1. DDD (degenerative  disc disease), cervical    2. Cervical stenosis of spinal canal    3. Cervical radiculopathy        Plan: FREDDIE - C7-T1

## 2024-05-12 DIAGNOSIS — F41.9 ANXIETY: ICD-10-CM

## 2024-05-12 DIAGNOSIS — F32.9 REACTIVE DEPRESSION: ICD-10-CM

## 2024-05-14 RX ORDER — FLUOXETINE HYDROCHLORIDE 40 MG/1
40 CAPSULE ORAL DAILY
Qty: 90 CAPSULE | Refills: 1 | Status: SHIPPED | OUTPATIENT
Start: 2024-05-14

## 2024-05-28 ENCOUNTER — TELEPHONE (OUTPATIENT)
Age: 49
End: 2024-05-28

## 2024-05-28 DIAGNOSIS — R74.8 ELEVATED LIVER ENZYMES: Primary | ICD-10-CM

## 2024-05-28 DIAGNOSIS — Z13.6 ENCOUNTER FOR SCREENING FOR CARDIOVASCULAR DISORDERS: ICD-10-CM

## 2024-05-28 NOTE — TELEPHONE ENCOUNTER
Lipid panel and hepatic function panel ordered.  Please remind patient labs are to be fasting.  Thank you

## 2024-05-28 NOTE — TELEPHONE ENCOUNTER
Pt called and asked if lipid panel and liver panel can be added into her chart and she will complete them along with her A1c. She set up an appt with Sepideh to discuss her headaches so she will get those labs done before then to go over results. Please advise and notify pt when labs are in.

## 2024-05-29 ENCOUNTER — TELEPHONE (OUTPATIENT)
Age: 49
End: 2024-05-29

## 2024-05-29 NOTE — TELEPHONE ENCOUNTER
Patient is looking to see about sooner appointment and complaint of swelling behind the ears and burning pain.  requesting biopsy on scalp. has used shampoo and cream and was told previously that it is eczema but still not working. itching/swelling behind the ears/burns 5 years going on.  Transferred to [facility] in triage

## 2024-05-30 ENCOUNTER — OFFICE VISIT (OUTPATIENT)
Dept: FAMILY MEDICINE CLINIC | Facility: CLINIC | Age: 49
End: 2024-05-30
Payer: COMMERCIAL

## 2024-05-30 VITALS
OXYGEN SATURATION: 100 % | DIASTOLIC BLOOD PRESSURE: 82 MMHG | HEART RATE: 64 BPM | HEIGHT: 63 IN | SYSTOLIC BLOOD PRESSURE: 126 MMHG | WEIGHT: 154 LBS | BODY MASS INDEX: 27.29 KG/M2

## 2024-05-30 DIAGNOSIS — G43.009 MIGRAINE WITHOUT AURA AND WITHOUT STATUS MIGRAINOSUS, NOT INTRACTABLE: Primary | ICD-10-CM

## 2024-05-30 DIAGNOSIS — M05.79 RHEUMATOID ARTHRITIS INVOLVING MULTIPLE SITES WITH POSITIVE RHEUMATOID FACTOR (HCC): ICD-10-CM

## 2024-05-30 DIAGNOSIS — K21.9 GASTROESOPHAGEAL REFLUX DISEASE WITHOUT ESOPHAGITIS: ICD-10-CM

## 2024-05-30 PROCEDURE — 99214 OFFICE O/P EST MOD 30 MIN: CPT | Performed by: NURSE PRACTITIONER

## 2024-05-30 RX ORDER — OMEPRAZOLE 10 MG/1
10 CAPSULE, DELAYED RELEASE ORAL EVERY EVENING
Qty: 90 CAPSULE | Refills: 0 | Status: SHIPPED | OUTPATIENT
Start: 2024-05-30

## 2024-05-30 RX ORDER — SUMATRIPTAN 25 MG/1
25 TABLET, FILM COATED ORAL ONCE AS NEEDED
Qty: 30 TABLET | Refills: 0 | Status: SHIPPED | OUTPATIENT
Start: 2024-05-30

## 2024-05-30 RX ORDER — METOCLOPRAMIDE 5 MG/1
5 TABLET ORAL
Qty: 90 TABLET | Refills: 5 | Status: SHIPPED | OUTPATIENT
Start: 2024-05-30

## 2024-05-30 NOTE — ASSESSMENT & PLAN NOTE
Advised patient to continue with hydration, vitamin D, magnesium oxide as previously discussed.  Discussed importance of adequate sleep, hydration, nutrition.  To avoid triggers like MSG, caffeine, alcohol.  To follow-up with dermatology for scalp biopsy as scheduled.  Will initiate Imitrex.  Patient educated on how to take medication.

## 2024-05-30 NOTE — ASSESSMENT & PLAN NOTE
Patient was evaluated by rheumatology this summer.  Was made aware she does not have rheumatoid arthritis and cleared.  Patient continues to follow with pain management.

## 2024-05-30 NOTE — PROGRESS NOTES
Ambulatory Visit  Name: Patricia Nam      : 1975      MRN: [id number]  Encounter Provider: KIRSTIN Isbell  Encounter Date: 2024   Encounter department: [facility] [address]    Assessment & Plan   1. Migraine without aura and without status migrainosus, not intractable  Assessment & Plan:  Advised patient to continue with hydration, vitamin D, magnesium oxide as previously discussed.  Discussed importance of adequate sleep, hydration, nutrition.  To avoid triggers like MSG, caffeine, alcohol.  To follow-up with dermatology for scalp biopsy as scheduled.  Will initiate Imitrex.  Patient educated on how to take medication.    Orders:  -     SUMAtriptan (Imitrex) 25 mg tablet; Take 1 tablet (25 mg total) by mouth once as needed for migraine for up to 30 doses  -     metoclopramide (REGLAN) 5 mg tablet; Take 1 tablet (5 mg total) by mouth 3 (three) times a day before meals  2. Gastroesophageal reflux disease without esophagitis  Assessment & Plan:  Patient notes increasing symptoms of acid reflux after eating.  Patient is already on omeprazole 40 mg daily.  Discussed initiating high-dose PPIs, and increasing up result to 40 mg twice daily.  Patient does not want to increase to 40 twice daily, agreed to 40 mg in the am and 10 mg in the pm.  Encouraged to continue with diet modifications, to avoid spicy/acidic foods, large meals to contact GI for follow-up as they wanted for her to have an EGD, but was unable to be completed.  Orders:  -     omeprazole (PriLOSEC) 10 mg delayed release capsule; Take 1 capsule (10 mg total) by mouth every evening  3. Rheumatoid arthritis involving multiple sites with positive rheumatoid factor (HCC)  Assessment & Plan:  Patient was evaluated by rheumatology this summer.  Was made aware she does not have rheumatoid arthritis and cleared.  Patient continues to follow with pain management.       History of Present Illness      Patient presents to the office for recurrence of headaches.  Per patient headaches are located to the denies radiation of pain.  As about 2-3 episodes per week.  Yesterday was last episode.  Lasting 2 hours or all day  Tylenol with no relief  + nausea  + dizziness   Patient did receive injection neck pain and headaches by pain management on 4/30, headaches persisted.  Also has an appointment to get biopsy of persistent rash to left side of scalp.  Patient was made aware by dermatology that his skin condition is infectious, this could cause headaches as well.  Patient denies slurred speech, vomiting, confusion.        Review of Systems   Constitutional:  Negative for activity change, appetite change, chills, fatigue and fever.   HENT:  Negative for sore throat, tinnitus and trouble swallowing.    Eyes:  Negative for photophobia and visual disturbance.   Respiratory:  Negative for cough and shortness of breath.    Cardiovascular:  Negative for chest pain and palpitations.   Gastrointestinal:  Positive for nausea. Negative for abdominal pain and vomiting.   Genitourinary:  Negative for decreased urine volume.   Musculoskeletal:  Positive for neck pain (chronic).   Skin:  Negative for color change and rash.   Neurological:  Positive for dizziness and headaches. Negative for syncope, facial asymmetry, speech difficulty, weakness and numbness.   Hematological:  Negative for adenopathy.   Psychiatric/Behavioral:  Negative for agitation and confusion.      Pertinent Medical History     Medical History Reviewed by provider this encounter:  Allergies  Meds       Past Medical History   Past Medical History:   Diagnosis Date    Allergic     Asthma     Degenerative joint disease     Depression     GERD (gastroesophageal reflux disease)     Lumbar radiculopathy     PONV (postoperative nausea and vomiting)     Thyroid nodule      Past Surgical History:   Procedure Laterality Date    BREAST BIOPSY Left 2020    neg     CHOLECYSTECTOMY LAPAROSCOPIC N/A 5/6/2022    Procedure: CHOLECYSTECTOMY LAPAROSCOPIC;  Surgeon: Zion Walker MD;  Location: [facility];  Service: General    CYST REMOVAL  10/2020    lt breast     EGD      HYSTERECTOMY      HYSTERECTOMY  1998    LIPOSUCTION  09/2020    TUBAL LIGATION       Family History   Problem Relation Age of Onset    Alzheimer's disease Mother     Cancer Father         stomach/GI    Heart disease Father     No Known Problems Sister     No Known Problems Daughter     No Known Problems Maternal Grandmother     No Known Problems Paternal Grandmother     Colon cancer Maternal Aunt     Cancer Maternal Aunt         stomach    No Known Problems Maternal Aunt     No Known Problems Maternal Aunt     No Known Problems Maternal Aunt     No Known Problems Maternal Aunt     No Known Problems Maternal Aunt     Colon cancer Maternal Uncle     Depression Cousin     Diabetes Brother     Asthma Brother     Cancer Cousin     Breast cancer Neg Hx      Current Outpatient Medications on File Prior to Visit   Medication Sig Dispense Refill    albuterol (PROVENTIL HFA,VENTOLIN HFA) 90 mcg/act inhaler INHALE 1-2 PUFFS EVERY 6 HOURS AS NEEDED FOR WHEEZING. 18 g 3    azelastine (ASTELIN) 0.1 % nasal spray 1 spray into each nostril 2 (two) times a day Use in each nostril as directed 1 mL 1    Diclofenac Sodium (VOLTAREN) 1 % Apply 2 g topically 4 (four) times a day 100 g 0    EPINEPHrine (EPIPEN) 0.3 mg/0.3 mL SOAJ Inject 0.3 mL (0.3 mg total) into a muscle once for 1 dose As needed      fluocinonide (LIDEX) 0.05 % external solution Apply topically 2 (two) times a day 60 mL 0    FLUoxetine (PROzac) 40 MG capsule TAKE ONE CAPSULE BY MOUTH EVERY DAY 90 capsule 1    hydrOXYzine HCL (ATARAX) 10 mg tablet Take 1 tablet (10 mg total) by mouth daily at bedtime 30 tablet 0    ibuprofen (MOTRIN) 800 mg tablet Take 1 tablet (800 mg total) by mouth every 8 (eight) hours as needed for moderate pain 30 tablet 0    meclizine  (ANTIVERT) 25 mg tablet Take 1 tablet (25 mg total) by mouth every 8 (eight) hours as needed for dizziness 90 tablet 0    omeprazole (PriLOSEC) 40 MG capsule Take 1 capsule (40 mg total) by mouth daily 90 capsule 1    tiZANidine (ZANAFLEX) 4 mg tablet Take 1 tablet (4 mg total) by mouth every 8 (eight) hours as needed for muscle spasms 30 tablet 0    [DISCONTINUED] famotidine (PEPCID) 20 mg tablet Take 1 tablet (20 mg total) by mouth 2 (two) times a day (Patient not taking: Reported on 5/30/2024)      [DISCONTINUED] metoclopramide (REGLAN) 5 mg tablet Take 1 tablet (5 mg total) by mouth 3 (three) times a day before meals 90 tablet 5     No current facility-administered medications on file prior to visit.     Allergies   Allergen Reactions    Bee Venom Anaphylaxis    Iodides Other (See Comments)    [place] Oil - Food Allergy GI Intolerance     Other reaction(s): GI Intolerance, GI Reaction    Aspirin Other (See Comments)     shaking  convulsions    Other reaction(s): Other (See Comments)  shaking  convulsions  shaking  convulsions    Metronidazole GI Intolerance     Gi upset      Sulfamethoxazole-Trimethoprim GI Intolerance     Gi upst    Other reaction(s): GI Intolerance, GI Reaction  Gi upst  Gi upst    Tape  [Medical Tape] Other (See Comments) and Hives     fever  Other reaction(s): Other  fever      Current Outpatient Medications on File Prior to Visit   Medication Sig Dispense Refill    albuterol (PROVENTIL HFA,VENTOLIN HFA) 90 mcg/act inhaler INHALE 1-2 PUFFS EVERY 6 HOURS AS NEEDED FOR WHEEZING. 18 g 3    azelastine (ASTELIN) 0.1 % nasal spray 1 spray into each nostril 2 (two) times a day Use in each nostril as directed 1 mL 1    Diclofenac Sodium (VOLTAREN) 1 % Apply 2 g topically 4 (four) times a day 100 g 0    EPINEPHrine (EPIPEN) 0.3 mg/0.3 mL SOAJ Inject 0.3 mL (0.3 mg total) into a muscle once for 1 dose As needed      fluocinonide (LIDEX) 0.05 % external solution Apply topically 2 (two) times a day 60 mL  "0    FLUoxetine (PROzac) 40 MG capsule TAKE ONE CAPSULE BY MOUTH EVERY DAY 90 capsule 1    hydrOXYzine HCL (ATARAX) 10 mg tablet Take 1 tablet (10 mg total) by mouth daily at bedtime 30 tablet 0    ibuprofen (MOTRIN) 800 mg tablet Take 1 tablet (800 mg total) by mouth every 8 (eight) hours as needed for moderate pain 30 tablet 0    meclizine (ANTIVERT) 25 mg tablet Take 1 tablet (25 mg total) by mouth every 8 (eight) hours as needed for dizziness 90 tablet 0    omeprazole (PriLOSEC) 40 MG capsule Take 1 capsule (40 mg total) by mouth daily 90 capsule 1    tiZANidine (ZANAFLEX) 4 mg tablet Take 1 tablet (4 mg total) by mouth every 8 (eight) hours as needed for muscle spasms 30 tablet 0    [DISCONTINUED] famotidine (PEPCID) 20 mg tablet Take 1 tablet (20 mg total) by mouth 2 (two) times a day (Patient not taking: Reported on 5/30/2024)      [DISCONTINUED] metoclopramide (REGLAN) 5 mg tablet Take 1 tablet (5 mg total) by mouth 3 (three) times a day before meals 90 tablet 5     No current facility-administered medications on file prior to visit.      Social History     Tobacco Use    Smoking status: Never    Smokeless tobacco: Never   Vaping Use    Vaping status: Never Used   Substance and Sexual Activity    Alcohol use: Yes     Comment: Not very often    Drug use: Never    Sexual activity: Yes     Partners: Male     Birth control/protection: None     Comment: With one person for 5 years     Objective     /82   Pulse 64   Ht 5' 3\" (1.6 m)   Wt 69.9 kg (154 lb)   SpO2 100%   BMI 27.28 kg/m²     Physical Exam  Vitals reviewed.   Constitutional:       General: She is not in acute distress.     Appearance: Normal appearance. She is not ill-appearing.   HENT:      Head: Normocephalic and atraumatic.      Right Ear: Tympanic membrane, ear canal and external ear normal.      Left Ear: Tympanic membrane, ear canal and external ear normal.      Nose: Nose normal.      Mouth/Throat:      Mouth: Mucous membranes are " moist.      Pharynx: Oropharynx is clear.   Eyes:      General: No visual field deficit.     Conjunctiva/sclera: Conjunctivae normal.      Pupils: Pupils are equal, round, and reactive to light.   Neck:      Vascular: No carotid bruit.   Cardiovascular:      Rate and Rhythm: Normal rate and regular rhythm.      Pulses: Normal pulses.      Heart sounds: Normal heart sounds. No murmur heard.  Pulmonary:      Effort: Pulmonary effort is normal.      Breath sounds: Normal breath sounds.   Musculoskeletal:         General: Normal range of motion.      Cervical back: Normal range of motion and neck supple.   Lymphadenopathy:      Cervical: No cervical adenopathy.   Skin:     General: Skin is warm and dry.   Neurological:      General: No focal deficit present.      Mental Status: She is alert and oriented to person, place, and time.      GCS: GCS eye subscore is 4. GCS verbal subscore is 5. GCS motor subscore is 6.      Cranial Nerves: Cranial nerves 2-12 are intact. No facial asymmetry.      Sensory: Sensation is intact.      Motor: Motor function is intact.      Coordination: Coordination is intact.      Gait: Gait is intact.   Psychiatric:         Mood and Affect: Mood normal.         Behavior: Behavior normal.

## 2024-05-30 NOTE — ASSESSMENT & PLAN NOTE
Patient notes increasing symptoms of acid reflux after eating.  Patient is already on omeprazole 40 mg daily.  Discussed initiating high-dose PPIs, and increasing up result to 40 mg twice daily.  Patient does not want to increase to 40 twice daily, agreed to 40 mg in the am and 10 mg in the pm.  Encouraged to continue with diet modifications, to avoid spicy/acidic foods, large meals to contact GI for follow-up as they wanted for her to have an EGD, but was unable to be completed.

## 2024-05-31 ENCOUNTER — TELEPHONE (OUTPATIENT)
Age: 49
End: 2024-05-31

## 2024-05-31 NOTE — TELEPHONE ENCOUNTER
Received call from patient stating she received a message from us about changing her appt.    Patient stated she has two appt's with us.    Patient cancelled the 07/22/2024 appt and kept the 06/10/2024 appt.

## 2024-05-31 NOTE — TELEPHONE ENCOUNTER
Left a voicemail for patient notifying her that her appointment scheduled with Dr Van on 7/22 was moved to Antionette's schedule for the same time. Also explained that she will see Antionette and Dr Van at her visit.      Merly Lara/marquis  05/31/24  9:08 AM

## 2024-06-01 ENCOUNTER — APPOINTMENT (OUTPATIENT)
Dept: LAB | Facility: HOSPITAL | Age: 49
End: 2024-06-01
Payer: COMMERCIAL

## 2024-06-01 DIAGNOSIS — R74.8 ELEVATED LIVER ENZYMES: ICD-10-CM

## 2024-06-01 DIAGNOSIS — Z91.038 HYMENOPTERA ALLERGY: ICD-10-CM

## 2024-06-01 DIAGNOSIS — Z13.6 ENCOUNTER FOR SCREENING FOR CARDIOVASCULAR DISORDERS: ICD-10-CM

## 2024-06-01 DIAGNOSIS — R53.83 FATIGUE, UNSPECIFIED TYPE: ICD-10-CM

## 2024-06-01 LAB
ALBUMIN SERPL BCP-MCNC: 4.2 G/DL (ref 3.5–5)
ALP SERPL-CCNC: 104 U/L (ref 34–104)
ALT SERPL W P-5'-P-CCNC: 23 U/L (ref 7–52)
AST SERPL W P-5'-P-CCNC: 25 U/L (ref 13–39)
BASOPHILS # BLD AUTO: 0.05 THOUSANDS/ÂΜL (ref 0–0.1)
BASOPHILS NFR BLD AUTO: 1 % (ref 0–1)
BILIRUB DIRECT SERPL-MCNC: 0.07 MG/DL (ref 0–0.2)
BILIRUB SERPL-MCNC: 0.56 MG/DL (ref 0.2–1)
CHOLEST SERPL-MCNC: 172 MG/DL
EOSINOPHIL # BLD AUTO: 0.28 THOUSAND/ÂΜL (ref 0–0.61)
EOSINOPHIL NFR BLD AUTO: 3 % (ref 0–6)
ERYTHROCYTE [DISTWIDTH] IN BLOOD BY AUTOMATED COUNT: 12.1 % (ref 11.6–15.1)
EST. AVERAGE GLUCOSE BLD GHB EST-MCNC: 114 MG/DL
HBA1C MFR BLD: 5.6 %
HCT VFR BLD AUTO: 39.2 % (ref 34.8–46.1)
HDLC SERPL-MCNC: 46 MG/DL
HGB BLD-MCNC: 13.1 G/DL (ref 11.5–15.4)
IMM GRANULOCYTES # BLD AUTO: 0.03 THOUSAND/UL (ref 0–0.2)
IMM GRANULOCYTES NFR BLD AUTO: 0 % (ref 0–2)
LDLC SERPL CALC-MCNC: 106 MG/DL (ref 0–100)
LYMPHOCYTES # BLD AUTO: 2.44 THOUSANDS/ÂΜL (ref 0.6–4.47)
LYMPHOCYTES NFR BLD AUTO: 29 % (ref 14–44)
MCH RBC QN AUTO: 31 PG (ref 26.8–34.3)
MCHC RBC AUTO-ENTMCNC: 33.4 G/DL (ref 31.4–37.4)
MCV RBC AUTO: 93 FL (ref 82–98)
MONOCYTES # BLD AUTO: 0.69 THOUSAND/ÂΜL (ref 0.17–1.22)
MONOCYTES NFR BLD AUTO: 8 % (ref 4–12)
NEUTROPHILS # BLD AUTO: 4.87 THOUSANDS/ÂΜL (ref 1.85–7.62)
NEUTS SEG NFR BLD AUTO: 59 % (ref 43–75)
NRBC BLD AUTO-RTO: 0 /100 WBCS
PLATELET # BLD AUTO: 365 THOUSANDS/UL (ref 149–390)
PMV BLD AUTO: 9.2 FL (ref 8.9–12.7)
PROT SERPL-MCNC: 7.3 G/DL (ref 6.4–8.4)
RBC # BLD AUTO: 4.22 MILLION/UL (ref 3.81–5.12)
TRIGL SERPL-MCNC: 100 MG/DL
WBC # BLD AUTO: 8.36 THOUSAND/UL (ref 4.31–10.16)

## 2024-06-01 PROCEDURE — 86003 ALLG SPEC IGE CRUDE XTRC EA: CPT

## 2024-06-01 PROCEDURE — 36415 COLL VENOUS BLD VENIPUNCTURE: CPT

## 2024-06-01 PROCEDURE — 83036 HEMOGLOBIN GLYCOSYLATED A1C: CPT

## 2024-06-01 PROCEDURE — 80061 LIPID PANEL: CPT

## 2024-06-01 PROCEDURE — 85025 COMPLETE CBC W/AUTO DIFF WBC: CPT

## 2024-06-01 PROCEDURE — 80076 HEPATIC FUNCTION PANEL: CPT

## 2024-06-03 ENCOUNTER — TELEPHONE (OUTPATIENT)
Age: 49
End: 2024-06-03

## 2024-06-05 LAB
HONEY BEE IGE QN: <0.1 KU/L
PAPER WASP IGE QN: <0.1 KU/L
WHITEFACED HORNET IGE QN: <0.1 KU/L
YELLOW HORNET IGE QN: <0.1 KU/L
YELLOW JACKET IGE QN: <0.1 KU/L

## 2024-06-05 NOTE — PROGRESS NOTES
Pain Medicine Follow-Up Note    Assessment:  1. Cervical radiculopathy    2. Neck pain    3. DDD (degenerative disc disease), cervical        Plan:  Orders Placed This Encounter   Procedures    FL spine and pain procedure     Standing Status:   Future     Standing Expiration Date:   6/6/2028     Order Specific Question:   Reason for Exam:     Answer:   c7-T1 Yolande   (right sided)     Order Specific Question:   Is the patient pregnant?     Answer:   No     Order Specific Question:   Anticoagulant hold needed?     Answer:   NSAIDS       My impressions and treatment recommendations were discussed in detail with the patient who verbalized understanding and had no further questions.      Patient returns the office after having a cervical epidural steroid injection on 4/30/2024.  Patient states that this injection provided her approximately 1 month of pain relief.  Patient's pain is returning she currently has been using ibuprofen 800s and tizanidine 4 mg tablets as needed for pain.  Patient inquiring about a second epidural steroid injection at this time.  Educated the patient on the nature of epidural steroid injections and that we recommend only 4/year.  Seeing that the patient has only had 1 injection and it did provide her excellent pain relief but then fizzled out I recommend that she have a second injection in hopes of having a longer therapeutic timeframe.  I recommend that she have a right sided cervical epidural steroid injection at the 7-T1. Complete risks and benefits including bleeding, infection, tissue reaction, nerve injury and allergic reaction were discussed. The approach was demonstrated using models and literature was provided. Verbal and written consent was obtained.    Follow-up is planned in 4 weeks after injection time or sooner as warranted.  Discharge instructions were provided. I personally saw and examined the patient and I agree with the above discussed plan of care.    History of Present  Illness:    Patricia Nam is a 48 y.o. female who presents to [facility] for interval re-evaluation of the above stated pain complaints. The patient has a past medical and chronic pain history as outlined in the assessment section. She was last seen on 4/30/2024.    At today's visit patient states that their pain symptoms are better in some ways worse than others with a pain score of 6/10 on the verbal numeric pain scale.  The patient's pain is worse in the morning constant.  The patient's pain is constant in nature.  And the quality of the patient's pain is described as burning, dull-aching, sharp, throbbing, cramping, and pressure-like.  The patient's pain is located in the posterior neck bilateral shoulders and upper chest.  The patient states the amount of pain relief she is obtaining from her current pain relievers is not enough to make a difference in her life due to it only reducing her pain symptoms down to a #6.  The patient is currently using ibuprofen 800 and tizanidine.    Other than as stated above, the patient denies any interval changes in medications, medical condition, mental condition, symptoms, or allergies since the last office visit.         Review of Systems:    Review of Systems   Respiratory:  Negative for shortness of breath.    Cardiovascular:  Negative for chest pain.   Gastrointestinal:  Negative for constipation, diarrhea, nausea and vomiting.   Musculoskeletal:  Positive for arthralgias and neck pain. Negative for gait problem, joint swelling and myalgias.        DROM  Pain in Extremity neck   Skin:  Negative for rash.   Neurological:  Positive for dizziness. Negative for seizures and weakness.   All other systems reviewed and are negative.        Past Medical History:   Diagnosis Date    Allergic     Asthma     Degenerative joint disease     Depression     GERD (gastroesophageal reflux disease)     Lumbar radiculopathy     PONV (postoperative nausea and  vomiting)     Thyroid nodule        Past Surgical History:   Procedure Laterality Date    BREAST BIOPSY Left 2020    neg    CHOLECYSTECTOMY LAPAROSCOPIC N/A 5/6/2022    Procedure: CHOLECYSTECTOMY LAPAROSCOPIC;  Surgeon: Zion Walker MD;  Location: [facility];  Service: General    CYST REMOVAL  10/2020    lt breast     EGD      HYSTERECTOMY      HYSTERECTOMY  1998    LIPOSUCTION  09/2020    TUBAL LIGATION         Family History   Problem Relation Age of Onset    Alzheimer's disease Mother     Cancer Father         stomach/GI    Heart disease Father     No Known Problems Sister     No Known Problems Daughter     No Known Problems Maternal Grandmother     No Known Problems Paternal Grandmother     Colon cancer Maternal Aunt     Cancer Maternal Aunt         stomach    No Known Problems Maternal Aunt     No Known Problems Maternal Aunt     No Known Problems Maternal Aunt     No Known Problems Maternal Aunt     No Known Problems Maternal Aunt     Colon cancer Maternal Uncle     Depression Cousin     Diabetes Brother     Asthma Brother     Cancer Cousin     Breast cancer Neg Hx        Social History     Occupational History    Not on file   Tobacco Use    Smoking status: Never    Smokeless tobacco: Never   Vaping Use    Vaping status: Never Used   Substance and Sexual Activity    Alcohol use: Yes     Comment: Not very often    Drug use: Never    Sexual activity: Yes     Partners: Male     Birth control/protection: None     Comment: With one person for 5 years         Current Outpatient Medications:     albuterol (PROVENTIL HFA,VENTOLIN HFA) 90 mcg/act inhaler, INHALE 1-2 PUFFS EVERY 6 HOURS AS NEEDED FOR WHEEZING., Disp: 18 g, Rfl: 3    azelastine (ASTELIN) 0.1 % nasal spray, 1 spray into each nostril 2 (two) times a day Use in each nostril as directed, Disp: 1 mL, Rfl: 1    Diclofenac Sodium (VOLTAREN) 1 %, Apply 2 g topically 4 (four) times a day, Disp: 100 g, Rfl: 0    EPINEPHrine (EPIPEN) 0.3 mg/0.3 mL SOAJ, Inject  0.3 mL (0.3 mg total) into a muscle once for 1 dose As needed, Disp: , Rfl:     fluocinonide (LIDEX) 0.05 % external solution, Apply topically 2 (two) times a day, Disp: 60 mL, Rfl: 0    FLUoxetine (PROzac) 40 MG capsule, TAKE ONE CAPSULE BY MOUTH EVERY DAY, Disp: 90 capsule, Rfl: 1    hydrOXYzine HCL (ATARAX) 10 mg tablet, Take 1 tablet (10 mg total) by mouth daily at bedtime, Disp: 30 tablet, Rfl: 0    ibuprofen (MOTRIN) 800 mg tablet, Take 1 tablet (800 mg total) by mouth every 8 (eight) hours as needed for moderate pain, Disp: 30 tablet, Rfl: 0    meclizine (ANTIVERT) 25 mg tablet, Take 1 tablet (25 mg total) by mouth every 8 (eight) hours as needed for dizziness, Disp: 90 tablet, Rfl: 0    metoclopramide (REGLAN) 5 mg tablet, Take 1 tablet (5 mg total) by mouth 3 (three) times a day before meals, Disp: 90 tablet, Rfl: 5    omeprazole (PriLOSEC) 10 mg delayed release capsule, Take 1 capsule (10 mg total) by mouth every evening, Disp: 90 capsule, Rfl: 0    omeprazole (PriLOSEC) 40 MG capsule, Take 1 capsule (40 mg total) by mouth daily, Disp: 90 capsule, Rfl: 1    SUMAtriptan (Imitrex) 25 mg tablet, Take 1 tablet (25 mg total) by mouth once as needed for migraine for up to 30 doses, Disp: 30 tablet, Rfl: 0    tiZANidine (ZANAFLEX) 4 mg tablet, Take 1 tablet (4 mg total) by mouth every 8 (eight) hours as needed for muscle spasms, Disp: 30 tablet, Rfl: 0    Allergies   Allergen Reactions    Bee Venom Anaphylaxis    Iodides Other (See Comments)    [place] Oil - Food Allergy GI Intolerance     Other reaction(s): GI Intolerance, GI Reaction    Aspirin Other (See Comments)     shaking  convulsions    Other reaction(s): Other (See Comments)  shaking  convulsions  shaking  convulsions    Metronidazole GI Intolerance     Gi upset      Sulfamethoxazole-Trimethoprim GI Intolerance     Gi upst    Other reaction(s): GI Intolerance, GI Reaction  Gi upst  Gi upst    Tape  [Medical Tape] Other (See Comments) and Hives      "fever  Other reaction(s): Other  fever       Physical Exam:    /70   Pulse 74   Ht 5' 3\" (1.6 m)   Wt 69.4 kg (153 lb)   BMI 27.10 kg/m²     Constitutional:normal, well developed, well nourished, alert, in no distress and non-toxic and no overt pain behavior.  Eyes:anicteric  HEENT:grossly intact  Neck:supple, symmetric, trachea midline and no masses Limited range of motion car  Pulmonary:even and unlabored  Cardiovascular:No edema or pitting edema present  Skin:Normal without rashes or lesions and well hydrated  Psychiatric:Mood and affect appropriate  Neurologic:Cranial Nerves II-XII grossly intact  Musculoskeletal:normal gait      Imaging  FL spine and pain procedure    (Results Pending)         Orders Placed This Encounter   Procedures    FL spine and pain procedure       This document was created using speech voice recognition software.   Grammatical errors, random word insertions, pronoun errors, and incomplete sentences are an occasional consequence of this system due to software limitations, ambient noise, and hardware issues.   Any formal questions or concerns about content, text, or information contained within the body of this dictation should be directly addressed to the provider for clarification.   "

## 2024-06-06 ENCOUNTER — PATIENT MESSAGE (OUTPATIENT)
Dept: RADIOLOGY | Facility: CLINIC | Age: 49
End: 2024-06-06

## 2024-06-06 ENCOUNTER — OFFICE VISIT (OUTPATIENT)
Dept: PAIN MEDICINE | Facility: CLINIC | Age: 49
End: 2024-06-06
Payer: COMMERCIAL

## 2024-06-06 VITALS
WEIGHT: 153 LBS | SYSTOLIC BLOOD PRESSURE: 106 MMHG | HEIGHT: 63 IN | HEART RATE: 74 BPM | DIASTOLIC BLOOD PRESSURE: 70 MMHG | BODY MASS INDEX: 27.11 KG/M2

## 2024-06-06 DIAGNOSIS — M54.12 CERVICAL RADICULOPATHY: Primary | ICD-10-CM

## 2024-06-06 DIAGNOSIS — M50.30 DDD (DEGENERATIVE DISC DISEASE), CERVICAL: ICD-10-CM

## 2024-06-06 DIAGNOSIS — M54.2 NECK PAIN: ICD-10-CM

## 2024-06-06 PROCEDURE — 99214 OFFICE O/P EST MOD 30 MIN: CPT

## 2024-06-06 NOTE — PATIENT COMMUNICATION
Pt is scheduled for FREDDIE with Dr Dow on 8/1/24    Pt is not diabetic    Pt takes ibuprofen and was advised to stop this medication 24 hours prior to her procedure    Pt given instructions in office and via myc message    Have you completed PT/HEP/Chiro in the past 6 months for dedicated area? Yes with [facility]  If yes, how long did you complete? Approx 1 month  What was the frequency? 2 times a week  Did it provide relief? no  If no, reason therapy was not completed?

## 2024-06-09 NOTE — PATIENT INSTRUCTIONS
Epidural Steroid Injection, Ambulatory Care   GENERAL INFORMATION:   What do I need to know about an epidural steroid injection?  An epidural steroid injection (JAZMINE) is a procedure to inject steroid medicine into the epidural space. The epidural space is between your spinal cord and vertebrae. Steroids reduce inflammation and fluid buildup in your spine that may be causing pain. You may be given pain medicine along with the steroids.   How do I prepare for an JAZMINE?  Your healthcare provider will talk to you about how to prepare for your procedure. He will tell you what medicines to take or not take on the day of your procedure. You may need to stop taking blood thinners or other medicines several days before your procedure. You may need to adjust any diabetes medicine you take on the day of your procedure. Steroid medicine can increase your blood sugar level.   What will happen during an JAZMINE?   You will be given medicine to numb the procedure area. You will be awake for the procedure, but you will not feel pain. You may also be given medicine to help you relax during the procedure. Contrast liquid will be used to help your healthcare provider see the area better. Tell the healthcare provider if you have ever had an allergic reaction to contrast liquid.    Your healthcare provider may place the needle into your neck area, middle of your back, or tailbone area. He may inject the medicine next to the nerves that are causing your pain. He may instead inject the medicine into a larger area of the epidural space. This helps the medicine spread to more nerves. Your healthcare provider will use a fluoroscope to help guide the needle to the right place. A fluoroscope is a type of x-ray. After the procedure, a bandage will be placed over the injection site to prevent infection.  What are the risks of an JAZMINE?  You may have temporary or permanent nerve damage or paralysis. You may have bleeding or develop a serious infection,  such as meningitis (swelling of the brain coverings). An abscess may also develop. You may need surgery to fix the abscess. You may have a seizure, anxiety, or trouble sleeping. If you are a man, you may have temporary erectile dysfunction (not able to have an erection).   CARE AGREEMENT:   You have the right to help plan your care. Learn about your health condition and how it may be treated. Discuss treatment options with your caregivers to decide what care you want to receive. You always have the right to refuse treatment. The above information is an  only. It is not intended as medical advice for individual conditions or treatments. Talk to your doctor, nurse or pharmacist before following any medical regimen to see if it is safe and effective for you.  © 2014 [employer] Information is for End User's use only and may not be sold, redistributed or otherwise used for commercial purposes. All illustrations and images included in CareNotes® are the copyrighted property of A.D.A[employer]M., Inc. or [employer].

## 2024-06-13 ENCOUNTER — TELEPHONE (OUTPATIENT)
Age: 49
End: 2024-06-13

## 2024-06-14 ENCOUNTER — OFFICE VISIT (OUTPATIENT)
Age: 49
End: 2024-06-14
Payer: COMMERCIAL

## 2024-06-14 VITALS
HEART RATE: 62 BPM | DIASTOLIC BLOOD PRESSURE: 80 MMHG | HEIGHT: 63 IN | BODY MASS INDEX: 27.11 KG/M2 | WEIGHT: 153 LBS | SYSTOLIC BLOOD PRESSURE: 124 MMHG

## 2024-06-14 DIAGNOSIS — K21.00 GASTROESOPHAGEAL REFLUX DISEASE WITH ESOPHAGITIS WITHOUT HEMORRHAGE: ICD-10-CM

## 2024-06-14 DIAGNOSIS — K58.0 IRRITABLE BOWEL SYNDROME WITH DIARRHEA: ICD-10-CM

## 2024-06-14 DIAGNOSIS — K76.0 FATTY LIVER: ICD-10-CM

## 2024-06-14 DIAGNOSIS — Z80.0 FAMILY HISTORY OF STOMACH CANCER: ICD-10-CM

## 2024-06-14 DIAGNOSIS — K31.84 GASTROPARESIS: Primary | ICD-10-CM

## 2024-06-14 PROCEDURE — 99214 OFFICE O/P EST MOD 30 MIN: CPT | Performed by: PHYSICIAN ASSISTANT

## 2024-06-14 NOTE — PROGRESS NOTES
[facility] - Outpatient Follow-up Note  Patricia Nam 48 y.o. female MRN: [id number]  Encounter: [id number]          ASSESSMENT AND PLAN:      1. Gastroesophageal reflux disease  2. Gastroparesis  3. Family history of stomach cancer  4. Irritable bowel syndrome with diarrhea    Patient presents for follow up: she has gastroparesis, GERD and IBS-D.  Her gastroparesis symptoms began after her abdominal liposuction surgery 5 years ago.  EGD 12/1/21 was normal and biopsies were negative for h pylori.  Colonoscopy with visualization of the terminal ileum 12/1/21 was normal and biopsies were negative for microscopic colitis.  US 12/10/22 s/p mild hepatic steatosis and s/p cholecystectomy.  Celiac serology was negative.  Gastric emptying study 12/6/22 showed a severely reduced rate of gastric emptying.  MR enterography 2/23 was negative for SB crohn's. Pancreatic fecal elastase was normal.  24 hours 5-HIAA was negative.  Her IBS-D/possible SIBO was treated with a Xifaxan 550mg po TID x 14 days course previously significant improvement!     She reports increased GERD symptoms recently requiring an increase in her Omeprazole dose.  She is concerned as her father had stomach cancer.     Will plan for repeat EGD to investigate.  Continue Omeprazole.  We reviewed the importance of dietary modifications with small, frequent low fat meals.  She has Reglan to utilize just on an as needed basis with benefit (we reviewed the risk of tardive dyskinesia).    5. Fatty liver    She has a history of a fatty liver.   She denies alcohol.  She reports she was vaccinated for Hep A and B in the past.    Will check an ultrasound elastography to assess the steatosis and for any fibrosis.  Treatment with healthy diet and exercise recommended, obtain an ideal BMI.    ______________________________________________________________________    SUBJECTIVE:  Patient is a pleasant 48 year old female who presents to the  office to schedule a repeat EGD. She has chronic GERD and gastroparesis.  She reports her nausea and early satiety have been pretty well controlled with diet changes and she only uses Reglan occasionally.  She has had more heartburn though and her PCP increase the Omeprazole dose recently with improvement.  Her father had stomach cancer.  Her prior diarrhea and abdominal pain resolved with a Xifaxan course.      REVIEW OF SYSTEMS IS OTHERWISE NEGATIVE.      Historical Information   Past Medical History:   Diagnosis Date    Allergic     Asthma     Degenerative joint disease     Depression     GERD (gastroesophageal reflux disease)     Lumbar radiculopathy     PONV (postoperative nausea and vomiting)     Thyroid nodule      Past Surgical History:   Procedure Laterality Date    BREAST BIOPSY Left 2020    neg    CHOLECYSTECTOMY LAPAROSCOPIC N/A 5/6/2022    Procedure: CHOLECYSTECTOMY LAPAROSCOPIC;  Surgeon: Zion Walker MD;  Location: [facility];  Service: General    CYST REMOVAL  10/2020    lt breast     EGD      HYSTERECTOMY      HYSTERECTOMY  1998    LIPOSUCTION  09/2020    TUBAL LIGATION       Social History   Social History     Substance and Sexual Activity   Alcohol Use Yes    Comment: Not very often     Social History     Substance and Sexual Activity   Drug Use Never     Social History     Tobacco Use   Smoking Status Never   Smokeless Tobacco Never     Family History   Problem Relation Age of Onset    Alzheimer's disease Mother     Cancer Father         stomach/GI    Heart disease Father     No Known Problems Sister     No Known Problems Daughter     No Known Problems Maternal Grandmother     No Known Problems Paternal Grandmother     Colon cancer Maternal Aunt     Cancer Maternal Aunt         stomach    No Known Problems Maternal Aunt     No Known Problems Maternal Aunt     No Known Problems Maternal Aunt     No Known Problems Maternal Aunt     No Known Problems Maternal Aunt     Colon cancer Maternal Uncle   "   Depression Cousin     Diabetes Brother     Asthma Brother     Cancer Cousin     Breast cancer Neg Hx        Meds/Allergies       Current Outpatient Medications:     albuterol (PROVENTIL HFA,VENTOLIN HFA) 90 mcg/act inhaler    azelastine (ASTELIN) 0.1 % nasal spray    Diclofenac Sodium (VOLTAREN) 1 %    EPINEPHrine (EPIPEN) 0.3 mg/0.3 mL SOAJ    fluocinonide (LIDEX) 0.05 % external solution    FLUoxetine (PROzac) 40 MG capsule    hydrOXYzine HCL (ATARAX) 10 mg tablet    ibuprofen (MOTRIN) 800 mg tablet    meclizine (ANTIVERT) 25 mg tablet    metoclopramide (REGLAN) 5 mg tablet    omeprazole (PriLOSEC) 10 mg delayed release capsule    omeprazole (PriLOSEC) 40 MG capsule    SUMAtriptan (Imitrex) 25 mg tablet    tiZANidine (ZANAFLEX) 4 mg tablet    Allergies   Allergen Reactions    Bee Venom Anaphylaxis    Iodides Other (See Comments)    [place] Oil - Food Allergy GI Intolerance     Other reaction(s): GI Intolerance, GI Reaction    Aspirin Other (See Comments)     shaking  convulsions    Other reaction(s): Other (See Comments)  shaking  convulsions  shaking  convulsions    Metronidazole GI Intolerance     Gi upset      Sulfamethoxazole-Trimethoprim GI Intolerance     Gi upst    Other reaction(s): GI Intolerance, GI Reaction  Gi upst  Gi upst    Tape  [Medical Tape] Other (See Comments) and Hives     fever  Other reaction(s): Other  fever           Objective     Blood pressure 124/80, pulse 62, height 5' 3\" (1.6 m), weight 69.4 kg (153 lb), not currently breastfeeding. Body mass index is 27.1 kg/m².      PHYSICAL EXAM:      General Appearance:   Alert, cooperative, no distress   HEENT:   Normocephalic, atraumatic, anicteric.     Neck:  Supple, symmetrical, trachea midline   Lungs:   Clear to auscultation bilaterally; no rales, rhonchi or wheezing; respirations unlabored    Heart::   Regular rate and rhythm; no murmur, rub, or gallop.   Abdomen:   Soft, non-tender, non-distended; normal bowel sounds; no masses, no " organomegaly    Genitalia:   Deferred    Rectal:   Deferred    Extremities:  No cyanosis, clubbing or edema    Pulses:  2+ and symmetric    Skin:  No jaundice, rashes, or lesions    Lymph nodes:  No palpable cervical lymphadenopathy        Lab Results:   No visits with results within 1 Day(s) from this visit.   Latest known visit with results is:   Appointment on 06/01/2024   Component Date Value    Hemoglobin A1C 06/01/2024 5.6     EAG 06/01/2024 114     Honeybee Venom 06/01/2024 <0.10     White Hornet Venom 06/01/2024 <0.10     Yellow Hornet Venom 06/01/2024 <0.10     Paper Wasp Venom 06/01/2024 <0.10     Yellow Jacket  IgE 06/01/2024 <0.10     WBC 06/01/2024 8.36     RBC 06/01/2024 4.22     Hemoglobin 06/01/2024 13.1     Hematocrit 06/01/2024 39.2     MCV 06/01/2024 93     MCH 06/01/2024 31.0     MCHC 06/01/2024 33.4     RDW 06/01/2024 12.1     MPV 06/01/2024 9.2     Platelets 06/01/2024 365     nRBC 06/01/2024 0     Segmented % 06/01/2024 59     Immature Grans % 06/01/2024 0     Lymphocytes % 06/01/2024 29     Monocytes % 06/01/2024 8     Eosinophils Relative 06/01/2024 3     Basophils Relative 06/01/2024 1     Absolute Neutrophils 06/01/2024 4.87     Absolute Immature Grans 06/01/2024 0.03     Absolute Lymphocytes 06/01/2024 2.44     Absolute Monocytes 06/01/2024 0.69     Eosinophils Absolute 06/01/2024 0.28     Basophils Absolute 06/01/2024 0.05     Cholesterol 06/01/2024 172     Triglycerides 06/01/2024 100     HDL, Direct 06/01/2024 46 (L)     LDL Calculated 06/01/2024 106 (H)     Total Bilirubin 06/01/2024 0.56     Bilirubin, Direct 06/01/2024 0.07     Alkaline Phosphatase 06/01/2024 104     AST 06/01/2024 25     ALT 06/01/2024 23     Total Protein 06/01/2024 7.3     Albumin 06/01/2024 4.2          Radiology Results:   No results found.

## 2024-06-14 NOTE — PATIENT INSTRUCTIONS
Scheduled date of EGD(as of today): 8/17/24  Physician performing EGD: Noy  Location of EGD: [place]  Instructions reviewed with patient by: Jeri RODRÍGUEZ  Clearances:

## 2024-06-19 ENCOUNTER — OFFICE VISIT (OUTPATIENT)
Age: 49
End: 2024-06-19
Payer: COMMERCIAL

## 2024-06-19 VITALS — HEIGHT: 63 IN | WEIGHT: 153 LBS | BODY MASS INDEX: 27.11 KG/M2

## 2024-06-19 DIAGNOSIS — L21.9 SEBORRHEIC DERMATITIS: Primary | ICD-10-CM

## 2024-06-19 PROCEDURE — 99214 OFFICE O/P EST MOD 30 MIN: CPT | Performed by: NURSE PRACTITIONER

## 2024-06-19 RX ORDER — KETOCONAZOLE 20 MG/G
CREAM TOPICAL
Qty: 60 G | Refills: 3 | Status: SHIPPED | OUTPATIENT
Start: 2024-06-19

## 2024-06-19 RX ORDER — CLOBETASOL PROPIONATE 0.46 MG/ML
SOLUTION TOPICAL
Qty: 50 ML | Refills: 1 | Status: SHIPPED | OUTPATIENT
Start: 2024-06-19

## 2024-06-19 NOTE — PROGRESS NOTES
"[facility]ology [facility] Note     Patient Name: Patricia Nam  Encounter Date: 6/19/24     Have you been cared for by a [facility] Dermatologist in the last 3 years and, if so, which description applies to you?    Yes.  I have been here within the last 3 years, and my medical history has NOT changed since that time.  I am FEMALE/of child-bearing potential.    REVIEW OF SYSTEMS:  Have you recently had or currently have any of the following? No changes in my recent health.   PAST MEDICAL HISTORY:  Have you personally ever had or currently have any of the following?  If \"YES,\" then please provide more detail. No changes in my medical history.   HISTORY OF IMMUNOSUPPRESSION: Do you have a history of any of the following:  Systemic Immunosuppression such as Diabetes, Biologic or Immunotherapy, Chemotherapy, Organ Transplantation, Bone Marrow Transplantation?  No     Answering \"YES\" requires the addition of the dotphrase \"IMMUNOSUPPRESSED\" as the first diagnosis of the patient's visit.   FAMILY HISTORY:  Any \"first degree relatives\" (parent, brother, sister, or child) with the following?    No changes in my family's known health.   PATIENT EXPERIENCE:    Do you want the Dermatologist to perform a COMPLETE skin exam today including a clinical examination under the \"bra and underwear\" areas?  NO  If necessary, do we have your permission to call and leave a detailed message on your Preferred Phone number that includes your specific medical information?  Yes      Allergies   Allergen Reactions    Bee Venom Anaphylaxis    Iodides Other (See Comments)    [place] Oil - Food Allergy GI Intolerance     Other reaction(s): GI Intolerance, GI Reaction    Aspirin Other (See Comments)     shaking  convulsions    Other reaction(s): Other (See Comments)  shaking  convulsions  shaking  convulsions    Metronidazole GI Intolerance     Gi upset      Sulfamethoxazole-Trimethoprim GI Intolerance     Gi upst    Other reaction(s): GI " Intolerance, GI Reaction  Gi upst  Gi upst    Tape  [Medical Tape] Other (See Comments) and Hives     fever  Other reaction(s): Other  fever      Current Outpatient Medications:     albuterol (PROVENTIL HFA,VENTOLIN HFA) 90 mcg/act inhaler, INHALE 1-2 PUFFS EVERY 6 HOURS AS NEEDED FOR WHEEZING., Disp: 18 g, Rfl: 3    azelastine (ASTELIN) 0.1 % nasal spray, 1 spray into each nostril 2 (two) times a day Use in each nostril as directed, Disp: 1 mL, Rfl: 1    Diclofenac Sodium (VOLTAREN) 1 %, Apply 2 g topically 4 (four) times a day, Disp: 100 g, Rfl: 0    EPINEPHrine (EPIPEN) 0.3 mg/0.3 mL SOAJ, Inject 0.3 mL (0.3 mg total) into a muscle once for 1 dose As needed, Disp: , Rfl:     fluocinonide (LIDEX) 0.05 % external solution, Apply topically 2 (two) times a day, Disp: 60 mL, Rfl: 0    FLUoxetine (PROzac) 40 MG capsule, TAKE ONE CAPSULE BY MOUTH EVERY DAY, Disp: 90 capsule, Rfl: 1    hydrOXYzine HCL (ATARAX) 10 mg tablet, Take 1 tablet (10 mg total) by mouth daily at bedtime, Disp: 30 tablet, Rfl: 0    ibuprofen (MOTRIN) 800 mg tablet, Take 1 tablet (800 mg total) by mouth every 8 (eight) hours as needed for moderate pain, Disp: 30 tablet, Rfl: 0    meclizine (ANTIVERT) 25 mg tablet, Take 1 tablet (25 mg total) by mouth every 8 (eight) hours as needed for dizziness, Disp: 90 tablet, Rfl: 0    metoclopramide (REGLAN) 5 mg tablet, Take 1 tablet (5 mg total) by mouth 3 (three) times a day before meals, Disp: 90 tablet, Rfl: 5    omeprazole (PriLOSEC) 10 mg delayed release capsule, Take 1 capsule (10 mg total) by mouth every evening, Disp: 90 capsule, Rfl: 0    omeprazole (PriLOSEC) 40 MG capsule, Take 1 capsule (40 mg total) by mouth daily, Disp: 90 capsule, Rfl: 1    SUMAtriptan (Imitrex) 25 mg tablet, Take 1 tablet (25 mg total) by mouth once as needed for migraine for up to 30 doses, Disp: 30 tablet, Rfl: 0    tiZANidine (ZANAFLEX) 4 mg tablet, Take 1 tablet (4 mg total) by mouth every 8 (eight) hours as needed for  muscle spasms, Disp: 30 tablet, Rfl: 0          Whom besides the patient is providing clinical information about today's encounter?   NO ADDITIONAL HISTORIAN (patient alone provided history)    Physical Exam and Assessment/Plan by Diagnosis:       SEBORRHEIC DERMATITIS    Physical Exam:  Anatomic Location Affected:  behind the ears, eyebrows   Morphological Description:  Pink patches with mild scale       Additional History of Present Condition:  Previously seeing Dr. Van for itchy scalp. Previous treatment has included Ketoconazole 2% Shampoo, Fluocinonide 0.05 % cream and solution, Mometasone 0.1% lotion- all with no relief.     Assessment and Plan:  Based on a thorough discussion of this condition and the management approach to it (including a comprehensive discussion of the known risks, side effects and potential benefits of treatment), the patient (family) agrees to implement the following specific plan:  - Recommend sensitive skin care regimen  - detergent free of dyes and perfumes (example, free and clear). Try washing clothes with extra rinse cycle  - Short lukewarm showers  - White dove bar soap  Recommend using Vanicream Shampoo  - Recommend moisturizing whole body with plain Vaseline or Creams multiple times a day (examples: Cetaphil, CeraVe. and Eucerin)  - avoid aerosols and fragrances in the home (candles, plug ins, perfume, air freshener, etc)   Recommend that you stop using current hair products/ shampoos/ conditioners, hair color/ dyes, any perfumes or perfumed lotions. Once you have eliminated these products you can slowly start to re introduce them and see if your skin can tolerate them.   Start Ketoconazole cream- apply twice a day   Start Clobetasol scalp solution as needed for flares   Discussed Patch testing -need authorization before scheduled                    Seborrheic Dermatitis   Seborrheic dermatitis is a common, chronic or relapsing form of eczema/dermatitis that mainly affects  "the sebaceous, gland-rich regions of the scalp, face, and trunk.  There are infantile and adult forms of seborrhoeic dermatitis. It is sometimes associated with psoriasis and, in that clinical scenario, may be referred to as \"sebo-psoriasis.\"  Seborrheic dermatitis is also known as \"seborrheic eczema.\"  Dandruff (also called \"pityriasis capitis\") is an uninflamed form of seborrhoeic dermatitis. Dandruff presents as bran-like scaly patches scattered within hair-bearing areas of the scalp.  In an infant, this condition may be referred to as \"cradle cap.\"  The cause of seborrheic dermatitis is not completely understood. It is associated with proliferation of various species of the skin commensal Malassezia, in its yeast (non-pathogenic) form. Its metabolites (such as the fatty acids oleic acid, malssezin, and indole-3-carbaldehyde) may cause an inflammatory reaction. Differences in skin barrier lipid content and function may account for individual presentations.    Adult Seborrheic Dermatitis  Adult seborrheic dermatitis tends to begin in late adolescence; prevalence is greatest in young adults and in the elderly. It is more common in males than in females.    The following factors are sometimes associated with severe adult seborrheic dermatitis:  Oily skin  Familial tendency to seborrhoeic dermatitis or a family history of psoriasis  Immunosuppression: organ transplant recipient, human immunodeficiency virus (HIV) infection and patients with lymphoma  Neurological and psychiatric diseases: Parkinson disease, tardive dyskinesia, depression, epilepsy, facial nerve palsy, spinal cord injury and congenital disorders such as Down syndrome  Treatment for psoriasis with psoralen and ultraviolet A (PUVA) therapy  Lack of sleep  Stressful events.    In adults, seborrheic dermatitis may typically affect the scalp, face (creases around the nose, behind ears, within eyebrows) and upper trunk. Typical clinical features " include:  Winter flares, improving in summer following sun exposure  Minimal itch most of the time  Combination oily and dry mid-facial skin  Ill-defined localized scaly patches or diffuse scale in the scalp  Blepharitis; scaly red eyelid margins  [place]-pink, thin, scaly, and ill-defined plaques in skin folds on both sides of the face  Petal or ring-shaped flaky patches on hair-line and on anterior chest  Rash in armpits, under the breasts, in the groin folds and genital creases  Superficial folliculitis (inflamed hair follicles) on cheeks and upper trunk    Seborrheic dermatitis is diagnosed by its clinical appearance and behavior. Skin biopsy may be helpful but is rarely necessary to make this diagnosis.     Scribe Attestation      I,:  Bisi Gamino MA am acting as a scribe while in the presence of the attending physician.:       I,:  KIRSTIN Lebron personally performed the services described in this documentation    as scribed in my presence.:

## 2024-06-19 NOTE — PATIENT INSTRUCTIONS
"Assessment and Plan:  Based on a thorough discussion of this condition and the management approach to it (including a comprehensive discussion of the known risks, side effects and potential benefits of treatment), the patient (family) agrees to implement the following specific plan:  - Recommend sensitive skin care regimen  - detergent free of dyes and perfumes (example, free and clear). Try washing clothes with extra rinse cycle.  - Short lukewarm showers  - White dove bar soap  Recommend using Vanicream Shampoo  - Recommend moisturizing whole body with plain Vaseline or Creams multiple times a day (examples: Cetaphil, CeraVe. and Eucerin)  - avoid aerosols and fragrances in the home (candles, plug ins, perfume, air freshener, etc)   Recommend that you stop using current hair products/ shampoos/ conditioners, hair color/ dyes, any perfumes or perfumed lotions. Once you have eliminated these products you can slowly start to re introduce them and see if your skin can tolerate them.   Start Ketoconazole cream apply twice a day   Discussed Patch testing need authorization before scheduled  Start Clobetasol scalp solution twice a day                    Seborrheic Dermatitis   Seborrheic dermatitis is a common, chronic or relapsing form of eczema/dermatitis that mainly affects the sebaceous, gland-rich regions of the scalp, face, and trunk.  There are infantile and adult forms of seborrhoeic dermatitis. It is sometimes associated with psoriasis and, in that clinical scenario, may be referred to as \"sebo-psoriasis.\"  Seborrheic dermatitis is also known as \"seborrheic eczema.\"  Dandruff (also called \"pityriasis capitis\") is an uninflamed form of seborrhoeic dermatitis. Dandruff presents as bran-like scaly patches scattered within hair-bearing areas of the scalp.  In an infant, this condition may be referred to as \"cradle cap.\"  The cause of seborrheic dermatitis is not completely understood. It is associated with " proliferation of various species of the skin commensal Malassezia, in its yeast (non-pathogenic) form. Its metabolites (such as the fatty acids oleic acid, malssezin, and indole-3-carbaldehyde) may cause an inflammatory reaction. Differences in skin barrier lipid content and function may account for individual presentations.    Adult Seborrheic Dermatitis  Adult seborrheic dermatitis tends to begin in late adolescence; prevalence is greatest in young adults and in the elderly. It is more common in males than in females.    The following factors are sometimes associated with severe adult seborrheic dermatitis:  Oily skin  Familial tendency to seborrhoeic dermatitis or a family history of psoriasis  Immunosuppression: organ transplant recipient, human immunodeficiency virus (HIV) infection and patients with lymphoma  Neurological and psychiatric diseases: Parkinson disease, tardive dyskinesia, depression, epilepsy, facial nerve palsy, spinal cord injury and congenital disorders such as Down syndrome  Treatment for psoriasis with psoralen and ultraviolet A (PUVA) therapy  Lack of sleep  Stressful events.    In adults, seborrheic dermatitis may typically affect the scalp, face (creases around the nose, behind ears, within eyebrows) and upper trunk. Typical clinical features include:  Winter flares, improving in summer following sun exposure  Minimal itch most of the time  Combination oily and dry mid-facial skin  Ill-defined localized scaly patches or diffuse scale in the scalp  Blepharitis; scaly red eyelid margins  [place]-pink, thin, scaly, and ill-defined plaques in skin folds on both sides of the face  Petal or ring-shaped flaky patches on hair-line and on anterior chest  Rash in armpits, under the breasts, in the groin folds and genital creases  Superficial folliculitis (inflamed hair follicles) on cheeks and upper trunk    Seborrheic dermatitis is diagnosed by its clinical appearance and behavior. Skin biopsy may  be helpful but is rarely necessary to make this diagnosis.

## 2024-06-20 ENCOUNTER — TELEPHONE (OUTPATIENT)
Age: 49
End: 2024-06-20

## 2024-06-20 DIAGNOSIS — Z76.0 MEDICATION REFILL: ICD-10-CM

## 2024-06-20 DIAGNOSIS — L21.9 SEBORRHEIC DERMATITIS: Primary | ICD-10-CM

## 2024-06-20 RX ORDER — KETOCONAZOLE 20 MG/ML
SHAMPOO TOPICAL
Qty: 120 ML | Refills: 5 | Status: SHIPPED | OUTPATIENT
Start: 2024-06-20

## 2024-06-20 NOTE — TELEPHONE ENCOUNTER
Called and left a message for the patient letting her know Cade said we typically only prescribe the cream because it is most effective and covered by insurance. There is the option of the topical gel but this is unknown whether it is covered by her insurance. The cream should be used on the face and behind the ears and she should continue to use the shampoo that she has at home. If she prefers she can us the shampoo on her body and face and it is no harm us she does not like the cream. The shampoo may be effective on the face and body too.

## 2024-06-21 ENCOUNTER — TELEPHONE (OUTPATIENT)
Age: 49
End: 2024-06-21

## 2024-06-21 NOTE — TELEPHONE ENCOUNTER
Patient called asking if she can switch medications (Colbetasol and Ketoconazole) she was given from last visit.    She said one works better than the other.    Triaged call

## 2024-06-21 NOTE — TELEPHONE ENCOUNTER
Patient believes the clobetasol (TEMOVATE) 0.05% external solution may be helping more than the cream. She asked if it would be safe to use 1-2 times a week versus as needed for flares. Confirmed with patient that this is okay as long as she is not using it daily or more than once a day.

## 2024-06-25 ENCOUNTER — TELEPHONE (OUTPATIENT)
Age: 49
End: 2024-06-25

## 2024-06-26 ENCOUNTER — TELEPHONE (OUTPATIENT)
Age: 49
End: 2024-06-26

## 2024-06-26 DIAGNOSIS — G43.009 MIGRAINE WITHOUT AURA AND WITHOUT STATUS MIGRAINOSUS, NOT INTRACTABLE: ICD-10-CM

## 2024-06-26 DIAGNOSIS — K21.9 GASTROESOPHAGEAL REFLUX DISEASE WITHOUT ESOPHAGITIS: ICD-10-CM

## 2024-06-26 DIAGNOSIS — R51.9 WORSENING HEADACHES: Primary | ICD-10-CM

## 2024-06-26 RX ORDER — OMEPRAZOLE 10 MG/1
10 CAPSULE, DELAYED RELEASE ORAL EVERY EVENING
Qty: 90 CAPSULE | Refills: 0 | Status: SHIPPED | OUTPATIENT
Start: 2024-06-26

## 2024-06-26 NOTE — TELEPHONE ENCOUNTER
Caller: Bela    Doctor: Victor M    Reason for call: returning  phone call    Call back#: [phone]  
Caller: Patient     Doctor: José     Reason for call: Patient states she was to schedule injections for the back of the head. She is looking to see if these can now be scheduled ..       Please advise     Call back#: [phone]     
Caller: Patient    Doctor: Victor M     Reason for call: Looking to change the time of the injection n July .     Please call patient to assist     Call back#: [phone]     
Lm for pt -- asked for rcb -- has FREDDIE scheduled, just needs the in office occipital nerve procedure scheduled  
Occipital  nerve blocks are to treat occipital neuralgia which is a pain that radiates from the back your head and wraps around your ears.  When people do fine headache it is just pain that happens within the the head.  But if this is more a migraine or more of a deeper headache then it would be more of a neurological issue and that it is not something that we treat.  
S/W pt and advised the same  States pain is coming from the back of her head and around to temple  She will try ONB  CB with questions or concerns  
Spoke with pt - rescheduled injections    Pt would like to verify that the occipital nerve block that she is scheduled for will/is meant to help with her headaches.      
Spoke with pt, scheduled appt  
no

## 2024-06-26 NOTE — TELEPHONE ENCOUNTER
Patient states at last appointment she was prescribed Imitrex but it is not helping. She is requesting an MRI be ordered. Patient requests call back to further advise/update.

## 2024-06-26 NOTE — TELEPHONE ENCOUNTER
Called patient and she is aware of the process and about he MRI getting an approval as well. I did let her know that if it is not then she could see Neuro as well or discuss other treatments

## 2024-06-26 NOTE — TELEPHONE ENCOUNTER
Please inform patient that I will order an MRI, but it may not be covered if she has not had any prior imaging.  We did discuss use of prophylactic treatment, but she refused.  This could also be an option.  But I will also place an order for neurology, so she can have an evaluation with them

## 2024-07-01 ENCOUNTER — OFFICE VISIT (OUTPATIENT)
Dept: OBGYN CLINIC | Facility: CLINIC | Age: 49
End: 2024-07-01
Payer: COMMERCIAL

## 2024-07-01 VITALS
SYSTOLIC BLOOD PRESSURE: 119 MMHG | BODY MASS INDEX: 27.32 KG/M2 | HEIGHT: 63 IN | HEART RATE: 74 BPM | DIASTOLIC BLOOD PRESSURE: 74 MMHG | WEIGHT: 154.2 LBS

## 2024-07-01 DIAGNOSIS — M50.30 DDD (DEGENERATIVE DISC DISEASE), CERVICAL: Primary | ICD-10-CM

## 2024-07-01 PROCEDURE — 99213 OFFICE O/P EST LOW 20 MIN: CPT | Performed by: ORTHOPAEDIC SURGERY

## 2024-07-01 NOTE — PROGRESS NOTES
[facility] ORTHOPEDIC SPINE SURGERY  DR.AMIR JOSIE MD  [address] [address] [facility] [address] [phone]    HISTORY OF PRESENT ILLNESS:    Patricia Nam is a 48 y.o. female who presents for follow-up of cervical spine. Patient was last seen in the office on 4/15/2024 where routine myelopathy check was performed. Patient reports she has noticed she is tripping more recently and has balance issues. Patient also mentions her depth perception is off. Patient denies any changes with hand writing or hand dexterity.        ALLERGIES:   Allergies   Allergen Reactions    Bee Venom Anaphylaxis    Iodides Other (See Comments)    [place] Oil - Food Allergy GI Intolerance     Other reaction(s): GI Intolerance, GI Reaction    Aspirin Other (See Comments)     shaking  convulsions    Other reaction(s): Other (See Comments)  shaking  convulsions  shaking  convulsions    Metronidazole GI Intolerance     Gi upset      Sulfamethoxazole-Trimethoprim GI Intolerance     Gi upst    Other reaction(s): GI Intolerance, GI Reaction  Gi upst  Gi upst    Tape  [Medical Tape] Other (See Comments) and Hives     fever  Other reaction(s): Other  fever       MEDICATIONS:    Current Outpatient Medications:     albuterol (PROVENTIL HFA,VENTOLIN HFA) 90 mcg/act inhaler, INHALE 1-2 PUFFS EVERY 6 HOURS AS NEEDED FOR WHEEZING., Disp: 18 g, Rfl: 3    azelastine (ASTELIN) 0.1 % nasal spray, 1 spray into each nostril 2 (two) times a day Use in each nostril as directed, Disp: 1 mL, Rfl: 1    clobetasol (TEMOVATE) 0.05 % external solution, Apply to scalp only as needed for flaring, Disp: 50 mL, Rfl: 1    Diclofenac Sodium (VOLTAREN) 1 %, Apply 2 g topically 4 (four) times a day, Disp: 100 g, Rfl: 0    EPINEPHrine (EPIPEN) 0.3 mg/0.3 mL SOAJ, Inject 0.3 mL (0.3 mg total) into a muscle once for 1 dose As needed, Disp: , Rfl:     fluocinonide (LIDEX) 0.05 % external solution, Apply topically 2 (two) times a day, Disp: 60 mL, Rfl: 0    FLUoxetine (PROzac)  "40 MG capsule, TAKE ONE CAPSULE BY MOUTH EVERY DAY, Disp: 90 capsule, Rfl: 1    hydrOXYzine HCL (ATARAX) 10 mg tablet, Take 1 tablet (10 mg total) by mouth daily at bedtime, Disp: 30 tablet, Rfl: 0    ibuprofen (MOTRIN) 800 mg tablet, Take 1 tablet (800 mg total) by mouth every 8 (eight) hours as needed for moderate pain, Disp: 30 tablet, Rfl: 0    ketoconazole (NIZORAL) 2 % cream, Apply topically to affected areas twice a day, Disp: 60 g, Rfl: 3    ketoconazole (NIZORAL) 2 % shampoo, Daily for 2 weeks straight and then on \"Mondays, Wednesdays and Fridays\":  Lather into scalp and skin on face, neck, chest, and back; leave on for 5 minutes and then rinse off completely., Disp: 120 mL, Rfl: 5    meclizine (ANTIVERT) 25 mg tablet, Take 1 tablet (25 mg total) by mouth every 8 (eight) hours as needed for dizziness, Disp: 90 tablet, Rfl: 0    metoclopramide (REGLAN) 5 mg tablet, Take 1 tablet (5 mg total) by mouth 3 (three) times a day before meals, Disp: 90 tablet, Rfl: 5    omeprazole (PriLOSEC) 10 mg delayed release capsule, Take 1 capsule (10 mg total) by mouth every evening, Disp: 90 capsule, Rfl: 0    omeprazole (PriLOSEC) 40 MG capsule, Take 1 capsule (40 mg total) by mouth daily, Disp: 90 capsule, Rfl: 1    SUMAtriptan (Imitrex) 25 mg tablet, Take 1 tablet (25 mg total) by mouth once as needed for migraine for up to 30 doses, Disp: 30 tablet, Rfl: 0    tiZANidine (ZANAFLEX) 4 mg tablet, Take 1 tablet (4 mg total) by mouth every 8 (eight) hours as needed for muscle spasms, Disp: 30 tablet, Rfl: 0     PAST MEDICAL HISTORY:   Past Medical History:   Diagnosis Date    Allergic     Asthma     Degenerative joint disease     Depression     GERD (gastroesophageal reflux disease)     Lumbar radiculopathy     PONV (postoperative nausea and vomiting)     Thyroid nodule        PAST SURGICAL HISTORY:  Past Surgical History:   Procedure Laterality Date    BREAST BIOPSY Left 2020    neg    CHOLECYSTECTOMY LAPAROSCOPIC N/A " 5/6/2022    Procedure: CHOLECYSTECTOMY LAPAROSCOPIC;  Surgeon: Zion Walker MD;  Location: [facility];  Service: General    CYST REMOVAL  10/2020    lt breast     EGD      HYSTERECTOMY      HYSTERECTOMY  1998    LIPOSUCTION  09/2020    TUBAL LIGATION         SOCIAL HISTORY:  Social History     Tobacco Use   Smoking Status Never   Smokeless Tobacco Never          PHYSICAL EXAM:  48 y.o. female sitting comfortably on exam chair in no apparent distress.   Ambulates with normal gait  Able to go up on toes and heels   Able to balance on one leg  No significant TTP over cervical spine  5/5 motor strength with normal sensation in bilateral upper extremities  2+ deep tendon reflexes bilateral upper extremities   2+ patellar reflex on right. 1+ patellar reflex on left  Absent achilles reflexes bilaterally   Absent haskins sign bilaterally  Normal rapid alternating movements bilateral upper extremities  Normal rapid alternating movements bilateral lower extremity        RADIOGRAPHIC STUDIES:  1. MRI, L-spine 3/17/2021, mild multilevel degenerative disc disease.  Moderate degenerative changes L3-4.  Moderate to severe degenerative changes at L5-S1.  Posterior central disc protrusion with slight predominance on the left side with minimal lateral recess stenosis at L5-S1.  Facet hypertrophy and mild lateral recess stenosis L4-5.  2. CT, abdomen, 06/03/2021, multilevel thoracic and lumbar degenerative disc disease, diffuse idiopathic skeletal hyperostosis, moderate degenerative disc disease L3-4 L4-5 and L5-S1 with posterior osteophyte formation.  No evidence of bony stenosis.  No evidence of congenital stenosis.  3. MRI, cervical spine, 8/13/2023: Multilevel cervical degenerative disc disease, most significant at C5-6 and C6-7.  There is evidence of posterior ossified formation at this point resulting in spinal cord compression and spinal cord indentation.  The spinal cord has not flattened.  There is no some  myelomalacia.  4. Xrays, cervical spine, 9/25/2023: Severe degenerative changes at C5-6 and C6-7.  Mild to moderate degenerative changes at other levels of the cervical spine.      ASSESSMENT:  1. DDD (degenerative disc disease), cervical      PLAN:  48 y.o. female with neck pain, cervical DDD, and cervical spinal cord compression.      Patient has concerns of walking imbalance today. It was discussed that these symptoms are not related to the spine, rather it may be an inner ear disorder. Patient does have an appointment with ENT next month. Neurological examination was within normal limits today. We will continue to see patient on routine basis every six months for myelopathy checks.     Patient will follow-up in 6 months for repeat myelopathy check.        Scribe Attestation      I,:  Mary Calvert PA-C am acting as a scribe while in the presence of the attending physician.:       I,:  Brock Olson MD personally performed the services described in this documentation    as scribed in my presence.:

## 2024-07-05 ENCOUNTER — HOSPITAL ENCOUNTER (OUTPATIENT)
Dept: ULTRASOUND IMAGING | Facility: HOSPITAL | Age: 49
End: 2024-07-05
Payer: COMMERCIAL

## 2024-07-05 DIAGNOSIS — K76.0 FATTY LIVER: ICD-10-CM

## 2024-07-05 PROCEDURE — 76981 USE PARENCHYMA: CPT

## 2024-07-09 ENCOUNTER — HOSPITAL ENCOUNTER (OUTPATIENT)
Dept: MRI IMAGING | Facility: HOSPITAL | Age: 49
Discharge: HOME | End: 2024-07-09
Payer: COMMERCIAL

## 2024-07-09 DIAGNOSIS — R51.9 WORSENING HEADACHES: ICD-10-CM

## 2024-07-09 PROCEDURE — 70553 MRI BRAIN STEM W/O & W/DYE: CPT

## 2024-07-09 PROCEDURE — A9585 GADOBUTROL INJECTION: HCPCS | Performed by: NURSE PRACTITIONER

## 2024-07-09 RX ORDER — GADOBUTROL 604.72 MG/ML
6 INJECTION INTRAVENOUS
Status: COMPLETED | OUTPATIENT
Start: 2024-07-09 | End: 2024-07-09

## 2024-07-09 RX ADMIN — GADOBUTROL 6 ML: 604.72 INJECTION INTRAVENOUS at 05:21

## 2024-07-12 ENCOUNTER — TELEPHONE (OUTPATIENT)
Age: 49
End: 2024-07-12

## 2024-07-16 ENCOUNTER — TELEPHONE (OUTPATIENT)
Age: 49
End: 2024-07-16

## 2024-07-16 NOTE — TELEPHONE ENCOUNTER
Patient called to follow up on her MRI results. Informed patient her MRI was not resulted yet and the office will contact her once her provider reviews the scan. She understood and will wait to hear from the office.     Patient would like to be notified once MRI is resulted.     Please advise, thank you.

## 2024-07-26 ENCOUNTER — OFFICE VISIT (OUTPATIENT)
Dept: NEUROLOGY | Facility: CLINIC | Age: 49
End: 2024-07-26
Payer: COMMERCIAL

## 2024-07-26 VITALS
HEART RATE: 66 BPM | BODY MASS INDEX: 27.29 KG/M2 | WEIGHT: 154 LBS | HEIGHT: 63 IN | DIASTOLIC BLOOD PRESSURE: 70 MMHG | SYSTOLIC BLOOD PRESSURE: 110 MMHG

## 2024-07-26 DIAGNOSIS — G43.709 CHRONIC MIGRAINE WITHOUT AURA WITHOUT STATUS MIGRAINOSUS, NOT INTRACTABLE: Primary | ICD-10-CM

## 2024-07-26 DIAGNOSIS — R29.818 SUSPECTED SLEEP APNEA: ICD-10-CM

## 2024-07-26 PROCEDURE — 99205 OFFICE O/P NEW HI 60 MIN: CPT | Performed by: PSYCHIATRY & NEUROLOGY

## 2024-07-26 RX ORDER — RIZATRIPTAN BENZOATE 10 MG/1
10 TABLET ORAL ONCE AS NEEDED
Qty: 9 TABLET | Refills: 11 | Status: SHIPPED | OUTPATIENT
Start: 2024-07-26

## 2024-07-26 RX ORDER — CEFDINIR 300 MG/1
CAPSULE ORAL
COMMUNITY
Start: 2024-07-08

## 2024-07-26 NOTE — PATIENT INSTRUCTIONS
"    -If you feel like you could sleep on your back that night only, certainly could try to sleep on your back for the sleep study, but not if you feel like you cannot sleep this way.  Just getting sleep is the most important thing, as the machine thinks you are sleeping the entire time it is plugged in. Otherwise, we discussed home sleep study can underestimate the problem.  If it comes back that you almost have sleep apnea or other sleep disorder, but not meeting criteria, we could consider in lab study and reach out to me if you would like this ordered before next visit.    I am placing a referral for a sleep study and if it is abnormal, it includes a referral to the sleep medicine specialist team to further evaluate your sleep issues.    Please call [phone] to schedule the sleep study or you can schedule on [employer] and we discussed due to the new order being labeled \" ambulatory referral to sleep medicine\" you have to wait 2-6 days for the sleep team to turn this referral into the actual sleep study order that you can schedule, otherwise if you call now they may say there is no order for sleep study, just an order for a referral, because they can not see it yet on their end.  The referral to sleep medicine piece is only if there is abnormalities and you need to see them afterwards.    After the sleep study is completed if there is abnormal results that need treatment, I recommend you see one of the following providers:  Jimmy Friend PA-C    I have heard [facility] [place] is often available sooner       Headache/migraine treatment:   Rescue medications (for immediate treatment of a headache):   It is ok to take ibuprofen, acetaminophen or naproxen (Advil, Tylenol,  Aleve, Excedrin) if they help your headaches you should limit these to No more " than 3 times a week to avoid medication overuse/rebound headaches.     For your more moderate to severe migraines take this medication early   Maxalt (rizatriptan) 10mg tabs - take one at the onset of headache. May repeat one time after 2 hours if pain has not resolved.   (Max 2 a day and 9 a month)     - This does not interact with metoclopramide/Reglan and some people take it together like a migraine cocktail along with anti-inflammatory    -If rizatriptan/Maxalt does not help significantly we discussed please feel free to reach out to me before next visit and I will send in Nurtec or Ubrelvy and both of these you can have 16 tabs a month instead of 9 as the more you take them they can also help with prevention but typically will need a prior authorization form and sometimes the pharmacies do not send this to us so if you do not get this medication in 1 to 2 weeks after I am sending it, please let me know as we likely did not receive the paperwork.  There also are coupon cards to completely cover the co-pay for these new medications otherwise they are very expensive and never pay that amount.    Since you already have this at home you absolutely could take metoclopramide/Reglan 10 mg for a bad migraine even if you do not have nausea as it can help either way    Over the counter preventive supplements for headaches/migraines (if you try, try for 3 months straight)  (to take every day to help prevent headaches - not to take at the time of headache):  There are combo pills online of these - none of which regulated by FDA and double check dosing - take appropriate dose only once a day- preventa migraine, migravent, mind ease, migrelief   [] Magnesium 400 - 500 mg daily (If any diarrhea or upset stomach, decrease dose  as tolerated)  [] Riboflavin (Vitamin B2) 400mg daily - try online   (FYI B2 may make your urine bright/neon yellow)  AND/OR  [] Herbal medication: Petasites/Butterbur 150 mg daily - try online  (When  choosing your Butterbur online or in the store, beware that there are some poor preps containing pyrrolizidine alkaloids (PAs) that can be harmful to the liver. Therefore, do not use butterbur products that are not labeled as PA-free.)      Prescription preventive medications for headaches/migraines   (to take every day to help prevent headaches - not to take at the time of headache):  [x] as we discussed we will see how the shots go first and then if needed will add trial of topiramate/Topamax which typically works well as long as it is tolerated and if you have any issues with tolerance just reach out and I can let you know whether that side effect will typically last longer or go away soon and if you do not like this medication we can try many others just reach out    - Topiramate    25 mg in a.m. And 25 mg in p.m. For 1 week, then take 25 mg in a.m. And 50 mg in p.m. For 1 week, then take 50 mg in a.m. and 50 mg in p.m. And continue  - generally the common side effects improve as your body gets used to the medication.  If we need to spread out a more gradual increase of the medication on a longer scale we can, just call if any questions or concerns  - if necessary, if the a.m. dose is causing side effects we can always have you take the full dose at night instead      *Typically these types of medications take time until you see the benefit, although some may see improvement in days, often it may take weeks, especially if the medication is being titrated up to a beneficial level. Please contact us if there are any concerns or questions regarding the medication.     Lifestyle Recommendations:  [x] SLEEP - Maintain a regular sleep schedule: Adults need at least 7-8 hours of uninterrupted a night. Maintain good sleep hygiene:  Going to bed and waking up at consistent times, avoiding excessive daytime naps, avoiding caffeinated beverages in the evening, avoid excessive stimulation in the evening and generally using  bed primarily for sleeping.  One hour before bedtime would recommend turning lights down lower, decreasing your activity (may read quietly, listen to music at a low volume). When you get into bed, should eliminate all technology (no texting, emailing, playing with your phone, iPad or tablet in bed).  [x] HYDRATION - Maintain good hydration.  Drink  2L of fluid a day (4 typical small water bottles)  [x] DIET - Maintain good nutrition. In particular don't skip meals and try and eat healthy balanced meals regularly.  [x] TRIGGERS - Look for other triggers and avoid them: Limit caffeine to 1-2 cups a day or less. Avoid dietary triggers that you have noticed bring on your headaches (this could include aged cheese, peanuts, MSG, aspartame and nitrates).  [x] EXERCISE - physical exercise as we all know is good for you in many ways, and not only is good for your heart, but also is beneficial for your mental health, cognitive health and  chronic pain/headaches. I would encourage at the least 5 days of physical exercise weekly for at least 30 minutes.     Education and Follow-up  [x] Please call with any questions or concerns. Of course if any new concerning symptoms go to the emergency department.  [x] Follow up 3-4 months, sooner if needed

## 2024-07-26 NOTE — PROGRESS NOTES
Review of Systems   Constitutional:  Negative for appetite change, fatigue and fever.   HENT: Negative.  Negative for hearing loss, tinnitus, trouble swallowing and voice change.    Eyes: Negative.  Negative for photophobia, pain and visual disturbance.   Respiratory: Negative.  Negative for shortness of breath.    Cardiovascular: Negative.  Negative for palpitations.   Gastrointestinal: Negative.  Negative for nausea and vomiting.   Endocrine: Negative.  Negative for cold intolerance.   Genitourinary: Negative.  Negative for dysuria, frequency and urgency.   Musculoskeletal:  Negative for back pain, gait problem, myalgias, neck pain and neck stiffness.   Skin: Negative.  Negative for rash.   Allergic/Immunologic: Negative.    Neurological:  Positive for headaches. Negative for dizziness, tremors, seizures, syncope, facial asymmetry, speech difficulty, weakness, light-headedness and numbness.   Hematological: Negative.  Does not bruise/bleed easily.   Psychiatric/Behavioral: Negative.  Negative for confusion, hallucinations and sleep disturbance.

## 2024-07-26 NOTE — PROGRESS NOTES
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 education level: Not on file   Occupational History    Not on file   Tobacco Use    Smoking status: Never    Smokeless tobacco: Never   Vaping Use    Vaping status: Never Used   Substance and Sexual Activity    Alcohol use: Yes     Comment: Not very often    Drug use: Never    Sexual activity: Yes     Partners: Male     Birth control/protection: None     Comment: With one person for 5 years   Other Topics Concern    Not on file   Social History Narrative    Social History      Socioeconomic History        Marital status:         Spouse name: Not on file        Number of children: Not on file        Years of education: Not on file        Highest education level: Not on file      Occupational History        Not on file      Tobacco Use        Smoking status: Never        Smokeless tobacco: Never      Vaping Use        Vaping status: Never Used      Substance and Sexual Activity        Alcohol use: Yes          Comment: Not very often        Drug use: Never        Sexual activity: Yes          Partners: Male          Birth control/protection: None          Comment: With one person for 5 years      Other Topics        Concerns:          Not on file      Social History Narrative        Not on file        Social Determinants of Health    Financial Resource Strain: Not on file    Food Insecurity: No  Food Insecurity (11/17/2023)        Received from [facility]        Hunger Vital Sign            Worried About Running Out of Food in the Last Year: Never true            Ran Out of Food in the Last Year: Never true    Transportation Needs: Not on file    Physical Activity: Unknown (8/12/2022)        Exercise Vital Sign            Days of Exercise per Week: 0 days            Minutes of Exercise per Session: Not on file    Stress: Not on file    Social Connections: Not on file    Intimate Partner Violence: Unknown (4/28/2023)        Received from [facility]        Intimate Partner Violence            Fear of Current or Ex-Partner: Not on file    Housing Stability: Not on file            Lived in home 5 yrs - heating is kerosene forced hot air, NO AC, dre is carpet, PETS none-      Social Determinants of Health     Financial Resource Strain: Low Risk  (11/17/2023)    Received from [facility]    Financial Resource Strain     Do you have any trouble paying for your medications, or do you think you might in the future?: No     Does your family have trouble paying for medicine? (Household - for ages 0-17 years): Not on file   Food Insecurity: No Food Insecurity (11/17/2023)    Received from [facility]    Food Insecurity     Do you need food for this week?: No     Are you able to get enough food for your family? (Household - for ages 0-17 years): Not on file     Does your family need food this week? (Household - for ages 0-17 years): Not on file     Do you always have enough food for your family? (Household - for ages 0-17 years): Not on file   Transportation Needs: No Transportation Needs (11/17/2023)    Received from [facility]    Transportation Needs     READ ONLY Do you have trouble getting a ride to medical visits or work?: Never True     Does your family have a hard time getting a ride to doctors’ visits? (Household - for ages 0-17 years): Not on file     Has lack of transportation kept you from medical  "appointments, meetings, work, or from getting things needed for daily living? Check all that apply. (Adult - for ages 18 years and over): Not on file     Do you (or your family) have trouble finding or paying for a ride (transportation)? (Household - for ages 0-17 years): Not on file   Physical Activity: Unknown (8/12/2022)    Exercise Vital Sign     Days of Exercise per Week: 0 days     Minutes of Exercise per Session: Not on file   Stress: Not on file   Social Connections: Socially Integrated (11/17/2023)    Received from [facility]     How often do you feel lonely or isolated from those around you?: Sometimes   Intimate Partner Violence: Unknown (4/28/2023)    [facility]    Intimate Partner Violence     Fear of Current or Ex-Partner: Not on file   Housing Stability: Low Risk  (11/17/2023)    Received from [facility]    Housing Stability     Do you currently live in a shelter or have no steady place to sleep at night?: No     READ ONLY Do you think you are at risk of becoming homeless?: No     Does your family worry about paying for your home or becoming homeless? (Household - for ages 0-17 years): Not on file     Are you homeless or worried that you might be in the future? (Adult - for ages 18 years and over): Not on file     Are you (or your family) homeless or worried that you might be in the future? (Household - for ages 0-17 years): Not on file         Objective:         Physical Exam:                                                                 Vitals:            Constitutional:    /70 (BP Location: Left arm, Patient Position: Sitting, Cuff Size: Large)   Pulse 66   Ht 5' 3\" (1.6 m)   Wt 69.9 kg (154 lb)   BMI 27.28 kg/m²   BP Readings from Last 3 Encounters:   07/26/24 110/70   07/01/24 119/74   06/14/24 124/80     Pulse Readings from Last 3 Encounters:   07/26/24 66   07/01/24 74   06/14/24 62         Well developed, well nourished, well groomed. " No dysmorphic features.       HEENT:  Normocephalic atraumatic.   Oropharynx appears clear and moist. No oral mucosal lesions noted.   Chest:  Respirations appear regular and unlabored.    Cardiovascular:  Distal extremities warm without palpable edema or tenderness, no observed significant swelling.    Musculoskeletal:  (see below under neurologic exam for evaluation of motor function and gait)   Skin:  warm and dry. No apparent birthmarks or stigmata of neurocutaneous disease.   Psychiatric:  Normal behavior and appropriate affect        Neurological Examination:     Mental status/cognitive function:   Recent and remote memory intact. Attention span and concentration as well as fund of knowledge are appropriate for age. Normal language and spontaneous speech.     Cranial Nerves:  II-visual fields full.   Fundi poorly visualized due to pupillary constriction  III, IV, VI-Pupils were equal, round, and reactive to light and accommodation. Extraocular movements were full and conjugate without nystagmus.   V-facial sensation symmetric.    VII-facial expression symmetric, intact forehead wrinkle, strong eye closure, symmetric smile    VIII-hearing grossly intact bilaterally   IX, X-palate elevation symmetric, no dysarthria.   XI-shoulder shrug strength intact    XII-tongue protrusion midline.    Motor Exam: symmetric bulk and tone throughout, no pronator drift. Power/strength 5/5 bilateral upper and lower extremities, no atrophy, fasciculations or abnormal movements noted.   Sensory: grossly intact light touch in all extremities.   Reflexes: brachioradialis 2+, biceps 2+, knee 2+, ankle 2+ bilaterally. No ankle clonus   Coordination: Finger nose finger intact bilaterally, no apparent dysmetria, ataxia or tremor noted  Gait: steady casual and tandem gait.       Pertinent lab results:   -6/1/2024 LFTs normal    A1c 5.6  -2/3/2024 CMP unremarkable except for alk phos 113 which normalized on next check  CBC  unremarkable  Ferritin 71 and iron studies within normal limits  Vitamin D 56  Vitamin B12 426  TSH normal     Imaging:   I have personally reviewed imaging and radiology read   -MRI brain with without contrast 7/9/2024: Normal per radiology*as of my retrospective review 7/26/2024 we discussed there are some features thought to be nonspecific by radiology that I am commenting on as I think they may matter and often are related to sleep issues including partially empty sella, prominent optic nerve sheath bilaterally with tortuosity bilaterally, tighter ventricles than would be expected at age 48, cerebellar tonsils overlie the foramen magnum with tight junction without Chiari malformation in my opinion  - MRI C-spine without contrast 8/13/2023: Multilevel cervical spondylosis, as described  Multifactorial disease results in overall moderate canal stenosis with mild cord impingement at C5-C6 and C6-C7.-Epidural injection offered  - MRI brain without contrast 9/17/2021: Normal per radiology  - CT sinus 8/10/2021: Functional endoscopic sinonasal surgery with widely patent surgical defects and   the paranasal sinuses. No mucosal thickening, periostitis, or air-fluid levels.   - MRI brain with without contrast 10/13/2024: headache intracranial hemorrhage suspected - they thought it was sinus at first: Unremarkable brain parenchyma, CSF spaces, craniocervical junction,  and meninges. No hemorrhage. The vertebral artery compresses the left ventrolateral medulla, otherwise unremarkable vessels.   - Noncontrast head CT 9/26/2020: Negative for intracranial hemorrhage.  Lucency within the left parietal bone, within the cranial vertex,  nonspecific in nature.   - Ultrasound head and neck soft tissue 1/23/2020:  Grossly homogenous thyroid parenchyma and right lower pole nodule with   peripheral calcification and not showing interval progression from the   previous study.   - 11/5/2018 optic disc OCT: Right Eye   The eye was not  dilated. Reliability was good (8-10). Disc is borderline.   RNFL findings normal.   Left Eye   The eye was not dilated. Reliability was good (8-10). Disc is borderline.   RNFL findings normal.   -7/25/2017: Right Eye   Reliability was good (8-10). Disc is normal. RNFL findings normal.   Left Eye   Reliability was good (8-10). Disc is normal. RNFL findings normal.   Please see EMR for spinal imaging     Review of Systems:   ROS obtained by medical assistant and reviewed, but if any symptoms listed below say negative, does not mean patient has not had this symptom since last visit, please see HPI for details of symptoms discussed this visit.  Regarding any abnormal or positive findings in ROS that are not neurologic related, patient instructed to address these issues with PCP or go to the ER if they are severe.      Review of Systems   Constitutional:  Negative for appetite change, fatigue and fever.   HENT: Negative.  Negative for hearing loss, tinnitus, trouble swallowing and voice change.    Eyes: Negative.  Negative for photophobia, pain and visual disturbance.   Respiratory: Negative.  Negative for shortness of breath.    Cardiovascular: Negative.  Negative for palpitations.   Gastrointestinal: Negative.  Negative for nausea and vomiting.   Endocrine: Negative.  Negative for cold intolerance.   Genitourinary: Negative.  Negative for dysuria, frequency and urgency.   Musculoskeletal:  Negative for back pain, gait problem, myalgias, neck pain and neck stiffness.   Skin: Negative.  Negative for rash.   Allergic/Immunologic: Negative.    Neurological:  Positive for headaches. Negative for dizziness, tremors, seizures, syncope, facial asymmetry, speech difficulty, weakness, light-headedness and numbness.   Hematological: Negative.  Does not bruise/bleed easily.   Psychiatric/Behavioral: Negative.  Negative for confusion, hallucinations and sleep disturbance.       I have spent 85 minutes with Patient today in which  greater than 50% of this time was spent in counseling/coordination of care regarding Diagnostic results, Prognosis, Risks and benefits of tx options, Instructions for management, Patient education, Importance of tx compliance, Risk factor reductions, Impressions, Counseling / Coordination of care, Documenting in the medical record, Reviewing / ordering tests, medicine, procedures  , Obtaining or reviewing history  , and Communicating with other healthcare professionals . I also spent 10 minutes non face to face for this patient the same day.         Author:  Glenis Bowles MD 7/26/2024 5:49 PM

## 2024-07-31 ENCOUNTER — PROCEDURE VISIT (OUTPATIENT)
Dept: PAIN MEDICINE | Facility: CLINIC | Age: 49
End: 2024-07-31
Payer: COMMERCIAL

## 2024-07-31 VITALS
HEIGHT: 63 IN | HEART RATE: 93 BPM | DIASTOLIC BLOOD PRESSURE: 80 MMHG | WEIGHT: 151 LBS | BODY MASS INDEX: 26.75 KG/M2 | SYSTOLIC BLOOD PRESSURE: 128 MMHG

## 2024-07-31 DIAGNOSIS — M54.81 BILATERAL OCCIPITAL NEURALGIA: ICD-10-CM

## 2024-07-31 PROCEDURE — 64405 NJX AA&/STRD GR OCPL NRV: CPT | Performed by: STUDENT IN AN ORGANIZED HEALTH CARE EDUCATION/TRAINING PROGRAM

## 2024-07-31 RX ORDER — METHYLPREDNISOLONE ACETATE 40 MG/ML
40 INJECTION, SUSPENSION INTRA-ARTICULAR; INTRALESIONAL; INTRAMUSCULAR; SOFT TISSUE ONCE
Status: COMPLETED | OUTPATIENT
Start: 2024-07-31 | End: 2024-07-31

## 2024-07-31 RX ORDER — BUPIVACAINE HYDROCHLORIDE 2.5 MG/ML
2 INJECTION, SOLUTION EPIDURAL; INFILTRATION; INTRACAUDAL ONCE
Status: COMPLETED | OUTPATIENT
Start: 2024-07-31 | End: 2024-07-31

## 2024-07-31 RX ADMIN — BUPIVACAINE HYDROCHLORIDE 2 ML: 2.5 INJECTION, SOLUTION EPIDURAL; INFILTRATION; INTRACAUDAL at 10:56

## 2024-07-31 RX ADMIN — METHYLPREDNISOLONE ACETATE 40 MG: 40 INJECTION, SUSPENSION INTRA-ARTICULAR; INTRALESIONAL; INTRAMUSCULAR; SOFT TISSUE at 10:56

## 2024-07-31 NOTE — PROGRESS NOTES
Nerve block    Date/Time: 7/31/2024 10:00 AM    Performed by: Larry Dow MD  Authorized by: Larry Dow MD    Patient location:  Bedside  [place] Protocol:  Consent: Verbal consent obtained. Written consent obtained.  Risks and benefits: risks, benefits and alternatives were discussed  Consent given by: patient  Timeout called at: 7/31/2024 10:30 AM.  Patient understanding: patient states understanding of the procedure being performed  Patient consent: the patient's understanding of the procedure matches consent given  Procedure consent: procedure consent matches procedure scheduled  Relevant documents: relevant documents present and verified  Test results: test results available and properly labeled  Site marked: the operative site was marked  Radiology Images displayed and confirmed. If images not available, report reviewed: imaging studies available  Required items: required blood products, implants, devices, and special equipment available  Patient identity confirmed: verbally with patient    Indications:     Indications:  Pain relief  Location:     Body area:  Head    Head nerve:  Greater occipital    Laterality:  Bilateral  Pre-procedure details:     Skin preparation:  2% chlorhexidine  Skin anesthesia (see MAR for exact dosages):     Skin anesthesia method:  None  Procedure details (see MAR for exact dosages):     Block needle gauge:  30 G    Additive injected:  None    Injection procedure:  Anatomic landmarks identified, incremental injection, negative aspiration for blood, introduced needle and anatomic landmarks palpated  Post-procedure details:     Dressing:  None    Outcome:  Pain improved    Patient tolerance of procedure:  Tolerated well, no immediate complications  Comments:      PATIENT NAME:  Patricia Nam    MR#:  [id number]    YOB: 1975    DATE OF PROCEDURE:  07/31/24    PROCEDURE:  Bilateral greater occipital nerve blocks using landmark technique.    ATTENDING  PHYSICIAN:  Larry Dow MD    PRE-PROCEDURE DIAGNOSIS:  Bilateral greater occipital neuralgia    POST-PROCEDURE DIAGNOSIS:  Diagnosis unchanged      ANESTHESIA:  None    ESTIMATED BLOOD LOSS:  Minimal    COMPLICATIONS:  None       CONSENT:  Today's procedure, its benefits, risks, alternatives were discussed in detail with the patient.  Specific risks discussed included, but were not limited to bleeding, infection, nerve damage, damage to adjacent structures, hematoma, abscess, failure of the pain to improve, worsening of the pain, and reaction to medications administered.  The patient verbalized understanding.  Written informed consent was obtained.    DESCRIPTION OF PROCEDURE:  After informed consent was obtained, the patient was placed in a seated position on the exam table.  The area of the bilateral greater occipital nerves were palpated.  The points of maximum tenderness were marked.  The areas were then prepped with antiseptic solution.  Using strict aseptic technique, a 3 mL injectate was drawn up which consisted of 2 mL of 0.25% bupivacaine and 1 mL of Depo-Medrol 40 mg/mL.      Attention was first directed to the area of the left lesser occipital nerve.  Using a 30-gauge  needle, the skin was entered in a perpendicular fashion over the point of maximum tenderness and advanced until os was contacted.  After negative aspiration, 1.5 mL of the bupivacaine/depo-medrol mixture was injected in a fanning fashion.      Attention was then directed to the area of the right lesser occipital nerve.  Again, using a 30-gauge needle, the skin was entered in a perpendicular fashion over the point of maximum tenderness and advanced until os was contacted.  After negative aspiration, 1.5 mL of the bupivacaine/depo-medrol mixture was injected in a fanning fashion.    All needles were with withdrawn with tip intact.  Hemostasis was maintained.  No paresthesias or complications were noted.  The skin was wiped clean and a  band aid was placed as appropriate.  The patient was monitored in the exam room following the procedure, during which time she remained neurologically and hemodynamically stable.  Ultimately,  Ms. Nam was discharged home with instructions to call the office in 7 days with an update or sooner should symptoms warrant.

## 2024-08-13 ENCOUNTER — PREP FOR PROCEDURE (OUTPATIENT)
Age: 49
End: 2024-08-13

## 2024-08-17 ENCOUNTER — ANESTHESIA (OUTPATIENT)
Dept: GASTROENTEROLOGY | Facility: HOSPITAL | Age: 49
End: 2024-08-17

## 2024-08-17 ENCOUNTER — ANESTHESIA EVENT (OUTPATIENT)
Dept: GASTROENTEROLOGY | Facility: HOSPITAL | Age: 49
End: 2024-08-17

## 2024-08-17 ENCOUNTER — HOSPITAL ENCOUNTER (OUTPATIENT)
Dept: GASTROENTEROLOGY | Facility: HOSPITAL | Age: 49
Setting detail: OUTPATIENT SURGERY
Discharge: HOME | End: 2024-08-17
Payer: COMMERCIAL

## 2024-08-17 VITALS
HEART RATE: 62 BPM | SYSTOLIC BLOOD PRESSURE: 117 MMHG | OXYGEN SATURATION: 100 % | HEIGHT: 63 IN | BODY MASS INDEX: 26.41 KG/M2 | RESPIRATION RATE: 20 BRPM | DIASTOLIC BLOOD PRESSURE: 55 MMHG | TEMPERATURE: 97.2 F | WEIGHT: 149.03 LBS

## 2024-08-17 DIAGNOSIS — K31.84 GASTROPARESIS: ICD-10-CM

## 2024-08-17 DIAGNOSIS — K21.9 GASTROESOPHAGEAL REFLUX DISEASE WITHOUT ESOPHAGITIS: ICD-10-CM

## 2024-08-17 DIAGNOSIS — Z80.0 FAMILY HISTORY OF STOMACH CANCER: ICD-10-CM

## 2024-08-17 PROCEDURE — 43239 EGD BIOPSY SINGLE/MULTIPLE: CPT | Performed by: INTERNAL MEDICINE

## 2024-08-17 PROCEDURE — 88305 TISSUE EXAM BY PATHOLOGIST: CPT | Performed by: PATHOLOGY

## 2024-08-17 RX ORDER — SODIUM CHLORIDE, SODIUM LACTATE, POTASSIUM CHLORIDE, CALCIUM CHLORIDE 600; 310; 30; 20 MG/100ML; MG/100ML; MG/100ML; MG/100ML
INJECTION, SOLUTION INTRAVENOUS CONTINUOUS PRN
Status: DISCONTINUED | OUTPATIENT
Start: 2024-08-17 | End: 2024-08-17

## 2024-08-17 RX ORDER — PROPOFOL 10 MG/ML
INJECTION, EMULSION INTRAVENOUS AS NEEDED
Status: DISCONTINUED | OUTPATIENT
Start: 2024-08-17 | End: 2024-08-17

## 2024-08-17 RX ORDER — LIDOCAINE HYDROCHLORIDE 10 MG/ML
INJECTION, SOLUTION EPIDURAL; INFILTRATION; INTRACAUDAL; PERINEURAL AS NEEDED
Status: DISCONTINUED | OUTPATIENT
Start: 2024-08-17 | End: 2024-08-17

## 2024-08-17 RX ADMIN — SODIUM CHLORIDE, SODIUM LACTATE, POTASSIUM CHLORIDE, AND CALCIUM CHLORIDE: .6; .31; .03; .02 INJECTION, SOLUTION INTRAVENOUS at 07:01

## 2024-08-17 RX ADMIN — PROPOFOL 150 MG: 10 INJECTION, EMULSION INTRAVENOUS at 07:38

## 2024-08-17 RX ADMIN — LIDOCAINE HYDROCHLORIDE 50 MG: 10 INJECTION, SOLUTION EPIDURAL; INFILTRATION; INTRACAUDAL; PERINEURAL at 07:38

## 2024-08-17 NOTE — ANESTHESIA POSTPROCEDURE EVALUATION
Post-Op Assessment Note    CV Status:  Stable  Pain Score: 0    Pain management: adequate       Mental Status:  Alert and awake   Hydration Status:  Stable   PONV Controlled:  None   Airway Patency:  Patent     Post Op Vitals Reviewed: Yes    No anethesia notable event occurred.    Staff: CRNA               /60 (08/17/24 0748)    Temp (!) 97.2 °F (36.2 °C) (08/17/24 0748)    Pulse 63 (08/17/24 0748)   Resp 18 (08/17/24 0748)    SpO2 100 % (08/17/24 0748)

## 2024-08-17 NOTE — INTERVAL H&P NOTE
H&P reviewed. After examining the patient I find no changes in the patients condition since the H&P had been written.    Vitals:    08/17/24 0658   BP: 138/64   Pulse: 77   Resp: 16   Temp: 97.5 °F (36.4 °C)   SpO2: 100%

## 2024-08-17 NOTE — H&P
History and Physical - [facility]  Patricia Nam 48 y.o. female MRN: [id number]                  HPI: Patricia Nam is a 48 y.o. year old female who presents for EGD for GERD, history of gastroparesis, family history of gastric cancer      REVIEW OF SYSTEMS: Per the HPI, and otherwise unremarkable.    Historical Information   Past Medical History:   Diagnosis Date    Allergic     Asthma     Degenerative joint disease     Depression     GERD (gastroesophageal reflux disease)     Lumbar radiculopathy     PONV (postoperative nausea and vomiting)     Thyroid nodule      Past Surgical History:   Procedure Laterality Date    BREAST BIOPSY Left 2020    neg    CHOLECYSTECTOMY LAPAROSCOPIC N/A 5/6/2022    Procedure: CHOLECYSTECTOMY LAPAROSCOPIC;  Surgeon: Zion Walker MD;  Location: [facility];  Service: General    CYST REMOVAL  10/2020    lt breast     EGD      HYSTERECTOMY      HYSTERECTOMY  1998    LIPOSUCTION  09/2020    TUBAL LIGATION       Social History   Social History     Substance and Sexual Activity   Alcohol Use Yes    Comment: Not very often     Social History     Substance and Sexual Activity   Drug Use Never     Social History     Tobacco Use   Smoking Status Never   Smokeless Tobacco Never     Family History   Problem Relation Age of Onset    Alzheimer's disease Mother     Cancer Father         stomach/GI    Heart disease Father     No Known Problems Sister     No Known Problems Daughter     No Known Problems Maternal Grandmother     No Known Problems Paternal Grandmother     Colon cancer Maternal Aunt     Cancer Maternal Aunt         stomach    No Known Problems Maternal Aunt     No Known Problems Maternal Aunt     No Known Problems Maternal Aunt     No Known Problems Maternal Aunt     No Known Problems Maternal Aunt     Colon cancer Maternal Uncle     Depression Cousin     Diabetes Brother     Asthma Brother     Cancer Cousin     Breast cancer Neg Hx        Meds/Allergies      Not in a hospital admission.    Allergies   Allergen Reactions    Bee Venom Anaphylaxis    Iodides Other (See Comments)    [place] Oil - Food Allergy GI Intolerance     Other reaction(s): GI Intolerance, GI Reaction    Aspirin Other (See Comments)     shaking  convulsions    Other reaction(s): Other (See Comments)  shaking  convulsions  shaking  convulsions    Metronidazole GI Intolerance     Gi upset      Sulfamethoxazole-Trimethoprim GI Intolerance     Gi upst    Other reaction(s): GI Intolerance, GI Reaction  Gi upst  Gi upst    Tape  [Medical Tape] Other (See Comments) and Hives     fever  Other reaction(s): Other  fever       Objective     not currently breastfeeding.      PHYSICAL EXAM    Gen: NAD  CV: RRR  CHEST: Clear  ABD: soft, NT/ND  EXT: no edema  Neuro: AAO      ASSESSMENT/PLAN:  This is a 48 y.o. year old female here for GERD, family history of gastric cancer, gastroparesis    PLAN:   Procedure: EGD

## 2024-08-17 NOTE — ANESTHESIA PREPROCEDURE EVALUATION
Procedure:  EGD    Relevant Problems   ANESTHESIA   (+) PONV (postoperative nausea and vomiting)      CARDIO   (+) Migraine without aura and without status migrainosus, not intractable      GI/HEPATIC   (+) Gastroesophageal reflux disease without esophagitis      MUSCULOSKELETAL   (+) Chronic bilateral low back pain with right-sided sciatica   (+) Dyskinesia of gallbladder   (+) Lumbar degenerative disc disease   (+) Rheumatoid arthritis with positive rheumatoid factor (HCC)      NEURO/PSYCH   (+) Chronic bilateral low back pain with right-sided sciatica   (+) Chronic pain syndrome   (+) Major depressive disorder with single episode, in full remission (HCC)   (+) Migraine without aura and without status migrainosus, not intractable      FEN/Gastrointestinal   (+) Gastroparesis      Left Ventricle: Left ventricular cavity size is normal. Wall thickness  is normal. The left ventricular ejection fraction is 60%. Systolic  function is normal. Wall motion is normal. Diastolic function is normal.    Right Ventricle: Right ventricular cavity size is normal. Systolic  function is normal.  Physical Exam    Airway    Mallampati score: II  TM Distance: >3 FB  Neck ROM: full     Dental       Cardiovascular  Cardiovascular exam normal    Pulmonary  Pulmonary exam normal     Other Findings  post-pubertal.      Anesthesia Plan  ASA Score- 2     Anesthesia Type- IV sedation with anesthesia with ASA Monitors.         Additional Monitors:     Airway Plan:            Plan Factors-Exercise tolerance (METS): >4 METS.    Chart reviewed. EKG reviewed. Imaging results reviewed. Existing labs reviewed. Patient summary reviewed.                  Induction- intravenous.    Postoperative Plan-         Informed Consent- Anesthetic plan and risks discussed with patient.  I personally reviewed this patient with the CRNA. Discussed and agreed on the Anesthesia Plan with the CRNA..

## 2024-08-19 DIAGNOSIS — K21.9 GASTROESOPHAGEAL REFLUX DISEASE WITHOUT ESOPHAGITIS: ICD-10-CM

## 2024-08-19 RX ORDER — OMEPRAZOLE 40 MG/1
40 CAPSULE, DELAYED RELEASE ORAL DAILY
Qty: 90 CAPSULE | Refills: 1 | Status: SHIPPED | OUTPATIENT
Start: 2024-08-19

## 2024-08-20 ENCOUNTER — HOSPITAL ENCOUNTER (OUTPATIENT)
Dept: RADIOLOGY | Facility: CLINIC | Age: 49
Discharge: HOME | End: 2024-08-20

## 2024-08-21 PROCEDURE — 88305 TISSUE EXAM BY PATHOLOGIST: CPT | Performed by: PATHOLOGY

## 2024-08-23 ENCOUNTER — TELEPHONE (OUTPATIENT)
Dept: RADIOLOGY | Facility: CLINIC | Age: 49
End: 2024-08-23

## 2024-08-23 NOTE — TELEPHONE ENCOUNTER
Lm for pt -- had to cancel [facility] and at time there were no openings before pt moves on 9/6 -- had cancellation for 8/27 @ 1:30, asked pt to call back by Monday AM and let me know if she would like the appt.

## 2024-08-26 NOTE — TELEPHONE ENCOUNTER
Pt stated she would like the appt for tomorrow at 8/27/24 at 1:30. Pt would like a call back to confirm.

## 2024-08-28 ENCOUNTER — OFFICE VISIT (OUTPATIENT)
Age: 49
End: 2024-08-28
Payer: COMMERCIAL

## 2024-08-28 ENCOUNTER — TELEPHONE (OUTPATIENT)
Age: 49
End: 2024-08-28

## 2024-08-28 VITALS
RESPIRATION RATE: 18 BRPM | WEIGHT: 147 LBS | TEMPERATURE: 98.2 F | HEIGHT: 63 IN | BODY MASS INDEX: 26.05 KG/M2 | DIASTOLIC BLOOD PRESSURE: 60 MMHG | SYSTOLIC BLOOD PRESSURE: 100 MMHG | HEART RATE: 72 BPM

## 2024-08-28 DIAGNOSIS — L29.9 ITCHY SCALP: Primary | ICD-10-CM

## 2024-08-28 PROCEDURE — 99214 OFFICE O/P EST MOD 30 MIN: CPT | Performed by: NURSE PRACTITIONER

## 2024-08-28 NOTE — TELEPHONE ENCOUNTER
PA for Eucrisa 2% oint SUBMITTED     via    [x]CM-KEY: [id number]  [][employer]Pay-Case ID #   []Faxed to plan   []Other website   []Phone call Case ID #     Office notes sent, clinical questions answered. Awaiting determination    Turnaround time for your insurance to make a decision on your Prior Authorization can take 7-21 business days.

## 2024-08-28 NOTE — PATIENT INSTRUCTIONS
"SEBORRHEIC DERMATITIS     Assessment and Plan:  Based on a thorough discussion of this condition and the management approach to it (including a comprehensive discussion of the known risks, side effects and potential benefits of treatment), the patient (family) agrees to implement the following specific plan:  - Recommend sensitive skin care regimen  - detergent free of dyes and perfumes (example, free and clear). Try washing clothes with extra rinse cycle  - Short lukewarm showers  - White dove bar soap  Recommend using Vanicream Shampoo-can be purchased off of [employer]   - Recommend moisturizing whole body with plain Vaseline or Creams multiple times a day (examples: Cetaphil, CeraVe. and Eucerin)  - avoid aerosols and fragrances in the home (candles, plug ins, perfume, air freshener, etc)   Recommend that you stop using current hair products/ shampoos/ conditioners, hair color/ dyes, any perfumes or perfumed lotions. Once you have eliminated these products you can slowly start to re introduce them and see if your skin can tolerate them.   Continue to use Ketoconazole cream- apply twice a day as needed   Continue to use Clobetasol scalp solution as needed for flares   Discussed Patch testing -need authorization before scheduled. Current wait list for Patch testing   Prescribed Crisaborole 2% ointment to be applied to affected areas  Monitor for any changes   Follow up in 2-3 months                                              Seborrheic Dermatitis   Seborrheic dermatitis is a common, chronic or relapsing form of eczema/dermatitis that mainly affects the sebaceous, gland-rich regions of the scalp, face, and trunk.  There are infantile and adult forms of seborrhoeic dermatitis. It is sometimes associated with psoriasis and, in that clinical scenario, may be referred to as \"sebo-psoriasis.\"  Seborrheic dermatitis is also known as \"seborrheic eczema.\"  Dandruff (also called \"pityriasis capitis\") is an uninflamed form of " "seborrhoeic dermatitis. Dandruff presents as bran-like scaly patches scattered within hair-bearing areas of the scalp.  In an infant, this condition may be referred to as \"cradle cap.\"  The cause of seborrheic dermatitis is not completely understood. It is associated with proliferation of various species of the skin commensal Malassezia, in its yeast (non-pathogenic) form. Its metabolites (such as the fatty acids oleic acid, malssezin, and indole-3-carbaldehyde) may cause an inflammatory reaction. Differences in skin barrier lipid content and function may account for individual presentations.     Adult Seborrheic Dermatitis  Adult seborrheic dermatitis tends to begin in late adolescence; prevalence is greatest in young adults and in the elderly. It is more common in males than in females.     The following factors are sometimes associated with severe adult seborrheic dermatitis:  Oily skin  Familial tendency to seborrhoeic dermatitis or a family history of psoriasis  Immunosuppression: organ transplant recipient, human immunodeficiency virus (HIV) infection and patients with lymphoma  Neurological and psychiatric diseases: Parkinson disease, tardive dyskinesia, depression, epilepsy, facial nerve palsy, spinal cord injury and congenital disorders such as Down syndrome  Treatment for psoriasis with psoralen and ultraviolet A (PUVA) therapy  Lack of sleep  Stressful events.     In adults, seborrheic dermatitis may typically affect the scalp, face (creases around the nose, behind ears, within eyebrows) and upper trunk. Typical clinical features include:  Winter flares, improving in summer following sun exposure  Minimal itch most of the time  Combination oily and dry mid-facial skin  Ill-defined localized scaly patches or diffuse scale in the scalp  Blepharitis; scaly red eyelid margins  Gum Spring-pink, thin, scaly, and ill-defined plaques in skin folds on both sides of the face  Petal or ring-shaped flaky patches on " hair-line and on anterior chest  Rash in armpits, under the breasts, in the groin folds and genital creases  Superficial folliculitis (inflamed hair follicles) on cheeks and upper trunk     Seborrheic dermatitis is diagnosed by its clinical appearance and behavior. Skin biopsy may be helpful but is rarely necessary to make this diagnosis.

## 2024-08-28 NOTE — PROGRESS NOTES
"[facility] Note     Patient Name: Patricia Nam  Encounter Date: 8/28/24     Have you been cared for by a [facility] Dermatologist in the last 3 years and, if so, which description applies to you?    Yes.  I have been here within the last 3 years, and my medical history has NOT changed since that time.  I am FEMALE/of child-bearing potential.    REVIEW OF SYSTEMS:  Have you recently had or currently have any of the following? No changes in my recent health.   PAST MEDICAL HISTORY:  Have you personally ever had or currently have any of the following?  If \"YES,\" then please provide more detail. No changes in my medical history.   HISTORY OF IMMUNOSUPPRESSION: Do you have a history of any of the following:  Systemic Immunosuppression such as Diabetes, Biologic or Immunotherapy, Chemotherapy, Organ Transplantation, Bone Marrow Transplantation or Prednisone?  No     Answering \"YES\" requires the addition of the dotphrase \"IMMUNOSUPPRESSED\" as the first diagnosis of the patient's visit.   FAMILY HISTORY:  Any \"first degree relatives\" (parent, brother, sister, or child) with the following?    No changes in my family's known health.   PATIENT EXPERIENCE:    Do you want the Dermatologist to perform a COMPLETE skin exam today including a clinical examination under the \"bra and underwear\" areas?  NO  If necessary, do we have your permission to call and leave a detailed message on your Preferred Phone number that includes your specific medical information?  Yes      Allergies   Allergen Reactions    Bee Venom Anaphylaxis    Iodides Other (See Comments)    [place] Oil - Food Allergy GI Intolerance     Other reaction(s): GI Intolerance, GI Reaction    Aspirin Other (See Comments)     shaking  convulsions    Other reaction(s): Other (See Comments)  shaking  convulsions  shaking  convulsions    Metronidazole GI Intolerance     Gi upset      Sulfamethoxazole-Trimethoprim GI Intolerance     Gi upst    Other " "reaction(s): GI Intolerance, GI Reaction  Gi upst  Gi upst    Tape  [Medical Tape] Other (See Comments) and Hives     fever  Other reaction(s): Other  fever      Current Outpatient Medications:     albuterol (PROVENTIL HFA,VENTOLIN HFA) 90 mcg/act inhaler, INHALE 1-2 PUFFS EVERY 6 HOURS AS NEEDED FOR WHEEZING., Disp: 18 g, Rfl: 3    azelastine (ASTELIN) 0.1 % nasal spray, 1 spray into each nostril 2 (two) times a day Use in each nostril as directed, Disp: 1 mL, Rfl: 1    cefdinir (OMNICEF) 300 mg capsule, , Disp: , Rfl:     clobetasol (TEMOVATE) 0.05 % external solution, Apply to scalp only as needed for flaring, Disp: 50 mL, Rfl: 1    Diclofenac Sodium (VOLTAREN) 1 %, Apply 2 g topically 4 (four) times a day, Disp: 100 g, Rfl: 0    EPINEPHrine (EPIPEN) 0.3 mg/0.3 mL SOAJ, Inject 0.3 mL (0.3 mg total) into a muscle once for 1 dose As needed, Disp: , Rfl:     fluocinonide (LIDEX) 0.05 % external solution, Apply topically 2 (two) times a day, Disp: 60 mL, Rfl: 0    FLUoxetine (PROzac) 40 MG capsule, TAKE ONE CAPSULE BY MOUTH EVERY DAY, Disp: 90 capsule, Rfl: 1    hydrOXYzine HCL (ATARAX) 10 mg tablet, Take 1 tablet (10 mg total) by mouth daily at bedtime, Disp: 30 tablet, Rfl: 0    ibuprofen (MOTRIN) 800 mg tablet, Take 1 tablet (800 mg total) by mouth every 8 (eight) hours as needed for moderate pain, Disp: 30 tablet, Rfl: 0    ketoconazole (NIZORAL) 2 % cream, Apply topically to affected areas twice a day, Disp: 60 g, Rfl: 3    ketoconazole (NIZORAL) 2 % shampoo, Daily for 2 weeks straight and then on \"Mondays, Wednesdays and Fridays\":  Lather into scalp and skin on face, neck, chest, and back; leave on for 5 minutes and then rinse off completely., Disp: 120 mL, Rfl: 5    meclizine (ANTIVERT) 25 mg tablet, Take 1 tablet (25 mg total) by mouth every 8 (eight) hours as needed for dizziness, Disp: 90 tablet, Rfl: 0    metoclopramide (REGLAN) 5 mg tablet, Take 1 tablet (5 mg total) by mouth 3 (three) times a day before " meals, Disp: 90 tablet, Rfl: 5    omeprazole (PriLOSEC) 10 mg delayed release capsule, Take 1 capsule (10 mg total) by mouth every evening, Disp: 90 capsule, Rfl: 0    omeprazole (PriLOSEC) 40 MG capsule, TAKE ONE CAPSULE BY MOUTH EVERY DAY, Disp: 90 capsule, Rfl: 1    rizatriptan (MAXALT) 10 mg tablet, Take 1 tablet (10 mg total) by mouth once as needed for migraine May repeat in 2 hours if needed. Max 2/24 hours, 9/month., Disp: 9 tablet, Rfl: 11    tiZANidine (ZANAFLEX) 4 mg tablet, Take 1 tablet (4 mg total) by mouth every 8 (eight) hours as needed for muscle spasms, Disp: 30 tablet, Rfl: 0          Whom besides the patient is providing clinical information about today's encounter?   NO ADDITIONAL HISTORIAN (patient alone provided history)    Physical Exam and Assessment/Plan by Diagnosis:    ITCHY SCALP    Physical Exam:  Anatomic Location Affected:  posterior ears and left posterior scalp   Morphological Description:  clear; no obvious rash or inflammation     Additional History of Present Condition:  presents for f/u; hx of seb derm. She reports behind ears and patch on left posterior scalp remains itchy. She has attempted ketoconazole shampoo without relief.     Assessment and Plan:  Based on a thorough discussion of this condition and the management approach to it (including a comprehensive discussion of the known risks, side effects and potential benefits of treatment), the patient (family) agrees to implement the following specific plan:  Recommend sensitive skin care regimen  Detergent free of dyes and perfumes (example, free and clear). Try washing clothes with extra rinse cycle  Short lukewarm showers  White dove bar soap  Recommend using Vanicream Shampoo-can be purchased off of [employer]   Recommend moisturizing whole body with plain Vaseline or Creams multiple times a day (examples: Cetaphil, CeraVe. and Eucerin)  Avoid aerosols and fragrances in the home (candles, plug ins, perfume, air freshener,  etc)   Recommend that you stop using current hair products/ shampoos/ conditioners, hair color/ dyes, any perfumes or perfumed lotions. Once you have eliminated these products you can slowly start to re introduce them and see if your skin can tolerate them  Prescribed Crisaborole 2% ointment to be applied to affected areas  Continue to use Clobetasol scalp solution as needed for flares   Patch testing ordered last visit; on wait list   Follow up in 2-3 months     Scribe Attestation      I,:  aDna Mcclure MA am acting as a scribe while in the presence of the attending physician.:       I,:  KIRSTIN Lebron personally performed the services described in this documentation    as scribed in my presence.:

## 2024-08-28 NOTE — TELEPHONE ENCOUNTER
Patient called to make sure she is still on waiting list I told her yes she is still on waiting list.

## 2024-09-03 NOTE — TELEPHONE ENCOUNTER
PA for Eucrisa 2% ointment  DENIED    Reason:(Screenshot if applicable)        Message sent to office clinical pool Yes    Denial letter scanned into Media No not provided.     Appeal started No (Provider will need to decide if appeal is warranted and send clinical documentation to Prior Authorization Team for initiation.)    **Please follow up with your patient regarding denial and next steps**

## 2024-09-05 ENCOUNTER — HOSPITAL ENCOUNTER (OUTPATIENT)
Dept: MAMMOGRAPHY | Facility: CLINIC | Age: 49
End: 2024-09-05
Payer: COMMERCIAL

## 2024-09-05 ENCOUNTER — HOSPITAL ENCOUNTER (OUTPATIENT)
Dept: ULTRASOUND IMAGING | Facility: CLINIC | Age: 49
End: 2024-09-05
Payer: COMMERCIAL

## 2024-09-05 DIAGNOSIS — N64.4 BREAST PAIN, RIGHT: ICD-10-CM

## 2024-09-05 DIAGNOSIS — R92.8 ABNORMAL FINDINGS ON DIAGNOSTIC IMAGING OF BREAST: ICD-10-CM

## 2024-09-05 PROCEDURE — G0279 TOMOSYNTHESIS, MAMMO: HCPCS

## 2024-09-05 PROCEDURE — 76642 ULTRASOUND BREAST LIMITED: CPT

## 2024-09-05 PROCEDURE — 77066 DX MAMMO INCL CAD BI: CPT

## 2024-09-06 NOTE — TELEPHONE ENCOUNTER
PA for Eucrisa 2%  DENIED    Reason:(Screenshot if applicable)        Message sent to office clinical pool Yes    Denial letter scanned into Media Yes    Appeal started No (Provider will need to decide if appeal is warranted and send clinical documentation to Prior Authorization Team for initiation.)    **Please follow up with your patient regarding denial and next steps**

## 2024-09-10 DIAGNOSIS — L29.9 ITCHY SCALP: Primary | ICD-10-CM

## 2024-09-10 RX ORDER — TRIAMCINOLONE ACETONIDE 1 MG/G
CREAM TOPICAL 2 TIMES DAILY
Qty: 80 G | Refills: 1 | Status: SHIPPED | OUTPATIENT
Start: 2024-09-10

## 2024-09-18 ENCOUNTER — OFFICE VISIT (OUTPATIENT)
Dept: FAMILY MEDICINE CLINIC | Facility: CLINIC | Age: 49
End: 2024-09-18
Payer: COMMERCIAL

## 2024-09-18 ENCOUNTER — TELEPHONE (OUTPATIENT)
Age: 49
End: 2024-09-18

## 2024-09-18 VITALS
SYSTOLIC BLOOD PRESSURE: 112 MMHG | DIASTOLIC BLOOD PRESSURE: 60 MMHG | WEIGHT: 151.6 LBS | HEIGHT: 63 IN | OXYGEN SATURATION: 99 % | HEART RATE: 66 BPM | BODY MASS INDEX: 26.86 KG/M2

## 2024-09-18 DIAGNOSIS — N60.81 SEBACEOUS CYST OF SKIN OF RIGHT BREAST: Primary | ICD-10-CM

## 2024-09-18 DIAGNOSIS — Z00.00 HEALTHCARE MAINTENANCE: ICD-10-CM

## 2024-09-18 PROCEDURE — 99213 OFFICE O/P EST LOW 20 MIN: CPT | Performed by: NURSE PRACTITIONER

## 2024-09-18 NOTE — TELEPHONE ENCOUNTER
Patient calling to schedule an appointment for Surg Onc.  I informed her the office will give her a callback to schedule.  Referral is pending triage.

## 2024-09-18 NOTE — PROGRESS NOTES
Ambulatory Visit  Name: Patricia Nam      : 1975      MRN: [id number]  Encounter Provider: KIRSTIN Isbell  Encounter Date: 2024   Encounter department: [facility] [address]    Assessment & Plan  Sebaceous cyst of skin of right breast  Does not warrant antibiotic treatment at this time.  Advised avoiding use of bras that will irritate/press against area, can apply warm compress.  Will refer to surgery for possible removal of cyst.    Orders:    Ambulatory Referral to Surgical Oncology; Future    Healthcare maintenance  To obtain blood work prior to physical.    Orders:    TSH, 3rd generation with Free T4 reflex; Future    Hemoglobin A1C; Future    Comprehensive metabolic panel; Future    CBC and differential; Future    Lipid panel; Future    Vitamin D 25 hydroxy; Future       History of Present Illness     Patient presents office for follow-up after recent ultrasound and mammo.  Patient was found to have cyst in right breast that has grown from previous imaging.  Complains of increased pain and swelling to right lateral breast.  Denies lymphadenopathy, fever, chills, drainage.        History obtained from : patient  Review of Systems   Constitutional:  Negative for chills, fever and unexpected weight change.   HENT:  Negative for sore throat and trouble swallowing.    Respiratory:  Negative for cough and shortness of breath.    Cardiovascular:  Negative for chest pain and palpitations.   Gastrointestinal:  Negative for abdominal pain, nausea and vomiting.   Genitourinary:  Negative for decreased urine volume and vaginal bleeding.   Musculoskeletal:  Negative for arthralgias and myalgias.   Skin:  Negative for color change and rash.   Neurological:  Negative for dizziness, weakness, light-headedness and headaches.   Hematological:  Negative for adenopathy. Does not bruise/bleed easily.     Pertinent Medical History     Medical History Reviewed by  provider this encounter:  Allergies  Meds           Medical History Reviewed by provider this encounter:  Tobacco  Allergies  Meds  Problems  Med Hx  Surg Hx  Fam Hx       Past Medical History   Past Medical History:   Diagnosis Date    Allergic     Asthma     Degenerative joint disease     Depression     GERD (gastroesophageal reflux disease)     Lumbar radiculopathy     PONV (postoperative nausea and vomiting)     Thyroid nodule      Past Surgical History:   Procedure Laterality Date    BREAST BIOPSY Left 2020    neg    CHOLECYSTECTOMY LAPAROSCOPIC N/A 5/6/2022    Procedure: CHOLECYSTECTOMY LAPAROSCOPIC;  Surgeon: Zion Walker MD;  Location: [facility];  Service: General    CYST REMOVAL  10/2020    lt breast     EGD      HYSTERECTOMY      HYSTERECTOMY  1998    LIPOSUCTION  09/2020    TUBAL LIGATION       Family History   Problem Relation Age of Onset    Alzheimer's disease Mother     Cancer Father         stomach/GI    Heart disease Father     No Known Problems Sister     No Known Problems Daughter     No Known Problems Maternal Grandmother     No Known Problems Paternal Grandmother     Colon cancer Maternal Aunt     Cancer Maternal Aunt         stomach    No Known Problems Maternal Aunt     No Known Problems Maternal Aunt     No Known Problems Maternal Aunt     No Known Problems Maternal Aunt     No Known Problems Maternal Aunt     Colon cancer Maternal Uncle     Depression Cousin     Diabetes Brother     Asthma Brother     Cancer Cousin     Breast cancer Neg Hx      Current Outpatient Medications on File Prior to Visit   Medication Sig Dispense Refill    albuterol (PROVENTIL HFA,VENTOLIN HFA) 90 mcg/act inhaler INHALE 1-2 PUFFS EVERY 6 HOURS AS NEEDED FOR WHEEZING. 18 g 3    azelastine (ASTELIN) 0.1 % nasal spray 1 spray into each nostril 2 (two) times a day Use in each nostril as directed 1 mL 1    cefdinir (OMNICEF) 300 mg capsule       clobetasol (TEMOVATE) 0.05 % external solution Apply to scalp  "only as needed for flaring 50 mL 1    Crisaborole 2 % OINT Apply topically 2 (two) times a day as needed (itch/irritation) Apply a thin film to affected area(s) 2 times daily; consider reducing to once daily when clinical response is achieved. 60 g 3    Diclofenac Sodium (VOLTAREN) 1 % Apply 2 g topically 4 (four) times a day 100 g 0    EPINEPHrine (EPIPEN) 0.3 mg/0.3 mL SOAJ Inject 0.3 mL (0.3 mg total) into a muscle once for 1 dose As needed      fluocinonide (LIDEX) 0.05 % external solution Apply topically 2 (two) times a day 60 mL 0    FLUoxetine (PROzac) 40 MG capsule TAKE ONE CAPSULE BY MOUTH EVERY DAY 90 capsule 1    hydrOXYzine HCL (ATARAX) 10 mg tablet Take 1 tablet (10 mg total) by mouth daily at bedtime 30 tablet 0    ibuprofen (MOTRIN) 800 mg tablet Take 1 tablet (800 mg total) by mouth every 8 (eight) hours as needed for moderate pain 30 tablet 0    ketoconazole (NIZORAL) 2 % cream Apply topically to affected areas twice a day 60 g 3    ketoconazole (NIZORAL) 2 % shampoo Daily for 2 weeks straight and then on \"Mondays, Wednesdays and Fridays\":  Lather into scalp and skin on face, neck, chest, and back; leave on for 5 minutes and then rinse off completely. 120 mL 5    meclizine (ANTIVERT) 25 mg tablet Take 1 tablet (25 mg total) by mouth every 8 (eight) hours as needed for dizziness 90 tablet 0    metoclopramide (REGLAN) 5 mg tablet Take 1 tablet (5 mg total) by mouth 3 (three) times a day before meals 90 tablet 5    omeprazole (PriLOSEC) 10 mg delayed release capsule Take 1 capsule (10 mg total) by mouth every evening 90 capsule 0    omeprazole (PriLOSEC) 40 MG capsule TAKE ONE CAPSULE BY MOUTH EVERY DAY 90 capsule 1    rizatriptan (MAXALT) 10 mg tablet Take 1 tablet (10 mg total) by mouth once as needed for migraine May repeat in 2 hours if needed. Max 2/24 hours, 9/month. 9 tablet 11    tiZANidine (ZANAFLEX) 4 mg tablet Take 1 tablet (4 mg total) by mouth every 8 (eight) hours as needed for muscle " spasms 30 tablet 0    triamcinolone (KENALOG) 0.1 % cream Apply topically 2 (two) times a day Apply twice daily to affected areas, as needed. 80 g 1     No current facility-administered medications on file prior to visit.     Allergies   Allergen Reactions    Bee Venom Anaphylaxis    Iodides Other (See Comments)    [place] Oil - Food Allergy GI Intolerance     Other reaction(s): GI Intolerance, GI Reaction    Aspirin Other (See Comments)     shaking  convulsions    Other reaction(s): Other (See Comments)  shaking  convulsions  shaking  convulsions    Metronidazole GI Intolerance     Gi upset      Sulfamethoxazole-Trimethoprim GI Intolerance     Gi upst    Other reaction(s): GI Intolerance, GI Reaction  Gi upst  Gi upst    Tape  [Medical Tape] Other (See Comments) and Hives     fever  Other reaction(s): Other  fever      Current Outpatient Medications on File Prior to Visit   Medication Sig Dispense Refill    albuterol (PROVENTIL HFA,VENTOLIN HFA) 90 mcg/act inhaler INHALE 1-2 PUFFS EVERY 6 HOURS AS NEEDED FOR WHEEZING. 18 g 3    azelastine (ASTELIN) 0.1 % nasal spray 1 spray into each nostril 2 (two) times a day Use in each nostril as directed 1 mL 1    cefdinir (OMNICEF) 300 mg capsule       clobetasol (TEMOVATE) 0.05 % external solution Apply to scalp only as needed for flaring 50 mL 1    Crisaborole 2 % OINT Apply topically 2 (two) times a day as needed (itch/irritation) Apply a thin film to affected area(s) 2 times daily; consider reducing to once daily when clinical response is achieved. 60 g 3    Diclofenac Sodium (VOLTAREN) 1 % Apply 2 g topically 4 (four) times a day 100 g 0    EPINEPHrine (EPIPEN) 0.3 mg/0.3 mL SOAJ Inject 0.3 mL (0.3 mg total) into a muscle once for 1 dose As needed      fluocinonide (LIDEX) 0.05 % external solution Apply topically 2 (two) times a day 60 mL 0    FLUoxetine (PROzac) 40 MG capsule TAKE ONE CAPSULE BY MOUTH EVERY DAY 90 capsule 1    hydrOXYzine HCL (ATARAX) 10 mg tablet Take 1  "tablet (10 mg total) by mouth daily at bedtime 30 tablet 0    ibuprofen (MOTRIN) 800 mg tablet Take 1 tablet (800 mg total) by mouth every 8 (eight) hours as needed for moderate pain 30 tablet 0    ketoconazole (NIZORAL) 2 % cream Apply topically to affected areas twice a day 60 g 3    ketoconazole (NIZORAL) 2 % shampoo Daily for 2 weeks straight and then on \"Mondays, Wednesdays and Fridays\":  Lather into scalp and skin on face, neck, chest, and back; leave on for 5 minutes and then rinse off completely. 120 mL 5    meclizine (ANTIVERT) 25 mg tablet Take 1 tablet (25 mg total) by mouth every 8 (eight) hours as needed for dizziness 90 tablet 0    metoclopramide (REGLAN) 5 mg tablet Take 1 tablet (5 mg total) by mouth 3 (three) times a day before meals 90 tablet 5    omeprazole (PriLOSEC) 10 mg delayed release capsule Take 1 capsule (10 mg total) by mouth every evening 90 capsule 0    omeprazole (PriLOSEC) 40 MG capsule TAKE ONE CAPSULE BY MOUTH EVERY DAY 90 capsule 1    rizatriptan (MAXALT) 10 mg tablet Take 1 tablet (10 mg total) by mouth once as needed for migraine May repeat in 2 hours if needed. Max 2/24 hours, 9/month. 9 tablet 11    tiZANidine (ZANAFLEX) 4 mg tablet Take 1 tablet (4 mg total) by mouth every 8 (eight) hours as needed for muscle spasms 30 tablet 0    triamcinolone (KENALOG) 0.1 % cream Apply topically 2 (two) times a day Apply twice daily to affected areas, as needed. 80 g 1     No current facility-administered medications on file prior to visit.      Social History     Tobacco Use    Smoking status: Never    Smokeless tobacco: Never   Vaping Use    Vaping status: Never Used   Substance and Sexual Activity    Alcohol use: Yes     Comment: Not very often    Drug use: Never    Sexual activity: Yes     Partners: Male     Birth control/protection: None     Comment: With one person for 5 years         Objective     /60 (BP Location: Left arm, Patient Position: Sitting, Cuff Size: Standard)   " "Pulse 66   Ht 5' 3\" (1.6 m)   Wt 68.8 kg (151 lb 9.6 oz)   SpO2 99%   BMI 26.85 kg/m²     Physical Exam  Vitals reviewed.   Constitutional:       General: She is not in acute distress.     Appearance: Normal appearance. She is not ill-appearing.   HENT:      Head: Normocephalic and atraumatic.      Right Ear: External ear normal.      Left Ear: External ear normal.      Nose: Nose normal.      Mouth/Throat:      Mouth: Mucous membranes are moist.      Pharynx: Oropharynx is clear.   Cardiovascular:      Rate and Rhythm: Normal rate and regular rhythm.      Pulses: Normal pulses.      Heart sounds: Normal heart sounds. No murmur heard.  Pulmonary:      Effort: Pulmonary effort is normal.      Breath sounds: Normal breath sounds.   Chest:   Breasts:     Breasts are symmetrical.      Right: Mass and tenderness present. No bleeding, inverted nipple or nipple discharge.      Left: Normal.       Musculoskeletal:         General: Normal range of motion.      Cervical back: Normal range of motion and neck supple.   Lymphadenopathy:      Cervical: No cervical adenopathy.      Upper Body:      Right upper body: No supraclavicular or axillary adenopathy.      Left upper body: No supraclavicular or axillary adenopathy.   Skin:     General: Skin is warm and dry.   Neurological:      General: No focal deficit present.      Mental Status: She is alert and oriented to person, place, and time.   Psychiatric:         Mood and Affect: Mood normal.         Behavior: Behavior normal.         "

## 2024-09-24 ENCOUNTER — TELEPHONE (OUTPATIENT)
Age: 49
End: 2024-09-24

## 2024-09-24 ENCOUNTER — TELEPHONE (OUTPATIENT)
Dept: HEMATOLOGY ONCOLOGY | Facility: CLINIC | Age: 49
End: 2024-09-24

## 2024-09-24 ENCOUNTER — TELEPHONE (OUTPATIENT)
Dept: FAMILY MEDICINE CLINIC | Facility: CLINIC | Age: 49
End: 2024-09-24

## 2024-09-24 NOTE — TELEPHONE ENCOUNTER
Referral to Surgical Oncology received.  Chart reviewed by  with Surgical Oncology.     Diagnosis:   Diagnosis   N60.81 (ICD-10-CM) - Sebaceous cyst of skin of right breast     After review of chart, due to diagnosis on patient's referral, referral has been closed at this time. Referring provider has been notified. Referral should be placed to Dermatology for referring diagnoses.

## 2024-09-24 NOTE — TELEPHONE ENCOUNTER
----- Message from KIRSTIN Vásquez sent at 9/24/2024  3:19 PM EDT -----  Regarding: Referral  Please inform patient that surgical oncology reviewed her chart/referral and suggested that dermatology remove the cyst.  I know she is already established with dermatology, so she can call the office for follow-up.    Thank you

## 2024-09-24 NOTE — TELEPHONE ENCOUNTER
Pt called in to schedule appt due to a poss cyst on the breast. Pt was seen by Dr. Walker 04/26/22 and is wondering if the Dr can sees her for this new issue. Pt was told will receive a call back from the practice with the answer.

## 2024-09-24 NOTE — TELEPHONE ENCOUNTER
Caller: Patricia     Doctor: Victor M     Reason for call: Patient called returning call from  please advise     Call back#: [phone]

## 2024-09-25 ENCOUNTER — TELEPHONE (OUTPATIENT)
Age: 49
End: 2024-09-25

## 2024-09-25 ENCOUNTER — TREATMENT (OUTPATIENT)
Dept: SURGERY | Facility: CLINIC | Age: 49
End: 2024-09-25

## 2024-09-25 ENCOUNTER — TELEPHONE (OUTPATIENT)
Dept: SURGERY | Facility: CLINIC | Age: 49
End: 2024-09-25

## 2024-09-25 ENCOUNTER — PATIENT MESSAGE (OUTPATIENT)
Dept: SURGERY | Facility: CLINIC | Age: 49
End: 2024-09-25

## 2024-09-25 DIAGNOSIS — L03.313 CELLULITIS OF CHEST WALL: Primary | ICD-10-CM

## 2024-09-25 NOTE — TELEPHONE ENCOUNTER
Patient called back regarding the antibiotic. Patient stated she cannot take Augmentin because it makes her physically ill. Spoke to Krish in the office and she will relay this to Dr. Walker. Patient made aware and will wait for your call tomorrow 9/26/24.

## 2024-09-25 NOTE — TELEPHONE ENCOUNTER
I called office to verify if Dr Walker can see the patient for her new problem no answer. Please call patient to schedule appointment [phone].

## 2024-09-25 NOTE — PATIENT INSTRUCTIONS
If no better by Thursday call office  If symptoms worsen go to ER  Take Zofran tablet under tongue wait 15-20 minutes to eat or drink- water, tea, broth, gatorade/pedialyte    Gastroenteritis   AMBULATORY CARE:   Gastroenteritis , or stomach flu, is an infection of the stomach and intestines  It is caused by bacteria, parasites, or viruses  Common symptoms include the following:   Diarrhea or gas    Nausea, vomiting, or poor appetite    Abdominal cramps, pain, or gurgling    Fever    Tiredness or weakness    Headaches or muscle aches with any of the above symptoms    Call 911 for any of the following: You have trouble breathing or a very fast pulse  Seek care immediately if:   You see blood in your diarrhea  You cannot stop vomiting  You have not urinated for 12 hours  You feel like you are going to faint  Contact your healthcare provider if:   You have a fever  You continue to vomit or have diarrhea, even after treatment  You see worms in your diarrhea  Your mouth or eyes are dry  You are not urinating as much or as often  You have questions or concerns about your condition or care  Treatment for gastroenteritis  may include medicines to slow or stop your diarrhea or vomiting  You may also need medicines to treat an infection caused by bacteria or a parasite  Manage your symptoms:   Drink liquids as directed  Ask your healthcare provider how much liquid to drink each day, and which liquids are best for you  You may also need to drink an oral rehydration solution (ORS)  An ORS has the right amounts of sugar, salt, and minerals in water to replace body fluids  Eat bland foods  When you feel hungry, begin eating soft, bland foods  Examples are bananas, clear soup, potatoes, and applesauce  Do not have dairy products, alcohol, sugary drinks, or drinks with caffeine until you feel better  Rest as much as possible    Slowly start to do more each day when you begin to feel Radiation Oncology Follow-Up    Patient Name:  Imtiaz Butler  MRN:  [id number]  :  1954    Referring Provider: Cortney Fisher MD  Primary Care Provider: Candie Moreno MD  Care Team: Patient Care Team:  Candie Moreno MD as PCP - General (Internal Medicine)  Candie Moreno MD as PCP - O Medicare Advantage PCP  Georgia Valiente MD as Consulting Physician (Hematology and Oncology)    Date of Service: 2024    SUBJECTIVE  History of Present Illness:  Imtiaz Butler is a 70 y.o. female who was previously seen at the [facility] Department of Radiation Oncology for her in breast recurrence/new primary right breast cancer.    Ms. Butler is well-known to me.  She has a history of a T2 N0 M0 invasive ductal carcinoma of the right breast for which she underwent breast conservation followed by adjuvant radiation to the right breast consisting of a dose of 42.56 Rousseau with a tumor bed boost to a total of 52.56 Rousseau.  Her radiation was completed 2018.  More recently she experienced an in breast recurrence/new primary right breast cancer.  She underwent a right partial mastectomy on 2024 which revealed a 2.2 cm invasive ductal carcinoma, grade 2.  No angiolymphatic invasion was present.  There was no ductal carcinoma in situ.  The resection margins were negative with the posterior margin less than 1 mm.  Estrogen receptors stained positive at 20%.  Progesterone receptors were negative.  HER2/mita was 0 on IHC.  She underwent a sentinel lymph node biopsy on 2024.  1 sentinel lymph node was removed which was negative for metastatic disease.  She saw Dr. Valiente yesterday who is recommending exemestane.  Ms. Rasheed returns today to again discuss reirradiation.    Treatment Rendered:   No radiation treatments to show. (Treatments may have been administered in another system.)       Review of Systems:   Review of Systems - Oncology    Performance  "Status:   The Karnofsky performance scale today is 80, Normal activity with effort; some signs or symptoms of disease (ECOG equivalent 1).       OBJECTIVE  Vital Signs:  /76 (BP Location: Left arm, Patient Position: Sitting, BP Cuff Size: Large adult)   Pulse 95   Temp 36.1 °C (97 °F) (Temporal)   Resp 18   Ht 1.6 m (5' 2.99\")   Wt 106 kg (234 lb 5.6 oz)   SpO2 100%   BMI 41.52 kg/m²   Physical Exam       ASSESSMENT:   Imtiaz Butler is a 70 y.o. female with Malignant neoplasm of upper-inner quadrant of right breast in female, estrogen receptor positive (Multi), Pathologic: Stage IIA (rpT2, pN0(sn), cM0, G2, ER+, TX-, HER2-).  She has a history of a prior right-sided breast cancer treated with breast conservation adjuvant radiation and endocrine therapy.  She has an in breast recurrence for which she underwent a repeat right partial mastectomy with sentinel lymph node biopsy.    PLAN: We had a long conversation with the patient detailing the role of partial breast reirradiation in the management of this in breast recurrence.  We did also discuss mastectomy as an alternative.  We discussed the data regarding once daily versus twice daily radiation with the RTOG 1014 trial looking at twice daily partial breast reirradiation.  We also discussed that many people are treating with once daily radiation off study.  We do have a an open clinical trial looking at once daily radiation although we have decided not to enroll her in this trial.  She does wish to proceed with radiation and would prefer a once daily treatment.  The side effects of radiation discussed included but were not limited to skin irritation with possible nonhealing ulcer, chest wall necrosis, postoperative breast edema, pulmonary toxicity and the possibility of a secondary malignancy induced by the radiation.  She voices understanding and wishes to proceed.  I have given her a simulation time for the beginning of October and we will plan on " better  Prevent the spread of germs:  Gastroenteritis can spread easily  Keep yourself, your family, and your surroundings clean to help prevent the spread of gastroenteritis:  Wash your hands often  Use soap and water  Wash your hands after you use the bathroom, change a child's diapers, or sneeze  Wash your hands before you prepare or eat food  Clean surfaces and do laundry often  Wash your clothes and towels separately from the rest of the laundry  Clean surfaces in your home with antibacterial  or bleach  Clean food thoroughly and cook safely  Wash raw vegetables before you cook  Cook meat, fish, and eggs fully  Do not use the same dishes for raw meat as you do for other foods  Refrigerate any leftover food immediately  Be aware when you camp or travel  Drink only clean water  Do not drink from rivers or lakes unless you purify or boil the water first  When you travel, drink bottled water and do not add ice  Do not eat fruit that has not been peeled  Do not eat raw fish or meat that is not fully cooked  Follow up with your doctor as directed:  Write down your questions so you remember to ask them during your visits  © Copyright [employer] 2022 Information is for End User's use only and may not be sold, redistributed or otherwise used for commercial purposes  The above information is an  only  It is not intended as medical advice for individual conditions or treatments  Talk to your doctor, nurse or pharmacist before following any medical regimen to see if it is safe and effective for you  starting her once the plan is complete.    NCCN Guidelines were applicable to guide this patients treatment plan.  Cortney Fisher MD

## 2024-09-25 NOTE — TELEPHONE ENCOUNTER
Dr. Walker called in an antibiotic for the patient but she states she cannot take Augmentin. She would like him to order something else. She can be reached at [phone]. Thank you.

## 2024-09-26 ENCOUNTER — TREATMENT (OUTPATIENT)
Dept: SURGERY | Facility: CLINIC | Age: 49
End: 2024-09-26

## 2024-09-26 DIAGNOSIS — L03.313 CELLULITIS OF CHEST WALL: Primary | ICD-10-CM

## 2024-09-26 RX ORDER — CEPHALEXIN 500 MG/1
500 CAPSULE ORAL EVERY 8 HOURS SCHEDULED
Qty: 21 CAPSULE | Refills: 0 | Status: SHIPPED | OUTPATIENT
Start: 2024-09-26 | End: 2024-10-03

## 2024-09-26 NOTE — TELEPHONE ENCOUNTER
Patient called and requested to speak with me about an update on the antibiotic. I checked the chart and nothing new has been called into her pharmacy. I left her a detailed voicemail stating that we would call her back once the new prescription is sent to the pharmacy.Thank you.

## 2024-09-26 NOTE — TELEPHONE ENCOUNTER
Spoke with pt and she stated any other antibiotic does not give her any issues just not the augmentin. She states the Keflex hightower snot make her sick

## 2024-09-26 NOTE — TELEPHONE ENCOUNTER
LMOM for pt to let us know what antibiotics does not cause any issues. To call the office to let us know so we can send to pharmacy

## 2024-10-02 DIAGNOSIS — B37.9 ANTIBIOTIC-INDUCED YEAST INFECTION: Primary | ICD-10-CM

## 2024-10-02 DIAGNOSIS — T36.95XA ANTIBIOTIC-INDUCED YEAST INFECTION: Primary | ICD-10-CM

## 2024-10-02 RX ORDER — FLUCONAZOLE 150 MG/1
150 TABLET ORAL ONCE
Qty: 2 TABLET | Refills: 0 | Status: SHIPPED | OUTPATIENT
Start: 2024-10-02 | End: 2024-10-02

## 2024-10-08 ENCOUNTER — OFFICE VISIT (OUTPATIENT)
Dept: SURGERY | Facility: CLINIC | Age: 49
End: 2024-10-08
Payer: COMMERCIAL

## 2024-10-08 VITALS
HEIGHT: 63 IN | RESPIRATION RATE: 18 BRPM | TEMPERATURE: 97.5 F | WEIGHT: 153.4 LBS | SYSTOLIC BLOOD PRESSURE: 126 MMHG | BODY MASS INDEX: 27.18 KG/M2 | HEART RATE: 66 BPM | OXYGEN SATURATION: 99 % | DIASTOLIC BLOOD PRESSURE: 78 MMHG

## 2024-10-08 DIAGNOSIS — L03.313 CELLULITIS OF CHEST WALL: Primary | ICD-10-CM

## 2024-10-08 DIAGNOSIS — B37.9 YEAST INFECTION: ICD-10-CM

## 2024-10-08 PROCEDURE — 99213 OFFICE O/P EST LOW 20 MIN: CPT | Performed by: SURGERY

## 2024-10-08 RX ORDER — FLUCONAZOLE 150 MG/1
150 TABLET ORAL DAILY
Qty: 4 TABLET | Refills: 0 | Status: SHIPPED | OUTPATIENT
Start: 2024-10-08 | End: 2024-10-12

## 2024-10-08 RX ORDER — SODIUM CHLORIDE, SODIUM LACTATE, POTASSIUM CHLORIDE, CALCIUM CHLORIDE 600; 310; 30; 20 MG/100ML; MG/100ML; MG/100ML; MG/100ML
125 INJECTION, SOLUTION INTRAVENOUS CONTINUOUS
OUTPATIENT
Start: 2024-10-08

## 2024-10-08 NOTE — PROGRESS NOTES
Follow Up - General Surgery   Patricia Nam 48 y.o. female MRN: [id number]  Unit/Bed#:  Encounter: [id number]    Assessment & Plan     Assessment:  Cellulitis with abscess from the right chest wall  History of asthma  History of DJD  History of GERD  History of thyroid nodule  Plan:  In light of the persistent lump with evidence of abscess I advised the patient to undergo incision and drainage of right chest wall abscess and open wound packing under IV sedation with anesthesia in the near future.  I discussed the operative procedure, risk, benefits and alternatives, but not limited to bleeding, infection after surgery, recurrence, injury to adjacent organs, need for furher operations, loss of time from work, the patient understood and agreed to proceed with the above surgery.    History of Present Illness     HPI:  Patricia Nam is a 48 y.o. female who presents to my office for evaluation of persistent redness and pain from the right chest wall.  The patient stated this happened a couple of weeks ago, she called our office and antibiotic was called in and she was advised to apply warm compresses.  Patient stated that the swelling has decreased slightly but she still has some pain and redness, no drainage, fever or chills.  Patient is known to me from prior laparoscopic cholecystectomy in 2022.    Review of Systems  The rest of the review of system total of 10 were negative except for the HPI.    Historical Information   Past Medical History:   Diagnosis Date    Allergic     Asthma     Degenerative joint disease     Depression     GERD (gastroesophageal reflux disease)     Lumbar radiculopathy     PONV (postoperative nausea and vomiting)     Thyroid nodule      Past Surgical History:   Procedure Laterality Date    BREAST BIOPSY Left 2020    neg    CHOLECYSTECTOMY LAPAROSCOPIC N/A 5/6/2022    Procedure: CHOLECYSTECTOMY LAPAROSCOPIC;  Surgeon: Zion Walker MD;  Location: [facility];  Service: General    CYST  REMOVAL  10/2020    lt breast     EGD      HYSTERECTOMY      HYSTERECTOMY  1998    LIPOSUCTION  09/2020    TUBAL LIGATION       Social History   Social History     Substance and Sexual Activity   Alcohol Use Yes    Comment: Not very often     Social History     Substance and Sexual Activity   Drug Use Never     Social History     Tobacco Use   Smoking Status Never    Passive exposure: Never   Smokeless Tobacco Never     Family History: Family history non-contributory    Meds/Allergies   all medications and allergies reviewed     Current Outpatient Medications:     albuterol (PROVENTIL HFA,VENTOLIN HFA) 90 mcg/act inhaler, INHALE 1-2 PUFFS EVERY 6 HOURS AS NEEDED FOR WHEEZING., Disp: 18 g, Rfl: 3    azelastine (ASTELIN) 0.1 % nasal spray, 1 spray into each nostril 2 (two) times a day Use in each nostril as directed, Disp: 1 mL, Rfl: 1    cefdinir (OMNICEF) 300 mg capsule, , Disp: , Rfl:     clobetasol (TEMOVATE) 0.05 % external solution, Apply to scalp only as needed for flaring, Disp: 50 mL, Rfl: 1    Crisaborole 2 % OINT, Apply topically 2 (two) times a day as needed (itch/irritation) Apply a thin film to affected area(s) 2 times daily; consider reducing to once daily when clinical response is achieved., Disp: 60 g, Rfl: 3    Diclofenac Sodium (VOLTAREN) 1 %, Apply 2 g topically 4 (four) times a day, Disp: 100 g, Rfl: 0    EPINEPHrine (EPIPEN) 0.3 mg/0.3 mL SOAJ, Inject 0.3 mL (0.3 mg total) into a muscle once for 1 dose As needed, Disp: , Rfl:     fluconazole (DIFLUCAN) 150 mg tablet, Take 1 tablet (150 mg total) by mouth daily for 4 doses, Disp: 4 tablet, Rfl: 0    fluocinonide (LIDEX) 0.05 % external solution, Apply topically 2 (two) times a day, Disp: 60 mL, Rfl: 0    FLUoxetine (PROzac) 40 MG capsule, TAKE ONE CAPSULE BY MOUTH EVERY DAY, Disp: 90 capsule, Rfl: 1    hydrOXYzine HCL (ATARAX) 10 mg tablet, Take 1 tablet (10 mg total) by mouth daily at bedtime, Disp: 30 tablet, Rfl: 0    ibuprofen (MOTRIN) 800 mg  "tablet, Take 1 tablet (800 mg total) by mouth every 8 (eight) hours as needed for moderate pain, Disp: 30 tablet, Rfl: 0    ketoconazole (NIZORAL) 2 % cream, Apply topically to affected areas twice a day, Disp: 60 g, Rfl: 3    ketoconazole (NIZORAL) 2 % shampoo, Daily for 2 weeks straight and then on \"Mondays, Wednesdays and Fridays\":  Lather into scalp and skin on face, neck, chest, and back; leave on for 5 minutes and then rinse off completely., Disp: 120 mL, Rfl: 5    meclizine (ANTIVERT) 25 mg tablet, Take 1 tablet (25 mg total) by mouth every 8 (eight) hours as needed for dizziness, Disp: 90 tablet, Rfl: 0    metoclopramide (REGLAN) 5 mg tablet, Take 1 tablet (5 mg total) by mouth 3 (three) times a day before meals, Disp: 90 tablet, Rfl: 5    omeprazole (PriLOSEC) 10 mg delayed release capsule, Take 1 capsule (10 mg total) by mouth every evening, Disp: 90 capsule, Rfl: 0    omeprazole (PriLOSEC) 40 MG capsule, TAKE ONE CAPSULE BY MOUTH EVERY DAY, Disp: 90 capsule, Rfl: 1    rizatriptan (MAXALT) 10 mg tablet, Take 1 tablet (10 mg total) by mouth once as needed for migraine May repeat in 2 hours if needed. Max 2/24 hours, 9/month., Disp: 9 tablet, Rfl: 11    tiZANidine (ZANAFLEX) 4 mg tablet, Take 1 tablet (4 mg total) by mouth every 8 (eight) hours as needed for muscle spasms, Disp: 30 tablet, Rfl: 0    triamcinolone (KENALOG) 0.1 % cream, Apply topically 2 (two) times a day Apply twice daily to affected areas, as needed., Disp: 80 g, Rfl: 1  Allergies   Allergen Reactions    Bee Venom Anaphylaxis    Iodides Other (See Comments)    [place] Oil - Food Allergy GI Intolerance     Other reaction(s): GI Intolerance, GI Reaction    Aspirin Other (See Comments)     shaking  convulsions    Other reaction(s): Other (See Comments)  shaking  convulsions  shaking  convulsions    Metronidazole GI Intolerance     Gi upset      Sulfamethoxazole-Trimethoprim GI Intolerance     Gi upst    Other reaction(s): GI Intolerance, GI " "Reaction  Gi upst  Gi upst    Tape  [Medical Tape] Other (See Comments) and Hives     fever  Other reaction(s): Other  fever       Objective     Current Vitals:   Blood Pressure: 126/78 (10/08/24 1122)  Pulse: 66 (10/08/24 1122)  Temperature: 97.5 °F (36.4 °C) (10/08/24 1122)  Respirations: 18 (10/08/24 1122)  Height: 5' 3\" (160 cm) (10/08/24 1122)  Weight - Scale: 69.6 kg (153 lb 6.4 oz) (10/08/24 1122)  SpO2: 99 % (10/08/24 1122)    Physical Exam  Vitals and nursing note reviewed.   Constitutional:       General: She is not in acute distress.  Cardiovascular:      Rate and Rhythm: Normal rate and regular rhythm.   Pulmonary:      Effort: No respiratory distress.      Breath sounds: Normal breath sounds.   Abdominal:      Palpations: Abdomen is soft. There is no mass.      Tenderness: There is no abdominal tenderness.   Skin:     General: Skin is warm.      Coloration: Skin is not jaundiced.      Findings: No erythema or rash.      Comments: There is a lump on the right chest wall measuring approximately 3 x 4 cm, mild erythema, somewhat tender to palpation, no drainage.   Neurological:      Mental Status: She is alert and oriented to person, place, and time.      Cranial Nerves: No cranial nerve deficit.   Psychiatric:         Mood and Affect: Mood normal.         Behavior: Behavior normal.       "

## 2024-10-11 ENCOUNTER — HOSPITAL ENCOUNTER (OUTPATIENT)
Dept: ULTRASOUND IMAGING | Facility: HOSPITAL | Age: 49
End: 2024-10-11
Payer: COMMERCIAL

## 2024-10-11 DIAGNOSIS — E04.1 THYROID NODULE: ICD-10-CM

## 2024-10-11 PROCEDURE — 76536 US EXAM OF HEAD AND NECK: CPT

## 2024-10-16 ENCOUNTER — TELEPHONE (OUTPATIENT)
Age: 49
End: 2024-10-16

## 2024-10-16 NOTE — TELEPHONE ENCOUNTER
Pt called back and was read Sepideh's message regarding her ultra shound.        Kadedra Jackson-Reyes, MA  10/16/2024 11:05 AM EDT       Called patient and n/a lmov for a call back

## 2024-10-21 ENCOUNTER — TELEPHONE (OUTPATIENT)
Dept: SURGERY | Facility: CLINIC | Age: 49
End: 2024-10-21

## 2024-10-21 NOTE — TELEPHONE ENCOUNTER
LMOM for her to calll me back we can keep the appt for 11/6/24 it would just be a follow up to re-evaluate to see if cysts needs to be lanced

## 2024-10-23 ENCOUNTER — TELEPHONE (OUTPATIENT)
Age: 49
End: 2024-10-23

## 2024-10-23 NOTE — TELEPHONE ENCOUNTER
Caller: Patricia     Doctor: Victor M     Reason for call: Patient calling to reschedule procedure please advise     Call back#: [phone]

## 2024-11-06 ENCOUNTER — TELEPHONE (OUTPATIENT)
Dept: NEPHROLOGY | Facility: CLINIC | Age: 49
End: 2024-11-06

## 2024-11-06 ENCOUNTER — TELEPHONE (OUTPATIENT)
Age: 49
End: 2024-11-06

## 2024-11-06 ENCOUNTER — OFFICE VISIT (OUTPATIENT)
Dept: SURGERY | Facility: CLINIC | Age: 49
End: 2024-11-06
Payer: COMMERCIAL

## 2024-11-06 VITALS
WEIGHT: 152.4 LBS | HEART RATE: 74 BPM | RESPIRATION RATE: 18 BRPM | OXYGEN SATURATION: 97 % | DIASTOLIC BLOOD PRESSURE: 62 MMHG | HEIGHT: 63 IN | SYSTOLIC BLOOD PRESSURE: 118 MMHG | TEMPERATURE: 97.8 F | BODY MASS INDEX: 27 KG/M2

## 2024-11-06 DIAGNOSIS — L08.9 INFECTED SEBACEOUS CYST: Primary | ICD-10-CM

## 2024-11-06 DIAGNOSIS — L72.3 INFECTED SEBACEOUS CYST: Primary | ICD-10-CM

## 2024-11-06 PROCEDURE — 99213 OFFICE O/P EST LOW 20 MIN: CPT | Performed by: SURGERY

## 2024-11-06 NOTE — TELEPHONE ENCOUNTER
I called [employer] # [phone] to verify eligibility for the patient and as per patient is currently active eff: date 05/28/2024

## 2024-11-06 NOTE — H&P (VIEW-ONLY)
Follow Up - General Surgery   Patricia Nam 49 y.o. female MRN: [id number]  Unit/Bed#:  Encounter: [id number]    Assessment & Plan     Assessment:  Sebaceous cyst right chest wall, recurrent  History of asthma, stable  History of depression, stable  History of GERD    Plan:  Patient still residual cyst on the right chest wall, no evidence of infection at this time.  Patient was advised for observation versus surgical excision, she opted for observation at this time.  She will contact our office should she change her mind.  I discussed the operative procedure, risk, benefits, alternatives and possible complications, she understood and she will come to our office.    History of Present Illness     HPI:  Patricia Nam is a 49 y.o. female who presents to my physical follow-up.  The patient is known to me from prior laparoscopic cholecystectomy, she was also seen in the office because of a infected cyst from the right chest wall on October 8.  Patient was also scheduled for incision and drainage of the right chest wall, patient canceled because she noted no residual infection or cyst.  Patient stated that the infection subsided and she only has residual cyst on the right chest wall.  She denies having any drainage, pain, redness or any other constitutional symptoms.  Patient tried to pop it but did not have anything.    Review of Systems  The rest of the review of system total of 10 were negative except for the HPI.    Historical Information   Past Medical History:   Diagnosis Date    Allergic     Asthma     Degenerative joint disease     Depression     GERD (gastroesophageal reflux disease)     Lumbar radiculopathy     PONV (postoperative nausea and vomiting)     Thyroid nodule      Past Surgical History:   Procedure Laterality Date    BREAST BIOPSY Left 2020    neg    CHOLECYSTECTOMY LAPAROSCOPIC N/A 05/06/2022    Procedure: CHOLECYSTECTOMY LAPAROSCOPIC;  Surgeon: Zion Walker MD;  Location: [facility];   Service: General    COLONOSCOPY      CYST REMOVAL  10/2020    lt breast     EGD      HYSTERECTOMY      HYSTERECTOMY  1998    LIPOSUCTION  09/2020    TUBAL LIGATION       Social History   Social History     Substance and Sexual Activity   Alcohol Use Yes    Comment: Not very often     Social History     Substance and Sexual Activity   Drug Use Never     Social History     Tobacco Use   Smoking Status Never    Passive exposure: Never   Smokeless Tobacco Never     Family History: Family history non-contributory    Meds/Allergies   all medications and allergies reviewed     Current Outpatient Medications:     albuterol (PROVENTIL HFA,VENTOLIN HFA) 90 mcg/act inhaler, INHALE 1-2 PUFFS EVERY 6 HOURS AS NEEDED FOR WHEEZING., Disp: 18 g, Rfl: 3    azelastine (ASTELIN) 0.1 % nasal spray, 1 spray into each nostril 2 (two) times a day Use in each nostril as directed (Patient taking differently: 1 spray into each nostril if needed Use in each nostril as directed), Disp: 1 mL, Rfl: 1    clobetasol (TEMOVATE) 0.05 % external solution, Apply to scalp only as needed for flaring (Patient taking differently: if needed Apply to scalp only as needed for flaring), Disp: 50 mL, Rfl: 1    Crisaborole 2 % OINT, Apply topically 2 (two) times a day as needed (itch/irritation) Apply a thin film to affected area(s) 2 times daily; consider reducing to once daily when clinical response is achieved., Disp: 60 g, Rfl: 3    Diclofenac Sodium (VOLTAREN) 1 %, Apply 2 g topically 4 (four) times a day (Patient taking differently: Apply 2 g topically if needed), Disp: 100 g, Rfl: 0    EPINEPHrine (EPIPEN) 0.3 mg/0.3 mL SOAJ, Inject 0.3 mL (0.3 mg total) into a muscle once for 1 dose As needed, Disp: , Rfl:     fluocinonide (LIDEX) 0.05 % external solution, Apply topically 2 (two) times a day (Patient taking differently: Apply topically if needed), Disp: 60 mL, Rfl: 0    FLUoxetine (PROzac) 40 MG capsule, TAKE ONE CAPSULE BY MOUTH EVERY DAY (Patient  taking differently: Take 40 mg by mouth as needed), Disp: 90 capsule, Rfl: 1    hydrOXYzine HCL (ATARAX) 10 mg tablet, Take 1 tablet (10 mg total) by mouth daily at bedtime (Patient taking differently: Take 10 mg by mouth if needed), Disp: 30 tablet, Rfl: 0    ibuprofen (MOTRIN) 800 mg tablet, Take 1 tablet (800 mg total) by mouth every 8 (eight) hours as needed for moderate pain, Disp: 30 tablet, Rfl: 0    ketoconazole (NIZORAL) 2 % cream, Apply topically to affected areas twice a day (Patient taking differently: if needed Apply topically to affected areas twice a day), Disp: 60 g, Rfl: 3    meclizine (ANTIVERT) 25 mg tablet, Take 1 tablet (25 mg total) by mouth every 8 (eight) hours as needed for dizziness, Disp: 90 tablet, Rfl: 0    metoclopramide (REGLAN) 5 mg tablet, Take 1 tablet (5 mg total) by mouth 3 (three) times a day before meals, Disp: 90 tablet, Rfl: 5    omeprazole (PriLOSEC) 10 mg delayed release capsule, Take 1 capsule (10 mg total) by mouth every evening, Disp: 90 capsule, Rfl: 0    omeprazole (PriLOSEC) 40 MG capsule, TAKE ONE CAPSULE BY MOUTH EVERY DAY, Disp: 90 capsule, Rfl: 1    rizatriptan (MAXALT) 10 mg tablet, Take 1 tablet (10 mg total) by mouth once as needed for migraine May repeat in 2 hours if needed. Max 2/24 hours, 9/month., Disp: 9 tablet, Rfl: 11    tiZANidine (ZANAFLEX) 4 mg tablet, Take 1 tablet (4 mg total) by mouth every 8 (eight) hours as needed for muscle spasms, Disp: 30 tablet, Rfl: 0    triamcinolone (KENALOG) 0.1 % cream, Apply topically 2 (two) times a day Apply twice daily to affected areas, as needed., Disp: 80 g, Rfl: 1  Allergies   Allergen Reactions    Bee Venom Anaphylaxis    Iodides Other (See Comments)    [employer] Oil - Food Allergy GI Intolerance     Other reaction(s): GI Intolerance, GI Reaction    Aspirin Other (See Comments)     shaking  convulsions    Other reaction(s): Other (See Comments)  shaking  convulsions  shaking  convulsions    Metronidazole GI  "Intolerance     Gi upset      Sulfamethoxazole-Trimethoprim GI Intolerance     Gi upst    Other reaction(s): GI Intolerance, GI Reaction  Gi upst  Gi upst    Tape  [Medical Tape] Other (See Comments) and Hives     fever  Other reaction(s): Other  fever       Objective     Current Vitals:   Blood Pressure: 118/62 (11/06/24 1148)  Pulse: 74 (11/06/24 1148)  Temperature: 97.8 °F (36.6 °C) (11/06/24 1148)  Respirations: 18 (11/06/24 1148)  Height: 5' 3\" (160 cm) (11/06/24 1148)  Weight - Scale: 69.1 kg (152 lb 6.4 oz) (11/06/24 1148)  SpO2: 97 % (11/06/24 1148)    Physical Exam  Vitals and nursing note reviewed.   Constitutional:       General: She is not in acute distress.  Cardiovascular:      Rate and Rhythm: Normal rate and regular rhythm.   Pulmonary:      Effort: No respiratory distress.      Breath sounds: Normal breath sounds.   Abdominal:      Palpations: Abdomen is soft. There is no mass.      Tenderness: There is no abdominal tenderness.   Skin:     General: Skin is warm.      Coloration: Skin is not jaundiced.      Findings: No erythema or rash.      Comments: There is a 1 cm skin lesion on the right chest wall, most likely residual from the infected sebaceous cyst, no evidence of infection at this time.   Neurological:      Mental Status: She is alert and oriented to person, place, and time.      Cranial Nerves: No cranial nerve deficit.   Psychiatric:         Mood and Affect: Mood normal.         Behavior: Behavior normal.       "

## 2024-11-06 NOTE — PROGRESS NOTES
Follow Up - General Surgery   Patricia Nam 49 y.o. female MRN: [id number]  Unit/Bed#:  Encounter: [id number]    Assessment & Plan     Assessment:  Sebaceous cyst right chest wall, recurrent  History of asthma, stable  History of depression, stable  History of GERD    Plan:  Patient still residual cyst on the right chest wall, no evidence of infection at this time.  Patient was advised for observation versus surgical excision, she opted for observation at this time.  She will contact our office should she change her mind.  I discussed the operative procedure, risk, benefits, alternatives and possible complications, she understood and she will come to our office.    History of Present Illness     HPI:  Patricia Nam is a 49 y.o. female who presents to my physical follow-up.  The patient is known to me from prior laparoscopic cholecystectomy, she was also seen in the office because of a infected cyst from the right chest wall on October 8.  Patient was also scheduled for incision and drainage of the right chest wall, patient canceled because she noted no residual infection or cyst.  Patient stated that the infection subsided and she only has residual cyst on the right chest wall.  She denies having any drainage, pain, redness or any other constitutional symptoms.  Patient tried to pop it but did not have anything.    Review of Systems  The rest of the review of system total of 10 were negative except for the HPI.    Historical Information   Past Medical History:   Diagnosis Date    Allergic     Asthma     Degenerative joint disease     Depression     GERD (gastroesophageal reflux disease)     Lumbar radiculopathy     PONV (postoperative nausea and vomiting)     Thyroid nodule      Past Surgical History:   Procedure Laterality Date    BREAST BIOPSY Left 2020    neg    CHOLECYSTECTOMY LAPAROSCOPIC N/A 05/06/2022    Procedure: CHOLECYSTECTOMY LAPAROSCOPIC;  Surgeon: Zion Walker MD;  Location: [facility];   Service: General    COLONOSCOPY      CYST REMOVAL  10/2020    lt breast     EGD      HYSTERECTOMY      HYSTERECTOMY  1998    LIPOSUCTION  09/2020    TUBAL LIGATION       Social History   Social History     Substance and Sexual Activity   Alcohol Use Yes    Comment: Not very often     Social History     Substance and Sexual Activity   Drug Use Never     Social History     Tobacco Use   Smoking Status Never    Passive exposure: Never   Smokeless Tobacco Never     Family History: Family history non-contributory    Meds/Allergies   all medications and allergies reviewed     Current Outpatient Medications:     albuterol (PROVENTIL HFA,VENTOLIN HFA) 90 mcg/act inhaler, INHALE 1-2 PUFFS EVERY 6 HOURS AS NEEDED FOR WHEEZING., Disp: 18 g, Rfl: 3    azelastine (ASTELIN) 0.1 % nasal spray, 1 spray into each nostril 2 (two) times a day Use in each nostril as directed (Patient taking differently: 1 spray into each nostril if needed Use in each nostril as directed), Disp: 1 mL, Rfl: 1    clobetasol (TEMOVATE) 0.05 % external solution, Apply to scalp only as needed for flaring (Patient taking differently: if needed Apply to scalp only as needed for flaring), Disp: 50 mL, Rfl: 1    Crisaborole 2 % OINT, Apply topically 2 (two) times a day as needed (itch/irritation) Apply a thin film to affected area(s) 2 times daily; consider reducing to once daily when clinical response is achieved., Disp: 60 g, Rfl: 3    Diclofenac Sodium (VOLTAREN) 1 %, Apply 2 g topically 4 (four) times a day (Patient taking differently: Apply 2 g topically if needed), Disp: 100 g, Rfl: 0    EPINEPHrine (EPIPEN) 0.3 mg/0.3 mL SOAJ, Inject 0.3 mL (0.3 mg total) into a muscle once for 1 dose As needed, Disp: , Rfl:     fluocinonide (LIDEX) 0.05 % external solution, Apply topically 2 (two) times a day (Patient taking differently: Apply topically if needed), Disp: 60 mL, Rfl: 0    FLUoxetine (PROzac) 40 MG capsule, TAKE ONE CAPSULE BY MOUTH EVERY DAY (Patient  taking differently: Take 40 mg by mouth as needed), Disp: 90 capsule, Rfl: 1    hydrOXYzine HCL (ATARAX) 10 mg tablet, Take 1 tablet (10 mg total) by mouth daily at bedtime (Patient taking differently: Take 10 mg by mouth if needed), Disp: 30 tablet, Rfl: 0    ibuprofen (MOTRIN) 800 mg tablet, Take 1 tablet (800 mg total) by mouth every 8 (eight) hours as needed for moderate pain, Disp: 30 tablet, Rfl: 0    ketoconazole (NIZORAL) 2 % cream, Apply topically to affected areas twice a day (Patient taking differently: if needed Apply topically to affected areas twice a day), Disp: 60 g, Rfl: 3    meclizine (ANTIVERT) 25 mg tablet, Take 1 tablet (25 mg total) by mouth every 8 (eight) hours as needed for dizziness, Disp: 90 tablet, Rfl: 0    metoclopramide (REGLAN) 5 mg tablet, Take 1 tablet (5 mg total) by mouth 3 (three) times a day before meals, Disp: 90 tablet, Rfl: 5    omeprazole (PriLOSEC) 10 mg delayed release capsule, Take 1 capsule (10 mg total) by mouth every evening, Disp: 90 capsule, Rfl: 0    omeprazole (PriLOSEC) 40 MG capsule, TAKE ONE CAPSULE BY MOUTH EVERY DAY, Disp: 90 capsule, Rfl: 1    rizatriptan (MAXALT) 10 mg tablet, Take 1 tablet (10 mg total) by mouth once as needed for migraine May repeat in 2 hours if needed. Max 2/24 hours, 9/month., Disp: 9 tablet, Rfl: 11    tiZANidine (ZANAFLEX) 4 mg tablet, Take 1 tablet (4 mg total) by mouth every 8 (eight) hours as needed for muscle spasms, Disp: 30 tablet, Rfl: 0    triamcinolone (KENALOG) 0.1 % cream, Apply topically 2 (two) times a day Apply twice daily to affected areas, as needed., Disp: 80 g, Rfl: 1  Allergies   Allergen Reactions    Bee Venom Anaphylaxis    Iodides Other (See Comments)    [employer] Oil - Food Allergy GI Intolerance     Other reaction(s): GI Intolerance, GI Reaction    Aspirin Other (See Comments)     shaking  convulsions    Other reaction(s): Other (See Comments)  shaking  convulsions  shaking  convulsions    Metronidazole GI  "Intolerance     Gi upset      Sulfamethoxazole-Trimethoprim GI Intolerance     Gi upst    Other reaction(s): GI Intolerance, GI Reaction  Gi upst  Gi upst    Tape  [Medical Tape] Other (See Comments) and Hives     fever  Other reaction(s): Other  fever       Objective     Current Vitals:   Blood Pressure: 118/62 (11/06/24 1148)  Pulse: 74 (11/06/24 1148)  Temperature: 97.8 °F (36.6 °C) (11/06/24 1148)  Respirations: 18 (11/06/24 1148)  Height: 5' 3\" (160 cm) (11/06/24 1148)  Weight - Scale: 69.1 kg (152 lb 6.4 oz) (11/06/24 1148)  SpO2: 97 % (11/06/24 1148)    Physical Exam  Vitals and nursing note reviewed.   Constitutional:       General: She is not in acute distress.  Cardiovascular:      Rate and Rhythm: Normal rate and regular rhythm.   Pulmonary:      Effort: No respiratory distress.      Breath sounds: Normal breath sounds.   Abdominal:      Palpations: Abdomen is soft. There is no mass.      Tenderness: There is no abdominal tenderness.   Skin:     General: Skin is warm.      Coloration: Skin is not jaundiced.      Findings: No erythema or rash.      Comments: There is a 1 cm skin lesion on the right chest wall, most likely residual from the infected sebaceous cyst, no evidence of infection at this time.   Neurological:      Mental Status: She is alert and oriented to person, place, and time.      Cranial Nerves: No cranial nerve deficit.   Psychiatric:         Mood and Affect: Mood normal.         Behavior: Behavior normal.       "

## 2024-11-06 NOTE — TELEPHONE ENCOUNTER
Patient called to speak with Frank, please call her tomorrow when you have a chance.  Her number is [phone]. Thanks.

## 2024-11-07 ENCOUNTER — TELEPHONE (OUTPATIENT)
Dept: SURGERY | Facility: CLINIC | Age: 49
End: 2024-11-07

## 2024-11-11 ENCOUNTER — NURSE TRIAGE (OUTPATIENT)
Age: 49
End: 2024-11-11

## 2024-11-11 ENCOUNTER — TELEPHONE (OUTPATIENT)
Age: 49
End: 2024-11-11

## 2024-11-11 NOTE — TELEPHONE ENCOUNTER
Spoke with pt, she is refusing ER at this time. If symptoms get worse she will go, otherwise she will wait until appt.

## 2024-11-11 NOTE — TELEPHONE ENCOUNTER
"LOV:6/14/24    HX:GERD, Gastroparesis, Family hx of stomach cancer, IBS-D    Pt transferred to nurse line. States she is experiencing intermittent upper abd pain, 8/10 (was 10/10 last night) x 3 days.  Pt denies pain radiating anywhere else, denies N/V/D or other symptoms.  Reports having chills, but no fever, thinks maybe from the cool weather.  States nothing makes the pain better or worse.     Pt would like to know if an antibiotic can be sent in, states provider previously did this.  Scheduled appt on 11/27/24.     Please advise.    HARVINDER Moore    Reason for Disposition   Abdominal pain is a chronic symptom (recurrent or ongoing AND lasting > 4 weeks)    Answer Assessment - Initial Assessment Questions  1. LOCATION: \"Where does it hurt?\"       Upper abd   2. RADIATION: \"Does the pain shoot anywhere else?\" (e.g., chest, back)      Denies   3. ONSET: \"When did the pain begin?\" (e.g., minutes, hours or days ago)        3 days   4. SUDDEN: \"Gradual or sudden onset?\"      All of sudden  5. PATTERN \"Does the pain come and go, or is it constant?\"      Constant   6. SEVERITY: \"How bad is the pain?\"  (e.g., Scale 1-10; mild, moderate, or severe)      10 at worst, now it is an 8  7. RECURRENT SYMPTOM: \"Have you ever had this type of stomach pain before?\" If Yes, ask: \"When was the last time?\" and \"What happened that time?\"       Yes   8. CAUSE: \"What do you think is causing the stomach pain?\"      Unsure, possibly infection like last time   9. RELIEVING/AGGRAVATING FACTORS: \"What makes it better or worse?\" (e.g., antacids, bending or twisting motion, bowel movement)      Nothing   10. OTHER SYMPTOMS: \"Do you have any other symptoms?\" (e.g., back pain, diarrhea, fever, urination pain, vomiting)        Chills occasional, but no fever.    Protocols used: Abdominal Pain - Female-Adult-[place]    "

## 2024-11-11 NOTE — TELEPHONE ENCOUNTER
Pt called to state that she overall doesn't feel well. Pt advised she do get chills but doesn't know if she is running a fever. Pts face does get red at times.

## 2024-11-11 NOTE — TELEPHONE ENCOUNTER
Pt called to schedule an appt. Offered first available and was denied. Warm transferred to triage nurse for sooner appt due to abdominal pain,

## 2024-11-12 ENCOUNTER — TREATMENT (OUTPATIENT)
Dept: SURGERY | Facility: CLINIC | Age: 49
End: 2024-11-12

## 2024-11-12 DIAGNOSIS — L08.9 INFECTED SEBACEOUS CYST: Primary | ICD-10-CM

## 2024-11-12 DIAGNOSIS — L72.3 INFECTED SEBACEOUS CYST: Primary | ICD-10-CM

## 2024-11-20 ENCOUNTER — OFFICE VISIT (OUTPATIENT)
Dept: OBGYN CLINIC | Facility: CLINIC | Age: 49
End: 2024-11-20
Payer: COMMERCIAL

## 2024-11-20 VITALS
DIASTOLIC BLOOD PRESSURE: 74 MMHG | SYSTOLIC BLOOD PRESSURE: 114 MMHG | HEART RATE: 64 BPM | WEIGHT: 154 LBS | BODY MASS INDEX: 27.29 KG/M2 | HEIGHT: 63 IN

## 2024-11-20 DIAGNOSIS — M50.30 DDD (DEGENERATIVE DISC DISEASE), CERVICAL: Primary | ICD-10-CM

## 2024-11-20 DIAGNOSIS — G95.20 SPINAL CORD COMPRESSION (HCC): ICD-10-CM

## 2024-11-20 PROCEDURE — 99214 OFFICE O/P EST MOD 30 MIN: CPT | Performed by: ORTHOPAEDIC SURGERY

## 2024-11-20 NOTE — PROGRESS NOTES
[facility] ORTHOPEDIC SPINE SURGERY  DR. GARRETT GALINDO MD  [phone]    [address]  [facility], [address]  [place] [id number]      HISTORY OF PRESENT ILLNESS:    Patricia Nam is a 49 y.o. female who presents for follow-up of cervical spine. Patient was last seen in the office on 7/01/2024 where patient was advised to follow-up in six months for repeat myelopathy check. Patient denies any changes in her function.       ALLERGIES:   Allergies   Allergen Reactions    Bee Venom Anaphylaxis    Iodides Other (See Comments)    Dupree Oil - Food Allergy GI Intolerance     Other reaction(s): GI Intolerance, GI Reaction    Aspirin Other (See Comments)     shaking  convulsions    Other reaction(s): Other (See Comments)  shaking  convulsions  shaking  convulsions    Metronidazole GI Intolerance     Gi upset      Sulfamethoxazole-Trimethoprim GI Intolerance     Gi upst    Other reaction(s): GI Intolerance, GI Reaction  Gi upst  Gi upst    Tape  [Medical Tape] Other (See Comments) and Hives     fever  Other reaction(s): Other  fever       MEDICATIONS:    Current Outpatient Medications:     albuterol (PROVENTIL HFA,VENTOLIN HFA) 90 mcg/act inhaler, INHALE 1-2 PUFFS EVERY 6 HOURS AS NEEDED FOR WHEEZING., Disp: 18 g, Rfl: 3    azelastine (ASTELIN) 0.1 % nasal spray, 1 spray into each nostril 2 (two) times a day Use in each nostril as directed, Disp: 1 mL, Rfl: 1    clobetasol (TEMOVATE) 0.05 % external solution, Apply to scalp only as needed for flaring, Disp: 50 mL, Rfl: 1    Crisaborole 2 % OINT, Apply topically 2 (two) times a day as needed (itch/irritation) Apply a thin film to affected area(s) 2 times daily; consider reducing to once daily when clinical response is achieved., Disp: 60 g, Rfl: 3    Diclofenac Sodium (VOLTAREN) 1 %, Apply 2 g topically 4 (four) times a day, Disp: 100 g, Rfl: 0    EPINEPHrine (EPIPEN) 0.3 mg/0.3 mL SOAJ, Inject 0.3 mL (0.3 mg total) into a muscle once for 1  dose As needed, Disp: , Rfl:     fluocinonide (LIDEX) 0.05 % external solution, Apply topically 2 (two) times a day, Disp: 60 mL, Rfl: 0    FLUoxetine (PROzac) 40 MG capsule, TAKE ONE CAPSULE BY MOUTH EVERY DAY, Disp: 90 capsule, Rfl: 1    hydrOXYzine HCL (ATARAX) 10 mg tablet, Take 1 tablet (10 mg total) by mouth daily at bedtime (Patient taking differently: Take 10 mg by mouth daily at bedtime), Disp: 30 tablet, Rfl: 0    ibuprofen (MOTRIN) 800 mg tablet, Take 1 tablet (800 mg total) by mouth every 8 (eight) hours as needed for moderate pain, Disp: 30 tablet, Rfl: 0    ketoconazole (NIZORAL) 2 % cream, Apply topically to affected areas twice a day, Disp: 60 g, Rfl: 3    meclizine (ANTIVERT) 25 mg tablet, Take 1 tablet (25 mg total) by mouth every 8 (eight) hours as needed for dizziness, Disp: 90 tablet, Rfl: 0    metoclopramide (REGLAN) 5 mg tablet, Take 1 tablet (5 mg total) by mouth 3 (three) times a day before meals, Disp: 90 tablet, Rfl: 5    omeprazole (PriLOSEC) 10 mg delayed release capsule, Take 1 capsule (10 mg total) by mouth every evening, Disp: 90 capsule, Rfl: 0    omeprazole (PriLOSEC) 40 MG capsule, TAKE ONE CAPSULE BY MOUTH EVERY DAY, Disp: 90 capsule, Rfl: 1    rizatriptan (MAXALT) 10 mg tablet, Take 1 tablet (10 mg total) by mouth once as needed for migraine May repeat in 2 hours if needed. Max 2/24 hours, 9/month., Disp: 9 tablet, Rfl: 11    tiZANidine (ZANAFLEX) 4 mg tablet, Take 1 tablet (4 mg total) by mouth every 8 (eight) hours as needed for muscle spasms, Disp: 30 tablet, Rfl: 0    triamcinolone (KENALOG) 0.1 % cream, Apply topically 2 (two) times a day Apply twice daily to affected areas, as needed., Disp: 80 g, Rfl: 1     PAST MEDICAL HISTORY:   Past Medical History:   Diagnosis Date    Allergic     Asthma     Degenerative joint disease     Depression     GERD (gastroesophageal reflux disease)     Lumbar radiculopathy     PONV (postoperative nausea and vomiting)     Thyroid nodule         PAST SURGICAL HISTORY:  Past Surgical History:   Procedure Laterality Date    BREAST BIOPSY Left 2020    neg    CHOLECYSTECTOMY LAPAROSCOPIC N/A 05/06/2022    Procedure: CHOLECYSTECTOMY LAPAROSCOPIC;  Surgeon: Zion Walker MD;  Location: [facility];  Service: General    COLONOSCOPY      CYST REMOVAL  10/2020    lt breast     EGD      HYSTERECTOMY      HYSTERECTOMY  1998    LIPOSUCTION  09/2020    TUBAL LIGATION         SOCIAL HISTORY:  Social History     Tobacco Use   Smoking Status Never    Passive exposure: Never   Smokeless Tobacco Never          PHYSICAL EXAM:  49 y.o. female sitting comfortably on exam chair in no apparent distress.   Ambulates with normal gait  Able to go up on toes and heels   Able to balance on one leg  5/5 motor strength with normal sensation in bilateral upper extremities  2+ deep tendon reflexes bilateral upper extremities   3+ patellar reflexes bilaterally  1+ quad reflex on left  Absent quad reflex on right   Absent haskins sign bilaterally  Normal rapid alternating movements bilateral upper extremities  Normal rapid alternating movements bilateral lower extremity      RADIOGRAPHIC STUDIES:  MRI, L-spine 3/17/2021, mild multilevel degenerative disc disease.  Moderate degenerative changes L3-4.  Moderate to severe degenerative changes at L5-S1.  Posterior central disc protrusion with slight predominance on the left side with minimal lateral recess stenosis at L5-S1.  Facet hypertrophy and mild lateral recess stenosis L4-5.  CT, abdomen, 06/03/2021, multilevel thoracic and lumbar degenerative disc disease, diffuse idiopathic skeletal hyperostosis, moderate degenerative disc disease L3-4 L4-5 and L5-S1 with posterior osteophyte formation.  No evidence of bony stenosis.  No evidence of congenital stenosis.  MRI, cervical spine, 8/13/2023: Multilevel cervical degenerative disc disease, most significant at C5-6 and C6-7.  There is evidence of posterior ossified formation at this  point resulting in spinal cord compression and spinal cord indentation.  The spinal cord has not flattened.  There is no some myelomalacia.  Xrays, cervical spine, 9/25/2023: Severe degenerative changes at C5-6 and C6-7.  Mild to moderate degenerative changes at other levels of the cervical spine.    ASSESSMENT:  1. DDD (degenerative disc disease), cervical  2. Spinal cord compression (HCC)      PLAN:  49 y.o. female with cervical DDD and cervical spinal cord compression.      Patient underwent routine neurological examination today with the only changes in physical examination compared to last visit being brisk deep tendon reflexes in lower extremities. It was discussed that reflexes can be brisk with excessive caffeine or anxiety. In absence of changes in gait and rapid alternating movements, we will continue to monitor patient closely every six months. Patient will require an updated MRI of cervical spine in about two to three years.     At this time there is no objective sign of myelopathy.  She will need to be examined routinely to make sure she does not become myelopathic.  I did review the radiographic studies including the MRI study.  The MRI scan does show evidence of spinal cord compression without flattening or myelomalacia.    Patient will follow-up in 6 months for repeat myelopathy check.      Scribe Attestation      I,:  Mary Singer PA-C am acting as a scribe while in the presence of the attending physician.:       I,:  Brock Olson MD personally performed the services described in this documentation    as scribed in my presence.:

## 2024-11-22 ENCOUNTER — TELEPHONE (OUTPATIENT)
Dept: NEUROLOGY | Facility: CLINIC | Age: 49
End: 2024-11-22

## 2024-11-22 ENCOUNTER — TELEPHONE (OUTPATIENT)
Age: 49
End: 2024-11-22

## 2024-11-22 ENCOUNTER — TELEMEDICINE (OUTPATIENT)
Dept: NEUROLOGY | Facility: CLINIC | Age: 49
End: 2024-11-22
Payer: COMMERCIAL

## 2024-11-22 DIAGNOSIS — M54.81 BILATERAL OCCIPITAL NEURALGIA: Primary | ICD-10-CM

## 2024-11-22 DIAGNOSIS — R29.818 SUSPECTED SLEEP APNEA: ICD-10-CM

## 2024-11-22 DIAGNOSIS — G43.009 MIGRAINE WITHOUT AURA AND WITHOUT STATUS MIGRAINOSUS, NOT INTRACTABLE: ICD-10-CM

## 2024-11-22 PROCEDURE — 99214 OFFICE O/P EST MOD 30 MIN: CPT | Performed by: PSYCHIATRY & NEUROLOGY

## 2024-11-22 NOTE — PATIENT INSTRUCTIONS
"    -If you feel like you could sleep on your back that night only, certainly could try to sleep on your back for the sleep study, but not if you feel like you cannot sleep this way.  Just getting sleep is the most important thing, as the machine thinks you are sleeping the entire time it is plugged in. Otherwise, we discussed home sleep study can underestimate the problem.  If it comes back that you almost have sleep apnea or other sleep disorder, but not meeting criteria, we could consider in lab study and reach out to me if you would like this ordered before next visit.    Referral for sleep study placed 7/28/2024: For a sleep study and if it is abnormal, it includes a referral to the sleep medicine specialist team to further evaluate your sleep issues.    Please call [phone] to schedule the sleep study or you can schedule on [employer] and we discussed due to the new order being labeled \" ambulatory referral to sleep medicine\" you have to wait 2-6 days for the sleep team to turn this referral into the actual sleep study order that you can schedule, otherwise if you call now they may say there is no order for sleep study, just an order for a referral, because they can not see it yet on their end.  The referral to sleep medicine piece is only if there is abnormalities and you need to see them afterwards.      Headache/migraine treatment:   Rescue medications (for immediate treatment of a headache):   It is ok to take ibuprofen, acetaminophen or naproxen (Advil, Tylenol,  Aleve, Excedrin) if they help your headaches you should limit these to No more than 3 times a week to avoid medication overuse/rebound headaches.     For your more moderate to severe migraines take this medication early   Maxalt (rizatriptan) 10mg tabs - take one at the onset of headache. May repeat one time after 2 hours if pain has not resolved.   (Max 2 a day and 9 a month)     - This does not interact with metoclopramide/Reglan and some " people take it together like a migraine cocktail along with anti-inflammatory    -If rizatriptan/Maxalt does not help significantly we discussed please feel free to reach out to me before next visit and I will send in Nurtec or Ubrelvy and both of these you can have 16 tabs a month instead of 9 as the more you take them they can also help with prevention but typically will need a prior authorization form and sometimes the pharmacies do not send this to us so if you do not get this medication in 1 to 2 weeks after I am sending it, please let me know as we likely did not receive the paperwork.  There also are coupon cards to completely cover the co-pay for these new medications otherwise they are very expensive and never pay that amount.    Since you already have this at home you absolutely could take metoclopramide/Reglan 10 mg for a bad migraine even if you do not have nausea as it can help either way    Over the counter preventive supplements for headaches/migraines (if you try, try for 3 months straight)  (to take every day to help prevent headaches - not to take at the time of headache):  There are combo pills online of these - none of which regulated by FDA and double check dosing - take appropriate dose only once a day- preventa migraine, migravent, mind ease, migrelief   [] Magnesium 400 - 500 mg daily (If any diarrhea or upset stomach, decrease dose  as tolerated)  [] Riboflavin (Vitamin B2) 400mg daily - try online   (FYI B2 may make your urine bright/neon yellow)  AND/OR  [] Herbal medication: Petasites/Butterbur 150 mg daily - try online  (When choosing your Butterbur online or in the store, beware that there are some poor preps containing pyrrolizidine alkaloids (PAs) that can be harmful to the liver. Therefore, do not use butterbur products that are not labeled as PA-free.)      Prescription preventive medications for headaches/migraines   (to take every day to help prevent headaches - not to take at the  time of headache):  [x]       -----  as we discussed if needed would add trial of topiramate/Topamax which typically works well as long as it is tolerated and if you have any issues with tolerance just reach out and I can let you know whether that side effect will typically last longer or go away soon and if you do not like this medication we can try many others just reach out    - Topiramate    25 mg in a.m. And 25 mg in p.m. For 1 week, then take 25 mg in a.m. And 50 mg in p.m. For 1 week, then take 50 mg in a.m. and 50 mg in p.m. And continue  - generally the common side effects improve as your body gets used to the medication.  If we need to spread out a more gradual increase of the medication on a longer scale we can, just call if any questions or concerns  - if necessary, if the a.m. dose is causing side effects we can always have you take the full dose at night instead      *Typically these types of medications take time until you see the benefit, although some may see improvement in days, often it may take weeks, especially if the medication is being titrated up to a beneficial level. Please contact us if there are any concerns or questions regarding the medication.     Lifestyle Recommendations:  [x] SLEEP - Maintain a regular sleep schedule: Adults need at least 7-8 hours of uninterrupted a night. Maintain good sleep hygiene:  Going to bed and waking up at consistent times, avoiding excessive daytime naps, avoiding caffeinated beverages in the evening, avoid excessive stimulation in the evening and generally using bed primarily for sleeping.  One hour before bedtime would recommend turning lights down lower, decreasing your activity (may read quietly, listen to music at a low volume). When you get into bed, should eliminate all technology (no texting, emailing, playing with your phone, iPad or tablet in bed).  [x] HYDRATION - Maintain good hydration.  Drink  2L of fluid a day (4 typical small water  bottles)  [x] DIET - Maintain good nutrition. In particular don't skip meals and try and eat healthy balanced meals regularly.  [x] TRIGGERS - Look for other triggers and avoid them: Limit caffeine to 1-2 cups a day or less. Avoid dietary triggers that you have noticed bring on your headaches (this could include aged cheese, peanuts, MSG, aspartame and nitrates).  [x] EXERCISE - physical exercise as we all know is good for you in many ways, and not only is good for your heart, but also is beneficial for your mental health, cognitive health and  chronic pain/headaches. I would encourage at the least 5 days of physical exercise weekly for at least 30 minutes.     Education and Follow-up  [x] Please call with any questions or concerns. Of course if any new concerning symptoms go to the emergency department.  [x] Follow up 3-4 months, sooner if needed

## 2024-11-22 NOTE — PROGRESS NOTES
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 Last 3 Encounters:   11/20/24 64   11/06/24 74   10/08/24 66         Well developed, well nourished, No dysmorphic features.         Normocephalic atraumatic.     Normal behavior and appropriate affect        Able to answer questions appropriately, provide history of recent events   Normal language and spontaneous speech.  facial expression symmetric  symmetric bulk throughout. no atrophy, fasciculations or significant abnormal movements noted during our visit from observation.        Review of Systems:   ROS obtained by medical assistant and reviewed, but if any symptoms listed below say negative, does not mean patient has not had this symptom since last visit, please see HPI for details of symptoms discussed this visit.  Regarding any abnormal or positive findings in ROS that are not neurologic related, patient instructed to address these issues with PCP or go to the ER if they are severe.    Review of Systems   Constitutional:  Negative for appetite change and fever.   HENT: Negative.  Negative for hearing loss, tinnitus, trouble swallowing and voice change.    Eyes: Negative.  Negative for photophobia and pain.   Respiratory: Negative.  Negative for shortness of breath.    Cardiovascular: Negative.  Negative for palpitations.   Gastrointestinal:  Positive for nausea. Negative for vomiting.    Endocrine: Negative.  Negative for cold intolerance.   Genitourinary: Negative.  Negative for dysuria, frequency and urgency.   Musculoskeletal: Negative.  Negative for myalgias and neck pain.   Skin: Negative.  Negative for rash.   Neurological:  Positive for dizziness and headaches. Negative for tremors, seizures, syncope, facial asymmetry, speech difficulty, weakness, light-headedness and numbness.   Hematological: Negative.  Does not bruise/bleed easily.   Psychiatric/Behavioral:  Positive for sleep disturbance. Negative for confusion and hallucinations.         I have spent 17 minutes with Patient today in which greater than 50% of this time was spent in counseling/coordination of care regarding Prognosis, Risks and benefits of tx options, Instructions for management, Patient education, Importance of tx compliance, Risk factor reductions, Impressions, Counseling / Coordination of care, Documenting in the medical record, Obtaining or reviewing history  , and Communicating with other healthcare professionals . I also spent 14 minutes non face to face for this patient the same day.           Visit Time  Total Visit Duration: 31

## 2024-11-22 NOTE — TELEPHONE ENCOUNTER
Pt called stated she got a message about registering for her scheduled VV today at 1pm.      Please assist. Thank you.

## 2024-11-22 NOTE — TELEPHONE ENCOUNTER
Called patient to get her 3-4 mon f/u scheduled with Dr Bowles  patient was unable to answer, I left a vm with a call back number.

## 2024-11-27 ENCOUNTER — TELEPHONE (OUTPATIENT)
Age: 49
End: 2024-11-27

## 2024-11-27 ENCOUNTER — OFFICE VISIT (OUTPATIENT)
Age: 49
End: 2024-11-27
Payer: COMMERCIAL

## 2024-11-27 VITALS
HEART RATE: 84 BPM | WEIGHT: 154 LBS | BODY MASS INDEX: 27.29 KG/M2 | HEIGHT: 63 IN | DIASTOLIC BLOOD PRESSURE: 80 MMHG | OXYGEN SATURATION: 97 % | SYSTOLIC BLOOD PRESSURE: 122 MMHG

## 2024-11-27 DIAGNOSIS — K58.0 IRRITABLE BOWEL SYNDROME WITH DIARRHEA: ICD-10-CM

## 2024-11-27 DIAGNOSIS — K31.84 GASTROPARESIS: ICD-10-CM

## 2024-11-27 DIAGNOSIS — K21.9 GASTROESOPHAGEAL REFLUX DISEASE WITHOUT ESOPHAGITIS: Primary | ICD-10-CM

## 2024-11-27 PROCEDURE — 99214 OFFICE O/P EST MOD 30 MIN: CPT | Performed by: PHYSICIAN ASSISTANT

## 2024-11-27 NOTE — PROGRESS NOTES
[facility] - Outpatient Follow-up Note  Patricia Nam 49 y.o. female MRN: [id number]  Encounter: [id number]          ASSESSMENT AND PLAN:      1. Gastroesophageal reflux disease  2. Gastroparesis  3. Irritable bowel syndrome with diarrhea    Patient presents for follow up: she has gastroparesis, GERD and IBS-D.  Her gastroparesis symptoms began after her abdominal liposuction surgery 5 years ago.  EGD 12/1/21 was normal and biopsies were negative for h pylori.  Colonoscopy with visualization of the terminal ileum 12/1/21 was normal and biopsies were negative for microscopic colitis.  US 12/10/22 s/p mild hepatic steatosis and s/p cholecystectomy.  Celiac serology was negative.  Gastric emptying study 12/6/22 showed a severely reduced rate of gastric emptying.  MR enterography 2/23 was negative for SB crohn's. Pancreatic fecal elastase was normal.  24 hr 5-HIAA was negative.  Repeat EGD 8/17/2024 was normal and bx negative for h pylori.  Her IBS-D/possible SIBO was previously treated with a Xifaxan 550mg po TID x 14 days course previously significant improvement!  She recently had a return of some abdominal discomfort and diarrhea which then subsided. Her GERD has overall been pretty well controlled.    Patient will reach out to us if her IBS-D symptoms return, we could utilize another Xifaxan 550mg po TID x 14 days course as she responded very well in the past.    Continue Omeprazole but she will see if she can decrease the dose.  Small, frequent low fat meals.    She has Reglan to use just on an as needed basis (she is aware of the risk of tardive dyskinesia).  Follow up in 6 months or sooner if needed.    ______________________________________________________________________    SUBJECTIVE:  Patient is a pleasant 49 year old female who presents to the office for follow up.  She has a history of GERD, IBS-D, gastroparesis, and a fatty liver.  She reports she was previously having a  return of some abdominal discomfort and diarrhea however this then subsided.  No vomiting.  She reports her GERD is overall well controlled and she will see if she can decrease the the dose of the Omeprazole.        REVIEW OF SYSTEMS IS OTHERWISE NEGATIVE.      Historical Information   Past Medical History:   Diagnosis Date    Allergic     Asthma     Degenerative joint disease     Depression     GERD (gastroesophageal reflux disease)     Lumbar radiculopathy     PONV (postoperative nausea and vomiting)     Thyroid nodule      Past Surgical History:   Procedure Laterality Date    BREAST BIOPSY Left 2020    neg    CHOLECYSTECTOMY LAPAROSCOPIC N/A 05/06/2022    Procedure: CHOLECYSTECTOMY LAPAROSCOPIC;  Surgeon: Zion Walker MD;  Location: [facility];  Service: General    COLONOSCOPY      CYST REMOVAL  10/2020    lt breast     EGD      HYSTERECTOMY      HYSTERECTOMY  1998    LIPOSUCTION  09/2020    TUBAL LIGATION       Social History   Social History     Substance and Sexual Activity   Alcohol Use Yes    Comment: Not very often     Social History     Substance and Sexual Activity   Drug Use Never     Social History     Tobacco Use   Smoking Status Never    Passive exposure: Never   Smokeless Tobacco Never     Family History   Problem Relation Age of Onset    Alzheimer's disease Mother     Cancer Father         stomach/GI    Heart disease Father     No Known Problems Sister     No Known Problems Daughter     No Known Problems Maternal Grandmother     No Known Problems Paternal Grandmother     Colon cancer Maternal Aunt     Cancer Maternal Aunt         stomach    No Known Problems Maternal Aunt     No Known Problems Maternal Aunt     No Known Problems Maternal Aunt     No Known Problems Maternal Aunt     No Known Problems Maternal Aunt     Colon cancer Maternal Uncle     Depression Cousin     Diabetes Brother     Asthma Brother     Cancer Cousin     Breast cancer Neg Hx        Meds/Allergies       Current Outpatient  "Medications:     albuterol (PROVENTIL HFA,VENTOLIN HFA) 90 mcg/act inhaler    azelastine (ASTELIN) 0.1 % nasal spray    clobetasol (TEMOVATE) 0.05 % external solution    Crisaborole 2 % OINT    Diclofenac Sodium (VOLTAREN) 1 %    EPINEPHrine (EPIPEN) 0.3 mg/0.3 mL SOAJ    fluocinonide (LIDEX) 0.05 % external solution    FLUoxetine (PROzac) 40 MG capsule    hydrOXYzine HCL (ATARAX) 10 mg tablet    ibuprofen (MOTRIN) 800 mg tablet    ketoconazole (NIZORAL) 2 % cream    meclizine (ANTIVERT) 25 mg tablet    metoclopramide (REGLAN) 5 mg tablet    omeprazole (PriLOSEC) 10 mg delayed release capsule    omeprazole (PriLOSEC) 40 MG capsule    rizatriptan (MAXALT) 10 mg tablet    tiZANidine (ZANAFLEX) 4 mg tablet    triamcinolone (KENALOG) 0.1 % cream    Allergies   Allergen Reactions    Bee Venom Anaphylaxis    Iodides Other (See Comments)    Benton Oil - Food Allergy GI Intolerance     Other reaction(s): GI Intolerance, GI Reaction    Aspirin Other (See Comments)     shaking  convulsions    Other reaction(s): Other (See Comments)  shaking  convulsions  shaking  convulsions    Metronidazole GI Intolerance     Gi upset      Sulfamethoxazole-Trimethoprim GI Intolerance     Gi upst    Other reaction(s): GI Intolerance, GI Reaction  Gi upst  Gi upst    Tape  [Medical Tape] Other (See Comments) and Hives     fever  Other reaction(s): Other  fever           Objective     Blood pressure 122/80, pulse 84, height 5' 3\" (1.6 m), weight 69.9 kg (154 lb), SpO2 97%, not currently breastfeeding. Body mass index is 27.28 kg/m².      PHYSICAL EXAM:      General Appearance:   Alert, cooperative, no distress   HEENT:   Normocephalic, atraumatic, anicteric.     Neck:  Supple, symmetrical, trachea midline   Lungs:   Clear to auscultation bilaterally; no rales, rhonchi or wheezing; respirations unlabored    Heart::   Regular rate and rhythm; no murmur, rub, or gallop.   Abdomen:   Soft, non-tender, non-distended; normal bowel sounds; no masses, " no organomegaly    Genitalia:   Deferred    Rectal:   Deferred    Extremities:  No cyanosis, clubbing or edema    Pulses:  2+ and symmetric    Skin:  No jaundice, rashes, or lesions    Lymph nodes:  No palpable cervical lymphadenopathy        Lab Results:   No visits with results within 1 Day(s) from this visit.   Latest known visit with results is:   Hospital Outpatient Visit on 08/17/2024   Component Date Value    Case Report 08/17/2024                      Value:Surgical Pathology Report                         Case: [id number]                                  Authorizing Provider:  Mehrdad Villafuerte MD      Collected:           08/17/2024 0742              Ordering Location:      [facility]       Received:            08/17/2024 1245                                     [facility]                                                             Pathologist:           Aurelio Caldwell MD                                                            Specimens:   A) - Stomach, Antrum                                                                                B) - Stomach, Body                                                                                  C) - Stomach, Fundus                                                                       Final Diagnosis 08/17/2024                      Value:A. Stomach, Antrum:  Mild chronic inactive gastritis   No H. pylori identified on H&E stained slide      B. Stomach, Body:  Mild chronic inactive gastritis  No H. pylori identified on H&E stained slide      C. Stomach, Fundus:  Mild chronic inactive gastritis  No H. pylori identified on H&E stained slide          Note 08/17/2024                      Value:Interpretation performed at [facility], [address] [place] [id number]        Additional Information 08/17/2024                      Value:All reported additional testing was performed with appropriately reactive controls.  These tests were developed  "and their performance characteristics determined by [facility] or appropriate performing facility, though some tests may be performed on tissues which have not been validated for performance characteristics (such as staining performed on alcohol exposed cell blocks and decalcified tissues).  Results should be interpreted with caution and in the context of the patients’ clinical condition. These tests may not be cleared or approved by the U.S. Food and Drug Administration, though the FDA has determined that such clearance or approval is not necessary. These tests are used for clinical purposes and they should not be regarded as investigational or for research. This laboratory has been approved by CLIA 88, designated as a high-complexity laboratory and is qualified to perform these tests.  .      Gross Description 08/17/2024                      Value:A. The specimen is received in formalin, labeled with the patient's name and hospital number, and is designated \" stomach antrum\".  The specimen consists of 2 tan soft tissue fragments measuring 0.4 cm each.  Entirely submitted. Screened cassette.  B. The specimen is received in formalin, labeled with the patient's name and hospital number, and is designated \" stomach body\".  The specimen consists of 2 tan soft tissue fragments measuring 0.3 and 0.7 cm.  Entirely submitted. Screened cassette.  C. The specimen is received in formalin, labeled with the patient's name and hospital number, and is designated \" stomach fundus\".  The specimen consists of 2 tan soft tissue fragments measuring 0.4 and 0.5 cm.  Entirely submitted. Screened cassette.    Note: The estimated total formalin fixation time based upon information provided by the submitting clinician and the standard processing schedule is under 72 hours.    [facility]      Clinical Information 08/17/2024                      Value:GERD, family hx of gastric cancer  · The upper third of the esophagus, " middle third of the esophagus, lower third of the esophagus, GE junction, Z-line, cardia, fundus of the stomach, body of the stomach, incisura, antrum, prepyloric region, pylorus, duodenal bulb, 2nd part of the duodenum and 3rd part of the duodenum appeared normal. Z-line is 37 cm from the incisors.  · Performed forceps biopsies in the fundus of the stomach, body of the stomach and antrum    Cold Bx R/O H. Pylori         Radiology Results:   No results found.

## 2024-11-27 NOTE — TELEPHONE ENCOUNTER
Caller: Pt     Doctor/Office: Dr Dow    Call regarding :  Schedule Procedure.      Please call:  [phone]

## 2024-11-29 ENCOUNTER — HOSPITAL ENCOUNTER (OUTPATIENT)
Facility: HOSPITAL | Age: 49
Setting detail: OUTPATIENT SURGERY
Discharge: HOME | End: 2024-11-29
Attending: SURGERY | Admitting: SURGERY
Payer: COMMERCIAL

## 2024-11-29 VITALS
DIASTOLIC BLOOD PRESSURE: 66 MMHG | TEMPERATURE: 97.1 F | WEIGHT: 153.22 LBS | HEART RATE: 67 BPM | SYSTOLIC BLOOD PRESSURE: 121 MMHG | OXYGEN SATURATION: 99 % | HEIGHT: 63 IN | RESPIRATION RATE: 21 BRPM | BODY MASS INDEX: 27.15 KG/M2

## 2024-11-29 DIAGNOSIS — L72.3 INFECTED SEBACEOUS CYST: ICD-10-CM

## 2024-11-29 DIAGNOSIS — L08.9 INFECTED SEBACEOUS CYST: ICD-10-CM

## 2024-11-29 PROCEDURE — 88304 TISSUE EXAM BY PATHOLOGIST: CPT | Performed by: PATHOLOGY

## 2024-11-29 PROCEDURE — 12031 INTMD RPR S/A/T/EXT 2.5 CM/<: CPT | Performed by: SURGERY

## 2024-11-29 PROCEDURE — 11401 EXC TR-EXT B9+MARG 0.6-1 CM: CPT | Performed by: SURGERY

## 2024-11-29 RX ORDER — ACETAMINOPHEN 325 MG/1
975 TABLET ORAL EVERY 8 HOURS PRN
Status: CANCELLED | OUTPATIENT
Start: 2024-11-29

## 2024-11-29 RX ORDER — MAGNESIUM HYDROXIDE 1200 MG/15ML
LIQUID ORAL AS NEEDED
Status: DISCONTINUED | OUTPATIENT
Start: 2024-11-29 | End: 2024-11-29 | Stop reason: HOSPADM

## 2024-11-29 RX ORDER — LIDOCAINE HYDROCHLORIDE 10 MG/ML
INJECTION, SOLUTION EPIDURAL; INFILTRATION; INTRACAUDAL; PERINEURAL AS NEEDED
Status: DISCONTINUED | OUTPATIENT
Start: 2024-11-29 | End: 2024-11-29 | Stop reason: HOSPADM

## 2024-11-29 NOTE — DISCHARGE INSTR - AVS FIRST PAGE
SURGERY INSTRUCTIONS    No diet restriction for this surgery  May shower every day starting tomorrow  Remove plastic dressings in 3 days  Remove strips in 7 days  Nothing should be covering incisions 7 days after surgery  Call office to make an appointment in 2 weeks [phone]  Call office with any issues regarding the surgery  You are encourage to walk as much as possible  No heavy lifting or strenuous exercise for one week  Resume home medications starting today  Resume blood thinners starting tomorrow.  (Aspirin, Coumadin, Eliquis, Xarelto)  Apply ice to the incisions. 10 minutes every hour for 2 days  May take regular Tylenol or ibuprofen for pain.   Please let us know how we did, you will receive a survey in the mail or via email.  Please respond to help us improved.

## 2024-11-29 NOTE — OP NOTE
OPERATIVE REPORT  PATIENT NAME: Patricia Nam    :  1975  MRN: [id number]  Pt Location: [facility] ROOM 01    SURGERY DATE: 2024    Surgeons and Role:     * Zion Walker MD - Primary    Preop Diagnosis:  Infected sebaceous cyst [L72.3, L08.9]    Post-Op Diagnosis Codes:     * Infected sebaceous cyst [L72.3, L08.9]    Procedure(s):  EXCISION  BIOPSY LESION CHEST WALL    Specimen(s):  ID Type Source Tests Collected by Time Destination   1 : Sebaceous cyst right chest wall Tissue Chest Wall TISSUE EXAM Zion Walker MD 2024 [id number]        Estimated Blood Loss:   Minimal    Drains:  None    Anesthesia Type:   Local    Operative Indications:  Infected sebaceous cyst [L72.3, L08.9]    Operative Findings:  Sebaceous cyst measuring approximately 1 cm in the largest diameter, no evidence of infection at this time.  The entire cyst wall was completely excised with 2 mm margin.    Complications:   None    Procedure and Technique:  The patient was identified and the patient was placed in the operating table spine position.  After adequate monitoring the right chest wall was prepped and draped in sterile usual fashion with Betadine solution.  Timeout was called the patient was identified as was surgical site.    1% lidocaine plain with bicarb was infiltrated over the cyst on the right chest wall, elliptical incision was made of approximately 2 cm, taken down through the subcutaneous tissue with scalpel sharp dissection until the cyst was completely excised.  Wound was irrigated with saline solution.  Hemostasis was accomplished with cautery.  Subcutaneous tissue was approximated with 3-0 Vicryl in an interrupted fashion.  Skin was closed with 4-0 Vicryl in a continuous cuticular fashion.  Sterile dressing was applied.  At the end of the case instrument, needles, and sponges counts were correct.  Patient tolerated the procedure well.   I was present for the entire procedure. and A qualified resident  physician was not available.    Patient Disposition:  APU and hemodynamically stable             SIGNATURE: Zion Walker MD  DATE: November 29, 2024  TIME: 8:13 AM

## 2024-12-02 PROCEDURE — 88304 TISSUE EXAM BY PATHOLOGIST: CPT | Performed by: PATHOLOGY

## 2024-12-03 ENCOUNTER — PATIENT MESSAGE (OUTPATIENT)
Dept: SURGERY | Facility: CLINIC | Age: 49
End: 2024-12-03

## 2024-12-05 NOTE — TELEPHONE ENCOUNTER
Caller: Bela     Doctor: Victor M    Reason for call: returning schedulers phone call     Call back#: [phone]

## 2024-12-05 NOTE — TELEPHONE ENCOUNTER
Tried multiple times to return call - phone number would not connect - will defer call and try again at another time.

## 2024-12-09 ENCOUNTER — OFFICE VISIT (OUTPATIENT)
Dept: SURGERY | Facility: CLINIC | Age: 49
End: 2024-12-09

## 2024-12-09 VITALS
RESPIRATION RATE: 18 BRPM | OXYGEN SATURATION: 98 % | HEART RATE: 66 BPM | DIASTOLIC BLOOD PRESSURE: 72 MMHG | WEIGHT: 153 LBS | BODY MASS INDEX: 27.11 KG/M2 | HEIGHT: 63 IN | SYSTOLIC BLOOD PRESSURE: 128 MMHG | TEMPERATURE: 97.5 F

## 2024-12-09 DIAGNOSIS — Z09 POSTOPERATIVE FOLLOW-UP: Primary | ICD-10-CM

## 2024-12-09 PROCEDURE — 99024 POSTOP FOLLOW-UP VISIT: CPT | Performed by: SURGERY

## 2024-12-09 NOTE — PROGRESS NOTES
"Name: Patricia Nam      : 1975      MRN: [id number]  Encounter Provider: Zion Walker MD  Encounter Date: 2024   Encounter department: [facility]  :  Assessment & Plan  Postoperative follow-up  Patient is status post excision of sebaceous cyst from right chest wall, improved     Patient doing well from the surgical standpoint, wound is completely healed.  She is discharged from my care and she is allowed to go back to her usual physical activities without restrictions.      History of Present Illness   HPI  Patricia Nam is a 49 y.o. female who presents to my office for first postop follow-up after excision of sebaceous cyst from the right chest wall from .  She offers no complaints at this time.  Pathology discussed with patient and she was informed that this is a benign condition.       Objective   /72 (BP Location: Left arm, Patient Position: Sitting, Cuff Size: Standard)   Pulse 66   Temp 97.5 °F (36.4 °C)   Resp 18   Ht 5' 3\" (1.6 m)   Wt 69.4 kg (153 lb)   SpO2 98%   BMI 27.10 kg/m²      Physical Exam  Vitals and nursing note reviewed.   Skin:     Comments: Wound from the lower right lateral chest wall is well-healed without evidence of infection or recurrence.         "

## 2024-12-10 ENCOUNTER — TRANSCRIBE ORDERS (OUTPATIENT)
Dept: SLEEP CENTER | Facility: CLINIC | Age: 49
End: 2024-12-10

## 2024-12-10 DIAGNOSIS — R29.818 SUSPECTED SLEEP APNEA: Primary | ICD-10-CM

## 2024-12-20 ENCOUNTER — OFFICE VISIT (OUTPATIENT)
Dept: FAMILY MEDICINE CLINIC | Facility: CLINIC | Age: 49
End: 2024-12-20
Payer: COMMERCIAL

## 2024-12-20 VITALS
BODY MASS INDEX: 27.21 KG/M2 | SYSTOLIC BLOOD PRESSURE: 122 MMHG | HEIGHT: 63 IN | HEART RATE: 85 BPM | DIASTOLIC BLOOD PRESSURE: 70 MMHG | TEMPERATURE: 98.5 F | WEIGHT: 153.6 LBS | OXYGEN SATURATION: 99 %

## 2024-12-20 DIAGNOSIS — Z91.030 H/O BEE STING ALLERGY: ICD-10-CM

## 2024-12-20 DIAGNOSIS — Z00.00 ANNUAL PHYSICAL EXAM: Primary | ICD-10-CM

## 2024-12-20 PROCEDURE — 99396 PREV VISIT EST AGE 40-64: CPT | Performed by: NURSE PRACTITIONER

## 2024-12-20 RX ORDER — EPINEPHRINE 0.3 MG/.3ML
0.3 INJECTION SUBCUTANEOUS ONCE
Qty: 0.6 ML | Refills: 0 | Status: SHIPPED | OUTPATIENT
Start: 2024-12-20 | End: 2024-12-20

## 2024-12-20 NOTE — PATIENT INSTRUCTIONS
"Patient Education     Routine physical for adults   The Basics   Written by the doctors and editors at [facility]   What is a physical? -- A physical is a routine visit, or \"check-up,\" with your doctor. You might also hear it called a \"wellness visit\" or \"preventive visit.\"  During each visit, the doctor will:   Ask about your physical and mental health   Ask about your habits, behaviors, and lifestyle   Do an exam   Give you vaccines if needed   Talk to you about any medicines you take   Give advice about your health   Answer your questions  Getting regular check-ups is an important part of taking care of your health. It can help your doctor find and treat any problems you have. But it's also important for preventing health problems.  A routine physical is different from a \"sick visit.\" A sick visit is when you see a doctor because of a health concern or problem. Since physicals are scheduled ahead of time, you can think about what you want to ask the doctor.  How often should I get a physical? -- It depends on your age and health. In general, for people age 21 years and older:   If you are younger than 50 years, you might be able to get a physical every 3 years.   If you are 50 years or older, your doctor might recommend a physical every year.  If you have an ongoing health condition, like diabetes or high blood pressure, your doctor will probably want to see you more often.  What happens during a physical? -- In general, each visit will include:   Physical exam - The doctor or nurse will check your height, weight, heart rate, and blood pressure. They will also look at your eyes and ears. They will ask about how you are feeling and whether you have any symptoms that bother you.   Medicines - It's a good idea to bring a list of all the medicines you take to each doctor visit. Your doctor will talk to you about your medicines and answer any questions. Tell them if you are having any side effects that bother you. You " "should also tell them if you are having trouble paying for any of your medicines.   Habits and behaviors - This includes:   Your diet   Your exercise habits   Whether you smoke, drink alcohol, or use drugs   Whether you are sexually active   Whether you feel safe at home  Your doctor will talk to you about things you can do to improve your health and lower your risk of health problems. They will also offer help and support. For example, if you want to quit smoking, they can give you advice and might prescribe medicines. If you want to improve your diet or get more physical activity, they can help you with this, too.   Lab tests, if needed - The tests you get will depend on your age and situation. For example, your doctor might want to check your:   Cholesterol   Blood sugar   Iron level   Vaccines - The recommended vaccines will depend on your age, health, and what vaccines you already had. Vaccines are very important because they can prevent certain serious or deadly infections.   Discussion of screening - \"Screening\" means checking for diseases or other health problems before they cause symptoms. Your doctor can recommend screening based on your age, risk, and preferences. This might include tests to check for:   Cancer, such as breast, prostate, cervical, ovarian, colorectal, prostate, lung, or skin cancer   Sexually transmitted infections, such as chlamydia and gonorrhea   Mental health conditions like depression and anxiety  Your doctor will talk to you about the different types of screening tests. They can help you decide which screenings to have. They can also explain what the results might mean.   Answering questions - The physical is a good time to ask the doctor or nurse questions about your health. If needed, they can refer you to other doctors or specialists, too.  Adults older than 65 years often need other care, too. As you get older, your doctor will talk to you about:   How to prevent falling at " home   Hearing or vision tests   Memory testing   How to take your medicines safely   Making sure that you have the help and support you need at home  All topics are updated as new evidence becomes available and our peer review process is complete.  This topic retrieved from [employer] on: May 02, 2024.  Topic 277193 Version 1.0  Release: 32.4.3 - C32.122  © 2024 UpToDate, Inc. and/or its affiliates. All rights reserved.  Consumer Information Use and Disclaimer   Disclaimer: This generalized information is a limited summary of diagnosis, treatment, and/or medication information. It is not meant to be comprehensive and should be used as a tool to help the user understand and/or assess potential diagnostic and treatment options. It does NOT include all information about conditions, treatments, medications, side effects, or risks that may apply to a specific patient. It is not intended to be medical advice or a substitute for the medical advice, diagnosis, or treatment of a health care provider based on the health care provider's examination and assessment of a patient's specific and unique circumstances. Patients must speak with a health care provider for complete information about their health, medical questions, and treatment options, including any risks or benefits regarding use of medications. This information does not endorse any treatments or medications as safe, effective, or approved for treating a specific patient. UpToDate, Inc. and its affiliates disclaim any warranty or liability relating to this information or the use thereof.The use of this information is governed by the Terms of Use, available at https://www.[employer].com/en/know/clinical-effectiveness-terms. 2024© UpToDate, Inc. and its affiliates and/or licensors. All rights reserved.  Copyright   © 2024 UpToDate, Inc. and/or its affiliates. All rights reserved.

## 2024-12-20 NOTE — PROGRESS NOTES
Adult Annual Physical  Name: Patricia Nam      : 1975      MRN: [id number]  Encounter Provider: KIRSTIN Isbell  Encounter Date: 2024   Encounter department: [facility] [address]    Assessment & Plan  Annual physical exam  Exam completed.  Advised patient to obtain fasting blood work as previously ordered.       H/O bee sting allergy    Orders:    EPINEPHrine (EPIPEN) 0.3 mg/0.3 mL SOAJ; Inject 0.3 mL (0.3 mg total) into a muscle once for 1 dose As needed      Immunizations and preventive care screenings were discussed with patient today. Appropriate education was printed on patient's after visit summary.    Counseling:  Alcohol/drug use: discussed moderation in alcohol intake, the recommendations for healthy alcohol use, and avoidance of illicit drug use.  Dental Health: discussed importance of regular tooth brushing, flossing, and dental visits.  Injury prevention: discussed safety/seat belts, safety helmets, smoke detectors, carbon monoxide detectors, and smoking near bedding or upholstery.  Sexual health: discussed sexually transmitted diseases, partner selection, use of condoms, avoidance of unintended pregnancy, and contraceptive alternatives.  Exercise: the importance of regular exercise/physical activity was discussed. Recommend exercise 3-5 times per week for at least 30 minutes.          History of Present Illness     Adult Annual Physical:  Patient presents for annual physical.     Diet and Physical Activity:  - Diet/Nutrition: poor diet.  - Exercise: walking.    General Health:  - Sleep: 7-8 hours of sleep on average.  - Hearing: normal hearing bilateral ears.  - Vision: no vision problems.  - Dental: regular dental visits and brushes teeth twice daily.    /GYN Health:  - Follows with GYN: yes.   - Menopause: postmenopausal.   - History of STDs: no    Advanced Care Planning:  - Has an advanced directive?: no    - Has a durable medical  POA?: no    - ACP document given to patient?: no      Review of Systems   Constitutional:  Negative for activity change, appetite change and unexpected weight change.   HENT:  Negative for congestion, ear pain and sore throat.    Eyes:  Negative for photophobia and visual disturbance.   Respiratory:  Negative for cough, chest tightness and shortness of breath.    Cardiovascular:  Negative for chest pain and palpitations.   Gastrointestinal:  Negative for abdominal pain, blood in stool, constipation, diarrhea, nausea and vomiting.   Endocrine: Negative for polydipsia, polyphagia and polyuria.   Genitourinary:  Negative for decreased urine volume, dysuria, flank pain, frequency, hematuria, pelvic pain and urgency.   Musculoskeletal:  Negative for arthralgias, back pain and myalgias.   Skin:  Negative for color change and rash.   Neurological:  Negative for dizziness, syncope, weakness, light-headedness, numbness and headaches.   Hematological:  Negative for adenopathy. Does not bruise/bleed easily.   Psychiatric/Behavioral:  Negative for dysphoric mood and sleep disturbance. The patient is not nervous/anxious.      Medical History Reviewed by provider this encounter:  Allergies  Meds      Medical History Reviewed by provider this encounter:  Allergies  Meds       Medical History Reviewed by provider this encounter:  Allergies  Meds        Medical History Reviewed by provider this encounter:  Tobacco  Allergies  Meds  Problems  Med Hx  Surg Hx  Fam Hx  Soc   Hx    .  Past Medical History   Past Medical History:   Diagnosis Date    Allergic     Asthma     Degenerative joint disease     Depression     GERD (gastroesophageal reflux disease)     Lumbar radiculopathy     PONV (postoperative nausea and vomiting)     Thyroid nodule      Past Surgical History:   Procedure Laterality Date    BREAST BIOPSY Left 2020    neg    CHEST WALL BIOPSY N/A 11/29/2024    Procedure: EXCISION  BIOPSY LESION CHEST WALL;   Surgeon: Zion Walker MD;  Location: [facility];  Service: General    CHOLECYSTECTOMY LAPAROSCOPIC N/A 05/06/2022    Procedure: CHOLECYSTECTOMY LAPAROSCOPIC;  Surgeon: Zion Walker MD;  Location: [facility];  Service: General    COLONOSCOPY      CYST REMOVAL  10/2020    lt breast     EGD      HYSTERECTOMY      HYSTERECTOMY  1998    LIPOSUCTION  09/2020    TUBAL LIGATION       Family History   Problem Relation Age of Onset    Alzheimer's disease Mother     Cancer Father         stomach/GI    Heart disease Father     No Known Problems Sister     No Known Problems Daughter     No Known Problems Maternal Grandmother     No Known Problems Paternal Grandmother     Colon cancer Maternal Aunt     Cancer Maternal Aunt         stomach    No Known Problems Maternal Aunt     No Known Problems Maternal Aunt     No Known Problems Maternal Aunt     No Known Problems Maternal Aunt     No Known Problems Maternal Aunt     Colon cancer Maternal Uncle     Depression Cousin     Diabetes Brother     Asthma Brother     Cancer Cousin     Breast cancer Neg Hx       reports that she has never smoked. She has never been exposed to tobacco smoke. She has never used smokeless tobacco. She reports current alcohol use. She reports that she does not use drugs.  Current Outpatient Medications on File Prior to Visit   Medication Sig Dispense Refill    albuterol (PROVENTIL HFA,VENTOLIN HFA) 90 mcg/act inhaler INHALE 1-2 PUFFS EVERY 6 HOURS AS NEEDED FOR WHEEZING. 18 g 3    azelastine (ASTELIN) 0.1 % nasal spray 1 spray into each nostril 2 (two) times a day Use in each nostril as directed 1 mL 1    Diclofenac Sodium (VOLTAREN) 1 % Apply 2 g topically 4 (four) times a day 100 g 0    FLUoxetine (PROzac) 40 MG capsule TAKE ONE CAPSULE BY MOUTH EVERY DAY 90 capsule 1    hydrOXYzine HCL (ATARAX) 10 mg tablet Take 1 tablet (10 mg total) by mouth daily at bedtime 30 tablet 0    ibuprofen (MOTRIN) 800 mg tablet Take 1 tablet (800 mg total) by mouth  every 8 (eight) hours as needed for moderate pain 30 tablet 0    meclizine (ANTIVERT) 25 mg tablet Take 1 tablet (25 mg total) by mouth every 8 (eight) hours as needed for dizziness 90 tablet 0    metoclopramide (REGLAN) 5 mg tablet Take 1 tablet (5 mg total) by mouth 3 (three) times a day before meals 90 tablet 5    omeprazole (PriLOSEC) 10 mg delayed release capsule Take 1 capsule (10 mg total) by mouth every evening 90 capsule 0    omeprazole (PriLOSEC) 40 MG capsule TAKE ONE CAPSULE BY MOUTH EVERY DAY 90 capsule 1    rizatriptan (MAXALT) 10 mg tablet Take 1 tablet (10 mg total) by mouth once as needed for migraine May repeat in 2 hours if needed. Max 2/24 hours, 9/month. 9 tablet 11    tiZANidine (ZANAFLEX) 4 mg tablet Take 1 tablet (4 mg total) by mouth every 8 (eight) hours as needed for muscle spasms 30 tablet 0    [DISCONTINUED] EPINEPHrine (EPIPEN) 0.3 mg/0.3 mL SOAJ Inject 0.3 mL (0.3 mg total) into a muscle once for 1 dose As needed      [DISCONTINUED] clobetasol (TEMOVATE) 0.05 % external solution Apply to scalp only as needed for flaring (Patient not taking: Reported on 12/20/2024) 50 mL 1    [DISCONTINUED] Crisaborole 2 % OINT Apply topically 2 (two) times a day as needed (itch/irritation) Apply a thin film to affected area(s) 2 times daily; consider reducing to once daily when clinical response is achieved. (Patient not taking: Reported on 12/20/2024) 60 g 3    [DISCONTINUED] fluocinonide (LIDEX) 0.05 % external solution Apply topically 2 (two) times a day (Patient not taking: Reported on 12/20/2024) 60 mL 0    [DISCONTINUED] ketoconazole (NIZORAL) 2 % cream Apply topically to affected areas twice a day (Patient not taking: Reported on 12/20/2024) 60 g 3    [DISCONTINUED] triamcinolone (KENALOG) 0.1 % cream Apply topically 2 (two) times a day Apply twice daily to affected areas, as needed. (Patient not taking: Reported on 12/20/2024) 80 g 1     No current facility-administered medications on file  prior to visit.     Allergies   Allergen Reactions    Bee Venom Anaphylaxis    Iodides Other (See Comments)    [employer] Oil - Food Allergy GI Intolerance     Other reaction(s): GI Intolerance, GI Reaction    Aspirin Other (See Comments)     shaking  convulsions    Other reaction(s): Other (See Comments)  shaking  convulsions  shaking  convulsions    Metronidazole GI Intolerance     Gi upset      Sulfamethoxazole-Trimethoprim GI Intolerance     Gi upst    Other reaction(s): GI Intolerance, GI Reaction  Gi upst  Gi upst    Tape  [Medical Tape] Other (See Comments) and Hives     fever  Other reaction(s): Other  fever      Current Outpatient Medications on File Prior to Visit   Medication Sig Dispense Refill    albuterol (PROVENTIL HFA,VENTOLIN HFA) 90 mcg/act inhaler INHALE 1-2 PUFFS EVERY 6 HOURS AS NEEDED FOR WHEEZING. 18 g 3    azelastine (ASTELIN) 0.1 % nasal spray 1 spray into each nostril 2 (two) times a day Use in each nostril as directed 1 mL 1    Diclofenac Sodium (VOLTAREN) 1 % Apply 2 g topically 4 (four) times a day 100 g 0    FLUoxetine (PROzac) 40 MG capsule TAKE ONE CAPSULE BY MOUTH EVERY DAY 90 capsule 1    hydrOXYzine HCL (ATARAX) 10 mg tablet Take 1 tablet (10 mg total) by mouth daily at bedtime 30 tablet 0    ibuprofen (MOTRIN) 800 mg tablet Take 1 tablet (800 mg total) by mouth every 8 (eight) hours as needed for moderate pain 30 tablet 0    meclizine (ANTIVERT) 25 mg tablet Take 1 tablet (25 mg total) by mouth every 8 (eight) hours as needed for dizziness 90 tablet 0    metoclopramide (REGLAN) 5 mg tablet Take 1 tablet (5 mg total) by mouth 3 (three) times a day before meals 90 tablet 5    omeprazole (PriLOSEC) 10 mg delayed release capsule Take 1 capsule (10 mg total) by mouth every evening 90 capsule 0    omeprazole (PriLOSEC) 40 MG capsule TAKE ONE CAPSULE BY MOUTH EVERY DAY 90 capsule 1    rizatriptan (MAXALT) 10 mg tablet Take 1 tablet (10 mg total) by mouth once as needed for migraine May  "repeat in 2 hours if needed. Max 2/24 hours, 9/month. 9 tablet 11    tiZANidine (ZANAFLEX) 4 mg tablet Take 1 tablet (4 mg total) by mouth every 8 (eight) hours as needed for muscle spasms 30 tablet 0    [DISCONTINUED] EPINEPHrine (EPIPEN) 0.3 mg/0.3 mL SOAJ Inject 0.3 mL (0.3 mg total) into a muscle once for 1 dose As needed      [DISCONTINUED] clobetasol (TEMOVATE) 0.05 % external solution Apply to scalp only as needed for flaring (Patient not taking: Reported on 12/20/2024) 50 mL 1    [DISCONTINUED] Crisaborole 2 % OINT Apply topically 2 (two) times a day as needed (itch/irritation) Apply a thin film to affected area(s) 2 times daily; consider reducing to once daily when clinical response is achieved. (Patient not taking: Reported on 12/20/2024) 60 g 3    [DISCONTINUED] fluocinonide (LIDEX) 0.05 % external solution Apply topically 2 (two) times a day (Patient not taking: Reported on 12/20/2024) 60 mL 0    [DISCONTINUED] ketoconazole (NIZORAL) 2 % cream Apply topically to affected areas twice a day (Patient not taking: Reported on 12/20/2024) 60 g 3    [DISCONTINUED] triamcinolone (KENALOG) 0.1 % cream Apply topically 2 (two) times a day Apply twice daily to affected areas, as needed. (Patient not taking: Reported on 12/20/2024) 80 g 1     No current facility-administered medications on file prior to visit.      Social History     Tobacco Use    Smoking status: Never     Passive exposure: Never    Smokeless tobacco: Never   Vaping Use    Vaping status: Never Used   Substance and Sexual Activity    Alcohol use: Yes     Comment: Not very often    Drug use: Never    Sexual activity: Yes     Partners: Male     Birth control/protection: None     Comment: With one person for 5 years       Objective   /70 (BP Location: Left arm, Patient Position: Sitting)   Pulse 85   Temp 98.5 °F (36.9 °C)   Ht 5' 3\" (1.6 m)   Wt 69.7 kg (153 lb 9.6 oz)   SpO2 99%   BMI 27.21 kg/m²     Physical Exam  Vitals reviewed. "   Constitutional:       General: She is not in acute distress.     Appearance: Normal appearance. She is not ill-appearing.   HENT:      Head: Normocephalic and atraumatic.      Right Ear: Tympanic membrane, ear canal and external ear normal.      Left Ear: Tympanic membrane, ear canal and external ear normal.      Nose: Nose normal.      Mouth/Throat:      Mouth: Mucous membranes are moist.      Pharynx: Oropharynx is clear.   Eyes:      Pupils: Pupils are equal, round, and reactive to light.   Cardiovascular:      Rate and Rhythm: Normal rate and regular rhythm.      Pulses: Normal pulses.      Heart sounds: Normal heart sounds. No murmur heard.  Pulmonary:      Effort: Pulmonary effort is normal.      Breath sounds: Normal breath sounds.   Abdominal:      General: Bowel sounds are normal. There is no distension.      Palpations: Abdomen is soft.      Tenderness: There is no abdominal tenderness. There is no right CVA tenderness or left CVA tenderness.   Musculoskeletal:         General: Normal range of motion.      Cervical back: Normal range of motion and neck supple.      Right lower leg: No edema.      Left lower leg: No edema.   Lymphadenopathy:      Cervical: No cervical adenopathy.   Skin:     General: Skin is warm and dry.      Capillary Refill: Capillary refill takes less than 2 seconds.   Neurological:      General: No focal deficit present.      Mental Status: She is alert and oriented to person, place, and time.   Psychiatric:         Mood and Affect: Mood normal.         Behavior: Behavior normal.

## 2025-01-04 ENCOUNTER — APPOINTMENT (OUTPATIENT)
Dept: LAB | Facility: HOSPITAL | Age: 50
End: 2025-01-04
Payer: COMMERCIAL

## 2025-01-04 DIAGNOSIS — Z00.00 HEALTHCARE MAINTENANCE: ICD-10-CM

## 2025-01-04 LAB
25(OH)D3 SERPL-MCNC: 43.3 NG/ML (ref 30–100)
ALBUMIN SERPL BCG-MCNC: 4.2 G/DL (ref 3.5–5)
ALP SERPL-CCNC: 91 U/L (ref 34–104)
ALT SERPL W P-5'-P-CCNC: 18 U/L (ref 7–52)
ANION GAP SERPL CALCULATED.3IONS-SCNC: 6 MMOL/L (ref 4–13)
AST SERPL W P-5'-P-CCNC: 20 U/L (ref 13–39)
BASOPHILS # BLD AUTO: 0.06 THOUSANDS/ΜL (ref 0–0.1)
BASOPHILS NFR BLD AUTO: 1 % (ref 0–1)
BILIRUB SERPL-MCNC: 0.53 MG/DL (ref 0.2–1)
BUN SERPL-MCNC: 12 MG/DL (ref 5–25)
CALCIUM SERPL-MCNC: 9.1 MG/DL (ref 8.4–10.2)
CHLORIDE SERPL-SCNC: 107 MMOL/L (ref 96–108)
CHOLEST SERPL-MCNC: 158 MG/DL (ref ?–200)
CO2 SERPL-SCNC: 25 MMOL/L (ref 21–32)
CREAT SERPL-MCNC: 0.56 MG/DL (ref 0.6–1.3)
EOSINOPHIL # BLD AUTO: 0.31 THOUSAND/ΜL (ref 0–0.61)
EOSINOPHIL NFR BLD AUTO: 4 % (ref 0–6)
ERYTHROCYTE [DISTWIDTH] IN BLOOD BY AUTOMATED COUNT: 11.7 % (ref 11.6–15.1)
EST. AVERAGE GLUCOSE BLD GHB EST-MCNC: 108 MG/DL
GFR SERPL CREATININE-BSD FRML MDRD: 109 ML/MIN/1.73SQ M
GLUCOSE P FAST SERPL-MCNC: 92 MG/DL (ref 65–99)
HBA1C MFR BLD: 5.4 %
HCT VFR BLD AUTO: 39.4 % (ref 34.8–46.1)
HDLC SERPL-MCNC: 47 MG/DL
HGB BLD-MCNC: 13.1 G/DL (ref 11.5–15.4)
IMM GRANULOCYTES # BLD AUTO: 0.03 THOUSAND/UL (ref 0–0.2)
IMM GRANULOCYTES NFR BLD AUTO: 0 % (ref 0–2)
LDLC SERPL CALC-MCNC: 101 MG/DL (ref 0–100)
LYMPHOCYTES # BLD AUTO: 1.94 THOUSANDS/ΜL (ref 0.6–4.47)
LYMPHOCYTES NFR BLD AUTO: 22 % (ref 14–44)
MCH RBC QN AUTO: 30.4 PG (ref 26.8–34.3)
MCHC RBC AUTO-ENTMCNC: 33.2 G/DL (ref 31.4–37.4)
MCV RBC AUTO: 91 FL (ref 82–98)
MONOCYTES # BLD AUTO: 0.58 THOUSAND/ΜL (ref 0.17–1.22)
MONOCYTES NFR BLD AUTO: 7 % (ref 4–12)
NEUTROPHILS # BLD AUTO: 5.78 THOUSANDS/ΜL (ref 1.85–7.62)
NEUTS SEG NFR BLD AUTO: 66 % (ref 43–75)
NONHDLC SERPL-MCNC: 111 MG/DL
NRBC BLD AUTO-RTO: 0 /100 WBCS
PLATELET # BLD AUTO: 309 THOUSANDS/UL (ref 149–390)
PMV BLD AUTO: 9.7 FL (ref 8.9–12.7)
POTASSIUM SERPL-SCNC: 4.2 MMOL/L (ref 3.5–5.3)
PROT SERPL-MCNC: 7.1 G/DL (ref 6.4–8.4)
RBC # BLD AUTO: 4.31 MILLION/UL (ref 3.81–5.12)
SODIUM SERPL-SCNC: 138 MMOL/L (ref 135–147)
TRIGL SERPL-MCNC: 51 MG/DL (ref ?–150)
TSH SERPL DL<=0.05 MIU/L-ACNC: 2.06 UIU/ML (ref 0.45–4.5)
WBC # BLD AUTO: 8.7 THOUSAND/UL (ref 4.31–10.16)

## 2025-01-04 PROCEDURE — 36415 COLL VENOUS BLD VENIPUNCTURE: CPT

## 2025-01-04 PROCEDURE — 85025 COMPLETE CBC W/AUTO DIFF WBC: CPT

## 2025-01-04 PROCEDURE — 80061 LIPID PANEL: CPT

## 2025-01-04 PROCEDURE — 83036 HEMOGLOBIN GLYCOSYLATED A1C: CPT

## 2025-01-04 PROCEDURE — 80053 COMPREHEN METABOLIC PANEL: CPT

## 2025-01-04 PROCEDURE — 84443 ASSAY THYROID STIM HORMONE: CPT

## 2025-01-04 PROCEDURE — 82306 VITAMIN D 25 HYDROXY: CPT

## 2025-01-07 ENCOUNTER — RESULTS FOLLOW-UP (OUTPATIENT)
Dept: FAMILY MEDICINE CLINIC | Facility: CLINIC | Age: 50
End: 2025-01-07

## 2025-02-05 ENCOUNTER — TELEPHONE (OUTPATIENT)
Dept: PAIN MEDICINE | Facility: CLINIC | Age: 50
End: 2025-02-05

## 2025-02-05 NOTE — TELEPHONE ENCOUNTER
Caller: Bela    Doctor: Dr. palma    Reason for call: patient calling to schedule procedure occipital nerve block     Call back#: [phone]

## 2025-02-05 NOTE — TELEPHONE ENCOUNTER
Caller: andrews Gomez    Doctor: Dr. Dow    Reason for call: calling to schedule a procedure    Call back#: [phone]

## 2025-02-10 DIAGNOSIS — K21.9 GASTROESOPHAGEAL REFLUX DISEASE WITHOUT ESOPHAGITIS: ICD-10-CM

## 2025-02-11 RX ORDER — OMEPRAZOLE 40 MG/1
40 CAPSULE, DELAYED RELEASE ORAL DAILY
Qty: 90 CAPSULE | Refills: 1 | Status: SHIPPED | OUTPATIENT
Start: 2025-02-11

## 2025-03-06 ENCOUNTER — TELEMEDICINE (OUTPATIENT)
Dept: NEUROLOGY | Facility: CLINIC | Age: 50
End: 2025-03-06
Payer: COMMERCIAL

## 2025-03-06 ENCOUNTER — TELEPHONE (OUTPATIENT)
Dept: NEUROLOGY | Facility: CLINIC | Age: 50
End: 2025-03-06

## 2025-03-06 ENCOUNTER — TELEPHONE (OUTPATIENT)
Age: 50
End: 2025-03-06

## 2025-03-06 DIAGNOSIS — M54.81 BILATERAL OCCIPITAL NEURALGIA: ICD-10-CM

## 2025-03-06 DIAGNOSIS — G43.009 MIGRAINE WITHOUT AURA AND WITHOUT STATUS MIGRAINOSUS, NOT INTRACTABLE: Primary | ICD-10-CM

## 2025-03-06 PROCEDURE — 99214 OFFICE O/P EST MOD 30 MIN: CPT | Performed by: PSYCHIATRY & NEUROLOGY

## 2025-03-06 RX ORDER — AMOXICILLIN 500 MG/1
500 CAPSULE ORAL 3 TIMES DAILY
COMMUNITY
Start: 2025-02-26 | End: 2025-03-08

## 2025-03-06 NOTE — TELEPHONE ENCOUNTER
Caller: pt    Doctor: Dr. gaston    Reason for call: pt is calling back    Call back#: [phone]

## 2025-03-06 NOTE — PROGRESS NOTES
Review of Systems   Constitutional:  Negative for appetite change and fever.   HENT: Negative.  Negative for hearing loss, tinnitus, trouble swallowing and voice change.    Eyes: Negative.  Negative for photophobia and pain.   Respiratory: Negative.  Negative for shortness of breath.    Cardiovascular: Negative.  Negative for palpitations.   Gastrointestinal:  Positive for nausea. Negative for vomiting.   Endocrine: Negative.  Negative for cold intolerance.   Genitourinary: Negative.  Negative for dysuria, frequency and urgency.   Musculoskeletal: Negative.  Negative for myalgias and neck pain.   Skin: Negative.  Negative for rash.   Neurological:  Positive for dizziness and headaches (everyday). Negative for tremors, seizures, syncope, facial asymmetry, speech difficulty, weakness, light-headedness and numbness.   Hematological: Negative.  Does not bruise/bleed easily.   Psychiatric/Behavioral:  Positive for sleep disturbance. Negative for confusion and hallucinations.    All other systems reviewed and are negative.

## 2025-03-06 NOTE — TELEPHONE ENCOUNTER
Scheduled pt- sent auth team message, pt has new insurance which has not been covering typically covered tests for her

## 2025-03-06 NOTE — TELEPHONE ENCOUNTER
Caller: Pt    Doctor: Dr. Dow    Reason for call: Pt looking to set up nerve block appt again. Pt is in pain.     Please assist.     Call back#: [phone]
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
## 2025-03-06 NOTE — PATIENT INSTRUCTIONS
"  As we discussed, if needed between visits for the ketorolac/Toradol shot and since you have tolerated in the past we could either give 30 mg or 60 mg if needed    Do follow up with pain management for the occipital nerve block     -If you feel like you could sleep on your back that night only, certainly could try to sleep on your back for the sleep study, but not if you feel like you cannot sleep this way.  Just getting sleep is the most important thing, as the machine thinks you are sleeping the entire time it is plugged in. Otherwise, we discussed home sleep study can underestimate the problem.  If it comes back that you almost have sleep apnea or other sleep disorder, but not meeting criteria, we could consider in lab study and reach out to me if you would like this ordered before next visit.    Referral for sleep study placed 7/28/2024: For a sleep study and if it is abnormal, it includes a referral to the sleep medicine specialist team to further evaluate your sleep issues.    Please call [phone] to schedule the sleep study or you can schedule on [employer] and we discussed due to the new order being labeled \" ambulatory referral to sleep medicine\" you have to wait 2-6 days for the sleep team to turn this referral into the actual sleep study order that you can schedule, otherwise if you call now they may say there is no order for sleep study, just an order for a referral, because they can not see it yet on their end.  The referral to sleep medicine piece is only if there is abnormalities and you need to see them afterwards.      Headache/migraine treatment:   Rescue medications (for immediate treatment of a headache):   It is ok to take ibuprofen, acetaminophen or naproxen (Advil, Tylenol,  Aleve, Excedrin) if they help your headaches you should limit these to No more than 3 times a week to avoid medication overuse/rebound headaches.     For your more moderate to severe migraines take this medication " early   Maxalt (rizatriptan) 10mg tabs - take one at the onset of headache. May repeat one time after 2 hours if pain has not resolved.   (Max 2 a day and 9 a month)     - This does not interact with metoclopramide/Reglan and some people take it together like a migraine cocktail along with anti-inflammatory    -If rizatriptan/Maxalt does not help significantly we discussed please feel free to reach out to me before next visit and I will send in Nurtec or Ubrelvy and both of these you can have 16 tabs a month instead of 9 as the more you take them they can also help with prevention but typically will need a prior authorization form and sometimes the pharmacies do not send this to us so if you do not get this medication in 1 to 2 weeks after I am sending it, please let me know as we likely did not receive the paperwork.  There also are coupon cards to completely cover the co-pay for these new medications otherwise they are very expensive and never pay that amount.    Since you already have this at home you absolutely could take metoclopramide/Reglan 10 mg for a bad migraine even if you do not have nausea as it can help either way    Over the counter preventive supplements for headaches/migraines (if you try, try for 3 months straight)  (to take every day to help prevent headaches - not to take at the time of headache):  There are combo pills online of these - none of which regulated by FDA and double check dosing - take appropriate dose only once a day- preventa migraine, migravent, mind ease, migrelief   [] Magnesium 400 - 500 mg daily (If any diarrhea or upset stomach, decrease dose  as tolerated)  [] Riboflavin (Vitamin B2) 400mg daily - try online   (FYI B2 may make your urine bright/neon yellow)  AND/OR  [] Herbal medication: Petasites/Butterbur 150 mg daily - try online  (When choosing your Butterbur online or in the store, beware that there are some poor preps containing pyrrolizidine alkaloids (PAs) that can  be harmful to the liver. Therefore, do not use butterbur products that are not labeled as PA-free.)      Prescription preventive medications for headaches/migraines   (to take every day to help prevent headaches - not to take at the time of headache):  [x]       -----  as we discussed if needed would add trial of topiramate/Topamax which typically works well as long as it is tolerated and if you have any issues with tolerance just reach out and I can let you know whether that side effect will typically last longer or go away soon and if you do not like this medication we can try many others just reach out    - Topiramate    25 mg in a.m. And 25 mg in p.m. For 1 week, then take 25 mg in a.m. And 50 mg in p.m. For 1 week, then take 50 mg in a.m. and 50 mg in p.m. And continue  - generally the common side effects improve as your body gets used to the medication.  If we need to spread out a more gradual increase of the medication on a longer scale we can, just call if any questions or concerns  - if necessary, if the a.m. dose is causing side effects we can always have you take the full dose at night instead      *Typically these types of medications take time until you see the benefit, although some may see improvement in days, often it may take weeks, especially if the medication is being titrated up to a beneficial level. Please contact us if there are any concerns or questions regarding the medication.     Lifestyle Recommendations:  [x] SLEEP - Maintain a regular sleep schedule: Adults need at least 7-8 hours of uninterrupted a night. Maintain good sleep hygiene:  Going to bed and waking up at consistent times, avoiding excessive daytime naps, avoiding caffeinated beverages in the evening, avoid excessive stimulation in the evening and generally using bed primarily for sleeping.  One hour before bedtime would recommend turning lights down lower, decreasing your activity (may read quietly, listen to music at a low  volume). When you get into bed, should eliminate all technology (no texting, emailing, playing with your phone, iPad or tablet in bed).  [x] HYDRATION - Maintain good hydration.  Drink  2L of fluid a day (4 typical small water bottles)  [x] DIET - Maintain good nutrition. In particular don't skip meals and try and eat healthy balanced meals regularly.  [x] TRIGGERS - Look for other triggers and avoid them: Limit caffeine to 1-2 cups a day or less. Avoid dietary triggers that you have noticed bring on your headaches (this could include aged cheese, peanuts, MSG, aspartame and nitrates).  [x] EXERCISE - physical exercise as we all know is good for you in many ways, and not only is good for your heart, but also is beneficial for your mental health, cognitive health and  chronic pain/headaches. I would encourage at the least 5 days of physical exercise weekly for at least 30 minutes.     Education and Follow-up  [x] Please call with any questions or concerns. Of course if any new concerning symptoms go to the emergency department.  [x] Follow up 3 months, sooner if needed
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
## 2025-03-07 ENCOUNTER — TELEPHONE (OUTPATIENT)
Facility: HOSPITAL | Age: 50
End: 2025-03-07

## 2025-03-07 ENCOUNTER — HOSPITAL ENCOUNTER (OUTPATIENT)
Dept: MAMMOGRAPHY | Facility: CLINIC | Age: 50
End: 2025-03-07

## 2025-03-07 ENCOUNTER — HOSPITAL ENCOUNTER (OUTPATIENT)
Dept: ULTRASOUND IMAGING | Facility: CLINIC | Age: 50
End: 2025-03-07

## 2025-03-07 DIAGNOSIS — R92.8 ABNORMAL MAMMOGRAM: ICD-10-CM

## 2025-03-07 PROCEDURE — G0279 TOMOSYNTHESIS, MAMMO: HCPCS

## 2025-03-07 PROCEDURE — 77065 DX MAMMO INCL CAD UNI: CPT

## 2025-03-07 PROCEDURE — 76642 ULTRASOUND BREAST LIMITED: CPT

## 2025-03-07 NOTE — TELEPHONE ENCOUNTER
Telephone Call    [] Called Patient regarding [facility]; Patient enrolled to [facility].    [] Called Patient regarding [facility]; Patient answered call and wants to enroll; Asked for a call back.    [] Called Patient regarding [facility]; Patient answered call; Declined to enroll in program.    [x] Called Patient regarding [facility]; Patient did not ; Left voicemail with my name and direct phone number.     [] Called Patient regarding [facility]; Patient did not ; Unable to leave voicemail.    [] Called Patient regarding [facility]; Phone number is invalid    Attempts (Max 3): [x]1   []2   []3        Additional Notes:

## 2025-03-07 NOTE — PROGRESS NOTES
Met with patient and Dr. Ramirez Plunkett regarding recommendation for;    _____ RIGHT ___X___LEFT      ___X__Ultrasound guided  ______Stereotactic breast biopsy.      __X___Verbalized understanding.    Reviewed clip placement with patient, pt states understanding: Yes: __X__ No: ____  Comments:    Blood thinners:  No: __X___ Yes: ______ What:          Biopsy teaching sheet given:  Yes: ___X___ No: ________    Pt given contact information and adv to call with any questions/needs    Patient advised to arrive at 1000 for a 1015 appointment    Pt given directions and contact information for [facility] campus and HAZEL Pandya, RN  Pt has been outreached for assistance by Inna but as she is going to [facility], will request coverage for biopsy by BONITA

## 2025-03-11 NOTE — TELEPHONE ENCOUNTER
Auth team verified with insurance that pt is correct, insurance will not cover procedure she is scheduled for - per auth team, pt can be self pay (as she anticipated)    Gathered CPT codes for pts procedure, as typically used/entered by Dr Dow - 59022, ,     Placed estimate in chart - notified pt of amounts (explained this is estimate, assuming these are the codes doctor will use, if he alters anything, the amount may differ).      Explained network offers 50% self pay discount, assuming pt pays 30% of the amount at time of check in.

## 2025-03-12 ENCOUNTER — PATIENT MESSAGE (OUTPATIENT)
Dept: NEUROLOGY | Facility: CLINIC | Age: 50
End: 2025-03-12

## 2025-03-12 ENCOUNTER — CLINICAL SUPPORT (OUTPATIENT)
Dept: NEUROLOGY | Facility: CLINIC | Age: 50
End: 2025-03-12
Payer: COMMERCIAL

## 2025-03-12 DIAGNOSIS — G43.709 CHRONIC MIGRAINE WITHOUT AURA WITHOUT STATUS MIGRAINOSUS, NOT INTRACTABLE: Primary | ICD-10-CM

## 2025-03-12 PROCEDURE — 96372 THER/PROPH/DIAG INJ SC/IM: CPT | Performed by: PSYCHIATRY & NEUROLOGY

## 2025-03-12 RX ADMIN — KETOROLAC TROMETHAMINE 30 MG: 30 INJECTION, SOLUTION INTRAMUSCULAR; INTRAVENOUS at 12:49

## 2025-03-12 NOTE — PATIENT COMMUNICATION
Called pt.   She thinks that she had toradol in the past for a Sinus headache but not sure.    She would like to get 30mg dose of ketorolac.      Scheduled pt for [facility] office today at 11:30am    Please enter order

## 2025-03-12 NOTE — PATIENT COMMUNICATION
Recd call from pt and had to R/S Toradol injection to 1230 today. Secure chat message sent to the Medical assistants at the [place] office to make them aware.

## 2025-03-12 NOTE — PROGRESS NOTES
Administered Ketorolac ( Toradol) injection to L deltoid. Pt tolerated well.     Administered by Maria G

## 2025-03-12 NOTE — PATIENT COMMUNICATION
Per last office note-  as of 3/6/2025: She reports she continue to have significant improvement from the occipital nerve block after July up until about 2 weeks ago she had the return of her typical bilateral occipital neuralgia symptoms that does not feel like her migraine symptoms and therefore she is not taking rizatriptan for it. She reports she unfortunately had just canceled her plan for her next occipital nerve block with pain management 1/15/2025 and plans on calling them today to get back in (appears she got in for 4/4/2025) and she is not currently interested in any medications that may be helpful in the meantime.      future options: Carbamazepine/Tegretol, verapamil, topiramate/Topamax -discussed in detail in case she would like to try before next visit, acetazolamide/Diamox CGRP med, botox     Please advise

## 2025-03-12 NOTE — PATIENT COMMUNICATION
Glenis Bowles MD to Neurology Concussion & Migraine Team 5       3/12/25  9:58 AM   Could you please help schedule or if you talk to her see if she has ever had it before

## 2025-03-13 ENCOUNTER — TELEPHONE (OUTPATIENT)
Facility: HOSPITAL | Age: 50
End: 2025-03-13

## 2025-03-13 NOTE — TELEPHONE ENCOUNTER
Telephone Call    [] Called Patient regarding [facility]; Patient enrolled to [facility].    [] Called Patient regarding [facility]; Patient answered call and wants to enroll; Asked for a call back.    [] Called Patient regarding [facility]; Patient answered call; Declined to enroll in program.    [x] Called Patient regarding [facility]; Patient did not ; Left voicemail with my name and direct phone number.     [] Called Patient regarding [facility]; Patient did not ; Unable to leave voicemail.    [] Called Patient regarding [facility]; Phone number is invalid    Attempts (Max 3): []1   [x]2   []3        Additional Notes:

## 2025-03-21 NOTE — DISCHARGE INSTR - OTHER ORDERS
POST LARGE CORE BREAST BIOPSY PROCEDURE: PATIENT INFORMATION      Place an ice pack inside your bra over the top of the gauze dressing every hour for 20 minutes (20 minutes on, 60 minutes off). Do this until bedtime. The ice pack should be used whether pain is or is not present, as it significantly reduces the chance for bruising, bleeding, or hematoma development at home after your procedure. Please use as instructed.    Do not shower or bathe until the following morning Sunday 03/23/2025 @ 11:15 am.    You may shower/bathe your breast carefully with the steri-strips in place.  Be careful not to loosen them.  The steri-strips should remain in place 3-5 days to allow adequate time for your body to heal the breast biopsy incision site. If steri-strips have not fallen off on their own by Thursday evening 03/27/2025, it would be appropriate to remove them.    You may have mild discomfort, and you may have some bruising where the needle entered the skin. Following a breast biopsy, it can be very common to have bruising around the biopsy site & in some cases the underside of the breast due to positioning during the procedure. This should clear within 1-2 weeks time post-procedure.    If you need medicine for discomfort, take acetaminophen products such as Tylenol. You may also take Advil or Motrin products. Avoid using any aspirin based products (avoid Aleve, avoid Naproxen), as these medications can increase the risk for bleeding/ hematoma development at breast biopsy site.    Do not participate in strenuous activities such as-tennis, aerobics, skiing, weight lifting > 10 lbs, etc. for 24 hours. Refrain from swimming/soaking until biopsy incision is fully healed to reduce any risk for infection.    Wearing a bra for sleeping may be more comfortable for the first 24-48 hours.    Watch for continued bleeding, pain or fever over 101. If any of these symptoms occur, please contact our breast nurse navigator at the location  where your biopsy was performed.    During normal business hours (7:30 am-4:00 pm) please call the nurse navigator at the site where your   procedure was performed:    [facility] Henrry/ Michael:  [phone], [phone] or [phone]  [facility]:  Sunita Crenshaw Radiology RN P# [phone]         or      Sigrid HESTER P# [phone]              After 4 PM - please call your physician or go to the nearest Emergency Department location.          9.         The final results of your biopsy are usually available within one week.

## 2025-03-22 ENCOUNTER — HOSPITAL ENCOUNTER (OUTPATIENT)
Dept: RADIOLOGY | Facility: HOSPITAL | Age: 50
Discharge: HOME | End: 2025-03-22

## 2025-03-22 DIAGNOSIS — R92.8 ABNORMAL MAMMOGRAM: ICD-10-CM

## 2025-03-22 PROCEDURE — A4648 IMPLANTABLE TISSUE MARKER: HCPCS

## 2025-03-22 PROCEDURE — 88305 TISSUE EXAM BY PATHOLOGIST: CPT | Performed by: STUDENT IN AN ORGANIZED HEALTH CARE EDUCATION/TRAINING PROGRAM

## 2025-03-22 PROCEDURE — 19083 BX BREAST 1ST LESION US IMAG: CPT

## 2025-03-22 RX ORDER — LIDOCAINE HYDROCHLORIDE 10 MG/ML
5 INJECTION, SOLUTION EPIDURAL; INFILTRATION; INTRACAUDAL; PERINEURAL ONCE
Status: COMPLETED | OUTPATIENT
Start: 2025-03-22 | End: 2025-03-22

## 2025-03-22 RX ADMIN — LIDOCAINE HYDROCHLORIDE 5 ML: 10 INJECTION, SOLUTION EPIDURAL; INFILTRATION; INTRACAUDAL; PERINEURAL at 10:43

## 2025-03-24 ENCOUNTER — PATIENT OUTREACH (OUTPATIENT)
Facility: HOSPITAL | Age: 50
End: 2025-03-24

## 2025-03-24 ENCOUNTER — TELEPHONE (OUTPATIENT)
Facility: HOSPITAL | Age: 50
End: 2025-03-24

## 2025-03-24 ENCOUNTER — TELEPHONE (OUTPATIENT)
Dept: RADIOLOGY | Facility: HOSPITAL | Age: 50
End: 2025-03-24

## 2025-03-24 VITALS — HEART RATE: 60 BPM | SYSTOLIC BLOOD PRESSURE: 128 MMHG | DIASTOLIC BLOOD PRESSURE: 74 MMHG

## 2025-03-24 NOTE — PROGRESS NOTES
Patient arrived via: 10:35AM    __X___ambulatory    _____wheelchair    _____stretcher      Domestic violence screen    __X____negative______positive    Breast Implants:    ___X____yes ________no

## 2025-03-24 NOTE — PROGRESS NOTES
Procedure type:    ___X__ultrasound guided _____stereotactic    Breast:    __X___Left _____Right    Location: LT BREAST 1200 10CMFN    Needle:12    # of passes:4    Clip:BUTTERFLY    Performed by: DR AAYUSH RAY    Pressure held for 5 minutes by:SANDRA Polanco Strips:    ___X__yes _____no    Band aid:    __X___yes_____no    Tolerated procedure:    __X___yes _____no

## 2025-03-24 NOTE — TELEPHONE ENCOUNTER
Telephone Call    [] Called Patient regarding [facility] [facility]; Patient enrolled to [facility] [facility].    [] Called Patient regarding [facility] [facility]; Patient answered call and wants to enroll; Asked for a call back.    [] Called Patient regarding [employer] [facility]; Patient answered call; Declined to enroll in program.    [x] Called Patient regarding [facility] [facility]; Patient did not ; Left voicemail with my name and direct phone number.     [] Called Patient regarding [facility] [facility]; Patient did not ; Unable to leave voicemail.    [] Called Patient regarding [employer] [facility]; Phone number is invalid    Attempts (Max 3): []1   []2   [x]3        Additional Notes:

## 2025-03-24 NOTE — PROGRESS NOTES
Ice pack given:    __X___yes _____no      Discharge instructions reviewed & given to patient:    __X___yes _____no      Biopsy site clean and dry with no bleeding on discharge:    __XX___yes ____no      Discharged via: 1055AM    _____ambulatory  X  _____wheelchair    _____stretcher

## 2025-03-26 ENCOUNTER — TELEPHONE (OUTPATIENT)
Dept: RADIOLOGY | Facility: HOSPITAL | Age: 50
End: 2025-03-26

## 2025-03-26 PROCEDURE — 88305 TISSUE EXAM BY PATHOLOGIST: CPT | Performed by: STUDENT IN AN ORGANIZED HEALTH CARE EDUCATION/TRAINING PROGRAM

## 2025-04-11 ENCOUNTER — OFFICE VISIT (OUTPATIENT)
Dept: FAMILY MEDICINE CLINIC | Facility: CLINIC | Age: 50
End: 2025-04-11
Payer: COMMERCIAL

## 2025-04-11 VITALS
TEMPERATURE: 100.7 F | SYSTOLIC BLOOD PRESSURE: 140 MMHG | HEIGHT: 63 IN | OXYGEN SATURATION: 97 % | DIASTOLIC BLOOD PRESSURE: 80 MMHG | WEIGHT: 155.8 LBS | BODY MASS INDEX: 27.61 KG/M2 | RESPIRATION RATE: 12 BRPM | HEART RATE: 96 BPM

## 2025-04-11 DIAGNOSIS — M79.621 PAIN OF RIGHT UPPER ARM: ICD-10-CM

## 2025-04-11 DIAGNOSIS — R50.9 FEVER, UNSPECIFIED FEVER CAUSE: Primary | ICD-10-CM

## 2025-04-11 DIAGNOSIS — R10.11 RUQ ABDOMINAL PAIN: ICD-10-CM

## 2025-04-11 DIAGNOSIS — M25.50 POLYARTHRALGIA: ICD-10-CM

## 2025-04-11 LAB
SARS-COV-2 AG UPPER RESP QL IA: NEGATIVE
SL AMB  POCT GLUCOSE, UA: NORMAL
SL AMB LEUKOCYTE ESTERASE,UA: NORMAL
SL AMB POCT BILIRUBIN,UA: NORMAL
SL AMB POCT BLOOD,UA: NORMAL
SL AMB POCT CLARITY,UA: CLEAR
SL AMB POCT COLOR,UA: YELLOW
SL AMB POCT KETONES,UA: NORMAL
SL AMB POCT NITRITE,UA: NORMAL
SL AMB POCT PH,UA: 8
SL AMB POCT SPECIFIC GRAVITY,UA: 1.01
SL AMB POCT URINE PROTEIN: NORMAL
SL AMB POCT UROBILINOGEN: 3.5
VALID CONTROL: NORMAL

## 2025-04-11 PROCEDURE — 87086 URINE CULTURE/COLONY COUNT: CPT | Performed by: NURSE PRACTITIONER

## 2025-04-11 PROCEDURE — 99214 OFFICE O/P EST MOD 30 MIN: CPT | Performed by: NURSE PRACTITIONER

## 2025-04-11 PROCEDURE — 81002 URINALYSIS NONAUTO W/O SCOPE: CPT | Performed by: NURSE PRACTITIONER

## 2025-04-11 PROCEDURE — 87811 SARS-COV-2 COVID19 W/OPTIC: CPT | Performed by: NURSE PRACTITIONER

## 2025-04-11 RX ORDER — CYCLOBENZAPRINE HCL 10 MG
10 TABLET ORAL 3 TIMES DAILY PRN
Qty: 30 TABLET | Refills: 0 | Status: SHIPPED | OUTPATIENT
Start: 2025-04-11

## 2025-04-11 RX ORDER — IBUPROFEN 800 MG/1
800 TABLET, FILM COATED ORAL EVERY 8 HOURS PRN
Qty: 30 TABLET | Refills: 0 | Status: SHIPPED | OUTPATIENT
Start: 2025-04-11

## 2025-04-11 NOTE — PROGRESS NOTES
Name: Patricia Nam      : 1975      MRN: [id number]  Encounter Provider: KIRSTIN Isbell  Encounter Date: 2025   Encounter department: [facility] [address]  :  Assessment & Plan  Fever, unspecified fever cause  COVID-negative in office as well as urine dip.  Advised patient to alternate acetaminophen and ibuprofen.  Encourage increased hydration, adequate nutrition, rest.  To obtain blood work and ultrasound as ordered.    Orders:    POCT Rapid Covid Ag    POCT urine dip    CBC and differential; Future    Comprehensive metabolic panel; Future    Amylase; Future    Lipase; Future    US right upper quadrant; Future    Lyme Total AB W Reflex to IGM/IGG; Future    Urine culture; Future    RUQ abdominal pain  To monitor for food triggers.  To decrease intake of fatty foods.  To obtain blood work and ultrasound as ordered.    Orders:    CBC and differential; Future    Comprehensive metabolic panel; Future    Amylase; Future    Lipase; Future    US right upper quadrant; Future    Polyarthralgia    Orders:    Lyme Total AB W Reflex to IGM/IGG; Future    Pain of right upper arm  Advised ice, elevation, rest.  Can take ibuprofen as prescribed for the next 5 to 7 days.  Flexeril as needed.    Orders:    cyclobenzaprine (FLEXERIL) 10 mg tablet; Take 1 tablet (10 mg total) by mouth 3 (three) times a day as needed for muscle spasms    ibuprofen (MOTRIN) 800 mg tablet; Take 1 tablet (800 mg total) by mouth every 8 (eight) hours as needed for moderate pain           History of Present Illness   Patient presents to the office for evaluation of bodyaches and fever.  Patient states she started 2 to 3 days ago with pain to right upper arm.  States over the weekend she was helping move furniture and feels she may have overdone it.  Today while at work developed chills, body aches, worsening joint pain.  Denies recent sick contacts.  Denies respiratory symptoms, urinary  "symptoms, vomiting, diarrhea.        Review of Systems   Constitutional:  Positive for chills and fever. Negative for unexpected weight change.   HENT:  Negative for congestion, ear pain, sinus pressure, sinus pain and sore throat.    Eyes:  Negative for photophobia and visual disturbance.   Respiratory:  Negative for cough and shortness of breath.    Cardiovascular:  Negative for chest pain and palpitations.   Gastrointestinal:  Positive for abdominal pain. Negative for constipation, diarrhea, nausea and vomiting.   Genitourinary:  Negative for decreased urine volume, dysuria, flank pain, frequency, hematuria and urgency.   Musculoskeletal:  Positive for arthralgias, back pain and myalgias. Negative for neck pain and neck stiffness.   Skin:  Negative for color change and rash.   Neurological:  Negative for dizziness, weakness, light-headedness and headaches.   Hematological:  Negative for adenopathy. Does not bruise/bleed easily.   Psychiatric/Behavioral:  Negative for confusion. The patient is not nervous/anxious.        Objective   /80 (BP Location: Left arm, Cuff Size: Standard)   Pulse 96   Temp (!) 100.7 °F (38.2 °C)   Resp 12   Ht 5' 3\" (1.6 m)   Wt 70.7 kg (155 lb 12.8 oz)   SpO2 97%   BMI 27.60 kg/m²      Physical Exam  Vitals reviewed.   Constitutional:       General: She is not in acute distress.     Appearance: Normal appearance. She is not ill-appearing.   HENT:      Head: Normocephalic and atraumatic.      Right Ear: Tympanic membrane, ear canal and external ear normal.      Left Ear: Tympanic membrane, ear canal and external ear normal.      Nose: Nose normal.      Mouth/Throat:      Mouth: Mucous membranes are moist.      Pharynx: Oropharynx is clear.   Eyes:      Conjunctiva/sclera: Conjunctivae normal.      Pupils: Pupils are equal, round, and reactive to light.   Cardiovascular:      Rate and Rhythm: Normal rate and regular rhythm.      Pulses: Normal pulses.      Heart sounds: Normal " heart sounds.   Pulmonary:      Effort: Pulmonary effort is normal.      Breath sounds: Normal breath sounds. No wheezing.   Abdominal:      General: Bowel sounds are normal.      Palpations: Abdomen is soft.      Tenderness: There is abdominal tenderness in the right upper quadrant.   Musculoskeletal:         General: No swelling.      Right upper arm: Tenderness present. No swelling or bony tenderness.      Left upper arm: Normal.      Cervical back: Normal range of motion and neck supple.      Right lower leg: No edema.      Left lower leg: No edema.   Lymphadenopathy:      Cervical: No cervical adenopathy.   Skin:     General: Skin is warm and dry.      Capillary Refill: Capillary refill takes less than 2 seconds.      Findings: No erythema.   Neurological:      General: No focal deficit present.      Mental Status: She is alert and oriented to person, place, and time.   Psychiatric:         Mood and Affect: Mood normal.         Behavior: Behavior normal.

## 2025-04-12 LAB — BACTERIA UR CULT: NORMAL

## 2025-04-21 DIAGNOSIS — K21.9 GASTROESOPHAGEAL REFLUX DISEASE WITHOUT ESOPHAGITIS: ICD-10-CM

## 2025-04-21 RX ORDER — OMEPRAZOLE 40 MG/1
40 CAPSULE, DELAYED RELEASE ORAL DAILY
Qty: 90 CAPSULE | Refills: 0 | Status: SHIPPED | OUTPATIENT
Start: 2025-04-21

## 2025-04-21 RX ORDER — OMEPRAZOLE 10 MG/1
10 CAPSULE, DELAYED RELEASE ORAL EVERY EVENING
Qty: 90 CAPSULE | Refills: 0 | Status: SHIPPED | OUTPATIENT
Start: 2025-04-21

## 2025-04-24 ENCOUNTER — OFFICE VISIT (OUTPATIENT)
Dept: OBGYN CLINIC | Facility: CLINIC | Age: 50
End: 2025-04-24
Payer: COMMERCIAL

## 2025-04-24 VITALS — WEIGHT: 155 LBS | HEIGHT: 63 IN | BODY MASS INDEX: 27.46 KG/M2

## 2025-04-24 DIAGNOSIS — R20.0 NUMBNESS AND TINGLING OF BOTH UPPER EXTREMITIES: Primary | ICD-10-CM

## 2025-04-24 DIAGNOSIS — M50.30 DDD (DEGENERATIVE DISC DISEASE), CERVICAL: ICD-10-CM

## 2025-04-24 DIAGNOSIS — M75.31 CALCIFIC TENDINITIS OF RIGHT SHOULDER: ICD-10-CM

## 2025-04-24 DIAGNOSIS — S46.911A RIGHT SHOULDER STRAIN, INITIAL ENCOUNTER: ICD-10-CM

## 2025-04-24 DIAGNOSIS — R20.2 NUMBNESS AND TINGLING OF BOTH UPPER EXTREMITIES: Primary | ICD-10-CM

## 2025-04-24 DIAGNOSIS — G95.20 SPINAL CORD COMPRESSION (HCC): ICD-10-CM

## 2025-04-24 DIAGNOSIS — M94.0 COSTOCHONDRITIS: ICD-10-CM

## 2025-04-24 DIAGNOSIS — G56.03 CARPAL TUNNEL SYNDROME, BILATERAL: ICD-10-CM

## 2025-04-24 PROCEDURE — 99204 OFFICE O/P NEW MOD 45 MIN: CPT | Performed by: FAMILY MEDICINE

## 2025-04-24 NOTE — PATIENT INSTRUCTIONS
Over the counter vitamins:    - Turmeric vitamin at least 1000 mg daily    - Tart cherry vitamin at least 1000 mg daily    - Glucosamine-chondrointin 2-3 times daily    Epsom Salt Soaks    Wear braces at night

## 2025-04-24 NOTE — PROGRESS NOTES
Name: Patricia Nam      : 1975      MRN: [id number]  Encounter Provider: Lisette Tinajero DO  Encounter Date: 2025   Encounter department: [facility] [place]  :  Assessment & Plan  Numbness and tingling of both upper extremities  49-year-old right-hand-dominant female with numbness and tingling both upper extremities more than 5 years duration.  Clinical impression is that she may have symptoms from carpal tunnel syndrome, and there may be component of cervical radiculopathy.    I will refer her for ultrasound of the wrist to evaluate for carpal tunnel syndrome.  In the interim I provided her cock-up wrist splints to wear at nighttime to bring hyperflexion of the wrist.  Start taking turmeric turmeric, tart cherry, and glucosamine supplements.  May do Epsom salt soaks.  Follow-up after having ultrasound done.  Orders:    US Carpal Tunnel; Future    Carpal tunnel syndrome, bilateral  49-year-old right-hand-dominant female with numbness and tingling both upper extremities.  Will refer for ultrasound to [facility] for carpal tunnel syndrome.  I provided cock-up wrist splints to wear at nighttime.  Follow-up after having ultrasounds done.  Orders:    Cock Up Wrist Splint    Costochondritis  49-year-old right-hand-dominant female onset of costochondral pain from fall injury earlier today 2025.  Clinical impression is onset of constant chondritis.  I discussed treatment in form of topical anti-inflammatory and icing.  May apply top of diclofenac 3 times daily and apply ice as needed.  Follow-up as needed.  Orders:    Diclofenac Sodium (VOLTAREN) 1 %; Apply 2 g topically 3 (three) times a day as needed (Pain)    Right shoulder strain, initial encounter  49-year-old right-hand-dominant female with onset of right shoulder pain from fall injury earlier today 2025.  Clinical impression that she may have sustained strain and aggravation of calcific  tendinitis.  Will continue to monitor.       Calcific tendinitis of right shoulder  49-year-old right-hand-dominant female with onset of right shoulder pain from fall injury earlier today 4/24/2025.  Clinical impression that she may have sustained strain and aggravation of calcific tendinitis.  Will continue to monitor.       Spinal cord compression (HCC)  49-year-old right-hand-dominant female with history of cervical spine pathology remarkable for cervical disc protrusion and canal stenosis, with a fall injury earlier today 4/24/2025.  Patient currently without any neck pain or any change in numbness/tingling or strength in the upper extremities.  I advised patient at this time currently no concern for acute compromise of spinal cord.  Recommend she continue follow-up with spine specialist as per her 6-month checkups.       DDD (degenerative disc disease), cervical  49-year-old right-hand-dominant female with history of cervical spine pathology remarkable for cervical disc protrusion and canal stenosis, with a fall injury earlier today 4/24/2025.  Patient currently without any neck pain or any change in numbness/tingling or strength in the upper extremities.  I advised patient at this time currently no concern for acute compromise of spinal cord.  Recommend she continue follow-up with spine specialist as per her 6-month checkups.           History of Present Illness   Chief Complaint   Patient presents with    Spine - Pain     Pt fell this am on asfalt. Pt sees Dr. Olson for spinal cord compression, want's to make sure there is no more damage. Pt states pain with deep breath.       DAHLIA Nam is a 49 y.o. female right-hand-dominant who presents for evaluation after sustaining a fall injury earlier today on 4/24/2025.  She tripped on a cord and fell forward landing on her left knee and outstretched hands.  She had pain described as sudden in onset, localized to the anterior chest wall worse at the  "sternum and left costochondral area, worse with taking deep breaths and direct palpation of the area.  She also also reports having pain of the shoulder described as sudden in onset, localized to the anterior lateral aspect, burning and aching, worse with moving the arm, and improved with resting.  She reports having numbness and tingling both upper extremities, however this has been previously underlying and has not changed.  She currently denies any neck pain or any new onset weakness in the upper extremities.  She reports having numbness and tingling usually in the first 3 digits of the hand that is bothersome at nighttime and wakes her from sleep.  She does report history of dropping objects during today.    History obtained from: patient    Review of Systems   Musculoskeletal:  Positive for arthralgias. Negative for neck pain.   Neurological:  Positive for numbness. Negative for weakness.          Objective   Ht 5' 3\" (1.6 m)   Wt 70.3 kg (155 lb)   BMI 27.46 kg/m²      Physical Exam  Vitals and nursing note reviewed.   Constitutional:       Appearance: Normal appearance. She is well-developed. She is not ill-appearing or diaphoretic.   HENT:      Head: Normocephalic and atraumatic.      Right Ear: External ear normal.      Left Ear: External ear normal.   Eyes:      Conjunctiva/sclera: Conjunctivae normal.   Neck:      Trachea: No tracheal deviation.   Pulmonary:      Effort: Pulmonary effort is normal. No respiratory distress.   Abdominal:      General: There is no distension.   Musculoskeletal:         General: Tenderness (Left costochondral junction ribs 5-7) present.   Skin:     General: Skin is warm and dry.      Coloration: Skin is not jaundiced or pale.   Neurological:      Mental Status: She is alert and oriented to person, place, and time.   Psychiatric:         Mood and Affect: Mood normal.         Behavior: Behavior normal.         Thought Content: Thought content normal.         Judgment: " Judgment normal.       Right Hand Exam     Muscle Strength   The patient has normal right wrist strength.    Tests   Tinel's sign (median nerve): negative    Other   Sensation: normal  Pulse: present    Comments:  Positive carpal tunnel compression      Left Hand Exam     Muscle Strength   The patient has normal left wrist strength.    Tests   Tinel's sign (median nerve): negative    Other   Sensation: normal  Pulse: present    Comments:  Negative carpal tunnel compression      Right Elbow Exam     Tenderness   The patient is experiencing no tenderness.     Range of Motion   The patient has normal right elbow ROM.    Muscle Strength   The patient has normal right elbow strength (5/5 flexion and extension).    Other   Sensation: normal      Left Elbow Exam     Other   Sensation: normal      Right Shoulder Exam     Tenderness   Right shoulder tenderness location: Anterior, lateral.    Range of Motion   Active abduction:  120   Forward flexion:  130   Internal rotation 0 degrees:  Lumbar     Muscle Strength   Abduction: 5/5   Internal rotation: 5/5   External rotation: 5/5   Supraspinatus: 5/5     Tests   Fischer test: positive    Other   Sensation: normal    Comments:  Pain with empty can      Left Shoulder Exam     Other   Sensation: normal            Administrative Statements   I have spent a total time of 45 minutes in caring for this patient on the day of the visit/encounter including Diagnostic results, Prognosis, Risks and benefits of tx options, Instructions for management, Impressions, Documenting in the medical record, Reviewing/placing orders in the medical record (including tests, medications, and/or procedures), and Obtaining or reviewing history  .

## 2025-04-24 NOTE — ASSESSMENT & PLAN NOTE
49-year-old right-hand-dominant female with onset of right shoulder pain from fall injury earlier today 4/24/2025.  Clinical impression that she may have sustained strain and aggravation of calcific tendinitis.  Will continue to monitor.

## 2025-04-24 NOTE — ASSESSMENT & PLAN NOTE
49-year-old right-hand-dominant female with history of cervical spine pathology remarkable for cervical disc protrusion and canal stenosis, with a fall injury earlier today 4/24/2025.  Patient currently without any neck pain or any change in numbness/tingling or strength in the upper extremities.  I advised patient at this time currently no concern for acute compromise of spinal cord.  Recommend she continue follow-up with spine specialist as per her 6-month checkups.

## 2025-04-24 NOTE — ASSESSMENT & PLAN NOTE
49-year-old right-hand-dominant female with numbness and tingling both upper extremities.  Will refer for ultrasound to eval for carpal tunnel syndrome.  I provided cock-up wrist splints to wear at nighttime.  Follow-up after having ultrasounds done.  Orders:    Cock Up Wrist Splint

## 2025-04-24 NOTE — ASSESSMENT & PLAN NOTE
49-year-old right-hand-dominant female with numbness and tingling both upper extremities more than 5 years duration.  Clinical impression is that she may have symptoms from carpal tunnel syndrome, and there may be component of cervical radiculopathy.    I will refer her for ultrasound of the wrist to evaluate for carpal tunnel syndrome.  In the interim I provided her cock-up wrist splints to wear at nighttime to bring hyperflexion of the wrist.  Start taking turmeric turmeric, tart cherry, and glucosamine supplements.  May do Epsom salt soaks.  Follow-up after having ultrasound done.  Orders:    US Carpal Tunnel; Future

## 2025-04-24 NOTE — ASSESSMENT & PLAN NOTE
49-year-old right-hand-dominant female onset of costochondral pain from fall injury earlier today 4/24/2025.  Clinical impression is onset of constant chondritis.  I discussed treatment in form of topical anti-inflammatory and icing.  May apply top of diclofenac 3 times daily and apply ice as needed.  Follow-up as needed.  Orders:    Diclofenac Sodium (VOLTAREN) 1 %; Apply 2 g topically 3 (three) times a day as needed (Pain)

## 2025-04-26 ENCOUNTER — APPOINTMENT (OUTPATIENT)
Dept: LAB | Facility: HOSPITAL | Age: 50
End: 2025-04-26
Payer: COMMERCIAL

## 2025-04-26 DIAGNOSIS — M25.50 POLYARTHRALGIA: ICD-10-CM

## 2025-04-26 DIAGNOSIS — R50.9 FEVER, UNSPECIFIED FEVER CAUSE: ICD-10-CM

## 2025-04-26 DIAGNOSIS — R10.11 RUQ ABDOMINAL PAIN: ICD-10-CM

## 2025-04-26 LAB
AMYLASE SERPL-CCNC: 38 IU/L (ref 29–103)
B BURGDOR IGG+IGM SER QL IA: NEGATIVE
BASOPHILS # BLD AUTO: 0.05 THOUSANDS/ÂΜL (ref 0–0.1)
BASOPHILS NFR BLD AUTO: 1 % (ref 0–1)
EOSINOPHIL # BLD AUTO: 0.66 THOUSAND/ÂΜL (ref 0–0.61)
EOSINOPHIL NFR BLD AUTO: 7 % (ref 0–6)
ERYTHROCYTE [DISTWIDTH] IN BLOOD BY AUTOMATED COUNT: 12.1 % (ref 11.6–15.1)
HCT VFR BLD AUTO: 38 % (ref 34.8–46.1)
HGB BLD-MCNC: 12.5 G/DL (ref 11.5–15.4)
IMM GRANULOCYTES # BLD AUTO: 0.03 THOUSAND/UL (ref 0–0.2)
IMM GRANULOCYTES NFR BLD AUTO: 0 % (ref 0–2)
LIPASE SERPL-CCNC: 27 U/L (ref 11–82)
LYMPHOCYTES # BLD AUTO: 2.37 THOUSANDS/ÂΜL (ref 0.6–4.47)
LYMPHOCYTES NFR BLD AUTO: 27 % (ref 14–44)
MCH RBC QN AUTO: 30.7 PG (ref 26.8–34.3)
MCHC RBC AUTO-ENTMCNC: 32.9 G/DL (ref 31.4–37.4)
MCV RBC AUTO: 93 FL (ref 82–98)
MONOCYTES # BLD AUTO: 0.59 THOUSAND/ÂΜL (ref 0.17–1.22)
MONOCYTES NFR BLD AUTO: 7 % (ref 4–12)
NEUTROPHILS # BLD AUTO: 5.19 THOUSANDS/ÂΜL (ref 1.85–7.62)
NEUTS SEG NFR BLD AUTO: 58 % (ref 43–75)
NRBC BLD AUTO-RTO: 0 /100 WBCS
PLATELET # BLD AUTO: 293 THOUSANDS/UL (ref 149–390)
PMV BLD AUTO: 9.5 FL (ref 8.9–12.7)
RBC # BLD AUTO: 4.07 MILLION/UL (ref 3.81–5.12)
WBC # BLD AUTO: 8.89 THOUSAND/UL (ref 4.31–10.16)

## 2025-04-26 PROCEDURE — 83690 ASSAY OF LIPASE: CPT

## 2025-04-26 PROCEDURE — 85025 COMPLETE CBC W/AUTO DIFF WBC: CPT

## 2025-04-26 PROCEDURE — 82150 ASSAY OF AMYLASE: CPT

## 2025-04-26 PROCEDURE — 86618 LYME DISEASE ANTIBODY: CPT

## 2025-04-26 PROCEDURE — 36415 COLL VENOUS BLD VENIPUNCTURE: CPT

## 2025-04-29 ENCOUNTER — RESULTS FOLLOW-UP (OUTPATIENT)
Dept: FAMILY MEDICINE CLINIC | Facility: CLINIC | Age: 50
End: 2025-04-29

## 2025-05-13 NOTE — PROGRESS NOTES
Name: Patricia Nam      : 1975      MRN: [id number]  Encounter Provider: Sadia Arroyo PA-C  Encounter Date: 2025   Encounter department: [facility] [place]  :  Assessment & Plan  Encounter for annual routine gynecological examination     Calcium 1200-1500mg (in divided doses-max 600 mg at one time) + 600-1000 IU Vit D daily.   Exercise 150-300 minutes per week minimum including weight bearing exercises.   Pap with high risk HPV Q 5 years, if normal.    Call your insurance company to verify coverage prior to completing any ordered tests.   Annual mammogram already ordered and monthly breast self exam recommended.    Colonoscopy-          Silicone based lubricant with sex. (Use water based lubricant with condoms or sexual toys.)  Vaginal moisturizers twice weekly as needed.   Return to office in one year or sooner, if needed.      Stress incontinence  Kegels 20 times twice daily.   Pelvic floor exercises provided after visit summary  Referral to pelvic floor PT  Discussed with Perifit Kegel device  Orders:    Ambulatory Referral to Physical Therapy; Future        History of Present Illness   HPI  Patricia Nam is a 49 y.o. female who presents for routine annual examination with no concerns      Sexually active yes Monogamous   Birth control: Total hysterectomy/Tubal ligation/cervix out  due to heavy periods related to varicose veins of the uterus.  History of abnormal pap: Yes   ASCUS with HPV neg  Last pap 10/10/19, Findings NILM/HPV neg , Next pap: N/A   Self breast exam yes  Mammogram 2024 [facility]: 2.39% left breast showed a focal asymmetry, right breast showed a mass.  Left breast BI-RADS 3 right breast BI-RADS 2.  Patient had a follow-up left breast biopsy 3/22/2025 which was benign next mammogram Scheduled for 25  Colon Cancer screenin2021 , type colonoscopy , follow up   HPV vaccine series  completed: No    Patient endorses that she gets stress incontinence when she coughs/sneezes/laughs.  She would be interested in considering pelvic floor physical therapy    Hereditary Cancer Screening  FH Breast/Ovarian cancer: denies  FH Uterine cancer: denies  FH Colon cancer: Maternal uncle/Maternal cousin  Cancer-related family history includes Cancer in her cousin, father, and maternal uncle; Colon cancer in her maternal uncle; Thyroid cancer in her maternal aunt. There is no history of Breast cancer.      Review of Systems   Constitutional:  Negative for chills, fatigue and fever.   Gastrointestinal:  Negative for abdominal pain, anal bleeding, blood in stool, constipation, diarrhea, nausea and vomiting.   Genitourinary:  Negative for difficulty urinating, dyspareunia, dysuria, frequency, hematuria, menstrual problem, pelvic pain, urgency, vaginal bleeding, vaginal discharge and vaginal pain.   Neurological:  Negative for dizziness, syncope, light-headedness and headaches.     Medications Ordered Prior to Encounter[1]   Social History     Tobacco Use    Smoking status: Never     Passive exposure: Never    Smokeless tobacco: Never   Vaping Use    Vaping status: Never Used   Substance and Sexual Activity    Alcohol use: Yes     Comment: Not very often    Drug use: Never    Sexual activity: Yes     Partners: Male     Birth control/protection: None     Comment: With one person for 5 years        Objective   /70 (BP Location: Left arm, Patient Position: Sitting, Cuff Size: Standard)   Wt 71.3 kg (157 lb 3.2 oz)   BMI 27.85 kg/m²      Physical Exam  Vitals and nursing note reviewed.   Constitutional:       General: She is not in acute distress.     Appearance: She is well-developed.   HENT:      Head: Normocephalic and atraumatic.     Eyes:      Extraocular Movements: Extraocular movements intact.     Pulmonary:      Effort: Pulmonary effort is normal.   Chest:   Breasts:     Right: No swelling, bleeding,  inverted nipple, mass, nipple discharge, skin change or tenderness.      Left: No swelling, bleeding, inverted nipple, mass, nipple discharge, skin change or tenderness.   Abdominal:      Palpations: Abdomen is soft.      Tenderness: There is no abdominal tenderness.      Hernia: There is no hernia in the left inguinal area or right inguinal area.   Genitourinary:     General: Normal vulva.      Exam position: Lithotomy position.      Pubic Area: No rash.       Labia:         Right: No rash, tenderness or lesion.         Left: No rash, tenderness or lesion.       Urethra: No urethral pain or urethral swelling.      Vagina: No vaginal discharge, erythema, tenderness, bleeding or lesions.      Uterus: Absent.       Adnexa:         Right: No mass, tenderness or fullness.          Left: No mass, tenderness or fullness.        Comments: Cervix and uterus surgically absent  Lymphadenopathy:      Upper Body:      Right upper body: No supraclavicular, axillary or pectoral adenopathy.      Left upper body: No supraclavicular, axillary or pectoral adenopathy.      Lower Body: No right inguinal adenopathy. No left inguinal adenopathy.     Skin:     General: Skin is warm and dry.     Neurological:      Mental Status: She is alert.     Psychiatric:         Mood and Affect: Mood normal.         Behavior: Behavior normal.         Sadia Arroyo PA-C       [1]   Current Outpatient Medications on File Prior to Visit   Medication Sig Dispense Refill    albuterol (PROVENTIL HFA,VENTOLIN HFA) 90 mcg/act inhaler INHALE 1-2 PUFFS EVERY 6 HOURS AS NEEDED FOR WHEEZING. 18 g 3    azelastine (ASTELIN) 0.1 % nasal spray 1 spray into each nostril 2 (two) times a day Use in each nostril as directed 1 mL 1    cyclobenzaprine (FLEXERIL) 10 mg tablet Take 1 tablet (10 mg total) by mouth 3 (three) times a day as needed for muscle spasms 30 tablet 0    Diclofenac Sodium (VOLTAREN) 1 % Apply 2 g topically 3 (three) times a day as needed (Pain) 150 g  1    FLUoxetine (PROzac) 40 MG capsule TAKE ONE CAPSULE BY MOUTH EVERY DAY 90 capsule 1    fluticasone (VERAMYST) 27.5 MCG/SPRAY nasal spray 2 sprays into each nostril in the morning.      hydrOXYzine HCL (ATARAX) 10 mg tablet Take 1 tablet (10 mg total) by mouth daily at bedtime 30 tablet 0    ibuprofen (MOTRIN) 800 mg tablet Take 1 tablet (800 mg total) by mouth every 8 (eight) hours as needed for moderate pain 30 tablet 0    meclizine (ANTIVERT) 25 mg tablet Take 1 tablet (25 mg total) by mouth every 8 (eight) hours as needed for dizziness 90 tablet 0    metoclopramide (REGLAN) 5 mg tablet Take 1 tablet (5 mg total) by mouth 3 (three) times a day before meals 90 tablet 5    omeprazole (PriLOSEC) 10 mg delayed release capsule Take 1 capsule (10 mg total) by mouth every evening 90 capsule 0    omeprazole (PriLOSEC) 40 MG capsule Take 1 capsule (40 mg total) by mouth daily 90 capsule 0    rizatriptan (MAXALT) 10 mg tablet Take 1 tablet (10 mg total) by mouth once as needed for migraine May repeat in 2 hours if needed. Max 2/24 hours, 9/month. 9 tablet 11    EPINEPHrine (EPIPEN) 0.3 mg/0.3 mL SOAJ Inject 0.3 mL (0.3 mg total) into a muscle once for 1 dose As needed 0.6 mL 0    [DISCONTINUED] Diclofenac Sodium (VOLTAREN) 1 % Apply 2 g topically 4 (four) times a day (Patient not taking: Reported on 5/14/2025) 100 g 0     No current facility-administered medications on file prior to visit.

## 2025-05-14 ENCOUNTER — ANNUAL EXAM (OUTPATIENT)
Age: 50
End: 2025-05-14

## 2025-05-14 VITALS — WEIGHT: 157.2 LBS | BODY MASS INDEX: 27.85 KG/M2 | SYSTOLIC BLOOD PRESSURE: 108 MMHG | DIASTOLIC BLOOD PRESSURE: 70 MMHG

## 2025-05-14 DIAGNOSIS — N39.3 STRESS INCONTINENCE: ICD-10-CM

## 2025-05-14 DIAGNOSIS — Z01.419 ENCOUNTER FOR ANNUAL ROUTINE GYNECOLOGICAL EXAMINATION: Primary | ICD-10-CM

## 2025-05-27 ENCOUNTER — TELEPHONE (OUTPATIENT)
Age: 50
End: 2025-05-27

## 2025-05-27 NOTE — TELEPHONE ENCOUNTER
Patient called looking for seborrheic dermatitis fu appointment at [facility] . I offered next available may 2026 , offered different locations but patient  decided to be added to the cancellation list .

## 2025-06-03 ENCOUNTER — OFFICE VISIT (OUTPATIENT)
Age: 50
End: 2025-06-03
Payer: COMMERCIAL

## 2025-06-03 VITALS
DIASTOLIC BLOOD PRESSURE: 70 MMHG | OXYGEN SATURATION: 99 % | WEIGHT: 158.2 LBS | BODY MASS INDEX: 28.03 KG/M2 | HEART RATE: 63 BPM | HEIGHT: 63 IN | SYSTOLIC BLOOD PRESSURE: 106 MMHG

## 2025-06-03 DIAGNOSIS — K31.84 GASTROPARESIS: ICD-10-CM

## 2025-06-03 DIAGNOSIS — K21.9 GASTROESOPHAGEAL REFLUX DISEASE WITHOUT ESOPHAGITIS: Primary | ICD-10-CM

## 2025-06-03 DIAGNOSIS — K58.0 IRRITABLE BOWEL SYNDROME WITH DIARRHEA: ICD-10-CM

## 2025-06-03 PROCEDURE — 99214 OFFICE O/P EST MOD 30 MIN: CPT | Performed by: PHYSICIAN ASSISTANT

## 2025-06-03 RX ORDER — OMEPRAZOLE 20 MG/1
20 CAPSULE, DELAYED RELEASE ORAL DAILY
Qty: 30 CAPSULE | Refills: 1 | Status: SHIPPED | OUTPATIENT
Start: 2025-06-03

## 2025-06-03 NOTE — PROGRESS NOTES
Name: Patricia Nam      : 1975      MRN: [id number]  Encounter Provider: Maria G Nleson PA-C  Encounter Date: 6/3/2025   Encounter department: [facility] [place]  :  Assessment & Plan  Gastroesophageal reflux disease without esophagitis    Gastroparesis    Irritable bowel syndrome with diarrhea    Patient presents for follow up: she has gastroparesis, GERD and IBS-D.  Her gastroparesis symptoms began after her abdominal liposuction surgery 5 years ago.  EGD 21 was normal and biopsies were negative for h pylori.  Colonoscopy with visualization of the terminal ileum 21 was normal and biopsies were negative for microscopic colitis.  US 12/10/22 s/p mild hepatic steatosis and s/p cholecystectomy.  Celiac serology was negative.  Gastric emptying study 22 showed a severely reduced rate of gastric emptying.  MR enterography  was negative for SB crohn's. Pancreatic fecal elastase was normal.  24 hr 5-HIAA was negative.  Repeat EGD 2024 was normal and bx negative for h pylori. She has a father with stomach cancer.  Her IBS-D/possible SIBO was previously treated with a Xifaxan 550mg po TID x 14 days course with significant improvement.   Her GERD has overall been pretty well controlled- she has been able to decrease her Omeprazole to 40mg one day alternating with 10mg the next day.  She has not had to use the Reglan for her gastroparesis.     Continue Omeprazole but she will see if she can decrease the dose further to 20mg one day alternating with 10mg the next.  Small, frequent low fat meals.    She has Reglan to use just on an as needed basis (she is aware of the risk of tardive dyskinesia).  Suggested a probiotic course her her stool complaints.  Follow up in 6 months or sooner if needed.      History of Present Illness   HPI  Patricia Nam is a 49 y.o. female who presents to the office for follow up of her GERD, gastroparesis, and IBS.  She  "reports overall she has been doing pretty well.  She was able to decrease her Omeprazole dose without worsening of her GERD symptoms and is interested to wean down further as tolerated.  No vomiting.  No melena or rectal bleeding.  She does report increased stool odor.  No frequent watery diarrhea.    History obtained from: patient      Medical History Reviewed by provider this encounter:  Meds     .  Past Medical History   Past Medical History[1]  Past Surgical History[2]  Family History[3]   reports that she has never smoked. She has never been exposed to tobacco smoke. She has never used smokeless tobacco. She reports current alcohol use. She reports that she does not use drugs.  Current Outpatient Medications   Medication Instructions    albuterol (PROVENTIL HFA,VENTOLIN HFA) 90 mcg/act inhaler INHALE 1-2 PUFFS EVERY 6 HOURS AS NEEDED FOR WHEEZING.    azelastine (ASTELIN) 0.1 % nasal spray 1 spray, Nasal, 2 times daily, Use in each nostril as directed    cyclobenzaprine (FLEXERIL) 10 mg, Oral, 3 times daily PRN    Diclofenac Sodium (VOLTAREN) 2 g, Topical, 3 times daily PRN    EPINEPHrine (EPIPEN) 0.3 mg, Intramuscular, Once, As needed    FLUoxetine (PROZAC) 40 mg, Oral, Daily    fluticasone (VERAMYST) 27.5 MCG/SPRAY nasal spray 2 sprays, Daily    hydrOXYzine HCL (ATARAX) 10 mg, Oral, Daily at bedtime,       ibuprofen (MOTRIN) 800 mg, Oral, Every 8 hours PRN    meclizine (ANTIVERT) 25 mg, Oral, Every 8 hours PRN    metoclopramide (REGLAN) 5 mg, Oral, 3 times daily before meals    omeprazole (PRILOSEC) 10 mg, Oral, Every evening    omeprazole (PRILOSEC) 40 mg, Oral, Daily    omeprazole (PRILOSEC) 20 mg, Oral, Daily    rizatriptan (MAXALT) 10 mg, Oral, Once as needed, May repeat in 2 hours if needed. Max 2/24 hours, 9/month.   Allergies[4]   Medications Ordered Prior to Encounter[5]   Social History[6]     Objective   /70   Pulse 63   Ht 5' 3\" (1.6 m)   Wt 71.8 kg (158 lb 3.2 oz)   SpO2 99%   BMI 28.02 " kg/m²      Physical Exam  Constitutional:       General: She is not in acute distress.     Appearance: Normal appearance. She is not ill-appearing.   HENT:      Head: Normocephalic and atraumatic.     Cardiovascular:      Rate and Rhythm: Normal rate and regular rhythm.   Pulmonary:      Effort: Pulmonary effort is normal. No respiratory distress.      Breath sounds: Normal breath sounds.     Skin:     General: Skin is warm and dry.     Neurological:      Mental Status: She is alert and oriented to person, place, and time.     Psychiatric:         Mood and Affect: Mood normal.         Behavior: Behavior normal.                [1]   Past Medical History:  Diagnosis Date    Allergic     Asthma     Degenerative joint disease     Depression     GERD (gastroesophageal reflux disease)     Lumbar radiculopathy     PONV (postoperative nausea and vomiting)     Thyroid nodule    [2]   Past Surgical History:  Procedure Laterality Date    BREAST BIOPSY Left 2020    neg    BREAST BIOPSY Left 03/22/2025    CHEST WALL BIOPSY N/A 11/29/2024    Procedure: EXCISION  BIOPSY LESION CHEST WALL;  Surgeon: Zion Walker MD;  [facility]: [facility];  Service: General    CHOLECYSTECTOMY LAPAROSCOPIC N/A 05/06/2022    Procedure: CHOLECYSTECTOMY LAPAROSCOPIC;  Surgeon: Zion Walker MD;  Location: [facility];  Service: General    COLONOSCOPY      CYST REMOVAL  10/2020    lt breast     EGD      HYSTERECTOMY      HYSTERECTOMY  1998    LIPOSUCTION  09/2020    TUBAL LIGATION      US GUIDED BREAST BIOPSY LEFT COMPLETE Left 3/22/2025   [3]   Family History  Problem Relation Name Age of Onset    Alzheimer's disease Mother      Cancer Father Michael Viera         stomach/GI    Heart disease Father Michael Viera     No Known Problems Sister      No Known Problems Daughter      No Known Problems Maternal Grandmother      No Known Problems Maternal Grandfather      No Known Problems Paternal Grandmother      No Known Problems Paternal Grandfather       Diabetes Brother Karlo Viera     Asthma Brother Karlo Viera     Thyroid cancer Maternal Aunt      No Known Problems Maternal Aunt      No Known Problems Maternal Aunt      No Known Problems Maternal Aunt      No Known Problems Maternal Aunt      No Known Problems Maternal Aunt      Cancer Maternal Uncle          liver/stomach ca    Colon cancer Maternal Uncle      Depression Cousin      Cancer Cousin Naga bump     Breast cancer Neg Hx     [4]   Allergies  Allergen Reactions    Bee Venom Anaphylaxis    Iodides Other (See Comments)    [place] Oil - Food Allergy GI Intolerance     Other reaction(s): GI Intolerance, GI Reaction    Aspirin Other (See Comments)     shaking  convulsions    Other reaction(s): Other (See Comments)  shaking  convulsions  shaking  convulsions    Metronidazole GI Intolerance     Gi upset      Sulfamethoxazole-Trimethoprim GI Intolerance     Gi upst    Other reaction(s): GI Intolerance, GI Reaction  Gi upst  Gi upst    Tape  [Medical Tape] Other (See Comments) and Hives     fever  Other reaction(s): Other  fever   [5]   Current Outpatient Medications on File Prior to Visit   Medication Sig Dispense Refill    albuterol (PROVENTIL HFA,VENTOLIN HFA) 90 mcg/act inhaler INHALE 1-2 PUFFS EVERY 6 HOURS AS NEEDED FOR WHEEZING. 18 g 3    azelastine (ASTELIN) 0.1 % nasal spray 1 spray into each nostril 2 (two) times a day Use in each nostril as directed 1 mL 1    cyclobenzaprine (FLEXERIL) 10 mg tablet Take 1 tablet (10 mg total) by mouth 3 (three) times a day as needed for muscle spasms 30 tablet 0    Diclofenac Sodium (VOLTAREN) 1 % Apply 2 g topically 3 (three) times a day as needed (Pain) 150 g 1    EPINEPHrine (EPIPEN) 0.3 mg/0.3 mL SOAJ Inject 0.3 mL (0.3 mg total) into a muscle once for 1 dose As needed 0.6 mL 0    FLUoxetine (PROzac) 40 MG capsule TAKE ONE CAPSULE BY MOUTH EVERY DAY 90 capsule 1    fluticasone (VERAMYST) 27.5 MCG/SPRAY nasal spray 2 sprays into each nostril in the morning.       hydrOXYzine HCL (ATARAX) 10 mg tablet Take 1 tablet (10 mg total) by mouth daily at bedtime 30 tablet 0    ibuprofen (MOTRIN) 800 mg tablet Take 1 tablet (800 mg total) by mouth every 8 (eight) hours as needed for moderate pain 30 tablet 0    meclizine (ANTIVERT) 25 mg tablet Take 1 tablet (25 mg total) by mouth every 8 (eight) hours as needed for dizziness 90 tablet 0    metoclopramide (REGLAN) 5 mg tablet Take 1 tablet (5 mg total) by mouth 3 (three) times a day before meals 90 tablet 5    omeprazole (PriLOSEC) 10 mg delayed release capsule Take 1 capsule (10 mg total) by mouth every evening 90 capsule 0    omeprazole (PriLOSEC) 40 MG capsule Take 1 capsule (40 mg total) by mouth daily 90 capsule 0    rizatriptan (MAXALT) 10 mg tablet Take 1 tablet (10 mg total) by mouth once as needed for migraine May repeat in 2 hours if needed. Max 2/24 hours, 9/month. 9 tablet 11     No current facility-administered medications on file prior to visit.   [6]   Social History  Tobacco Use    Smoking status: Never     Passive exposure: Never    Smokeless tobacco: Never   Vaping Use    Vaping status: Never Used   Substance and Sexual Activity    Alcohol use: Yes     Comment: Not very often    Drug use: Never    Sexual activity: Yes     Partners: Male     Birth control/protection: None     Comment: With one person for 5 years

## 2025-06-17 ENCOUNTER — TELEPHONE (OUTPATIENT)
Dept: OBGYN CLINIC | Facility: CLINIC | Age: 50
End: 2025-06-17

## 2025-06-17 NOTE — TELEPHONE ENCOUNTER
Called [facility] 6/17/25 at [phone] and verified coverage is current. Ref #[id number]

## 2025-06-18 ENCOUNTER — OFFICE VISIT (OUTPATIENT)
Dept: OBGYN CLINIC | Facility: CLINIC | Age: 50
End: 2025-06-18
Payer: COMMERCIAL

## 2025-06-18 VITALS — BODY MASS INDEX: 27.68 KG/M2 | HEIGHT: 63 IN | WEIGHT: 156.2 LBS

## 2025-06-18 DIAGNOSIS — M50.30 DDD (DEGENERATIVE DISC DISEASE), CERVICAL: Primary | ICD-10-CM

## 2025-06-18 DIAGNOSIS — G95.20 SPINAL CORD COMPRESSION (HCC): ICD-10-CM

## 2025-06-18 PROCEDURE — 99213 OFFICE O/P EST LOW 20 MIN: CPT | Performed by: ORTHOPAEDIC SURGERY

## 2025-06-18 NOTE — PROGRESS NOTES
[facility] ORTHOPEDIC SPINE SURGERY  DR. GARRETT GALINDO MD  [phone] [address]  [facility] [place], [address]  [place] [id number]      HISTORY OF PRESENT ILLNESS:    Patricia Nam is a 49 y.o. female who presents for follow-up of cervical spine. Patient was last seen in the office on 11/20/2024 where patient was advised to follow-up in six months for repeat myelopathy check. Patient denies any changes in her function. Patient does admit she tripped over a cord two months ago and had a fall. She is treating with Dr. Tinajero for right shoulder pain and carpal tunnel following the injury.       ALLERGIES:   Allergies   Allergen Reactions    Bee Venom Anaphylaxis    Iodides Other (See Comments)    [place] Oil - Food Allergy GI Intolerance     Other reaction(s): GI Intolerance, GI Reaction    Aspirin Other (See Comments)     shaking  convulsions    Other reaction(s): Other (See Comments)  shaking  convulsions  shaking  convulsions    Metronidazole GI Intolerance     Gi upset      Sulfamethoxazole-Trimethoprim GI Intolerance     Gi upst    Other reaction(s): GI Intolerance, GI Reaction  Gi upst  Gi upst    Tape  [Medical Tape] Other (See Comments) and Hives     fever  Other reaction(s): Other  fever       MEDICATIONS:    Current Outpatient Medications:     albuterol (PROVENTIL HFA,VENTOLIN HFA) 90 mcg/act inhaler, INHALE 1-2 PUFFS EVERY 6 HOURS AS NEEDED FOR WHEEZING., Disp: 18 g, Rfl: 3    azelastine (ASTELIN) 0.1 % nasal spray, 1 spray into each nostril 2 (two) times a day Use in each nostril as directed, Disp: 1 mL, Rfl: 1    cyclobenzaprine (FLEXERIL) 10 mg tablet, Take 1 tablet (10 mg total) by mouth 3 (three) times a day as needed for muscle spasms, Disp: 30 tablet, Rfl: 0    Diclofenac Sodium (VOLTAREN) 1 %, Apply 2 g topically 3 (three) times a day as needed (Pain), Disp: 150 g, Rfl: 1    EPINEPHrine (EPIPEN) 0.3 mg/0.3 mL SOAJ, Inject 0.3 mL (0.3 mg total) into a muscle once for 1  dose As needed, Disp: 0.6 mL, Rfl: 0    FLUoxetine (PROzac) 40 MG capsule, TAKE ONE CAPSULE BY MOUTH EVERY DAY, Disp: 90 capsule, Rfl: 1    fluticasone (VERAMYST) 27.5 MCG/SPRAY nasal spray, 2 sprays into each nostril in the morning., Disp: , Rfl:     hydrOXYzine HCL (ATARAX) 10 mg tablet, Take 1 tablet (10 mg total) by mouth daily at bedtime, Disp: 30 tablet, Rfl: 0    ibuprofen (MOTRIN) 800 mg tablet, Take 1 tablet (800 mg total) by mouth every 8 (eight) hours as needed for moderate pain, Disp: 30 tablet, Rfl: 0    meclizine (ANTIVERT) 25 mg tablet, Take 1 tablet (25 mg total) by mouth every 8 (eight) hours as needed for dizziness, Disp: 90 tablet, Rfl: 0    metoclopramide (REGLAN) 5 mg tablet, Take 1 tablet (5 mg total) by mouth 3 (three) times a day before meals, Disp: 90 tablet, Rfl: 5    omeprazole (PriLOSEC) 10 mg delayed release capsule, Take 1 capsule (10 mg total) by mouth every evening, Disp: 90 capsule, Rfl: 0    omeprazole (PriLOSEC) 20 mg delayed release capsule, Take 1 capsule (20 mg total) by mouth daily, Disp: 30 capsule, Rfl: 1    omeprazole (PriLOSEC) 40 MG capsule, Take 1 capsule (40 mg total) by mouth daily, Disp: 90 capsule, Rfl: 0    rizatriptan (MAXALT) 10 mg tablet, Take 1 tablet (10 mg total) by mouth once as needed for migraine May repeat in 2 hours if needed. Max 2/24 hours, 9/month., Disp: 9 tablet, Rfl: 11     PAST MEDICAL HISTORY:   Past Medical History:   Diagnosis Date    Allergic     Asthma     Degenerative joint disease     Depression 1998    GERD (gastroesophageal reflux disease)     Lumbar radiculopathy     PONV (postoperative nausea and vomiting)     Thyroid nodule        PAST SURGICAL HISTORY:  Past Surgical History:   Procedure Laterality Date    ABDOMINAL SURGERY  9-2020    Liposuction    BREAST BIOPSY Left 2020    neg    BREAST BIOPSY Left 03/22/2025    CHEST WALL BIOPSY N/A 11/29/2024    Procedure: EXCISION  BIOPSY LESION CHEST WALL;  Surgeon: Zion Walker MD;  Location:  [facility];  Service: General    CHOLECYSTECTOMY LAPAROSCOPIC N/A 05/06/2022    Procedure: CHOLECYSTECTOMY LAPAROSCOPIC;  Surgeon: Zion Walker MD;  [facility]: [facility];  Service: General    COLONOSCOPY      CYST REMOVAL  10/2020    lt breast     EGD      HYSTERECTOMY      HYSTERECTOMY  1998    LIPOSUCTION  09/2020    TUBAL LIGATION      US GUIDED BREAST BIOPSY LEFT COMPLETE Left 03/22/2025       SOCIAL HISTORY:  Social History     Tobacco Use   Smoking Status Never    Passive exposure: Never   Smokeless Tobacco Never          PHYSICAL EXAM:  49 y.o. female sitting comfortably on exam chair in no apparent distress.   Ambulates with normal gait  Able to go up on toes and heels   Able to balance on one leg  5/5 motor strength with normal sensation in bilateral upper extremities  2+ deep tendon reflexes bilateral upper extremities   3+ patellar reflexes bilaterally  2+ achilles reflex  Absent haskins sign bilaterally  Radial release absent  Normal rapid alternating movements bilateral upper extremities  Normal rapid alternating movements bilateral lower extremity      RADIOGRAPHIC STUDIES:  MRI, L-spine 3/17/2021, mild multilevel degenerative disc disease.  Moderate degenerative changes L3-4.  Moderate to severe degenerative changes at L5-S1.  Posterior central disc protrusion with slight predominance on the left side with minimal lateral recess stenosis at L5-S1.  Facet hypertrophy and mild lateral recess stenosis L4-5.  CT, abdomen, 06/03/2021, multilevel thoracic and lumbar degenerative disc disease, diffuse idiopathic skeletal hyperostosis, moderate degenerative disc disease L3-4 L4-5 and L5-S1 with posterior osteophyte formation.  No evidence of bony stenosis.  No evidence of congenital stenosis.  MRI, cervical spine, 8/13/2023: Multilevel cervical degenerative disc disease, most significant at C5-6 and C6-7.  There is evidence of posterior ossified formation at this point resulting in spinal cord compression  and spinal cord indentation.  The spinal cord has not flattened.  There is no some myelomalacia.  Xrays, cervical spine, 9/25/2023: Severe degenerative changes at C5-6 and C6-7.  Mild to moderate degenerative changes at other levels of the cervical spine.    ASSESSMENT:  1. DDD (degenerative disc disease), cervical  2. Spinal cord compression (HCC)        PLAN:  49 y.o. female with cervical DDD and cervical spinal cord compression.  She has been evaluated for myelopathy in the past and returns for routine 6 months follow-up.    Patient underwent routine neurological examination today. There are no signs of myelopathy on exam. In absence of changes to function, we will continue to monitor the patient closely every 6 months.     Patient will follow-up in 6 months for repeat myelopathy check.  Her last MRI study was from 2023.  I did explain to her that in 2 to 3 years, we will repeat the MRI study to make sure there is no progression of spinal stenosis.     Scribe Attestation      I,:  Maria G Vilchis am acting as a scribe while in the presence of the attending physician.:       I,:  Brock Olson MD personally performed the services described in this documentation    as scribed in my presence.:

## (undated) DEVICE — PAD GROUNDING DUAL ADULT

## (undated) DEVICE — ELECTRODE LAP L WIRE E-Z CLEAN 33CM -0100

## (undated) DEVICE — IV CATH 14 G X 1.75

## (undated) DEVICE — INTENDED FOR TISSUE SEPARATION, AND OTHER PROCEDURES THAT REQUIRE A SHARP SURGICAL BLADE TO PUNCTURE OR CUT.: Brand: BARD-PARKER SAFETY BLADES SIZE 15, STERILE

## (undated) DEVICE — TISSUE RETRIEVAL SYSTEM: Brand: INZII RETRIEVAL SYSTEM

## (undated) DEVICE — TROCAR: Brand: KII FIOS FIRST ENTRY

## (undated) DEVICE — SCD SEQUENTIAL COMPRESSION COMFORT SLEEVE MEDIUM KNEE LENGTH: Brand: KENDALL SCD

## (undated) DEVICE — PAD GROUNDING ADULT

## (undated) DEVICE — NEEDLE 25G X 1 1/2

## (undated) DEVICE — ALLENTOWN LAP CHOLE APP PACK: Brand: CARDINAL HEALTH

## (undated) DEVICE — 3M™ TEGADERM™ TRANSPARENT FILM DRESSING FRAME STYLE, 1624W, 2-3/8 IN X 2-3/4 IN (6 CM X 7 CM), 100/CT 4CT/CASE: Brand: 3M™ TEGADERM™

## (undated) DEVICE — ENDOPATH 5MM CURVED SCISSORS WITH MONOPOLAR CAUTERY: Brand: ENDOPATH

## (undated) DEVICE — MINOR PROCEDURE DRAPE: Brand: CONVERTORS

## (undated) DEVICE — PLUMEPEN PRO 10FT

## (undated) DEVICE — GLOVE INDICATOR PI UNDERGLOVE SZ 7.5 BLUE

## (undated) DEVICE — GAUZE SPONGES,8 PLY: Brand: CURITY

## (undated) DEVICE — 3M™ STERI-STRIP™ REINFORCED ADHESIVE SKIN CLOSURES, R1541, 1/4 IN X 3 IN (6 MM X 75 MM), 3 STRIPS/ENVELOPE: Brand: 3M™ STERI-STRIP™

## (undated) DEVICE — DRAPE EQUIPMENT RF WAND

## (undated) DEVICE — CHLORAPREP HI-LITE 26ML ORANGE

## (undated) DEVICE — 4-PORT MANIFOLD: Brand: NEPTUNE 2

## (undated) DEVICE — DRAPE C-ARM X-RAY

## (undated) DEVICE — SUT VICRYL 4-0 PS-2 27 IN J426H

## (undated) DEVICE — TUBING SUCTION 5MM X 12 FT

## (undated) DEVICE — PENCIL ELECTROSURG E-Z CLEAN -0035H

## (undated) DEVICE — POOLE SUCTION HANDLE: Brand: CARDINAL HEALTH

## (undated) DEVICE — COTTON TIP APPLICTOR 2 PK

## (undated) DEVICE — BETHLEHEM UNIVERSAL OUTPATIENT: Brand: CARDINAL HEALTH

## (undated) DEVICE — 3M™ STERI-STRIP™ REINFORCED ADHESIVE SKIN CLOSURES, R1546, 1/4 IN X 4 IN (6 MM X 100 MM), 10 STRIPS/ENVELOPE: Brand: 3M™ STERI-STRIP™

## (undated) DEVICE — 5 MM CURVED DISSECTORS WITH MONOPOLAR CAUTERY: Brand: ENDOPATH

## (undated) DEVICE — LIGAMAX 5 MM ENDOSCOPIC MULTIPLE CLIP APPLIER: Brand: LIGAMAX

## (undated) DEVICE — HYDROPHILIC WOUND DRESSING WITH ZINC PLUS VITAMINS A AND B6.: Brand: DERMAGRAN®-B

## (undated) DEVICE — [HIGH FLOW INSUFFLATOR,  DO NOT USE IF PACKAGE IS DAMAGED,  KEEP DRY,  KEEP AWAY FROM SUNLIGHT,  PROTECT FROM HEAT AND RADIOACTIVE SOURCES.]: Brand: PNEUMOSURE

## (undated) DEVICE — LIGHT HANDLE COVER SLEEVE DISP BLUE STELLAR

## (undated) DEVICE — CHOLE CATH KIT ARROW

## (undated) DEVICE — TUBING SMOKE EVAC W/FILTRATION DEVICE PLUMEPORT ACTIV

## (undated) DEVICE — GLOVE SRG BIOGEL ECLIPSE 7.5

## (undated) DEVICE — TROCAR: Brand: KII® SLEEVE

## (undated) DEVICE — SYRINGE 10ML LL